# Patient Record
Sex: MALE | Race: WHITE | NOT HISPANIC OR LATINO | Employment: OTHER | URBAN - METROPOLITAN AREA
[De-identification: names, ages, dates, MRNs, and addresses within clinical notes are randomized per-mention and may not be internally consistent; named-entity substitution may affect disease eponyms.]

---

## 2017-01-04 ENCOUNTER — TELEPHONE (OUTPATIENT)
Dept: INTERNAL MEDICINE | Facility: CLINIC | Age: 72
End: 2017-01-04

## 2017-01-04 NOTE — TELEPHONE ENCOUNTER
Attempted to call patient back to inform [employer] form was signed by Dr ZURITA and faxed to [employer].  No answer, LVM.

## 2017-01-04 NOTE — TELEPHONE ENCOUNTER
----- Message from Gina Donovan sent at 1/4/2017 11:13 AM CST -----  Contact: wife-Ms Pringle  Mailed in form that needs to be completed and sent to [facility]. Please call back at

## 2017-01-04 NOTE — TELEPHONE ENCOUNTER
----- Message from Myra Shine sent at 1/4/2017  9:20 AM CST -----  Contact: self/[phone]  Pt called in regards to getting a npp appointment with the dr. I was unable to schedule.      Please advise

## 2017-01-06 ENCOUNTER — TELEPHONE (OUTPATIENT)
Dept: CARDIOLOGY | Facility: CLINIC | Age: 72
End: 2017-01-06

## 2017-01-06 NOTE — TELEPHONE ENCOUNTER
----- Message from Lidya Ruiz LPN sent at 1/6/2017 10:56 AM CST -----  Contact: Dee Pringle (Spouse)      ----- Message -----     From: Melinda Egan     Sent: 1/6/2017  10:47 AM       To: Lisseth Murray Staff    Message for Rosa, regarding status of paper work  Call back on#  [phone]  thanks

## 2017-01-06 NOTE — TELEPHONE ENCOUNTER
Spoke with wife. Paperwork was faxed to [facility] yesterday.  Informed her to call back for re-faxing if [facility] states they did not receive it.

## 2017-01-26 ENCOUNTER — OFFICE VISIT (OUTPATIENT)
Dept: INTERNAL MEDICINE | Facility: CLINIC | Age: 72
End: 2017-01-26
Payer: MEDICARE

## 2017-01-26 VITALS
RESPIRATION RATE: 14 BRPM | WEIGHT: 236.56 LBS | BODY MASS INDEX: 30.36 KG/M2 | HEART RATE: 52 BPM | HEIGHT: 74 IN | TEMPERATURE: 99 F | DIASTOLIC BLOOD PRESSURE: 76 MMHG | SYSTOLIC BLOOD PRESSURE: 184 MMHG

## 2017-01-26 DIAGNOSIS — Z12.11 COLON CANCER SCREENING: ICD-10-CM

## 2017-01-26 DIAGNOSIS — Z12.5 PROSTATE CANCER SCREENING: ICD-10-CM

## 2017-01-26 DIAGNOSIS — I73.9 PAD (PERIPHERAL ARTERY DISEASE): ICD-10-CM

## 2017-01-26 DIAGNOSIS — E66.9 DIABETES MELLITUS TYPE 2 IN OBESE: ICD-10-CM

## 2017-01-26 DIAGNOSIS — I73.9 PVD (PERIPHERAL VASCULAR DISEASE): ICD-10-CM

## 2017-01-26 DIAGNOSIS — E78.2 MIXED HYPERCHOLESTEROLEMIA AND HYPERTRIGLYCERIDEMIA: ICD-10-CM

## 2017-01-26 DIAGNOSIS — F33.0 MILD EPISODE OF RECURRENT MAJOR DEPRESSIVE DISORDER: ICD-10-CM

## 2017-01-26 DIAGNOSIS — F13.20 SEDATIVE DEPENDENCE: ICD-10-CM

## 2017-01-26 DIAGNOSIS — Z87.891 EX-SMOKER: ICD-10-CM

## 2017-01-26 DIAGNOSIS — E11.69 DIABETES MELLITUS TYPE 2 IN OBESE: ICD-10-CM

## 2017-01-26 DIAGNOSIS — I25.10 CORONARY ARTERY DISEASE DUE TO LIPID RICH PLAQUE: ICD-10-CM

## 2017-01-26 DIAGNOSIS — E11.9 DIABETIC EYE EXAM: ICD-10-CM

## 2017-01-26 DIAGNOSIS — Z01.00 DIABETIC EYE EXAM: ICD-10-CM

## 2017-01-26 DIAGNOSIS — I25.10 CORONARY ARTERY DISEASE INVOLVING NATIVE CORONARY ARTERY OF NATIVE HEART WITHOUT ANGINA PECTORIS: ICD-10-CM

## 2017-01-26 DIAGNOSIS — Z23 NEED FOR PROPHYLACTIC VACCINATION AND INOCULATION AGAINST INFLUENZA: ICD-10-CM

## 2017-01-26 DIAGNOSIS — F51.01 PRIMARY INSOMNIA: ICD-10-CM

## 2017-01-26 DIAGNOSIS — G47.00 INSOMNIA, UNSPECIFIED TYPE: ICD-10-CM

## 2017-01-26 DIAGNOSIS — I25.83 CORONARY ARTERY DISEASE DUE TO LIPID RICH PLAQUE: ICD-10-CM

## 2017-01-26 DIAGNOSIS — Z00.00 WELL ADULT EXAM: Primary | ICD-10-CM

## 2017-01-26 DIAGNOSIS — Z23 NEED FOR VACCINATION WITH 13-POLYVALENT PNEUMOCOCCAL CONJUGATE VACCINE: ICD-10-CM

## 2017-01-26 DIAGNOSIS — Z13.6 SCREENING FOR AAA (ABDOMINAL AORTIC ANEURYSM): ICD-10-CM

## 2017-01-26 DIAGNOSIS — R09.89 BILATERAL CAROTID BRUITS: ICD-10-CM

## 2017-01-26 DIAGNOSIS — I10 ESSENTIAL HYPERTENSION: ICD-10-CM

## 2017-01-26 PROCEDURE — 3078F DIAST BP <80 MM HG: CPT | Mod: S$GLB,,, | Performed by: FAMILY MEDICINE

## 2017-01-26 PROCEDURE — 99387 INIT PM E/M NEW PAT 65+ YRS: CPT | Mod: S$GLB,,, | Performed by: FAMILY MEDICINE

## 2017-01-26 PROCEDURE — 99999 PR PBB SHADOW E&M-EST. PATIENT-LVL IV: CPT | Mod: PBBFAC,,, | Performed by: FAMILY MEDICINE

## 2017-01-26 PROCEDURE — G0008 ADMIN INFLUENZA VIRUS VAC: HCPCS | Mod: 59,S$GLB,, | Performed by: FAMILY MEDICINE

## 2017-01-26 PROCEDURE — 90670 PCV13 VACCINE IM: CPT | Mod: S$GLB,,, | Performed by: FAMILY MEDICINE

## 2017-01-26 PROCEDURE — 3077F SYST BP >= 140 MM HG: CPT | Mod: S$GLB,,, | Performed by: FAMILY MEDICINE

## 2017-01-26 PROCEDURE — 90662 IIV NO PRSV INCREASED AG IM: CPT | Mod: S$GLB,,, | Performed by: FAMILY MEDICINE

## 2017-01-26 PROCEDURE — G0009 ADMIN PNEUMOCOCCAL VACCINE: HCPCS | Mod: 59,S$GLB,, | Performed by: FAMILY MEDICINE

## 2017-01-26 RX ORDER — CLOPIDOGREL BISULFATE 75 MG/1
75 TABLET ORAL DAILY
Qty: 90 TABLET | Refills: 3 | Status: SHIPPED | OUTPATIENT
Start: 2017-01-26 | End: 2018-02-01 | Stop reason: SDUPTHER

## 2017-01-26 RX ORDER — NIFEDIPINE 60 MG/1
60 TABLET, EXTENDED RELEASE ORAL DAILY
Qty: 30 TABLET | Refills: 11 | Status: SHIPPED | OUTPATIENT
Start: 2017-01-26 | End: 2017-05-17 | Stop reason: SDUPTHER

## 2017-01-26 RX ORDER — FLUOXETINE HYDROCHLORIDE 20 MG/1
20 CAPSULE ORAL DAILY
Qty: 90 CAPSULE | Refills: 3 | Status: SHIPPED | OUTPATIENT
Start: 2017-01-26 | End: 2017-02-07 | Stop reason: SDUPTHER

## 2017-01-26 RX ORDER — TEMAZEPAM 30 MG/1
30 CAPSULE ORAL NIGHTLY PRN
Qty: 30 CAPSULE | Refills: 0 | Status: SHIPPED | OUTPATIENT
Start: 2017-01-26 | End: 2017-05-17 | Stop reason: SDUPTHER

## 2017-01-26 RX ORDER — SIMVASTATIN 10 MG/1
10 TABLET, FILM COATED ORAL NIGHTLY
Qty: 90 TABLET | Refills: 3 | Status: SHIPPED | OUTPATIENT
Start: 2017-01-26 | End: 2017-02-07 | Stop reason: ALTCHOICE

## 2017-01-26 RX ORDER — METOPROLOL TARTRATE 100 MG/1
50 TABLET ORAL 2 TIMES DAILY
Qty: 90 TABLET | Refills: 1 | Status: SHIPPED | OUTPATIENT
Start: 2017-01-26 | End: 2017-07-31 | Stop reason: SDUPTHER

## 2017-01-26 RX ORDER — METFORMIN HYDROCHLORIDE 1000 MG/1
1000 TABLET ORAL 2 TIMES DAILY
Qty: 180 TABLET | Refills: 3 | Status: SHIPPED | OUTPATIENT
Start: 2017-01-26 | End: 2018-02-01 | Stop reason: SDUPTHER

## 2017-01-26 RX ORDER — LOSARTAN POTASSIUM 100 MG/1
100 TABLET ORAL DAILY
Qty: 90 TABLET | Refills: 3 | Status: SHIPPED | OUTPATIENT
Start: 2017-01-26 | End: 2017-10-30 | Stop reason: SDUPTHER

## 2017-01-26 NOTE — MR AVS SNAPSHOT
[facility]   [facility]  [place] [address]  Phone: [phone]  Fax: [phone]                  Cecil Pringle   2017 1:00 PM   Office Visit    Description:  Male : 1945   Provider:  Layo Jett MD   Department:  [facility]           Reason for Visit     Annual Exam           Diagnoses this Visit        Comments    Well adult exam    -  Primary     Coronary artery disease involving native coronary artery of native heart without angina pectoris         PVD (peripheral vascular disease)         PAD (peripheral artery disease)         Uncontrolled type 2 diabetes mellitus with diabetic peripheral angiopathy without gangrene, without long-term current use of insulin         Diabetes mellitus type 2 in obese         Type 2 diabetes, uncontrolled, with neuropathy         Mixed hypercholesterolemia and hypertriglyceridemia         Essential hypertension         Depression, unspecified depression type         Insomnia, unspecified type         Ex-smoker         Screening for AAA (abdominal aortic aneurysm)         Diabetic eye exam         Need for prophylactic vaccination and inoculation against influenza         Need for vaccination with 13-polyvalent pneumococcal conjugate vaccine         Colon cancer screening         Prostate cancer screening         Coronary artery disease due to lipid rich plaque         Primary insomnia                To Do List           To Schedule:     Please call the Endoscopy Department at [phone] to schedule your appointment.          Goals (5 Years of Data)     None      Follow-Up and Disposition     Return in about 1 month (around 2017), or if symptoms worsen or fail to improve, for HTN reevaluation.       These Medications        Disp Refills Start End    nifedipine 30 MG ORAL TR24 (PROCARDIA-XL) 60 MG (OSM) 24 hr tablet 30 tablet 11 2017    Take 1 tablet (60 mg total)  by mouth once daily. - Oral    Pharmacy: [address] Ph #: [phone]       temazepam (RESTORIL) 30 mg capsule 30 capsule 0 1/26/2017     Take 1 capsule (30 mg total) by mouth nightly as needed for Insomnia. - Oral    Pharmacy: [address] Ph #: [phone]       simvastatin (ZOCOR) 10 MG tablet 90 tablet 3 1/26/2017     Take 1 tablet (10 mg total) by mouth every evening. - Oral    Pharmacy: [address] Ph #: [phone]       metoprolol tartrate (LOPRESSOR) 100 MG tablet 90 tablet 1 1/26/2017     Take 0.5 tablets (50 mg total) by mouth 2 (two) times daily. - Oral    Pharmacy: [address] Ph #: [phone]       metformin (GLUCOPHAGE) 1000 MG tablet 180 tablet 3 1/26/2017     Take 1 tablet (1,000 mg total) by mouth 2 (two) times daily. - Oral    Pharmacy: [address] Ph #: [phone]       losartan (COZAAR) 100 MG tablet 90 tablet 3 1/26/2017 1/26/2018    Take 1 tablet (100 mg total) by mouth once daily. - Oral    Pharmacy: [address] Ph #: [phone]       fluoxetine (PROZAC) 20 MG capsule 90 capsule 3 1/26/2017 1/26/2018    Take 1 capsule (20 mg total) by mouth once daily. - Oral    Pharmacy: [address] Ph #: [phone]       clopidogrel (PLAVIX) 75 mg tablet 90 tablet 3 1/26/2017     Take 1 tablet (75 mg total) by mouth once daily. - Oral    Pharmacy: [address] Ph #: [phone]         [facility]     [facility] Care Line - 24/7 Assistance  Registered nurses in the [facility] provide clinical advisement, health education, appointment  booking, and other advisory services.  Call for this free service at [phone].             Medications           Message regarding Medications     Verify the changes and/or additions to your medication regime listed below are the same as discussed with your clinician today.  If any of these changes or additions are incorrect, please notify your healthcare provider.        CHANGE how you are taking these medications     Start Taking Instead of    nifedipine 30 MG ORAL TR24 (PROCARDIA-XL) 60 MG (OSM) 24 hr tablet nifedipine 30 MG ORAL TR24 (PROCARDIA-XL) 30 MG (OSM) 24 hr tablet    Dosage:  Take 1 tablet (60 mg total) by mouth once daily. Dosage:  Take 1 tablet (30 mg total) by mouth once daily.    Reason for Change:  Reorder     temazepam (RESTORIL) 30 mg capsule temazepam (RESTORIL) 30 mg capsule    Dosage:  Take 1 capsule (30 mg total) by mouth nightly as needed for Insomnia. Dosage:  Take 1 capsule (30 mg total) by mouth nightly as needed.    Reason for Change:  Reorder            Verify that the below list of medications is an accurate representation of the medications you are currently taking.  If none reported, the list may be blank. If incorrect, please contact your healthcare provider. Carry this list with you in case of emergency.           Current Medications     aspirin 325 MG tablet Take 325 mg by mouth once daily.    clopidogrel (PLAVIX) 75 mg tablet Take 1 tablet (75 mg total) by mouth once daily.    co-enzyme Q-10 30 mg capsule Take 30 mg by mouth 3 (three) times daily.    fish oil-omega-3 fatty acids 300-1,000 mg capsule Take 1 g by mouth 3 (three) times daily.     fluoxetine (PROZAC) 20 MG capsule Take 1 capsule (20 mg total) by mouth once daily.    losartan (COZAAR) 100 MG tablet Take 1 tablet (100 mg total) by mouth once daily.    metformin (GLUCOPHAGE) 1000 MG tablet Take 1 tablet (1,000 mg total) by mouth 2 (two) times daily.    metoprolol tartrate (LOPRESSOR) 100 MG tablet Take 0.5 tablets  "(50 mg total) by mouth 2 (two) times daily.    multivit-iron-min-folic acid (MULTIVITAMIN-IRON-MINERALS-FOLIC ACID) 3,500-18-0.4 unit-mg-mg Chew Take by mouth.      nifedipine 30 MG ORAL TR24 (PROCARDIA-XL) 60 MG (OSM) 24 hr tablet Take 1 tablet (60 mg total) by mouth once daily.    simvastatin (ZOCOR) 10 MG tablet Take 1 tablet (10 mg total) by mouth every evening.    temazepam (RESTORIL) 30 mg capsule Take 1 capsule (30 mg total) by mouth nightly as needed for Insomnia.           Clinical Reference Information           Vital Signs - Last Recorded  Most recent update: 1/26/2017 12:55 PM by Geetha Lovett    BP Pulse Temp Resp Ht Wt    (!) 184/76 (BP Location: Right arm, Patient Position: Sitting, BP Method: Manual) (!) 52 98.7 °F (37.1 °C) (Oral) 14 6' 2" (1.88 m) 107.3 kg (236 lb 8.9 oz)    BMI                30.37 kg/m2          Blood Pressure          Most Recent Value    BP  (!)  184/76      Allergies as of 1/26/2017     No Known Allergies      Immunizations Administered on Date of Encounter - 1/26/2017     Name Date Dose VIS Date Route    Pneumococcal Conjugate - 13 Valent  Incomplete 0.5 mL 11/5/2015 Intramuscular      Orders Placed During Today's Visit      Normal Orders This Visit    Ambulatory Referral to Cardiology     Ambulatory Referral to Ophthalmology     Ambulatory Referral to Podiatry     Ambulatory Referral to Psychiatry     Case request GI: COLONOSCOPY     Pneumococcal Conjugate Vaccine (13 Valent) (IM)     Future Labs/Procedures Expected by Expires    CBC auto differential  1/26/2017 1/26/2018    Comprehensive metabolic panel  1/26/2017 1/26/2018    Hemoglobin A1c  1/26/2017 3/27/2018    Lipid panel  1/26/2017 1/26/2018    Microalbumin/creatinine urine ratio  1/26/2017 3/27/2018    PSA, Screening  1/26/2017 1/26/2018    T4, free  1/26/2017 1/26/2018    TSH  1/26/2017 1/26/2018    Urinalysis  1/26/2017 1/26/2018    Cardiology Lab Abdominal Aorta Evaluation  As directed 1/26/2018      "

## 2017-01-26 NOTE — PROGRESS NOTES
Subjective:       Patient ID: Cecil Pringle is a 71 y.o. male.    Chief Complaint: Annual Exam (new patient)    HPI 71-year-old white male former patient of Dr. Montanez presents to clinic today to establish care.  He has been treated predominantly for coronary artery disease status post CABG in 1990, 5 stent placement in 2013, and repeat CABG in 2014.  He has been stable on aspirin and Plavix.  He is being treated for hyperlipidemia with simvastatin 10 mg daily.  Blood pressure is uncontrolled at this time despite treatment with losartan, metoprolol, and nifedipine.  He has also been noted to have peripheral arterial disease per lower extremity arterial Dopplers.  He is an ex-smoker but has never had AAA screening ultrasound.  He is also been treated for type 2 diabetes with last hemoglobin A1c of 7.3 on metformin 1000 mg twice a day.  He continues to complain of bilateral lower extremity numbness.  He reports significant anxiety and depression despite being on Prozac.  He reports increased stress with marital difficulties.  He continues to use temazepam nightly secondary to frequent insomnia.  He reports a past surgical history significant for CABG in 1990 and repeat CABG in 2014.  5 coronary stents were placed in 2013.  He had also had a right hand surgery secondary to Dupuytren's contracture and left foot surgery.  He has a very strong family history of heart disease in multiple family members.  His mother passed away from asbestosis.  He is not up-to-date with eye exam or any vaccinations.  Review of Systems   Constitutional: Negative for appetite change, chills, fatigue and fever.   HENT: Negative for congestion, ear pain, hearing loss, postnasal drip, rhinorrhea, sinus pressure, sore throat and tinnitus.    Eyes: Positive for visual disturbance. Negative for redness and itching.   Respiratory: Negative for cough, chest tightness and shortness of breath.    Cardiovascular: Negative for chest pain and  palpitations.   Gastrointestinal: Negative for abdominal pain, constipation, diarrhea, nausea and vomiting.   Genitourinary: Positive for frequency (3 times per night). Negative for decreased urine volume, difficulty urinating, dysuria, hematuria and urgency.   Musculoskeletal: Negative for back pain, myalgias, neck pain and neck stiffness.   Skin: Negative for rash.   Neurological: Positive for numbness (feet). Negative for dizziness, light-headedness and headaches.   Psychiatric/Behavioral: Positive for confusion, decreased concentration, dysphoric mood and sleep disturbance. The patient is nervous/anxious.        Objective:      Physical Exam   Constitutional: He is oriented to person, place, and time. He appears well-developed and well-nourished. No distress.   HENT:   Head: Normocephalic and atraumatic.   Right Ear: External ear normal.   Left Ear: External ear normal.   Nose: Nose normal.   Mouth/Throat: Oropharynx is clear and moist. No oropharyngeal exudate.   Eyes: Conjunctivae and EOM are normal. Pupils are equal, round, and reactive to light. Right eye exhibits no discharge. Left eye exhibits no discharge. No scleral icterus.   Neck: Normal range of motion. Neck supple. No JVD present. No tracheal deviation present. No thyromegaly present.   Cardiovascular: Normal rate, regular rhythm, normal heart sounds and intact distal pulses.  Exam reveals no gallop and no friction rub.    No murmur heard.  Pulses:       Carotid pulses are on the right side with bruit, and on the left side with bruit.       Dorsalis pedis pulses are 1+ on the right side, and 1+ on the left side.        Posterior tibial pulses are 1+ on the right side, and 1+ on the left side.   Pulmonary/Chest: Effort normal and breath sounds normal. No stridor. No respiratory distress. He has no wheezes. He has no rales.   Abdominal: Soft. Bowel sounds are normal. He exhibits no distension and no mass. There is no tenderness. There is no rebound and  no guarding.   Genitourinary: Rectum normal and prostate normal.   Musculoskeletal: Normal range of motion. He exhibits no edema or tenderness.        Right foot: There is normal range of motion and no deformity.        Left foot: There is normal range of motion and no deformity.   Feet:   Right Foot:   Protective Sensation: 10 sites tested. 5 sites sensed.   Skin Integrity: Negative for ulcer, blister, skin breakdown, erythema, warmth, callus or dry skin.   Left Foot:   Protective Sensation: 10 sites tested. 5 sites sensed.   Skin Integrity: Negative for ulcer, blister, skin breakdown, erythema, warmth, callus or dry skin.   Lymphadenopathy:     He has no cervical adenopathy.   Neurological: He is alert and oriented to person, place, and time.   Skin: Skin is warm and dry. No rash noted. He is not diaphoretic. No erythema. No pallor.   Psychiatric: He has a normal mood and affect. His behavior is normal. Judgment and thought content normal.   Nursing note and vitals reviewed.      Assessment:       1. Well adult exam    2. Coronary artery disease involving native coronary artery of native heart without angina pectoris    3. PVD (peripheral vascular disease)    4. PAD (peripheral artery disease)    5. Uncontrolled type 2 diabetes mellitus with diabetic peripheral angiopathy without gangrene, without long-term current use of insulin    6. Diabetes mellitus type 2 in obese    7. Type 2 diabetes, uncontrolled, with neuropathy    8. Mixed hypercholesterolemia and hypertriglyceridemia    9. Essential hypertension    10. Insomnia, unspecified type    11. Ex-smoker    12. Screening for AAA (abdominal aortic aneurysm)    13. Diabetic eye exam    14. Coronary artery disease due to lipid rich plaque    15. Primary insomnia    16. Sedative dependence    17. Bilateral carotid bruits    18. Mild episode of recurrent major depressive disorder    19. Need for prophylactic vaccination and inoculation against influenza    20. Need  for vaccination with 13-polyvalent pneumococcal conjugate vaccine    21. Colon cancer screening    22. Prostate cancer screening        Plan:       1.  CBC, CMP, UA, TSH, free T4, fasting lipids, hemoglobin A1c, urine microalbumin to creatinine ratio, and PSA.  2.  Continue metoprolol and losartan as prescribed and increase dose of nifedipine to 60 mg daily secondary to continued hypertension.  3.  Refer to cardiology to establish care with new cardiologist.  4.  Continue aspirin and Plavix as prescribed.  5.  AAA screening ultrasound.  6.  Continue metformin 1000 mg twice a day.  7.  Refer to podiatry for diabetic foot exam secondary to neuropathy.  8.  Continue Prozac 20 mg daily.  Refer to psychiatry for further evaluation of depression.  9.  Continue temazepam as needed and refer to psychiatry for further evaluation of chronic insomnia and sedative dependence.  10.  Continue simvastatin 10 mg daily.  Hyperlipidemia stable.  11.  Flu vaccine given.  12.  Prevnar 13 vaccine given.  13.  Refer to ophthalmology for diabetic eye exam.  14.  Bilateral carotid Dopplers secondary to carotid bruit.  15.  Screening colonoscopy.  16.  Return to clinic as needed or in 1 month for hypertension reevaluation.

## 2017-01-30 ENCOUNTER — LAB VISIT (OUTPATIENT)
Dept: LAB | Facility: HOSPITAL | Age: 72
End: 2017-01-30
Attending: FAMILY MEDICINE
Payer: MEDICARE

## 2017-01-30 DIAGNOSIS — Z01.00 DIABETIC EYE EXAM: ICD-10-CM

## 2017-01-30 DIAGNOSIS — E78.2 MIXED HYPERCHOLESTEROLEMIA AND HYPERTRIGLYCERIDEMIA: ICD-10-CM

## 2017-01-30 DIAGNOSIS — E11.69 DIABETES MELLITUS TYPE 2 IN OBESE: ICD-10-CM

## 2017-01-30 DIAGNOSIS — Z13.6 SCREENING FOR AAA (ABDOMINAL AORTIC ANEURYSM): ICD-10-CM

## 2017-01-30 DIAGNOSIS — Z12.5 PROSTATE CANCER SCREENING: ICD-10-CM

## 2017-01-30 DIAGNOSIS — Z12.11 COLON CANCER SCREENING: ICD-10-CM

## 2017-01-30 DIAGNOSIS — I73.9 PAD (PERIPHERAL ARTERY DISEASE): ICD-10-CM

## 2017-01-30 DIAGNOSIS — E11.9 DIABETIC EYE EXAM: ICD-10-CM

## 2017-01-30 DIAGNOSIS — Z23 NEED FOR VACCINATION WITH 13-POLYVALENT PNEUMOCOCCAL CONJUGATE VACCINE: ICD-10-CM

## 2017-01-30 DIAGNOSIS — E66.9 DIABETES MELLITUS TYPE 2 IN OBESE: ICD-10-CM

## 2017-01-30 DIAGNOSIS — I73.9 PVD (PERIPHERAL VASCULAR DISEASE): ICD-10-CM

## 2017-01-30 DIAGNOSIS — Z23 NEED FOR PROPHYLACTIC VACCINATION AND INOCULATION AGAINST INFLUENZA: ICD-10-CM

## 2017-01-30 DIAGNOSIS — I25.10 CORONARY ARTERY DISEASE INVOLVING NATIVE CORONARY ARTERY OF NATIVE HEART WITHOUT ANGINA PECTORIS: ICD-10-CM

## 2017-01-30 DIAGNOSIS — I10 ESSENTIAL HYPERTENSION: ICD-10-CM

## 2017-01-30 DIAGNOSIS — G47.00 INSOMNIA, UNSPECIFIED TYPE: ICD-10-CM

## 2017-01-30 DIAGNOSIS — Z00.00 WELL ADULT EXAM: ICD-10-CM

## 2017-01-30 DIAGNOSIS — Z87.891 EX-SMOKER: ICD-10-CM

## 2017-01-30 LAB
ALBUMIN SERPL BCP-MCNC: 4 G/DL
ALP SERPL-CCNC: 65 U/L
ALT SERPL W/O P-5'-P-CCNC: 30 U/L
ANION GAP SERPL CALC-SCNC: 9 MMOL/L
AST SERPL-CCNC: 24 U/L
BASOPHILS # BLD AUTO: 0.03 K/UL
BASOPHILS NFR BLD: 0.3 %
BILIRUB SERPL-MCNC: 0.4 MG/DL
BUN SERPL-MCNC: 14 MG/DL
CALCIUM SERPL-MCNC: 9 MG/DL
CHLORIDE SERPL-SCNC: 104 MMOL/L
CHOLEST/HDLC SERPL: 4.5 {RATIO}
CO2 SERPL-SCNC: 25 MMOL/L
COMPLEXED PSA SERPL-MCNC: 0.78 NG/ML
CREAT SERPL-MCNC: 1 MG/DL
DIFFERENTIAL METHOD: ABNORMAL
EOSINOPHIL # BLD AUTO: 0.2 K/UL
EOSINOPHIL NFR BLD: 2.2 %
ERYTHROCYTE [DISTWIDTH] IN BLOOD BY AUTOMATED COUNT: 14.7 %
EST. GFR  (AFRICAN AMERICAN): >60 ML/MIN/1.73 M^2
EST. GFR  (NON AFRICAN AMERICAN): >60 ML/MIN/1.73 M^2
GLUCOSE SERPL-MCNC: 176 MG/DL
HCT VFR BLD AUTO: 44.6 %
HDL/CHOLESTEROL RATIO: 22.4 %
HDLC SERPL-MCNC: 147 MG/DL
HDLC SERPL-MCNC: 33 MG/DL
HGB BLD-MCNC: 15.1 G/DL
LDLC SERPL CALC-MCNC: ABNORMAL MG/DL
LYMPHOCYTES # BLD AUTO: 3 K/UL
LYMPHOCYTES NFR BLD: 31.2 %
MCH RBC QN AUTO: 29.5 PG
MCHC RBC AUTO-ENTMCNC: 33.9 %
MCV RBC AUTO: 87 FL
MONOCYTES # BLD AUTO: 0.6 K/UL
MONOCYTES NFR BLD: 6.6 %
NEUTROPHILS # BLD AUTO: 5.8 K/UL
NEUTROPHILS NFR BLD: 59.1 %
NONHDLC SERPL-MCNC: 114 MG/DL
PLATELET # BLD AUTO: 213 K/UL
PMV BLD AUTO: 11.8 FL
POTASSIUM SERPL-SCNC: 4.5 MMOL/L
PROT SERPL-MCNC: 7.6 G/DL
RBC # BLD AUTO: 5.12 M/UL
SODIUM SERPL-SCNC: 138 MMOL/L
T4 FREE SERPL-MCNC: 1.01 NG/DL
TRIGL SERPL-MCNC: 410 MG/DL
TSH SERPL DL<=0.005 MIU/L-ACNC: 1.01 UIU/ML
WBC # BLD AUTO: 9.73 K/UL

## 2017-01-30 PROCEDURE — 80053 COMPREHEN METABOLIC PANEL: CPT

## 2017-01-30 PROCEDURE — 84153 ASSAY OF PSA TOTAL: CPT

## 2017-01-30 PROCEDURE — 36415 COLL VENOUS BLD VENIPUNCTURE: CPT | Mod: PO

## 2017-01-30 PROCEDURE — 85025 COMPLETE CBC W/AUTO DIFF WBC: CPT

## 2017-01-30 PROCEDURE — 84439 ASSAY OF FREE THYROXINE: CPT

## 2017-01-30 PROCEDURE — 83036 HEMOGLOBIN GLYCOSYLATED A1C: CPT

## 2017-01-30 PROCEDURE — 84443 ASSAY THYROID STIM HORMONE: CPT

## 2017-01-30 PROCEDURE — 80061 LIPID PANEL: CPT

## 2017-01-31 ENCOUNTER — PATIENT MESSAGE (OUTPATIENT)
Dept: INTERNAL MEDICINE | Facility: CLINIC | Age: 72
End: 2017-01-31

## 2017-01-31 DIAGNOSIS — E66.9 DIABETES MELLITUS TYPE 2 IN OBESE: ICD-10-CM

## 2017-01-31 DIAGNOSIS — E11.69 DIABETES MELLITUS TYPE 2 IN OBESE: ICD-10-CM

## 2017-01-31 LAB
ESTIMATED AVG GLUCOSE: 186 MG/DL
HBA1C MFR BLD HPLC: 8.1 %

## 2017-02-03 ENCOUNTER — OFFICE VISIT (OUTPATIENT)
Dept: OPTOMETRY | Facility: CLINIC | Age: 72
End: 2017-02-03
Payer: MEDICARE

## 2017-02-03 DIAGNOSIS — E11.9 TYPE 2 DIABETES MELLITUS WITHOUT RETINOPATHY: Primary | ICD-10-CM

## 2017-02-03 DIAGNOSIS — Z13.5 GLAUCOMA SCREENING: ICD-10-CM

## 2017-02-03 DIAGNOSIS — H25.13 NUCLEAR SCLEROSIS, BILATERAL: ICD-10-CM

## 2017-02-03 DIAGNOSIS — H52.203 ASTIGMATISM WITH PRESBYOPIA, BILATERAL: ICD-10-CM

## 2017-02-03 DIAGNOSIS — H52.4 ASTIGMATISM WITH PRESBYOPIA, BILATERAL: ICD-10-CM

## 2017-02-03 PROCEDURE — 92015 DETERMINE REFRACTIVE STATE: CPT | Mod: S$GLB,,, | Performed by: OPTOMETRIST

## 2017-02-03 PROCEDURE — 92004 COMPRE OPH EXAM NEW PT 1/>: CPT | Mod: S$GLB,,, | Performed by: OPTOMETRIST

## 2017-02-03 PROCEDURE — 99999 PR PBB SHADOW E&M-EST. PATIENT-LVL II: CPT | Mod: PBBFAC,,, | Performed by: OPTOMETRIST

## 2017-02-03 NOTE — LETTER
February 3, 2017      Layo Jett MD  [address]  [place] [address]           [place] [facility]  [address] [facility]  [place] [address]  Phone: [phone]  Fax: [phone]          Patient: Cecil Pringle   MR Number: [id number]   YOB: 1945   Date of Visit: 2/3/2017       Dear Dr. Layo Jett:    Thank you for referring Cecil Pringle to me for evaluation. Attached you will find relevant portions of my assessment and plan of care.    If you have questions, please do not hesitate to call me. I look forward to following Cecil Pringle along with you.    Sincerely,    Jonatan Buckley, OD    Enclosure  CC:  No Recipients    If you would like to receive this communication electronically, please contact [email or web address] or [phone] to request more information on [employer] access.    For providers and/or their staff who would like to refer a patient to [facility], please contact us through our one-stop-shop provider referral line, [facility], at [phone].    If you feel you have received this communication in error or would no longer like to receive these types of communications, please e-mail [email or web address]

## 2017-02-03 NOTE — PROGRESS NOTES
HPI     NILESH: 2 years ago   Pt states near va has decreased. Denies f/f  Pt states BS does fluctuates.    No gtts     LBS= 138 (2/2/2017)  Hemoglobin A1C       Date                     Value               Ref Range             Status                01/30/2017               8.1 (H)             4.5 - 6.2 %           Final                  11/04/2015               7.3 (H)             4.5 - 6.2 %           Final                 11/08/2013               6.6 (H)             4.5 - 6.2 %           Final            ----------         Last edited by Nery Graham on 2/3/2017  8:00 AM.     ROS     Positive for: Endocrine, Cardiovascular    Negative for: Constitutional, Gastrointestinal, Neurological, Skin,   Genitourinary, Musculoskeletal, HENT, Eyes, Respiratory, Psychiatric,   Allergic/Imm, Heme/Lymph    Last edited by Jonatan Buckley, OD on 2/3/2017  8:20 AM. (History)        Assessment /Plan     For exam results, see Encounter Report.    Type 2 diabetes mellitus without retinopathy    Nuclear sclerosis, bilateral    Glaucoma screening    Astigmatism with presbyopia, bilateral      1. Cat OU--wrote new spex Rx  2. DM- WITHOUT RETINOPATHY.  Advised yearly DFE    PLAN:    rtc 1 yr

## 2017-02-04 ENCOUNTER — TELEPHONE (OUTPATIENT)
Dept: GASTROENTEROLOGY | Facility: CLINIC | Age: 72
End: 2017-02-04

## 2017-02-04 NOTE — TELEPHONE ENCOUNTER
home 2/1, 2/4 to call office to schedule an appointment prior to scheduling colonoscopy due to the Plavix pt is taking.

## 2017-02-07 ENCOUNTER — TELEPHONE (OUTPATIENT)
Dept: CARDIOLOGY | Facility: CLINIC | Age: 72
End: 2017-02-07

## 2017-02-07 ENCOUNTER — OFFICE VISIT (OUTPATIENT)
Dept: CARDIOLOGY | Facility: CLINIC | Age: 72
End: 2017-02-07
Payer: MEDICARE

## 2017-02-07 VITALS
WEIGHT: 232.94 LBS | BODY MASS INDEX: 28.96 KG/M2 | HEART RATE: 52 BPM | DIASTOLIC BLOOD PRESSURE: 66 MMHG | HEIGHT: 75 IN | SYSTOLIC BLOOD PRESSURE: 152 MMHG

## 2017-02-07 DIAGNOSIS — I25.10 CORONARY ARTERY DISEASE INVOLVING NATIVE CORONARY ARTERY OF NATIVE HEART WITHOUT ANGINA PECTORIS: Primary | ICD-10-CM

## 2017-02-07 DIAGNOSIS — I10 ESSENTIAL HYPERTENSION: ICD-10-CM

## 2017-02-07 DIAGNOSIS — E78.2 MIXED HYPERLIPIDEMIA: ICD-10-CM

## 2017-02-07 DIAGNOSIS — Z95.1 S/P CABG X 5: ICD-10-CM

## 2017-02-07 DIAGNOSIS — E11.8 TYPE 2 DIABETES MELLITUS WITH COMPLICATION, WITHOUT LONG-TERM CURRENT USE OF INSULIN: ICD-10-CM

## 2017-02-07 DIAGNOSIS — I35.0 NONRHEUMATIC AORTIC VALVE STENOSIS: ICD-10-CM

## 2017-02-07 PROCEDURE — 3077F SYST BP >= 140 MM HG: CPT | Mod: S$GLB,,, | Performed by: INTERNAL MEDICINE

## 2017-02-07 PROCEDURE — 3078F DIAST BP <80 MM HG: CPT | Mod: S$GLB,,, | Performed by: INTERNAL MEDICINE

## 2017-02-07 PROCEDURE — 99499 UNLISTED E&M SERVICE: CPT | Mod: S$GLB,,, | Performed by: INTERNAL MEDICINE

## 2017-02-07 PROCEDURE — 1157F ADVNC CARE PLAN IN RCRD: CPT | Mod: S$GLB,,, | Performed by: INTERNAL MEDICINE

## 2017-02-07 PROCEDURE — 99999 PR PBB SHADOW E&M-EST. PATIENT-LVL III: CPT | Mod: PBBFAC,,, | Performed by: INTERNAL MEDICINE

## 2017-02-07 PROCEDURE — 1160F RVW MEDS BY RX/DR IN RCRD: CPT | Mod: S$GLB,,, | Performed by: INTERNAL MEDICINE

## 2017-02-07 PROCEDURE — 1159F MED LIST DOCD IN RCRD: CPT | Mod: S$GLB,,, | Performed by: INTERNAL MEDICINE

## 2017-02-07 PROCEDURE — 1126F AMNT PAIN NOTED NONE PRSNT: CPT | Mod: S$GLB,,, | Performed by: INTERNAL MEDICINE

## 2017-02-07 PROCEDURE — 99215 OFFICE O/P EST HI 40 MIN: CPT | Mod: S$GLB,,, | Performed by: INTERNAL MEDICINE

## 2017-02-07 PROCEDURE — 93000 ELECTROCARDIOGRAM COMPLETE: CPT | Mod: S$GLB,,, | Performed by: INTERNAL MEDICINE

## 2017-02-07 RX ORDER — FLUOXETINE HYDROCHLORIDE 20 MG/1
20 CAPSULE ORAL DAILY
Qty: 90 CAPSULE | Refills: 3 | Status: SHIPPED | OUTPATIENT
Start: 2017-02-07 | End: 2018-03-15 | Stop reason: SDUPTHER

## 2017-02-07 RX ORDER — ATORVASTATIN CALCIUM 40 MG/1
40 TABLET, FILM COATED ORAL DAILY
Qty: 90 TABLET | Refills: 3 | Status: SHIPPED | OUTPATIENT
Start: 2017-02-07 | End: 2018-02-01 | Stop reason: SDUPTHER

## 2017-02-07 NOTE — MR AVS SNAPSHOT
[facility]   [facility]  [place] [address]  Phone: [phone]                  Cecil Pringle   2017 8:00 AM   Office Visit    Description:  Male : 1945   Provider:  Nory May MD   Department:  [facility]           Reason for Visit     Coronary Artery Disease           Diagnoses this Visit        Comments    Coronary artery disease involving native coronary artery of native heart without angina pectoris    -  Primary     S/P CABG x 5                To Do List           Future Appointments        Provider Department Dept Phone    2017 8[facility] [phone]    [facility] [phone]    2017 10:30 AM Sierra Mcgrath DPM [facility] [phone]    2017 7:40 AM Layo Jett MD [facility] [phone]    2017 8:00 AM LAB, [facility] - Laboratory [phone]      Goals (5 Years of Data)     None       These Medications        Disp Refills Start End    atorvastatin (LIPITOR) 40 MG tablet 90 tablet 3 2017     Take 1 tablet (40 mg total) by mouth once daily. - Oral    Pharmacy: [facility] NEISHA GANN  [address] Ph #: [phone]         [facility] On Call     [facility] On Call Nurse Care Line -  Assistance  Registered nurses in the [facility] provide clinical advisement, health education, appointment booking, and other advisory services.  Call for this free service at [phone].             Medications           Message regarding Medications     Verify the changes and/or additions to your medication regime listed below are the same as discussed with your clinician today.  If any of these changes or additions are incorrect, please notify your healthcare provider.        START taking these NEW medications        Refills     "atorvastatin (LIPITOR) 40 MG tablet 3    Sig: Take 1 tablet (40 mg total) by mouth once daily.    Class: Normal    Route: Oral      STOP taking these medications     simvastatin (ZOCOR) 10 MG tablet Take 1 tablet (10 mg total) by mouth every evening.           Verify that the below list of medications is an accurate representation of the medications you are currently taking.  If none reported, the list may be blank. If incorrect, please contact your healthcare provider. Carry this list with you in case of emergency.           Current Medications     aspirin 325 MG tablet Take 325 mg by mouth once daily.    clopidogrel (PLAVIX) 75 mg tablet Take 1 tablet (75 mg total) by mouth once daily.    co-enzyme Q-10 30 mg capsule Take 30 mg by mouth once daily.     fish oil-omega-3 fatty acids 300-1,000 mg capsule Take 2 g by mouth 3 (three) times daily.     fluoxetine (PROZAC) 20 MG capsule Take 1 capsule (20 mg total) by mouth once daily.    losartan (COZAAR) 100 MG tablet Take 1 tablet (100 mg total) by mouth once daily.    metformin (GLUCOPHAGE) 1000 MG tablet Take 1 tablet (1,000 mg total) by mouth 2 (two) times daily.    metoprolol tartrate (LOPRESSOR) 100 MG tablet Take 0.5 tablets (50 mg total) by mouth 2 (two) times daily.    multivit-iron-min-folic acid (MULTIVITAMIN-IRON-MINERALS-FOLIC ACID) 3,500-18-0.4 unit-mg-mg Chew Take by mouth.      nifedipine 30 MG ORAL TR24 (PROCARDIA-XL) 60 MG (OSM) 24 hr tablet Take 1 tablet (60 mg total) by mouth once daily.    SAW PALMETTO ORAL Take 1 capsule by mouth 2 (two) times daily.    SITagliptan (JANUVIA) 100 MG Tab Take 1 tablet (100 mg total) by mouth once daily.    temazepam (RESTORIL) 30 mg capsule Take 1 capsule (30 mg total) by mouth nightly as needed for Insomnia.    atorvastatin (LIPITOR) 40 MG tablet Take 1 tablet (40 mg total) by mouth once daily.           Clinical Reference Information           Your Vitals Were     BP Pulse Height Weight BMI    152/66 52 6' 3" " (1.905 m) 105.7 kg (232 lb 14.7 oz) 29.11 kg/m2      Blood Pressure          Most Recent Value    BP  (!)  152/66      Allergies as of 2/7/2017     No Known Allergies      Immunizations Administered on Date of Encounter - 2/7/2017     None      Orders Placed During Today's Visit      Normal Orders This Visit    IN OFFICE EKG 12-LEAD (to [place])     Future Labs/Procedures Expected by Expires    IN OFFICE EKG 12-LEAD (to [place])  As directed 2/7/2018      Instructions    LDL - bad type - improves with diet and medications: typically statins; most other medications can lower LDL but have not been proven to prevent heart attacks.             May not improve significantly with exercise alone.             Ideally less than 70     HDL - good type - improves with exercise             Ideally greater than 50    TGs (triglycerides) - also bad - can change very quickly and considerably with food - improves with diet and exercise            In some cases a low carbohydrate diet will lower TGs better than a low fat diet.            Ideal range       Sugar, fat and cholesterol in food:     Current dietary guidelines recommend drastically reducing our intake of sugar. There is less emphasis on fat. And cholesterol in our food is no longer a significant concern. However please do not confuse this with the role of cholesterol in our blood and arteries. It is still cholesterol that clogs up our arteries whether it comes from our food or is manufactured by our bodies.       Most foods that are high in cholesterol are also high in saturated fat. But there is way more saturated fat than cholesterol in these foods. On the other hand there are a handful of foods that are high in cholesterol but do not contain much saturated fat: eggs, shrimp, crab legs and crawfish; these are OK to eat.       Saturated fat is the bad fat - you should limit your intake of this. Deep fried foods, meats and other animal fats are high in saturated fat.  Cookies, donuts and most dessert and cakes are usually high in both saturated fat and sugar.       Unsaturated fat is the good fat. It contains the same number of calories as saturated fat but does not get deposited in our arteries. The Mediterranean style diet encourages the intake of unsaturated fat - olive oil, avocado and unsalted nuts.      You should eat a few servings of vegetables (and fruit as long as you are not diabetic) everyday. Substitute some plant proteins in place of meat: soy, beans, lentils, quinoa and oatmeal.     Do not use stick butter or stick margarine. Butter that comes in a tub is soft butter. It consists of 1/2 butter and 1/2 canola or another type of vegetable oil. It is fine to use that.       Trans fats should also be avoided. Most foods that are labelled as containing 0 gms of trans fat can still contain several hundred milligrams of trans fat: creamer, margarine, refrigerator dough, deep fried foods, ready made frosting, potato, corn and torilla chips, cakes, cookies, pie crusts and crackers containing shortening made with hydrogenated vegetable oil.           Language Assistance Services     ATTENTION: Language assistance services are available, free of charge. Please call [phone].      ATENCIÓN: Si kandicela brad, tiene a malave disposición servicios gratuitos de asistencia lingüística. Llame al [phone].     CHÚ Ý: N?u b?n nói Ti?ng Vi?t, có các d?ch v? h? tr? ngôn ng? mi?n phí dành cho b?n. G?i s? [phone].         [facility] complies with applicable Federal civil rights laws and does not discriminate on the basis of race, color, national origin, age, disability, or sex.

## 2017-02-07 NOTE — TELEPHONE ENCOUNTER
Nelida with [facility] pharmacy notified to cancel prescription for Simvastatin 10mg listed on pt's profile.

## 2017-02-07 NOTE — LETTER
February 13, 2017      Layo Jett MD  [address]  [place] [address]           [place] [facility]  [address]  [place] [address]  Phone: [phone]          Patient: Cecil Pringle   MR Number: [id number]   YOB: 1945   Date of Visit: 2/7/2017       Dear Dr. Layo Jett:    Thank you for referring Cecil Pringle to me for evaluation. Attached you will find relevant portions of my assessment and plan of care.    If you have questions, please do not hesitate to call me. I look forward to following Cecil Pringle along with you.    Sincerely,    Nory May MD    Enclosure  CC:  No Recipients    If you would like to receive this communication electronically, please contact [email or web address] or [phone] to request more information on [facility] access.    For providers and/or their staff who would like to refer a patient to [facility], please contact us through our one-stop-shop provider referral line, [facility], at [phone].    If you feel you have received this communication in error or would no longer like to receive these types of communications, please e-mail [email or web address]

## 2017-02-07 NOTE — ASSESSMENT & PLAN NOTE
Triglycerides elevated.   Cholesterol education.  Nutritional counseling.  Switch simvastatin to atorvastatin 40 mg. If triglycerides remain elevated despite good glycemic control, will add a fibrate.  Will discuss w Dr. Beckett if treatment w liraglutide is an option, since he had improvement in his glycemic control and lipids which on it.

## 2017-02-07 NOTE — PROGRESS NOTES
Subjective:     Patient ID:  Cecil Pringle is a 71 y.o. male who presents for evaluation of Coronary Artery Disease      Problem List:  DM w asymptomatic PAD  CAD  CABG in 1990s and 2014  HTN  Mixed hyperlipidemia  Aortic stenosis      HPI:     Cecil Pringle is here to establish care with a cardiologist on the [place]. He has been following with  Dr. Montanez since the 1990s. He had 5 vessel CABG in the 1990s. In 2011 he had 5 stents and in 2014 he underwent a second  CABG x 4 at [facility].  He does not report angina or shortness of breath with exertion. He has not required S/L NTG.  He does not recall having angina prior to any of the coronary interventions.   Stress echo 2/15: normal LV systolic function, mild aortic stenosis, valve area 1.7 cm2. No ischemia but his peak heart rate was in the 90s, 63% of max predicted.  He has a mixed hyperlipidemia. He is currently treated with low dose simvastatin. He does not recall taking a fibrate for the elevated triglycerides. He recalls that he entered a study for diabetes using injectible liraglutide and during the treatment phase his blood sugars and lipids normalized.  He recalls having a febrile illness as a child followed by a heart murmur. He has aortic stenosis but does not have any other valve involvement.   On a beta-blocker, ACE inhibitor and low dose dihydropyridine calcium-channel blocker for hypertension. BP is mildly elevated today. He does not take a diuretic. Renal function is normal.      Review of Systems   Constitution: Negative for weakness, malaise/fatigue, weight gain and weight loss.   Eyes: Positive for visual disturbance.   Cardiovascular: Negative for chest pain, claudication, dyspnea on exertion, irregular heartbeat, leg swelling, orthopnea, palpitations, paroxysmal nocturnal dyspnea and syncope.   Respiratory: Negative for cough, sputum production and wheezing.    Musculoskeletal: Negative for falls, joint pain,  muscle cramps, muscle weakness and myalgias.   Gastrointestinal: Negative for abdominal pain, heartburn and melena.   Genitourinary: Positive for nocturia. Negative for frequency and hematuria.   Neurological: Negative for dizziness, light-headedness, loss of balance and paresthesias.   Psychiatric/Behavioral: Negative for depression.         Current Outpatient Prescriptions   Medication Sig    aspirin 325 MG tablet Take 325 mg by mouth once daily.    clopidogrel (PLAVIX) 75 mg tablet Take 1 tablet (75 mg total) by mouth once daily.    co-enzyme Q-10 30 mg capsule Take 30 mg by mouth once daily.     fish oil-omega-3 fatty acids 300-1,000 mg capsule Take 2 g by mouth 3 (three) times daily.     fluoxetine (PROZAC) 20 MG capsule Take 1 capsule (20 mg total) by mouth once daily.    losartan (COZAAR) 100 MG tablet Take 1 tablet (100 mg total) by mouth once daily.    metformin (GLUCOPHAGE) 1000 MG tablet Take 1 tablet (1,000 mg total) by mouth 2 (two) times daily.    metoprolol tartrate (LOPRESSOR) 100 MG tablet Take 0.5 tablets (50 mg total) by mouth 2 (two) times daily.    multivit-iron-min-folic acid (MULTIVITAMIN-IRON-MINERALS-FOLIC ACID) 3,500-18-0.4 unit-mg-mg Chew Take by mouth.      nifedipine 30 MG ORAL TR24 (PROCARDIA-XL) 60 MG (OSM) 24 hr tablet Take 1 tablet (60 mg total) by mouth once daily.    SAW PALMETTO ORAL Take 1 capsule by mouth 2 (two) times daily.    simvastatin (ZOCOR) 10 MG tablet Take 1 tablet (10 mg total) by mouth every evening.    SITagliptan (JANUVIA) 100 MG Tab Take 1 tablet (100 mg total) by mouth once daily.    temazepam (RESTORIL) 30 mg capsule Take 1 capsule (30 mg total) by mouth nightly as needed for Insomnia.         Past Medical History   Diagnosis Date    Anxiety     Coronary artery disease     Depression     Diabetes mellitus, type 2     Hyperlipidemia     Hypertension     Insomnia     PAD (peripheral artery disease)          Social History   Substance Use  "Topics    Smoking status: Former Smoker     Packs/day: 2.50     Years: 30.00     Types: Cigarettes    Smokeless tobacco: Never Used    Alcohol use No         Family History   Problem Relation Age of Onset    Heart disease Father       at the age of 56    Heart disease Brother     Heart disease Paternal Aunt     Heart disease Paternal Uncle     Cancer Mother      mesothelioma,  at the age of 78    Heart disease Sister     Heart disease Maternal Aunt     Glaucoma Neg Hx     Macular degeneration Neg Hx     Retinal detachment Neg Hx     Strabismus Neg Hx     Amblyopia Neg Hx     Blindness Neg Hx     Cataracts Neg Hx          Objective:     Physical Exam   Constitutional: He is oriented to person, place, and time. He appears well-developed and well-nourished.   BP (!) 152/66  Pulse (!) 52  Ht 6' 3" (1.905 m)  Wt 105.7 kg (232 lb 14.7 oz)  BMI 29.11 kg/m2     HENT:   Head: Normocephalic and atraumatic.   Neck: No JVD present. Carotid bruit is present (louder on the left, radiating from the precordium).   Cardiovascular: Normal rate, regular rhythm, S1 normal and S2 normal.  Exam reveals no gallop.    Murmur heard.   Harsh midsystolic murmur is present with a grade of 3/6  at the upper right sternal border radiating to the neck  Pulses:       Radial pulses are 2+ on the right side, and 2+ on the left side.        Posterior tibial pulses are 1+ on the right side, and 1+ on the left side.   Pulmonary/Chest: Effort normal. He has no wheezes. He has no rales. Chest wall is not dull to percussion.   Abdominal: Soft. There is no splenomegaly or hepatomegaly. There is no tenderness.   Musculoskeletal:        Right lower leg: He exhibits no edema.        Left lower leg: He exhibits no edema.   Neurological: He is alert and oriented to person, place, and time. Gait normal.   Skin: Skin is warm and dry. No bruising noted. No cyanosis. Nails show no clubbing.   Psychiatric: He has a normal mood and affect. " His speech is normal and behavior is normal. Judgment and thought content normal. Cognition and memory are normal.         Lab Results   Component Value Date    CHOL 147 01/30/2017    CHOL 152 11/04/2015    CHOL 163 11/08/2013     Lab Results   Component Value Date    HDL 33 (L) 01/30/2017    HDL 32 (L) 11/04/2015    HDL 33 (L) 11/08/2013     Lab Results   Component Value Date    LDLCALC Invalid, Trig>400.0 01/30/2017    LDLCALC 45.6 (L) 11/04/2015    LDLCALC 74.4 11/08/2013     Lab Results   Component Value Date    TRIG 410 (H) 01/30/2017    TRIG 372 (H) 11/04/2015    TRIG 278 (H) 11/08/2013     Lab Results   Component Value Date    CHOLHDL 22.4 01/30/2017    CHOLHDL 21.1 11/04/2015    CHOLHDL 20.2 11/08/2013     Lab Results   Component Value Date    ALT 30 01/30/2017     Lab Results   Component Value Date    ALT 30 01/30/2017    AST 24 01/30/2017    ALKPHOS 65 01/30/2017    BILITOT 0.4 01/30/2017      Lab Results   Component Value Date    CREATININE 1.0 01/30/2017    BUN 14 01/30/2017     01/30/2017    K 4.5 01/30/2017     01/30/2017    CO2 25 01/30/2017     Stress echo 2/15:    1 - Moderate left atrial enlargement.     2 - Concentric hypertrophy.     3 - Normal left ventricular systolic function (EF 60-65%).     4 - Left ventricular diastolic dysfunction.     5 - Normal right ventricular systolic function .     6 - Trivial aortic stenosis, KOBY = 1.7 cm2, mean gradient = 11.0 mmHg.     7 - Mild tricuspid regurgitation.     8 - No evidence of stress induced myocardial ischemia. Exercised for 6.4 min, peak heart rate <100 bm, 63% of the age predicted maximum heart rate.      Assessment:     1. Coronary artery disease involving native coronary artery of native heart without angina pectoris    2. S/P CABG x 5    3. Mixed hyperlipidemia    4. Essential hypertension    5. Nonrheumatic aortic valve stenosis    6. Type 2 diabetes mellitus with complication, without long-term current use of insulin           Plan:       Coronary artery disease involving native coronary artery of native heart without angina pectoris  Stable.  Continue same meds.    Mixed hyperlipidemia  Triglycerides elevated.   Cholesterol education.  Nutritional counseling.  Switch simvastatin to atorvastatin 40 mg. If triglycerides remain elevated despite good glycemic control, will add a fibrate.  Will discuss w Dr. Beckett if treatment w liraglutide is an option, since he had improvement in his glycemic control and lipids which on it.      S/P CABG x 5  Get old records.    Nonrheumatic aortic valve stenosis  Febrile illness during childhood followed by development of a murmur. He has aortic stenosis but does not have involvement of any other valve. I doubt if the aortic stenosis is rheumatic.     Essential hypertension  BP elevated today.   Will monitor. If it continues to remain elevated consider adding a thiazide diuretic.    RTC 6 months.

## 2017-02-07 NOTE — Clinical Note
Layo, Thank you for referring Cecil Pringle  to the [facility] clinic at [place]. He recalls that he entered a study for diabetes using injectible liraglutide Victoza and during the treatment phase his blood sugars and lipids normalized. Is treatment w Victoza an option? If so, I will defer adding a fibrate for his triglycerides. Nory

## 2017-02-07 NOTE — PATIENT INSTRUCTIONS
LDL - bad type - improves with diet and medications: typically statins; most other medications can lower LDL but have not been proven to prevent heart attacks.             May not improve significantly with exercise alone.             Ideally less than 70     HDL - good type - improves with exercise             Ideally greater than 50    TGs (triglycerides) - also bad - can change very quickly and considerably with food - improves with diet and exercise            In some cases a low carbohydrate diet will lower TGs better than a low fat diet.            Ideal range       Sugar, fat and cholesterol in food:     Current dietary guidelines recommend drastically reducing our intake of sugar. There is less emphasis on fat. And cholesterol in our food is no longer a significant concern. However please do not confuse this with the role of cholesterol in our blood and arteries. It is still cholesterol that clogs up our arteries whether it comes from our food or is manufactured by our bodies.       Most foods that are high in cholesterol are also high in saturated fat. But there is way more saturated fat than cholesterol in these foods. On the other hand there are a handful of foods that are high in cholesterol but do not contain much saturated fat: eggs, shrimp, crab legs and crawfish; these are OK to eat.       Saturated fat is the bad fat - you should limit your intake of this. Deep fried foods, meats and other animal fats are high in saturated fat. Cookies, donuts and most dessert and cakes are usually high in both saturated fat and sugar.       Unsaturated fat is the good fat. It contains the same number of calories as saturated fat but does not get deposited in our arteries. The Mediterranean style diet encourages the intake of unsaturated fat - olive oil, avocado and unsalted nuts.      You should eat a few servings of vegetables (and fruit as long as you are not diabetic) everyday. Substitute some plant  proteins in place of meat: soy, beans, lentils, quinoa and oatmeal.     Do not use stick butter or stick margarine. Butter that comes in a tub is soft butter. It consists of 1/2 butter and 1/2 canola or another type of vegetable oil. It is fine to use that.       Trans fats should also be avoided. Most foods that are labelled as containing 0 gms of trans fat can still contain several hundred milligrams of trans fat: creamer, margarine, refrigerator dough, deep fried foods, ready made frosting, potato, corn and torilla chips, cakes, cookies, pie crusts and crackers containing shortening made with hydrogenated vegetable oil.

## 2017-02-08 ENCOUNTER — TELEPHONE (OUTPATIENT)
Dept: INTERNAL MEDICINE | Facility: CLINIC | Age: 72
End: 2017-02-08

## 2017-02-08 PROBLEM — I35.0 NONRHEUMATIC AORTIC VALVE STENOSIS: Status: ACTIVE | Noted: 2017-02-08

## 2017-02-08 NOTE — ASSESSMENT & PLAN NOTE
Febrile illness during childhood followed by development of a murmur. He has aortic stenosis but does not have involvement of any other valve. I doubt if the aortic stenosis is rheumatic.

## 2017-02-09 ENCOUNTER — CLINICAL SUPPORT (OUTPATIENT)
Dept: CARDIOLOGY | Facility: CLINIC | Age: 72
End: 2017-02-09
Payer: MEDICARE

## 2017-02-09 ENCOUNTER — OFFICE VISIT (OUTPATIENT)
Dept: PODIATRY | Facility: CLINIC | Age: 72
End: 2017-02-09
Payer: MEDICARE

## 2017-02-09 ENCOUNTER — PATIENT MESSAGE (OUTPATIENT)
Dept: INTERNAL MEDICINE | Facility: CLINIC | Age: 72
End: 2017-02-09

## 2017-02-09 VITALS
HEIGHT: 75 IN | DIASTOLIC BLOOD PRESSURE: 79 MMHG | SYSTOLIC BLOOD PRESSURE: 201 MMHG | HEART RATE: 47 BPM | WEIGHT: 232 LBS | BODY MASS INDEX: 28.85 KG/M2

## 2017-02-09 DIAGNOSIS — L84 PRE-ULCERATIVE CORN OR CALLOUS: ICD-10-CM

## 2017-02-09 DIAGNOSIS — R09.89 BILATERAL CAROTID BRUITS: ICD-10-CM

## 2017-02-09 DIAGNOSIS — I65.21 STENOSIS OF RIGHT CAROTID ARTERY: ICD-10-CM

## 2017-02-09 DIAGNOSIS — I77.1 ILIAC ARTERY STENOSIS, LEFT: ICD-10-CM

## 2017-02-09 DIAGNOSIS — Z13.6 SCREENING FOR AAA (ABDOMINAL AORTIC ANEURYSM): ICD-10-CM

## 2017-02-09 DIAGNOSIS — B35.1 DERMATOPHYTOSIS OF NAIL: ICD-10-CM

## 2017-02-09 DIAGNOSIS — E11.49 TYPE II DIABETES MELLITUS WITH NEUROLOGICAL MANIFESTATIONS: Primary | ICD-10-CM

## 2017-02-09 DIAGNOSIS — Z87.891 EX-SMOKER: ICD-10-CM

## 2017-02-09 LAB
AORTIC ATHEROMA: YES
INTERNAL CAROTID STENOSIS: NORMAL
VASCULAR ABDOMINAL AORTIC ANEURYSM (AAA): 2.97

## 2017-02-09 PROCEDURE — 3077F SYST BP >= 140 MM HG: CPT | Mod: S$GLB,,, | Performed by: PODIATRIST

## 2017-02-09 PROCEDURE — 3078F DIAST BP <80 MM HG: CPT | Mod: S$GLB,,, | Performed by: PODIATRIST

## 2017-02-09 PROCEDURE — 1159F MED LIST DOCD IN RCRD: CPT | Mod: S$GLB,,, | Performed by: PODIATRIST

## 2017-02-09 PROCEDURE — 1160F RVW MEDS BY RX/DR IN RCRD: CPT | Mod: S$GLB,,, | Performed by: PODIATRIST

## 2017-02-09 PROCEDURE — 99499 UNLISTED E&M SERVICE: CPT | Mod: S$GLB,,, | Performed by: PODIATRIST

## 2017-02-09 PROCEDURE — 99204 OFFICE O/P NEW MOD 45 MIN: CPT | Mod: 25,S$GLB,, | Performed by: PODIATRIST

## 2017-02-09 PROCEDURE — 93978 VASCULAR STUDY: CPT | Mod: S$GLB,,, | Performed by: INTERNAL MEDICINE

## 2017-02-09 PROCEDURE — 99999 PR PBB SHADOW E&M-EST. PATIENT-LVL III: CPT | Mod: PBBFAC,,, | Performed by: PODIATRIST

## 2017-02-09 PROCEDURE — 3045F PR MOST RECENT HEMOGLOBIN A1C LEVEL 7.0-9.0%: CPT | Mod: S$GLB,,, | Performed by: PODIATRIST

## 2017-02-09 PROCEDURE — 1157F ADVNC CARE PLAN IN RCRD: CPT | Mod: S$GLB,,, | Performed by: PODIATRIST

## 2017-02-09 PROCEDURE — 11721 DEBRIDE NAIL 6 OR MORE: CPT | Mod: 59,Q9,S$GLB, | Performed by: PODIATRIST

## 2017-02-09 PROCEDURE — 11056 PARNG/CUTG B9 HYPRKR LES 2-4: CPT | Mod: Q9,S$GLB,, | Performed by: PODIATRIST

## 2017-02-09 PROCEDURE — 1126F AMNT PAIN NOTED NONE PRSNT: CPT | Mod: S$GLB,,, | Performed by: PODIATRIST

## 2017-02-09 PROCEDURE — 93880 EXTRACRANIAL BILAT STUDY: CPT | Mod: S$GLB,,, | Performed by: INTERNAL MEDICINE

## 2017-02-09 PROCEDURE — 4010F ACE/ARB THERAPY RXD/TAKEN: CPT | Mod: S$GLB,,, | Performed by: PODIATRIST

## 2017-02-09 NOTE — PROCEDURES
Routine Foot Care  Date/Time: 2/9/2017 12:28 PM  Performed by: ELOISE JACKSON  Authorized by: ELOISE JACKSON     Consent Done?:  Yes (Verbal)  Hyperkeratotic Skin Lesions?: Yes    Number of trimmed lesions:  2  Location(s):  Left 1st Toe and Right 1st Toe    Nail Care Type:  Debride  Location(s): All  (Left 1st Toe, Left 3rd Toe, Left 2nd Toe, Left 4th Toe, Left 5th Toe, Right 1st Toe, Right 2nd Toe, Right 3rd Toe, Right 4th Toe and Right 5th Toe)  Patient tolerance:  Patient tolerated the procedure well with no immediate complications

## 2017-02-09 NOTE — PROGRESS NOTES
CC:     Foot exam       HPI:   The patient is a 71 y.o.  male  who presents for a diabetic foot exam.     Patient reports occasional presence of abnormal sensation to the feet .    History of diabetic foot ulcers: none   History of foot surgery: none.     Shoes worn today:  Athletic type  Patient complains of bilateral hallux calluses.       Primary care doctor is: Layo Jett MD  Patient last saw primary care doctor on:    1/26/17  Hemoglobin A1C   Date Value Ref Range Status   01/30/2017 8.1 (H) 4.5 - 6.2 % Final   11/04/2015 7.3 (H) 4.5 - 6.2 % Final   11/08/2013 6.6 (H) 4.5 - 6.2 % Final           Past Medical History   Diagnosis Date    Anxiety     Coronary artery disease     Depression     Diabetes mellitus, type 2     Hyperlipidemia     Hypertension     Insomnia     PAD (peripheral artery disease)          Current Outpatient Prescriptions on File Prior to Visit   Medication Sig Dispense Refill    aspirin 325 MG tablet Take 325 mg by mouth once daily.      atorvastatin (LIPITOR) 40 MG tablet Take 1 tablet (40 mg total) by mouth once daily. 90 tablet 3    clopidogrel (PLAVIX) 75 mg tablet Take 1 tablet (75 mg total) by mouth once daily. 90 tablet 3    co-enzyme Q-10 30 mg capsule Take 30 mg by mouth once daily.       fish oil-omega-3 fatty acids 300-1,000 mg capsule Take 2 g by mouth 3 (three) times daily.       fluoxetine (PROZAC) 20 MG capsule Take 1 capsule (20 mg total) by mouth once daily. 90 capsule 3    liraglutide 0.6 mg/0.1 mL, 18 mg/3 mL, subq PNIJ 0.6 mg/0.1 mL (18 mg/3 mL) PnIj Start with 0.6 mg SC daily for 1 week, then increase to 1.2 mg SC daily for 1 week.  If tolerable, increased to 1.8 mg SC daily. 3 mL 11    losartan (COZAAR) 100 MG tablet Take 1 tablet (100 mg total) by mouth once daily. 90 tablet 3    metformin (GLUCOPHAGE) 1000 MG tablet Take 1 tablet (1,000 mg total) by mouth 2 (two) times daily. 180 tablet 3    metoprolol tartrate (LOPRESSOR) 100 MG tablet Take  "0.5 tablets (50 mg total) by mouth 2 (two) times daily. 90 tablet 1    multivit-iron-min-folic acid (MULTIVITAMIN-IRON-MINERALS-FOLIC ACID) 3,500-18-0.4 unit-mg-mg Chew Take by mouth.        nifedipine 30 MG ORAL TR24 (PROCARDIA-XL) 60 MG (OSM) 24 hr tablet Take 1 tablet (60 mg total) by mouth once daily. 30 tablet 11    SAW PALMETTO ORAL Take 1 capsule by mouth 2 (two) times daily.      SITagliptan (JANUVIA) 100 MG Tab Take 1 tablet (100 mg total) by mouth once daily. 30 tablet 11    temazepam (RESTORIL) 30 mg capsule Take 1 capsule (30 mg total) by mouth nightly as needed for Insomnia. 30 capsule 0     No current facility-administered medications on file prior to visit.          Review of patient's allergies indicates:  No Known Allergies          ROS:  General ROS: negative  Respiratory ROS: no cough, shortness of breath, or wheezing  Cardiovascular ROS: no chest pain or dyspnea on exertion  Musculoskeletal ROS: negative  Neurological ROS:   positive for - numbness/tingling  Dermatological ROS: negative      LAST HbA1c:   Hemoglobin A1C   Date Value Ref Range Status   01/30/2017 8.1 (H) 4.5 - 6.2 % Final     Comment:     According to ADA guidelines, hemoglobin A1C <7.0% represents  optimal control in non-pregnant diabetic patients.  Different  metrics may apply to specific populations.   Standards of Medical Care in Diabetes - 2016.  For the purpose of screening for the presence of diabetes:  <5.7%     Consistent with the absence of diabetes  5.7-6.4%  Consistent with increasing risk for diabetes   (prediabetes)  >or=6.5%  Consistent with diabetes  Currently no consensus exists for use of hemoglobin A1C  for diagnosis of diabetes for children.     11/04/2015 7.3 (H) 4.5 - 6.2 % Final   11/08/2013 6.6 (H) 4.5 - 6.2 % Final           EXAM:   Vitals:    02/09/17 1023   BP: (!) 201/79   Pulse: (!) 47   Weight: 105.2 kg (232 lb)   Height: 6' 3" (1.905 m)       General: alert, no distress, cooperative    Vascular: "   Dorsalis pedis:   1+ bilateral.   Posterior Tibial:   1+ bilateral.   3 seconds capillary refill time   Temperature of toes are cool to touch.   reduced hair growth on the feet.    Edema on feet:   Trace and non-pitting   Varicosities:  spider veins on the bilateral    Dermatological:    Skin: thin,  dry and no suspicious lesions  Nails: toenails 1-5 L and 1-5 R  are onychomycosis of the toenails and dystrophic nails, elongated and thick by 1-2mm with subungual debris.    Callus:  Yes - bilateral medial hallux.   Open Wounds: None  Ecchymoses is not observed.      Erythema:  none .     Interdigital spaces: debris present      Neurological:    normal light touch sensation  [place] diminished      Musculoskeletal:     Muscle strength: 5/5, adequate ROM, adequate strength     Right foot:  bunion   Left foot: bunion             ASSESSMENT/PLAN:      I counseled the patient on his conditions, their implications and medical management.       Type II diabetes mellitus with neurological manifestations  -     Foot Care    Pre-ulcerative corn or callous  -     Foot Care    Dermatophytosis of nail  -     Foot Care        Shoe inspection. Diabetic Foot Education. Patient reminded of the importance of good nutrition and blood sugar control to help prevent podiatric complications of diabetes. Patient instructed on proper foot hygiene. We discussed wearing proper shoe gear, daily foot inspections, never walking without protective shoe gear, never putting sharp instruments to feet.    Return in about 3 months (around 5/9/2017) for diabetic foot exam, or sooner if concerned.

## 2017-02-09 NOTE — PATIENT INSTRUCTIONS
Your A1c:    Hemoglobin A1C   Date Value Ref Range Status   01/30/2017 8.1 (H) 4.5 - 6.2 % Final     Comment:     According to ADA guidelines, hemoglobin A1C <7.0% represents  optimal control in non-pregnant diabetic patients.  Different  metrics may apply to specific populations.   Standards of Medical Care in Diabetes - 2016.  For the purpose of screening for the presence of diabetes:  <5.7%     Consistent with the absence of diabetes  5.7-6.4%  Consistent with increasing risk for diabetes   (prediabetes)  >or=6.5%  Consistent with diabetes  Currently no consensus exists for use of hemoglobin A1C  for diagnosis of diabetes for children.     11/04/2015 7.3 (H) 4.5 - 6.2 % Final   11/08/2013 6.6 (H) 4.5 - 6.2 % Final       How to Check Your Feet    Below are tips to help you look for foot problems. Try to check your feet at the same time each day, such as when you get out of bed in the morning.    · Check the top of each foot. The tops of toes, back of the heel, and outer edge of the foot can get a lot of rubbing from poor-fitting shoes.    · Check the bottom of each foot. Daily wear and tear often leads to problems at pressure spots.    · Check the toes and nails. Fungal infections often occur between toes. Toenail problems can also be a sign of fungal infections or lead to breaks in the skin.    · Check your shoes, too. Loose objects inside a shoe can injure the foot. Use your hand to feel inside your shoes for things like nina, loose stitching, or rough areas that could irritate your skin.        Diabetic Foot Care    Diabetes can lead to a number of different foot complications. Fortunately, most of these complications can be prevented with a little extra foot care. If diabetes is not well controlled, the high blood sugar can cause damage to blood vessels and result in poor circulation to the foot. When the skin does not get enough blood flow, it becomes prone to pressure sores and ulcers, which heal  slowly.  High blood sugar can also damage nerves, interfering with the ability to feel pain and pressure. When you cant feel your foot normally, it is easy to injure your skin, bones and joints without knowing it. For these reasons diabetes increases the risk of fungal infections, bunions and ulcers. Deep ulcers can lead to bone infection. Gangrene is the most serious foot complication of diabetes. It usually occurs on the tips of the toes as blacked areas of skin. The black area is dead tissue. In severe cases, gangrene spreads to involve the entire toe, other toes and the entire foot. Foot or toe amputation may be required. Good foot care and blood sugar control can prevent this.    Home Care  1. Wear comfortable, proper fitting shoes.  2. Wash your feet daily with warm water and mild soap.  3. After drying, apply a moisturizing cream or lotion.  4. Check your feet daily for skin breaks, blisters, swelling, or redness. Look between your toes also.  5. Wear cotton socks and change them every day.  6. Trim toe nails carefully and do not cut your cuticles.  7. Strive to keep your blood sugar under control with a combination of medicines, diet and activity.  8. If you smoke and have diabetes, it is very important that you stop. Smoking reduces blood flow to your foot.  9. Avoid activities that increase your risk of foot injury:  · Do not walk barefoot.  · Do not use heating pads or hot water bottles on your feet.  · Do not put your foot in a hot tub without first checking the temperature with your hand.  10) Schedule yearly foot exams.    Follow Up  with your doctor or as advised by our staff. Report any cut, puncture, scrape, other injury, blister, ingrown toenail or ulcer on your foot.    Get Prompt Medical Attention  if any of the following occur:  -- Open ulcer with pus draining from the wound  -- Increasing foot or leg pain  -- New areas of redness or swelling or tender areas of the foot    © 4947-7972 The  [employer]. [address] [place] [address], [place] [address]. All rights reserved. This information is not intended as a substitute for professional medical care. Always follow your healthcare professional's instructions.

## 2017-02-09 NOTE — LETTER
February 9, 2017      Layo Jett MD  [address] [facility] [place] [address]           [place] [facility]  [address] [facility] [address]  Phone: [phone]          Patient: Cecil Pringle   MR Number: [id number]   YOB: 1945   Date of Visit: 2/9/2017       Dear Dr. Layo Jett:    Thank you for referring Cecil Pringle to me for evaluation. Attached you will find relevant portions of my assessment and plan of care.    If you have questions, please do not hesitate to call me. I look forward to following Cecil Pringle along with you.    Sincerely,    Sierra Mcgrath, GERBER    Enclosure  CC:  No Recipients    If you would like to receive this communication electronically, please contact [email or web address] or [phone] to request more information on [employer] Link access.    For providers and/or their staff who would like to refer a patient to [facility], please contact us through our one-stop-shop provider referral line, [facility], at [phone].    If you feel you have received this communication in error or would no longer like to receive these types of communications, please e-mail [email or web address]

## 2017-02-14 ENCOUNTER — INITIAL CONSULT (OUTPATIENT)
Dept: CARDIOLOGY | Facility: CLINIC | Age: 72
End: 2017-02-14
Payer: MEDICARE

## 2017-02-14 VITALS
HEART RATE: 52 BPM | DIASTOLIC BLOOD PRESSURE: 76 MMHG | SYSTOLIC BLOOD PRESSURE: 142 MMHG | WEIGHT: 235.44 LBS | BODY MASS INDEX: 29.27 KG/M2 | HEIGHT: 75 IN

## 2017-02-14 DIAGNOSIS — I65.21 STENOSIS OF RIGHT CAROTID ARTERY: ICD-10-CM

## 2017-02-14 DIAGNOSIS — I77.1 ILIAC ARTERY STENOSIS, LEFT: ICD-10-CM

## 2017-02-14 DIAGNOSIS — I73.9 PVD (PERIPHERAL VASCULAR DISEASE): Primary | ICD-10-CM

## 2017-02-14 PROCEDURE — 1160F RVW MEDS BY RX/DR IN RCRD: CPT | Mod: S$GLB,,, | Performed by: INTERNAL MEDICINE

## 2017-02-14 PROCEDURE — 99999 PR PBB SHADOW E&M-EST. PATIENT-LVL III: CPT | Mod: PBBFAC,,, | Performed by: INTERNAL MEDICINE

## 2017-02-14 PROCEDURE — 3077F SYST BP >= 140 MM HG: CPT | Mod: S$GLB,,, | Performed by: INTERNAL MEDICINE

## 2017-02-14 PROCEDURE — 99213 OFFICE O/P EST LOW 20 MIN: CPT | Mod: S$GLB,,, | Performed by: INTERNAL MEDICINE

## 2017-02-14 PROCEDURE — 3078F DIAST BP <80 MM HG: CPT | Mod: S$GLB,,, | Performed by: INTERNAL MEDICINE

## 2017-02-14 PROCEDURE — 1126F AMNT PAIN NOTED NONE PRSNT: CPT | Mod: S$GLB,,, | Performed by: INTERNAL MEDICINE

## 2017-02-14 PROCEDURE — 99499 UNLISTED E&M SERVICE: CPT | Mod: S$GLB,,, | Performed by: INTERNAL MEDICINE

## 2017-02-14 PROCEDURE — 1157F ADVNC CARE PLAN IN RCRD: CPT | Mod: S$GLB,,, | Performed by: INTERNAL MEDICINE

## 2017-02-14 PROCEDURE — 1159F MED LIST DOCD IN RCRD: CPT | Mod: S$GLB,,, | Performed by: INTERNAL MEDICINE

## 2017-02-14 NOTE — LETTER
February 14, 2017      Layo Jett MD  [address] [facility] [place] [address]           [place] [facility]  [address] [facility] [place] [address]  Phone: [phone]          Patient: Cecil Pringle   MR Number: [id number]   YOB: 1945   Date of Visit: 2/14/2017       Dear Dr. Layo Jett:    Thank you for referring Cecil Pringle to me for evaluation. Attached you will find relevant portions of my assessment and plan of care.    If you have questions, please do not hesitate to call me. I look forward to following Cecil Pringle along with you.    Sincerely,    Sayluis angel Blankenship MD    Enclosure  CC:  No Recipients    If you would like to receive this communication electronically, please contact [email or web address] or [phone] to request more information on [employer] Link access.    For providers and/or their staff who would like to refer a patient to [facility], please contact us through our one-stop-shop provider referral line, [facility], at [phone].    If you feel you have received this communication in error or would no longer like to receive these types of communications, please e-mail [email or web address]

## 2017-02-14 NOTE — MR AVS SNAPSHOT
[place] [facility]   [address]  [place] [address]  Phone: [phone]                  Cecil Pringle   2017 8:00 AM   Initial consult    Description:  Male : 1945   Provider:  Demarcus Blankenship MD   Department:  [place] [facility]           Reason for Visit     Consult           Diagnoses this Visit        Comments    PVD (peripheral vascular disease)    -  Primary     Stenosis of right carotid artery         Iliac artery stenosis, left                To Do List           Future Appointments        Provider Department Dept Phone    2017 7:40 AM Layo Jett MD [place] [facility] Internal Medicine [phone]    2017 8:00 AM LAB, [place] [facility] [phone]      Goals (5 Years of Data)     None      Follow-Up and Disposition     Call patient with results Return in about 1 year (around 2018).      [facility] On Call     [facility] On Call Nurse Care Line -  Assistance  Registered nurses in the [facility] On Call Center provide clinical advisement, health education, appointment booking, and other advisory services.  Call for this free service at [phone].             Medications           Message regarding Medications     Verify the changes and/or additions to your medication regime listed below are the same as discussed with your clinician today.  If any of these changes or additions are incorrect, please notify your healthcare provider.             Verify that the below list of medications is an accurate representation of the medications you are currently taking.  If none reported, the list may be blank. If incorrect, please contact your healthcare provider. Carry this list with you in case of emergency.           Current Medications     aspirin 325 MG tablet Take 325 mg by mouth once daily.    atorvastatin (LIPITOR) 40 MG tablet Take 1 tablet (40 mg total) by mouth once daily.    clopidogrel (PLAVIX) 75 mg tablet  "Take 1 tablet (75 mg total) by mouth once daily.    co-enzyme Q-10 30 mg capsule Take 30 mg by mouth once daily.     fish oil-omega-3 fatty acids 300-1,000 mg capsule Take 2 g by mouth 3 (three) times daily.     fluoxetine (PROZAC) 20 MG capsule Take 1 capsule (20 mg total) by mouth once daily.    liraglutide 0.6 mg/0.1 mL, 18 mg/3 mL, subq PNIJ 0.6 mg/0.1 mL (18 mg/3 mL) PnIj Start with 0.6 mg SC daily for 1 week, then increase to 1.2 mg SC daily for 1 week.  If tolerable, increased to 1.8 mg SC daily.    losartan (COZAAR) 100 MG tablet Take 1 tablet (100 mg total) by mouth once daily.    metformin (GLUCOPHAGE) 1000 MG tablet Take 1 tablet (1,000 mg total) by mouth 2 (two) times daily.    metoprolol tartrate (LOPRESSOR) 100 MG tablet Take 0.5 tablets (50 mg total) by mouth 2 (two) times daily.    multivit-iron-min-folic acid (MULTIVITAMIN-IRON-MINERALS-FOLIC ACID) 3,500-18-0.4 unit-mg-mg Chew Take by mouth.      nifedipine 30 MG ORAL TR24 (PROCARDIA-XL) 60 MG (OSM) 24 hr tablet Take 1 tablet (60 mg total) by mouth once daily.    SAW PALMETTO ORAL Take 1 capsule by mouth 2 (two) times daily.    SITagliptan (JANUVIA) 100 MG Tab Take 1 tablet (100 mg total) by mouth once daily.    temazepam (RESTORIL) 30 mg capsule Take 1 capsule (30 mg total) by mouth nightly as needed for Insomnia.           Clinical Reference Information           Your Vitals Were     BP Pulse Height Weight BMI    142/76 (BP Location: Right arm, Patient Position: Sitting, BP Method: Manual) 52 6' 3" (1.905 m) 106.8 kg (235 lb 7.2 oz) 29.43 kg/m2      Blood Pressure          Most Recent Value    BP  (!)  142/76      Allergies as of 2/14/2017     No Known Allergies      Immunizations Administered on Date of Encounter - 2/14/2017     None      Orders Placed During Today's Visit     Future Labs/Procedures Expected by Expires    Cardiology Lab BEHZAD Resting, Lower Extremities  As directed 2/14/2018      Language Assistance Services     ATTENTION: Language " assistance services are available, free of charge. Please call [phone].      ATENCIÓN: Si habla brad, tiene a malave disposición servicios gratuitos de asistencia lingüística. Llame al [phone].     CHÚ Ý: N?u b?n nói Ti?ng Vi?t, có các d?ch v? h? tr? ngôn ng? mi?n phí dành cho b?n. G?i s? [phone].         [facility] complies with applicable Federal civil rights laws and does not discriminate on the basis of race, color, national origin, age, disability, or sex.

## 2017-02-14 NOTE — PROGRESS NOTES
Subjective:    Patient ID:  Cecil Pringle is a 71 y.o. Y.o.male who presents for evaluation of  PAD.      HPI: 71 year old male with PMH of CABG  and , Quit smoking , mixed hyperlipidemia. Presents to day for evaluation of PAD. He had AAA recently showed ectatic abdominal aorta and the carotid doppler showed minimal disease. Mr. Ambrosia reports no signs of stroke or mini stroke  No chest pain or SOB. He can walk more than 2 miles. His arterial doppler in  shows evidence of PAD. He is asymptomatic. No chest pain or SOB.    Past Medical History   Diagnosis Date    Anxiety     Coronary artery disease     Depression     Diabetes mellitus, type 2     Hyperlipidemia     Hypertension     Insomnia     PAD (peripheral artery disease)        indicated that his mother is . He indicated that his father is . He indicated that both of his sisters are alive. He indicated that both of his brothers are alive. He indicated that his daughter is alive. He indicated that his son is alive. He indicated that his maternal aunt is . He indicated that the status of his paternal aunt is unknown. He indicated that the status of his paternal uncle is unknown. He indicated that the status of his neg hx is unknown.     Social History     Social History    Marital status:      Spouse name: N/A    Number of children: N/A    Years of education: N/A     Occupational History    Retired       Social History Main Topics    Smoking status: Former Smoker     Packs/day: 2.50     Years: 30.00     Types: Cigarettes    Smokeless tobacco: Never Used    Alcohol use No    Drug use: No    Sexual activity: Not on file     Other Topics Concern    Not on file     Social History Narrative       Current Outpatient Prescriptions   Medication Sig    aspirin 325 MG tablet Take 325 mg by mouth once daily.    atorvastatin (LIPITOR) 40 MG tablet Take 1 tablet (40 mg total) by mouth once daily.     clopidogrel (PLAVIX) 75 mg tablet Take 1 tablet (75 mg total) by mouth once daily.    co-enzyme Q-10 30 mg capsule Take 30 mg by mouth once daily.     fish oil-omega-3 fatty acids 300-1,000 mg capsule Take 2 g by mouth 3 (three) times daily.     fluoxetine (PROZAC) 20 MG capsule Take 1 capsule (20 mg total) by mouth once daily.    liraglutide 0.6 mg/0.1 mL, 18 mg/3 mL, subq PNIJ 0.6 mg/0.1 mL (18 mg/3 mL) PnIj Start with 0.6 mg SC daily for 1 week, then increase to 1.2 mg SC daily for 1 week.  If tolerable, increased to 1.8 mg SC daily.    losartan (COZAAR) 100 MG tablet Take 1 tablet (100 mg total) by mouth once daily.    metformin (GLUCOPHAGE) 1000 MG tablet Take 1 tablet (1,000 mg total) by mouth 2 (two) times daily.    metoprolol tartrate (LOPRESSOR) 100 MG tablet Take 0.5 tablets (50 mg total) by mouth 2 (two) times daily.    multivit-iron-min-folic acid (MULTIVITAMIN-IRON-MINERALS-FOLIC ACID) 3,500-18-0.4 unit-mg-mg Chew Take by mouth.      nifedipine 30 MG ORAL TR24 (PROCARDIA-XL) 60 MG (OSM) 24 hr tablet Take 1 tablet (60 mg total) by mouth once daily.    SAW PALMETTO ORAL Take 1 capsule by mouth 2 (two) times daily.    SITagliptan (JANUVIA) 100 MG Tab Take 1 tablet (100 mg total) by mouth once daily.    temazepam (RESTORIL) 30 mg capsule Take 1 capsule (30 mg total) by mouth nightly as needed for Insomnia.     No current facility-administered medications for this visit.        CMP  Sodium   Date Value Ref Range Status   01/30/2017 138 136 - 145 mmol/L Final     Potassium   Date Value Ref Range Status   01/30/2017 4.5 3.5 - 5.1 mmol/L Final     Chloride   Date Value Ref Range Status   01/30/2017 104 95 - 110 mmol/L Final     CO2   Date Value Ref Range Status   01/30/2017 25 23 - 29 mmol/L Final     Glucose   Date Value Ref Range Status   01/30/2017 176 (H) 70 - 110 mg/dL Final     BUN, Bld   Date Value Ref Range Status   01/30/2017 14 8 - 23 mg/dL Final     Creatinine   Date Value Ref Range  Status   01/30/2017 1.0 0.5 - 1.4 mg/dL Final     Calcium   Date Value Ref Range Status   01/30/2017 9.0 8.7 - 10.5 mg/dL Final     Total Protein   Date Value Ref Range Status   01/30/2017 7.6 6.0 - 8.4 g/dL Final     Albumin   Date Value Ref Range Status   01/30/2017 4.0 3.5 - 5.2 g/dL Final     Total Bilirubin   Date Value Ref Range Status   01/30/2017 0.4 0.1 - 1.0 mg/dL Final     Comment:     For infants and newborns, interpretation of results should be based  on gestational age, weight and in agreement with clinical  observations.  Premature Infant recommended reference ranges:  Up to 24 hours.............<8.0 mg/dL  Up to 48 hours............<12.0 mg/dL  3-5 days..................<15.0 mg/dL  6-29 days.................<15.0 mg/dL       Alkaline Phosphatase   Date Value Ref Range Status   01/30/2017 65 55 - 135 U/L Final     AST   Date Value Ref Range Status   01/30/2017 24 10 - 40 U/L Final     ALT   Date Value Ref Range Status   01/30/2017 30 10 - 44 U/L Final     Anion Gap   Date Value Ref Range Status   01/30/2017 9 8 - 16 mmol/L Final     eGFR if    Date Value Ref Range Status   01/30/2017 >60.0 >60 mL/min/1.73 m^2 Final     eGFR if non    Date Value Ref Range Status   01/30/2017 >60.0 >60 mL/min/1.73 m^2 Final     Comment:     Calculation used to obtain the estimated glomerular filtration  rate (eGFR) is the CKD-EPI equation. Since race is unknown   in our information system, the eGFR values for   -American and Non--American patients are given   for each creatinine result.         Lab Results   Component Value Date    WBC 9.73 01/30/2017    HGB 15.1 01/30/2017    HCT 44.6 01/30/2017    MCV 87 01/30/2017     01/30/2017       Review of Systems   Constitution: Negative for decreased appetite, fever and weight gain.   HENT: Negative.    Cardiovascular: Negative for chest pain, claudication, cyanosis and leg swelling.   Respiratory: Negative for cough,  "shortness of breath and wheezing.    Skin: Negative for color change, dry skin, itching, rash and suspicious lesions.   Musculoskeletal: Negative for arthritis, back pain, joint swelling and muscle weakness.   Gastrointestinal: Negative.    Genitourinary: Negative.    Neurological: Negative.  Negative for loss of balance, numbness and paresthesias.        Objective:     Visit Vitals    BP (!) 142/76 (BP Location: Right arm, Patient Position: Sitting, BP Method: Manual)    Pulse (!) 52    Ht 6' 3" (1.905 m)    Wt 106.8 kg (235 lb 7.2 oz)    BMI 29.43 kg/m2     Physical Exam   Constitutional: He is oriented to person, place, and time. He appears well-developed and well-nourished. No distress.   HENT:   Head: Normocephalic and atraumatic.   Eyes: Conjunctivae and EOM are normal. Pupils are equal, round, and reactive to light.   Neck: Normal range of motion. Neck supple. No JVD present.   Cardiovascular: Normal rate, regular rhythm and normal heart sounds.  Exam reveals no gallop and no friction rub.    No murmur heard.  Pulmonary/Chest: Effort normal and breath sounds normal. No respiratory distress. He has no wheezes.   Musculoskeletal: Normal range of motion. He exhibits no edema or tenderness.   Neurological: He is alert and oriented to person, place, and time.   Skin: Skin is warm. No rash noted. He is not diaphoretic. No erythema. No pallor.         I have reviewed the tests results from 2/2017  CONCLUSIONS   There is 20 - 39% right Internal Carotid stenosis.  There is 0 - 19% left Internal Carotid stenosis.  2/2017.  CONCLUSIONS   The Abdominal Aorta is ectatic at the inferior mesenteric level with a maximum diameter of 2.97 cm.  There is an accelerated PSV at the left common iliac level of 191 cm/s  indicating a possible stenosis.  Assessment:       1. PVD (peripheral vascular disease)  Cardiology Lab BEHZAD Resting, Lower Extremities   2. Stenosis of right carotid artery     3. Iliac artery stenosis, left   "        Plan:       Cecil was seen today for consult.    1. He was switched to Lipitor recently, he will be on fibrates as per cardiology note.  2. Needs extra work to get his DM under control.  3. walking exercise.  4. Rest BEHZAD.    Demarcus Blankenship

## 2017-02-15 ENCOUNTER — TELEPHONE (OUTPATIENT)
Dept: CARDIOLOGY | Facility: CLINIC | Age: 72
End: 2017-02-15

## 2017-02-15 ENCOUNTER — CLINICAL SUPPORT (OUTPATIENT)
Dept: CARDIOLOGY | Facility: CLINIC | Age: 72
End: 2017-02-15
Payer: MEDICARE

## 2017-02-15 DIAGNOSIS — I73.9 PVD (PERIPHERAL VASCULAR DISEASE): ICD-10-CM

## 2017-02-15 DIAGNOSIS — E78.2 MIXED HYPERLIPIDEMIA: Primary | ICD-10-CM

## 2017-02-15 LAB — VASCULAR ANKLE BRACHIAL INDEX (ABI) RIGHT: 1.05 (ref 0.9–1.2)

## 2017-02-15 PROCEDURE — 93922 UPR/L XTREMITY ART 2 LEVELS: CPT | Mod: S$GLB,,, | Performed by: INTERNAL MEDICINE

## 2017-02-15 NOTE — TELEPHONE ENCOUNTER
----- Message from Demarcus Blankenship MD sent at 2/15/2017  2:20 PM CST -----  Stable BEHZAD. Continue same management.

## 2017-02-15 NOTE — TELEPHONE ENCOUNTER
Spoke with patient in regards to BEHZAD results.    Informed patient BEHZAD was stable.  Plan : Continue same management.    Patient verbalized understanding.

## 2017-02-19 NOTE — ASSESSMENT & PLAN NOTE
BP elevated today.   Will monitor. If it continues to remain elevated consider adding a thiazide diuretic.

## 2017-02-20 ENCOUNTER — TELEPHONE (OUTPATIENT)
Dept: CARDIOLOGY | Facility: CLINIC | Age: 72
End: 2017-02-20

## 2017-02-20 NOTE — TELEPHONE ENCOUNTER
----- Message from Nory May MD sent at 2/19/2017  2:21 PM CST -----  BP was elevated in clinic the day that I saw him. I recall he said that he had not taken his meds that morning. Pl let him know that I placed an order for the Hypertension digital medicine program.

## 2017-02-27 ENCOUNTER — OFFICE VISIT (OUTPATIENT)
Dept: INTERNAL MEDICINE | Facility: CLINIC | Age: 72
End: 2017-02-27
Payer: MEDICARE

## 2017-02-27 VITALS
BODY MASS INDEX: 28.86 KG/M2 | DIASTOLIC BLOOD PRESSURE: 74 MMHG | WEIGHT: 232.13 LBS | SYSTOLIC BLOOD PRESSURE: 132 MMHG | TEMPERATURE: 98 F | HEIGHT: 75 IN | RESPIRATION RATE: 16 BRPM | HEART RATE: 70 BPM

## 2017-02-27 DIAGNOSIS — I35.0 NONRHEUMATIC AORTIC VALVE STENOSIS: ICD-10-CM

## 2017-02-27 DIAGNOSIS — Z95.1 S/P CABG X 5: ICD-10-CM

## 2017-02-27 DIAGNOSIS — I65.21 STENOSIS OF RIGHT CAROTID ARTERY: ICD-10-CM

## 2017-02-27 DIAGNOSIS — I73.9 PVD (PERIPHERAL VASCULAR DISEASE): ICD-10-CM

## 2017-02-27 DIAGNOSIS — I77.1 ILIAC ARTERY STENOSIS, LEFT: ICD-10-CM

## 2017-02-27 DIAGNOSIS — I10 ESSENTIAL HYPERTENSION: Primary | ICD-10-CM

## 2017-02-27 DIAGNOSIS — E78.2 MIXED HYPERLIPIDEMIA: ICD-10-CM

## 2017-02-27 DIAGNOSIS — I25.10 CORONARY ARTERY DISEASE INVOLVING NATIVE CORONARY ARTERY OF NATIVE HEART WITHOUT ANGINA PECTORIS: ICD-10-CM

## 2017-02-27 DIAGNOSIS — I73.9 PAD (PERIPHERAL ARTERY DISEASE): ICD-10-CM

## 2017-02-27 PROBLEM — E11.69 DIABETES MELLITUS TYPE 2 IN OBESE: Status: RESOLVED | Noted: 2017-01-26 | Resolved: 2017-02-27

## 2017-02-27 PROBLEM — E66.9 DIABETES MELLITUS TYPE 2 IN OBESE: Status: RESOLVED | Noted: 2017-01-26 | Resolved: 2017-02-27

## 2017-02-27 PROCEDURE — 1160F RVW MEDS BY RX/DR IN RCRD: CPT | Mod: S$GLB,,, | Performed by: FAMILY MEDICINE

## 2017-02-27 PROCEDURE — 1126F AMNT PAIN NOTED NONE PRSNT: CPT | Mod: S$GLB,,, | Performed by: FAMILY MEDICINE

## 2017-02-27 PROCEDURE — 99999 PR PBB SHADOW E&M-EST. PATIENT-LVL IV: CPT | Mod: PBBFAC,,, | Performed by: FAMILY MEDICINE

## 2017-02-27 PROCEDURE — 1157F ADVNC CARE PLAN IN RCRD: CPT | Mod: S$GLB,,, | Performed by: FAMILY MEDICINE

## 2017-02-27 PROCEDURE — 3045F PR MOST RECENT HEMOGLOBIN A1C LEVEL 7.0-9.0%: CPT | Mod: S$GLB,,, | Performed by: FAMILY MEDICINE

## 2017-02-27 PROCEDURE — 3078F DIAST BP <80 MM HG: CPT | Mod: S$GLB,,, | Performed by: FAMILY MEDICINE

## 2017-02-27 PROCEDURE — 99499 UNLISTED E&M SERVICE: CPT | Mod: S$GLB,,, | Performed by: FAMILY MEDICINE

## 2017-02-27 PROCEDURE — 1159F MED LIST DOCD IN RCRD: CPT | Mod: S$GLB,,, | Performed by: FAMILY MEDICINE

## 2017-02-27 PROCEDURE — 99214 OFFICE O/P EST MOD 30 MIN: CPT | Mod: S$GLB,,, | Performed by: FAMILY MEDICINE

## 2017-02-27 PROCEDURE — 3075F SYST BP GE 130 - 139MM HG: CPT | Mod: S$GLB,,, | Performed by: FAMILY MEDICINE

## 2017-02-27 PROCEDURE — 4010F ACE/ARB THERAPY RXD/TAKEN: CPT | Mod: S$GLB,,, | Performed by: FAMILY MEDICINE

## 2017-02-27 NOTE — MR AVS SNAPSHOT
[place] [facility]   [facility]  [place] [address]  Phone: [phone]  Fax: [phone]                  Cecil Pringle   2017 7:40 AM   Office Visit    Description:  Male : 1945   Provider:  Layo Jett MD   Department:  [place] [facility]           Reason for Visit     Follow-up                To Do List           Future Appointments        Provider Department Dept Phone    2017 8:00 AM LAB, [facility] [phone]      Goals (5 Years of Data)     None      Follow-Up and Disposition     Return in about 3 months (around 2017), or if symptoms worsen or fail to improve.      [facility] On Call     [facility] On Call Nurse Care Line -  Assistance  Registered nurses in the [facility] On Call Center provide clinical advisement, health education, appointment booking, and other advisory services.  Call for this free service at [phone].             Medications           Message regarding Medications     Verify the changes and/or additions to your medication regime listed below are the same as discussed with your clinician today.  If any of these changes or additions are incorrect, please notify your healthcare provider.             Verify that the below list of medications is an accurate representation of the medications you are currently taking.  If none reported, the list may be blank. If incorrect, please contact your healthcare provider. Carry this list with you in case of emergency.           Current Medications     aspirin 325 MG tablet Take 325 mg by mouth once daily.    atorvastatin (LIPITOR) 40 MG tablet Take 1 tablet (40 mg total) by mouth once daily.    clopidogrel (PLAVIX) 75 mg tablet Take 1 tablet (75 mg total) by mouth once daily.    co-enzyme Q-10 30 mg capsule Take 30 mg by mouth once daily.     fish oil-omega-3 fatty acids 300-1,000 mg capsule Take 2 g by mouth 3 (three) times daily.     fluoxetine  (PROZAC) 20 MG capsule Take 1 capsule (20 mg total) by mouth once daily.    liraglutide 0.6 mg/0.1 mL, 18 mg/3 mL, subq PNIJ 0.6 mg/0.1 mL (18 mg/3 mL) PnIj Start with 0.6 mg SC daily for 1 week, then increase to 1.2 mg SC daily for 1 week.  If tolerable, increased to 1.8 mg SC daily.    losartan (COZAAR) 100 MG tablet Take 1 tablet (100 mg total) by mouth once daily.    metformin (GLUCOPHAGE) 1000 MG tablet Take 1 tablet (1,000 mg total) by mouth 2 (two) times daily.    metoprolol tartrate (LOPRESSOR) 100 MG tablet Take 0.5 tablets (50 mg total) by mouth 2 (two) times daily.    multivit-iron-min-folic acid (MULTIVITAMIN-IRON-MINERALS-FOLIC ACID) 3,500-18-0.4 unit-mg-mg Chew Take by mouth.      nifedipine 30 MG ORAL TR24 (PROCARDIA-XL) 60 MG (OSM) 24 hr tablet Take 1 tablet (60 mg total) by mouth once daily.    SAW PALMETTO ORAL Take 1 capsule by mouth 2 (two) times daily.    SITagliptan (JANUVIA) 100 MG Tab Take 1 tablet (100 mg total) by mouth once daily.    temazepam (RESTORIL) 30 mg capsule Take 1 capsule (30 mg total) by mouth nightly as needed for Insomnia.           Clinical Reference Information           Your Vitals Were     BP                   132/74 (BP Location: Left arm, Patient Position: Sitting, BP Method: Manual)           Blood Pressure          Most Recent Value    BP  132/74      Allergies as of 2/27/2017     No Known Allergies      Immunizations Administered on Date of Encounter - 2/27/2017     None      Language Assistance Services     ATTENTION: Language assistance services are available, free of charge. Please call [phone].      ATENCIÓN: Si habla brad, tiene a malave disposición servicios gratuitos de asistencia lingüística. Llame al [phone].     CHÚ Ý: N?u b?n nói Ti?ng Vi?t, có các d?ch v? h? tr? ngôn ng? mi?n phí dành cho b?n. G?i s? [phone].         [facility] complies with applicable Federal civil rights laws and does not discriminate on the basis  of race, color, national origin, age, disability, or sex.

## 2017-02-27 NOTE — PROGRESS NOTES
Subjective:       Patient ID: Cecil Pringle is a 71 y.o. male.    Chief Complaint: Follow-up    HPI 71-year-old white male with coronary artery disease status post CABG, 5 stent placement, repeat CABG, hypertension, hyperlipidemia, uncontrolled type 2 diabetes, and peripheral artery and peripheral vascular disease presents to clinic today for follow-up.  He was originally seen on January 26, 2017 to establish care at which time his blood pressure was elevated at 184/76.  He was also noted to have an A1c that was elevated.  The patient's medications were adjusted specifically increasing nifedipine to 60 mg daily.  He was also started on both Januvia and Victoza for further control.  He returns today and has followed up with both cardiology and vascular medicine for further treatment.  At this time he reports improvement of his blood pressure and his blood glucose levels noting a range from 110-150.  Review of Systems   Constitutional: Negative for appetite change, chills, fatigue and fever.   HENT: Negative for congestion, ear pain, hearing loss, postnasal drip, rhinorrhea, sinus pressure, sore throat and tinnitus.    Eyes: Negative for redness, itching and visual disturbance.   Respiratory: Negative for cough, chest tightness and shortness of breath.    Cardiovascular: Negative for chest pain and palpitations.   Gastrointestinal: Negative for abdominal pain, constipation, diarrhea, nausea and vomiting.   Genitourinary: Negative for decreased urine volume, difficulty urinating, dysuria, frequency, hematuria and urgency.   Musculoskeletal: Negative for back pain, myalgias, neck pain and neck stiffness.   Skin: Negative for rash.   Neurological: Negative for dizziness, light-headedness and headaches.   Psychiatric/Behavioral: Negative.        Objective:      Physical Exam   Constitutional: He is oriented to person, place, and time. He appears well-developed and well-nourished. No distress.   HENT:   Head:  Normocephalic and atraumatic.   Right Ear: External ear normal.   Left Ear: External ear normal.   Nose: Nose normal.   Mouth/Throat: Oropharynx is clear and moist. No oropharyngeal exudate.   Eyes: Conjunctivae and EOM are normal. Pupils are equal, round, and reactive to light. Right eye exhibits no discharge. Left eye exhibits no discharge. No scleral icterus.   Neck: Normal range of motion. Neck supple. No JVD present. No tracheal deviation present. No thyromegaly present.   Cardiovascular: Normal rate, regular rhythm, normal heart sounds and intact distal pulses.  Exam reveals no gallop and no friction rub.    No murmur heard.  Pulmonary/Chest: Effort normal and breath sounds normal. No stridor. No respiratory distress. He has no wheezes. He has no rales.   Abdominal: Soft. Bowel sounds are normal. He exhibits no distension and no mass. There is no tenderness. There is no rebound and no guarding.   Musculoskeletal: Normal range of motion. He exhibits no edema or tenderness.   Lymphadenopathy:     He has no cervical adenopathy.   Neurological: He is alert and oriented to person, place, and time.   Skin: Skin is warm and dry. No rash noted. He is not diaphoretic. No erythema. No pallor.   Psychiatric: He has a normal mood and affect. His behavior is normal. Judgment and thought content normal.   Nursing note and vitals reviewed.      Assessment:       1. Essential hypertension    2. Uncontrolled type 2 diabetes mellitus with diabetic peripheral angiopathy without gangrene, without long-term current use of insulin    3. Coronary artery disease involving native coronary artery of native heart without angina pectoris    4. Mixed hyperlipidemia    5. Type 2 diabetes, uncontrolled, with neuropathy    6. PAD (peripheral artery disease)    7. PVD (peripheral vascular disease)    8. Nonrheumatic aortic valve stenosis    9. Stenosis of right carotid artery    10. Iliac artery stenosis, left    11. S/P CABG x 5        Plan:        1.  Continue losartan 100 mg daily, metoprolol 100 mg daily, and nifedipine 60 mg daily.  Hypertension is well controlled.  2.  Continue metformin 1000 mg twice a day, Januvia 100 mg daily, and Victoza as prescribed.  3.  Continue aspirin, Plavix, and Lipitor as prescribed.  Continue follow-up with cardiology and vascular medicine as scheduled.  4.  Return to clinic as needed or in 3 months for general exam.

## 2017-05-05 ENCOUNTER — LAB VISIT (OUTPATIENT)
Dept: LAB | Facility: HOSPITAL | Age: 72
End: 2017-05-05
Attending: FAMILY MEDICINE
Payer: MEDICARE

## 2017-05-05 DIAGNOSIS — E11.69 DIABETES MELLITUS TYPE 2 IN OBESE: ICD-10-CM

## 2017-05-05 DIAGNOSIS — E66.9 DIABETES MELLITUS TYPE 2 IN OBESE: ICD-10-CM

## 2017-05-05 LAB
ALBUMIN SERPL BCP-MCNC: 4.1 G/DL
ALP SERPL-CCNC: 65 U/L
ALT SERPL W/O P-5'-P-CCNC: 21 U/L
ANION GAP SERPL CALC-SCNC: 9 MMOL/L
AST SERPL-CCNC: 16 U/L
BILIRUB SERPL-MCNC: 0.4 MG/DL
BUN SERPL-MCNC: 17 MG/DL
CALCIUM SERPL-MCNC: 9.5 MG/DL
CHLORIDE SERPL-SCNC: 104 MMOL/L
CHOLEST/HDLC SERPL: 4.1 {RATIO}
CO2 SERPL-SCNC: 25 MMOL/L
CREAT SERPL-MCNC: 1 MG/DL
EST. GFR  (AFRICAN AMERICAN): >60 ML/MIN/1.73 M^2
EST. GFR  (NON AFRICAN AMERICAN): >60 ML/MIN/1.73 M^2
GLUCOSE SERPL-MCNC: 162 MG/DL
HDL/CHOLESTEROL RATIO: 24.6 %
HDLC SERPL-MCNC: 134 MG/DL
HDLC SERPL-MCNC: 33 MG/DL
LDLC SERPL CALC-MCNC: 28.8 MG/DL
NONHDLC SERPL-MCNC: 101 MG/DL
POTASSIUM SERPL-SCNC: 4.6 MMOL/L
PROT SERPL-MCNC: 7.6 G/DL
SODIUM SERPL-SCNC: 138 MMOL/L
TRIGL SERPL-MCNC: 361 MG/DL

## 2017-05-05 PROCEDURE — 36415 COLL VENOUS BLD VENIPUNCTURE: CPT | Mod: PO

## 2017-05-05 PROCEDURE — 80061 LIPID PANEL: CPT

## 2017-05-05 PROCEDURE — 80053 COMPREHEN METABOLIC PANEL: CPT

## 2017-05-05 PROCEDURE — 83036 HEMOGLOBIN GLYCOSYLATED A1C: CPT

## 2017-05-06 LAB
ESTIMATED AVG GLUCOSE: 151 MG/DL
HBA1C MFR BLD HPLC: 6.9 %

## 2017-05-17 ENCOUNTER — OFFICE VISIT (OUTPATIENT)
Dept: INTERNAL MEDICINE | Facility: CLINIC | Age: 72
End: 2017-05-17
Payer: MEDICARE

## 2017-05-17 ENCOUNTER — PATIENT MESSAGE (OUTPATIENT)
Dept: INTERNAL MEDICINE | Facility: CLINIC | Age: 72
End: 2017-05-17

## 2017-05-17 VITALS
DIASTOLIC BLOOD PRESSURE: 70 MMHG | WEIGHT: 230.81 LBS | RESPIRATION RATE: 16 BRPM | HEART RATE: 57 BPM | SYSTOLIC BLOOD PRESSURE: 128 MMHG | BODY MASS INDEX: 28.7 KG/M2 | HEIGHT: 75 IN | TEMPERATURE: 99 F

## 2017-05-17 DIAGNOSIS — I73.9 PAD (PERIPHERAL ARTERY DISEASE): ICD-10-CM

## 2017-05-17 DIAGNOSIS — I35.0 NONRHEUMATIC AORTIC VALVE STENOSIS: ICD-10-CM

## 2017-05-17 DIAGNOSIS — E11.69 DIABETES MELLITUS TYPE 2 IN OBESE: ICD-10-CM

## 2017-05-17 DIAGNOSIS — F33.0 MILD EPISODE OF RECURRENT MAJOR DEPRESSIVE DISORDER: ICD-10-CM

## 2017-05-17 DIAGNOSIS — I10 ESSENTIAL HYPERTENSION: ICD-10-CM

## 2017-05-17 DIAGNOSIS — I73.9 PVD (PERIPHERAL VASCULAR DISEASE): ICD-10-CM

## 2017-05-17 DIAGNOSIS — I65.21 STENOSIS OF RIGHT CAROTID ARTERY: ICD-10-CM

## 2017-05-17 DIAGNOSIS — Z95.1 S/P CABG X 5: ICD-10-CM

## 2017-05-17 DIAGNOSIS — I25.10 CORONARY ARTERY DISEASE INVOLVING NATIVE CORONARY ARTERY OF NATIVE HEART WITHOUT ANGINA PECTORIS: Primary | ICD-10-CM

## 2017-05-17 DIAGNOSIS — G47.00 INSOMNIA, UNSPECIFIED TYPE: ICD-10-CM

## 2017-05-17 DIAGNOSIS — I77.1 ILIAC ARTERY STENOSIS, LEFT: ICD-10-CM

## 2017-05-17 DIAGNOSIS — E78.2 MIXED HYPERLIPIDEMIA: ICD-10-CM

## 2017-05-17 DIAGNOSIS — E66.9 DIABETES MELLITUS TYPE 2 IN OBESE: ICD-10-CM

## 2017-05-17 PROCEDURE — 4010F ACE/ARB THERAPY RXD/TAKEN: CPT | Mod: S$GLB,,, | Performed by: FAMILY MEDICINE

## 2017-05-17 PROCEDURE — 3078F DIAST BP <80 MM HG: CPT | Mod: S$GLB,,, | Performed by: FAMILY MEDICINE

## 2017-05-17 PROCEDURE — 3044F HG A1C LEVEL LT 7.0%: CPT | Mod: S$GLB,,, | Performed by: FAMILY MEDICINE

## 2017-05-17 PROCEDURE — 99999 PR PBB SHADOW E&M-EST. PATIENT-LVL III: CPT | Mod: PBBFAC,,, | Performed by: FAMILY MEDICINE

## 2017-05-17 PROCEDURE — 3074F SYST BP LT 130 MM HG: CPT | Mod: S$GLB,,, | Performed by: FAMILY MEDICINE

## 2017-05-17 PROCEDURE — 1126F AMNT PAIN NOTED NONE PRSNT: CPT | Mod: S$GLB,,, | Performed by: FAMILY MEDICINE

## 2017-05-17 PROCEDURE — 1160F RVW MEDS BY RX/DR IN RCRD: CPT | Mod: S$GLB,,, | Performed by: FAMILY MEDICINE

## 2017-05-17 PROCEDURE — 1159F MED LIST DOCD IN RCRD: CPT | Mod: S$GLB,,, | Performed by: FAMILY MEDICINE

## 2017-05-17 PROCEDURE — 99499 UNLISTED E&M SERVICE: CPT | Mod: S$GLB,,, | Performed by: FAMILY MEDICINE

## 2017-05-17 PROCEDURE — 99214 OFFICE O/P EST MOD 30 MIN: CPT | Mod: S$GLB,,, | Performed by: FAMILY MEDICINE

## 2017-05-17 RX ORDER — NIFEDIPINE 60 MG/1
60 TABLET, EXTENDED RELEASE ORAL DAILY
Qty: 90 TABLET | Refills: 3 | Status: SHIPPED | OUTPATIENT
Start: 2017-05-17 | End: 2018-06-06 | Stop reason: SDUPTHER

## 2017-05-17 RX ORDER — TEMAZEPAM 30 MG/1
30 CAPSULE ORAL NIGHTLY PRN
Qty: 30 CAPSULE | Refills: 0 | Status: SHIPPED | OUTPATIENT
Start: 2017-05-17 | End: 2017-10-30 | Stop reason: SDUPTHER

## 2017-05-17 NOTE — PROGRESS NOTES
Subjective:       Patient ID: Cecil Pringle is a 71 y.o. male.    Chief Complaint: Follow-up    HPI 71-year-old white male with coronary artery disease status post CABG, 5 stent placement, repeat CABG, hypertension, hyperlipidemia, type 2 diabetes, peripheral artery disease, and peripheral vascular disease presents to clinic today for follow-up of his general medical conditions.  Hypertension continues to be well-controlled on nifedipine 60 mg daily, metoprolol 50 mg twice a day, and losartan 100 mg daily.  He continues to be treated for type 2 diabetes and recently has been started on Januvia 100 mg daily and Victoza.  He was artery taking metformin 1000 mg twice a day.  At this time he reports improvement of his glucose levels.  He reports a range of .  He reports continued Accu-Cheks 3 times per day.  Current A1c has improved to 6.9.  He continues to have stable depression on Prozac.  Review of Systems   Constitutional: Negative for appetite change, chills, fatigue and fever.   HENT: Negative for congestion, ear pain, hearing loss, postnasal drip, rhinorrhea, sinus pressure, sore throat and tinnitus.    Eyes: Negative for redness, itching and visual disturbance.   Respiratory: Negative for cough, chest tightness and shortness of breath.    Cardiovascular: Negative for chest pain and palpitations.   Gastrointestinal: Negative for abdominal pain, constipation, diarrhea, nausea and vomiting.   Genitourinary: Negative for decreased urine volume, difficulty urinating, dysuria, frequency, hematuria and urgency.   Musculoskeletal: Negative for back pain, myalgias, neck pain and neck stiffness.   Skin: Negative for rash.   Neurological: Negative for dizziness, light-headedness and headaches.   Psychiatric/Behavioral: Negative.        Objective:      Physical Exam   Constitutional: He is oriented to person, place, and time. He appears well-developed and well-nourished. No distress.   HENT:   Head: Normocephalic  and atraumatic.   Right Ear: External ear normal.   Left Ear: External ear normal.   Nose: Nose normal.   Mouth/Throat: Oropharynx is clear and moist. No oropharyngeal exudate.   Eyes: Conjunctivae and EOM are normal. Pupils are equal, round, and reactive to light. Right eye exhibits no discharge. Left eye exhibits no discharge. No scleral icterus.   Neck: Normal range of motion. Neck supple. No JVD present. No tracheal deviation present. No thyromegaly present.   Cardiovascular: Normal rate, regular rhythm, normal heart sounds and intact distal pulses.  Exam reveals no gallop and no friction rub.    No murmur heard.  Pulmonary/Chest: Effort normal and breath sounds normal. No stridor. No respiratory distress. He has no wheezes. He has no rales.   Abdominal: Soft. Bowel sounds are normal. He exhibits no distension and no mass. There is no tenderness. There is no rebound and no guarding.   Musculoskeletal: Normal range of motion. He exhibits no edema or tenderness.   Lymphadenopathy:     He has no cervical adenopathy.   Neurological: He is alert and oriented to person, place, and time.   Skin: Skin is warm and dry. No rash noted. He is not diaphoretic. No erythema. No pallor.   Psychiatric: He has a normal mood and affect. His behavior is normal. Judgment and thought content normal.   Nursing note and vitals reviewed.      Assessment:       1. Coronary artery disease involving native coronary artery of native heart without angina pectoris    2. Uncontrolled type 2 diabetes mellitus with diabetic peripheral angiopathy without gangrene, without long-term current use of insulin    3. Type 2 diabetes, uncontrolled, with neuropathy    4. Diabetes mellitus type 2 in obese    5. S/P CABG x 5    6. Essential hypertension    7. Iliac artery stenosis, left    8. Mixed hyperlipidemia    9. Nonrheumatic aortic valve stenosis    10. PAD (peripheral artery disease)    11. PVD (peripheral vascular disease)    12. Stenosis of right  carotid artery    13. Mild episode of recurrent major depressive disorder    14. Insomnia, unspecified type        Plan:       1.  Continue aspirin and Plavix as prescribed.  2.  Continue metoprolol 50 mg twice a day, losartan 100 mg daily, and nifedipine 60 mg daily.  Hypertension is well controlled.  3.  Continue metformin 1000 mg twice a day, Januvia 100 mg daily, and increase Victoza to 1.6 mg daily.  4.  Iliac stenosis is stable on aspirin and Plavix.  5.  Continue Lipitor 40 mg daily.  Hyper lipidemia is well-controlled.  5.  Continue follow-up with cardiology as scheduled.  6.  Continue Prozac as prescribed.  7.  Continue temazepam as needed.  8.  Return to clinic as needed or in 8 months for annual exam.

## 2017-05-17 NOTE — MR AVS SNAPSHOT
[facility]   [facility]  [place] [address]  Phone: [phone]  Fax: [phone]                  Cecil Pringle   2017 8:40 AM   Office Visit    Description:  Male : 1945   Provider:  Layo Jett MD   Department:  [facility]           Reason for Visit     Follow-up           Diagnoses this Visit        Comments    Uncontrolled type 2 diabetes mellitus with diabetic peripheral angiopathy without gangrene, without long-term current use of insulin         Type 2 diabetes, uncontrolled, with neuropathy         Diabetes mellitus type 2 in obese                To Do List           Future Appointments        Provider Department Dept Phone    2017 8:40 AM Layo Jett MD [facility] [phone]      Goals (5 Years of Data)     None      Follow-Up and Disposition     Return in about 8 months (around 2018), or if symptoms worsen or fail to improve, for Annual exam.       These Medications        Disp Refills Start End    nifedipine (PROCARDIA-XL) 60 MG (OSM) 24 hr tablet 90 tablet 3 2017    Take 1 tablet (60 mg total) by mouth once daily. - Oral    Pharmacy: [facility] [address] [place] [address] Ph #: [phone]       SITagliptan (JANUVIA) 100 MG Tab 90 tablet 3 2017    Take 1 tablet (100 mg total) by mouth once daily. - Oral    Pharmacy: [facility] [address] [place] [address] Ph #: [phone]         [facility]     [facility] -  Assistance  Unless otherwise directed by your provider, please contact [facility], our nurse care line that is available for  assistance.     Registered nurses in the [facility] provide: appointment scheduling, clinical advisement, health education, and other advisory services.  Call: [phone] (toll free)                Medications           Message regarding Medications     Verify the changes and/or additions to your medication regime listed below are the same as discussed with your clinician today.  If any of these changes or additions are incorrect, please notify your healthcare provider.        CHANGE how you are taking these medications     Start Taking Instead of    nifedipine (PROCARDIA-XL) 60 MG (OSM) 24 hr tablet nifedipine 30 MG ORAL TR24 (PROCARDIA-XL) 60 MG (OSM) 24 hr tablet    Dosage:  Take 1 tablet (60 mg total) by mouth once daily. Dosage:  Take 1 tablet (60 mg total) by mouth once daily.    Reason for Change:  Reorder            Verify that the below list of medications is an accurate representation of the medications you are currently taking.  If none reported, the list may be blank. If incorrect, please contact your healthcare provider. Carry this list with you in case of emergency.           Current Medications     aspirin 325 MG tablet Take 325 mg by mouth once daily.    atorvastatin (LIPITOR) 40 MG tablet Take 1 tablet (40 mg total) by mouth once daily.    clopidogrel (PLAVIX) 75 mg tablet Take 1 tablet (75 mg total) by mouth once daily.    co-enzyme Q-10 30 mg capsule Take 30 mg by mouth once daily.     fish oil-omega-3 fatty acids 300-1,000 mg capsule Take 2 g by mouth 3 (three) times daily.     fluoxetine (PROZAC) 20 MG capsule Take 1 capsule (20 mg total) by mouth once daily.    liraglutide 0.6 mg/0.1 mL, 18 mg/3 mL, subq PNIJ 0.6 mg/0.1 mL (18 mg/3 mL) PnIj Start with 0.6 mg SC daily for 1 week, then increase to 1.2 mg SC daily for 1 week.  If tolerable, increased to 1.8 mg SC daily.    losartan (COZAAR) 100 MG tablet Take 1 tablet (100 mg total) by mouth once daily.    metformin (GLUCOPHAGE) 1000 MG tablet Take 1 tablet (1,000 mg total) by mouth 2 (two) times daily.    metoprolol tartrate (LOPRESSOR) 100 MG tablet Take 0.5 tablets (50 mg total) by mouth 2 (two) times daily.    multivit-iron-min-folic  "acid (MULTIVITAMIN-IRON-MINERALS-FOLIC ACID) 3,500-18-0.4 unit-mg-mg Chew Take by mouth.      nifedipine (PROCARDIA-XL) 60 MG (OSM) 24 hr tablet Take 1 tablet (60 mg total) by mouth once daily.    SAW PALMETTO ORAL Take 1 capsule by mouth 2 (two) times daily.    SITagliptan (JANUVIA) 100 MG Tab Take 1 tablet (100 mg total) by mouth once daily.    temazepam (RESTORIL) 30 mg capsule Take 1 capsule (30 mg total) by mouth nightly as needed for Insomnia.           Clinical Reference Information           Your Vitals Were     BP Pulse Temp Resp Height Weight    128/70 (BP Location: Left arm, Patient Position: Sitting, BP Method: Manual) 57 98.5 °F (36.9 °C) (Oral) 16 6' 3" (1.905 m) 104.7 kg (230 lb 13.2 oz)    BMI                28.85 kg/m2          Blood Pressure          Most Recent Value    BP  128/70      Allergies as of 5/17/2017     No Known Allergies      Immunizations Administered on Date of Encounter - 5/17/2017     None      Language Assistance Services     ATTENTION: Language assistance services are available, free of charge. Please call [phone].      ATENCIÓN: Si ann-marie salinas, tiene a malave disposición servicios gratuitos de asistencia lingüística. Llame al [phone].     [facility] Ý: N?u b?n nói Ti?ng Vi?t, có các d?ch v? h? tr? ngôn ng? mi?n phí dành cho b?n. G?i s? [phone].         [facility] complies with applicable Federal civil rights laws and does not discriminate on the basis of race, color, national origin, age, disability, or sex.        "

## 2017-05-19 ENCOUNTER — PATIENT MESSAGE (OUTPATIENT)
Dept: INTERNAL MEDICINE | Facility: CLINIC | Age: 72
End: 2017-05-19

## 2017-05-19 RX ORDER — LANCETS
EACH MISCELLANEOUS
Qty: 100 EACH | Refills: 3 | Status: SHIPPED | OUTPATIENT
Start: 2017-05-19 | End: 2018-03-15

## 2017-05-19 RX ORDER — DEXTROSE 4 G
TABLET,CHEWABLE ORAL
Qty: 1 EACH | Refills: 0 | Status: SHIPPED | OUTPATIENT
Start: 2017-05-19 | End: 2018-03-15

## 2017-06-05 NOTE — PROGRESS NOTES
Januvia rx failed to trasmit on 5/17/17. Dr approved. Called rx into [facility] [address] for #90 w/ 3 Refills.

## 2017-07-31 RX ORDER — METOPROLOL TARTRATE 100 MG/1
TABLET ORAL
Qty: 90 TABLET | Refills: 3 | Status: SHIPPED | OUTPATIENT
Start: 2017-07-31 | End: 2017-07-31 | Stop reason: SDUPTHER

## 2017-07-31 RX ORDER — METOPROLOL TARTRATE 100 MG/1
50 TABLET ORAL 2 TIMES DAILY
Qty: 90 TABLET | Refills: 3 | Status: SHIPPED | OUTPATIENT
Start: 2017-07-31 | End: 2018-02-01 | Stop reason: SDUPTHER

## 2017-09-14 ENCOUNTER — LAB VISIT (OUTPATIENT)
Dept: LAB | Facility: HOSPITAL | Age: 72
End: 2017-09-14
Attending: INTERNAL MEDICINE
Payer: MEDICARE

## 2017-09-14 DIAGNOSIS — E78.2 MIXED HYPERLIPIDEMIA: ICD-10-CM

## 2017-09-14 LAB
ALT SERPL W/O P-5'-P-CCNC: 26 U/L
ANION GAP SERPL CALC-SCNC: 12 MMOL/L
AST SERPL-CCNC: 20 U/L
BUN SERPL-MCNC: 19 MG/DL
CALCIUM SERPL-MCNC: 9.4 MG/DL
CHLORIDE SERPL-SCNC: 107 MMOL/L
CHOLEST SERPL-MCNC: 125 MG/DL
CHOLEST/HDLC SERPL: 3.9 {RATIO}
CO2 SERPL-SCNC: 21 MMOL/L
CREAT SERPL-MCNC: 1 MG/DL
EST. GFR  (AFRICAN AMERICAN): >60 ML/MIN/1.73 M^2
EST. GFR  (NON AFRICAN AMERICAN): >60 ML/MIN/1.73 M^2
GLUCOSE SERPL-MCNC: 156 MG/DL
HDLC SERPL-MCNC: 32 MG/DL
HDLC SERPL: 25.6 %
LDLC SERPL CALC-MCNC: 25 MG/DL
NONHDLC SERPL-MCNC: 93 MG/DL
POTASSIUM SERPL-SCNC: 4.4 MMOL/L
SODIUM SERPL-SCNC: 140 MMOL/L
TRIGL SERPL-MCNC: 340 MG/DL

## 2017-09-14 PROCEDURE — 84450 TRANSFERASE (AST) (SGOT): CPT

## 2017-09-14 PROCEDURE — 80048 BASIC METABOLIC PNL TOTAL CA: CPT

## 2017-09-14 PROCEDURE — 84460 ALANINE AMINO (ALT) (SGPT): CPT

## 2017-09-14 PROCEDURE — 80061 LIPID PANEL: CPT

## 2017-09-14 PROCEDURE — 36415 COLL VENOUS BLD VENIPUNCTURE: CPT | Mod: PO

## 2017-09-21 ENCOUNTER — OFFICE VISIT (OUTPATIENT)
Dept: CARDIOLOGY | Facility: CLINIC | Age: 72
End: 2017-09-21
Payer: MEDICARE

## 2017-09-21 ENCOUNTER — TELEPHONE (OUTPATIENT)
Dept: CARDIOLOGY | Facility: CLINIC | Age: 72
End: 2017-09-21

## 2017-09-21 ENCOUNTER — TELEPHONE (OUTPATIENT)
Dept: INTERNAL MEDICINE | Facility: CLINIC | Age: 72
End: 2017-09-21

## 2017-09-21 VITALS
WEIGHT: 230.5 LBS | SYSTOLIC BLOOD PRESSURE: 144 MMHG | DIASTOLIC BLOOD PRESSURE: 66 MMHG | BODY MASS INDEX: 28.66 KG/M2 | HEART RATE: 52 BPM | HEIGHT: 75 IN

## 2017-09-21 DIAGNOSIS — E11.59 TYPE 2 DIABETES MELLITUS WITH OTHER CIRCULATORY COMPLICATION, WITHOUT LONG-TERM CURRENT USE OF INSULIN: ICD-10-CM

## 2017-09-21 DIAGNOSIS — E78.2 MIXED HYPERLIPIDEMIA: Primary | ICD-10-CM

## 2017-09-21 DIAGNOSIS — I25.10 CORONARY ARTERY DISEASE INVOLVING NATIVE CORONARY ARTERY OF NATIVE HEART WITHOUT ANGINA PECTORIS: ICD-10-CM

## 2017-09-21 DIAGNOSIS — I73.9 PAD (PERIPHERAL ARTERY DISEASE): ICD-10-CM

## 2017-09-21 DIAGNOSIS — I10 ESSENTIAL HYPERTENSION: ICD-10-CM

## 2017-09-21 DIAGNOSIS — R26.81 GAIT INSTABILITY: Primary | ICD-10-CM

## 2017-09-21 DIAGNOSIS — I35.0 NONRHEUMATIC AORTIC VALVE STENOSIS: ICD-10-CM

## 2017-09-21 PROCEDURE — 1126F AMNT PAIN NOTED NONE PRSNT: CPT | Mod: S$GLB,,, | Performed by: INTERNAL MEDICINE

## 2017-09-21 PROCEDURE — 3077F SYST BP >= 140 MM HG: CPT | Mod: S$GLB,,, | Performed by: INTERNAL MEDICINE

## 2017-09-21 PROCEDURE — 99999 PR PBB SHADOW E&M-EST. PATIENT-LVL III: CPT | Mod: PBBFAC,,, | Performed by: INTERNAL MEDICINE

## 2017-09-21 PROCEDURE — 3078F DIAST BP <80 MM HG: CPT | Mod: S$GLB,,, | Performed by: INTERNAL MEDICINE

## 2017-09-21 PROCEDURE — 99499 UNLISTED E&M SERVICE: CPT | Mod: S$GLB,,, | Performed by: INTERNAL MEDICINE

## 2017-09-21 PROCEDURE — 1159F MED LIST DOCD IN RCRD: CPT | Mod: S$GLB,,, | Performed by: INTERNAL MEDICINE

## 2017-09-21 PROCEDURE — 99214 OFFICE O/P EST MOD 30 MIN: CPT | Mod: S$GLB,,, | Performed by: INTERNAL MEDICINE

## 2017-09-21 PROCEDURE — 3008F BODY MASS INDEX DOCD: CPT | Mod: S$GLB,,, | Performed by: INTERNAL MEDICINE

## 2017-09-21 RX ORDER — FENOFIBRATE 160 MG/1
160 TABLET ORAL DAILY
Qty: 90 TABLET | Refills: 3 | Status: SHIPPED | OUTPATIENT
Start: 2017-09-21 | End: 2018-09-20 | Stop reason: SDUPTHER

## 2017-09-21 NOTE — Clinical Note
Gerhard, He complains of gait imbalance. Pl evaluate or consider sending him to a neurologist. Homey[place]

## 2017-09-21 NOTE — PROGRESS NOTES
Subjective:   Patient ID:  Cecil Pringle is a 71 y.o. male who presents for follow-up of Coronary Artery Disease      Problem List:  DM ~1995  asymptomatic PAD  CAD  CABG in 1990s and 2014  HTN  Mixed hyperlipidemia  Aortic stenosis    HPI:   Cecil Pringle does not report angina or shortness of breath with exertion. He has not required S/L NTG.    He had 5 vessel CABG in the 1990s. In 2011 he had 5 stents and in 2014 he underwent a second  CABG x 4 at [facility].  He does not recall having angina prior to any of the coronary interventions.   Stress echo 2/15: normal LV systolic function, mild aortic stenosis, valve area 1.7 cm2. No ischemia but his peak heart rate was in the 90s, 63% of max predicted.  He has a mixed hyperlipidemia treated w atorvastatin and fish oil. He has never been on a fibrate.  Triglycerides have been 340-410 mg/dl. HbA1c has been 6.9-8.1%. He uses an OTC fish oil 2 gm tid but the omega 3 content is much lower.   On metoprolol and losartan for hypertension.  He has PAD. U/S in 2015 revealed significant disease in the (R) CFA and SFA and (L) SFA. He does not report claudication. He walks 2 miles in 45 min 3-4 days a week.  ABIs performed in 2/17 revealed 1.05 on the right and 1.5 (noncompressible) on the left.  Carotid ultrasound reveals a 20-39% stenosis on the right side.  He reports that his balance is not what it used to be. A few months ago he stumbled over an uneven side walk.      Review of Systems   Constitution: Negative for weakness, malaise/fatigue, weight gain and weight loss.   Cardiovascular: Negative for chest pain, claudication, dyspnea on exertion, irregular heartbeat, leg swelling, orthopnea, palpitations, paroxysmal nocturnal dyspnea and syncope.   Respiratory: Negative for cough, sputum production and wheezing.    Musculoskeletal: Positive for muscle cramps. Negative for falls, joint pain, muscle weakness and myalgias.   Gastrointestinal: Negative for  abdominal pain, heartburn and melena.   Genitourinary: Positive for nocturia. Negative for frequency and hematuria.   Neurological: Positive for loss of balance. Negative for dizziness, light-headedness and paresthesias.   Psychiatric/Behavioral: Positive for depression. The patient is nervous/anxious.        Current Outpatient Prescriptions   Medication Sig    aspirin 325 MG tablet Take 325 mg by mouth once daily.    atorvastatin (LIPITOR) 40 MG tablet Take 1 tablet (40 mg total) by mouth once daily.    blood sugar diagnostic (RELION CONFIRM-MICRO) Strp 1 strip by Misc.(Non-Drug; Combo Route) route 3 (three) times daily. relion confirm plus test strips    blood sugar diagnostic (TRUETEST TEST STRIPS) Strp accu chek test strips test 3 times daily    blood-glucose meter [facility] Needs accu-chek meter to check blood sugar 3 times daily    clopidogrel (PLAVIX) 75 mg tablet Take 1 tablet (75 mg total) by mouth once daily.    co-enzyme Q-10 30 mg capsule Take 30 mg by mouth once daily.     fish oil-omega-3 fatty acids 300-1,000 mg capsule Take 2 g by mouth 3 (three) times daily.     fluoxetine (PROZAC) 20 MG capsule Take 1 capsule (20 mg total) by mouth once daily.    lancets Misc accu-chek lancets blood sugar 3 times daily    losartan (COZAAR) 100 MG tablet Take 1 tablet (100 mg total) by mouth once daily.    metformin (GLUCOPHAGE) 1000 MG tablet Take 1 tablet (1,000 mg total) by mouth 2 (two) times daily.    metoprolol tartrate (LOPRESSOR) 100 MG tablet Take 0.5 tablets (50 mg total) by mouth 2 (two) times daily.    multivit-iron-min-folic acid (MULTIVITAMIN-IRON-MINERALS-FOLIC ACID) 3,500-18-0.4 unit-mg-mg Chew Take by mouth.      nifedipine (PROCARDIA-XL) 60 MG (OSM) 24 hr tablet Take 1 tablet (60 mg total) by mouth once daily.    SAW PALMETTO ORAL Take 1 capsule by mouth 2 (two) times daily.    SITagliptan (JANUVIA) 100 MG Tab Take 1 tablet (100 mg total) by mouth once daily.    temazepam (RESTORIL) 30  "mg capsule Take 1 capsule (30 mg total) by mouth nightly as needed for Insomnia.         Social History   Substance Use Topics    Smoking status: Former Smoker     Packs/day: 2.50     Years: 30.00     Types: Cigarettes    Smokeless tobacco: Never Used    Alcohol use No         Objective:     Physical Exam   Constitutional: He is oriented to person, place, and time. He appears well-developed and well-nourished.   BP (!) 144/66   Pulse (!) 52   Ht 6' 3" (1.905 m)   Wt 104.5 kg (230 lb 7.9 oz)   BMI 28.81 kg/m²      HENT:   Head: Normocephalic and atraumatic.   Neck: No JVD present. Carotid bruit is present (radiating from the precordium).   Cardiovascular: Normal rate, regular rhythm, S1 normal and S2 normal.  Exam reveals no gallop.    Murmur heard.   Harsh midsystolic murmur is present with a grade of 2/6  at the upper right sternal border radiating to the neck  Pulses:       Radial pulses are 2+ on the right side, and 2+ on the left side.        Posterior tibial pulses are 0 on the right side, and 2+ on the left side.   Pulmonary/Chest: Effort normal. He has no wheezes. He has no rales. Chest wall is not dull to percussion.   Abdominal: Soft. There is no splenomegaly or hepatomegaly. There is no tenderness.   Musculoskeletal:        Right lower leg: He exhibits no edema.        Left lower leg: He exhibits no edema.   Neurological: He is alert and oriented to person, place, and time. Gait normal.   Skin: Skin is warm and dry. No bruising noted. No cyanosis. Nails show no clubbing.   Psychiatric: He has a normal mood and affect. His speech is normal and behavior is normal. Judgment and thought content normal. Cognition and memory are normal.           Lab Results   Component Value Date    CHOL 125 09/14/2017    HDL 32 (L) 09/14/2017    LDLCALC 25.0 (L) 09/14/2017    TRIG 340 (H) 09/14/2017    CHOLHDL 25.6 09/14/2017     Lab Results   Component Value Date     (H) 09/14/2017    CREATININE 1.0 09/14/2017 "    BUN 19 09/14/2017     09/14/2017    K 4.4 09/14/2017     09/14/2017    CO2 21 (L) 09/14/2017     Lab Results   Component Value Date    ALT 26 09/14/2017    AST 20 09/14/2017    ALKPHOS 65 05/05/2017    BILITOT 0.4 05/05/2017         Assessment and Plan:     1. Mixed hyperlipidemia    2. Coronary artery disease involving native coronary artery of native heart without angina pectoris    3. Essential hypertension    4. Nonrheumatic aortic valve stenosis    5. Type 2 diabetes mellitus with other circulatory complication, without long-term current use of insulin    6. PVD (peripheral vascular disease)        Mixed hyperlipidemia  Add fenofibrate.  Nutritional counseling.  Discussed risk of rhabdomyolysis and advised temporarily discontinuing the fibrate in situations where the risk increases.     Coronary artery disease involving native coronary artery of native heart without angina pectoris  Stable.  Continue same meds.     Essential hypertension  Stable.  Continue same meds.     Nonrheumatic aortic valve stenosis  Mild. Will repeat echo on RTC in 2018.    PAD (peripheral artery disease)  Asymptomatic.  Will repeat u/s on RTC in 2018.     RTC 4/18

## 2017-09-21 NOTE — TELEPHONE ENCOUNTER
The patient has been seen by cardiology today and complained of gait instability.  I have referred the patient to neurology for further evaluation.  Please schedule an inform the patient.  Thank you.

## 2017-09-21 NOTE — TELEPHONE ENCOUNTER
Spoke w/ pt re: cardiology appt & unsteady gait. He stated that he was not having that much trouble. I explained to him that the Dr has referred him to neurology to further evaluate him.    Pt verbalized understanding.    I sent referral coordinator information re: referral for neurology

## 2017-09-21 NOTE — PATIENT INSTRUCTIONS
Triglycerides are made worse by the following  Foods containing fat and sugars: ice cream, desserts, cakes, chocolate  Beer, fruit juices  Starches: rice, potatoes, bread, pasta  Lack of exercise  Uncontrolled diabetes      LDL - bad type - improves with diet and medications: typically statins; most other                   medications that lower LDL but have not been proven to prevent heart attacks.             May not improve significantly with exercise alone. If the triglycerides are high, the LDL  will not be accurate.             Ideally less than 70    HDL - good type - improves with exercise             Ideally greater than 50    TGs (triglycerides) - also bad - can change very quickly and considerably with food -           improve with diet and exercise            In some cases a low carbohydrate diet will lower TGs better than a low fat diet.            Ideal range     Sugar, fat and cholesterol in food:     A sensible diet that limits the intake of sugars, saturated (bad) fats and trans fats while increasing the intake of unsaturated (good)  fats and plant proteins is the basis of the current dietary recommendations.      We now recommend drastically reducing the intake of sugar. There is less emphasis on excluding fat.   Cholesterol in our food is no longer a significant concern, because it is generally present in small amounts. However please do not confuse this with the role of cholesterol in our blood and arteries. Make no mistake, it is cholesterol that clogs up our arteries whether it comes from our food or is manufactured by our bodies.       Most foods that are high in cholesterol are also high in saturated fat. But there is way more saturated fat than cholesterol in these foods. On the other hand there are a handful of foods that are high in cholesterol but do not contain much saturated fat: eggs, shrimp, crab legs and crawfish are OK to eat.       Saturated fat is the bad fat - you should  limit your intake of this. Deep fried foods, meats and other animal fats are high in saturated fat. Cookies, donuts and most dessert and cakes are usually high in both saturated fat and sugar.       Unsaturated fat is the good fat. It contains the same number of calories as saturated fat but does not get deposited in our arteries. The Mediterranean style diet encourages the intake of unsaturated fat - olive oil, avocado and unsalted nuts.      You should eat a few servings of vegetables (and fruit as long as you are not diabetic) everyday. Substitute some plant proteins in place of meat: soy, beans, lentils, quinoa and oatmeal.      Do not use stick butter or stick margarine. Butter that comes in a tub is soft butter. It consists of 1/2 butter and 1/2 canola or another type of vegetable oil. It is fine to use that.       Trans fats should definitely be avoided. Most foods that are labelled as containing 0 gms of trans fat can still contain several hundred milligrams of trans fat: creamer, margarine, refrigerator dough, deep fried foods, ready made frosting, potato, corn and torilla chips, cakes, cookies, pie crusts and crackers containing shortening made with hydrogenated vegetable oil.            In rare cases the cholesterol meds can cause a muscle reaction known as rhabdomyolysis. If you have muscle soreness all over the body, you feel like you have the flu but there is no fever, it may be a reaction from your cholesterol meds. Call us in that situation.    If having surgery or hospitalized for any reason, or you are sick or dehydrated fenofibrate should be stopped for a few days.  Keep well hydrated at alll times. Particularly in the summer.    If your doctor is prescribing a new medication even if for a short time, always ask if it would interfere with the cholesterol meds that you are taking.

## 2017-09-21 NOTE — ASSESSMENT & PLAN NOTE
Add fenofibrate.  Nutritional counseling.  Discussed risk of rhabdomyolysis and advised temporarily discontinuing the fibrate in situations where the risk increases.

## 2017-09-21 NOTE — TELEPHONE ENCOUNTER
Fili with [facility] pharmacy notified to cancel prescription for Liraglutide from pt's profile Pt states he no longer takes this medication.

## 2017-10-30 RX ORDER — TEMAZEPAM 30 MG/1
30 CAPSULE ORAL NIGHTLY PRN
Qty: 30 CAPSULE | Refills: 0 | Status: SHIPPED | OUTPATIENT
Start: 2017-10-30 | End: 2018-02-01 | Stop reason: SDUPTHER

## 2017-10-30 RX ORDER — LOSARTAN POTASSIUM 100 MG/1
100 TABLET ORAL DAILY
Qty: 90 TABLET | Refills: 3 | Status: SHIPPED | OUTPATIENT
Start: 2017-10-30 | End: 2018-02-01 | Stop reason: SDUPTHER

## 2018-01-05 DIAGNOSIS — I10 ESSENTIAL HYPERTENSION: Primary | ICD-10-CM

## 2018-02-01 DIAGNOSIS — E66.9 DIABETES MELLITUS TYPE 2 IN OBESE: ICD-10-CM

## 2018-02-01 DIAGNOSIS — E11.69 DIABETES MELLITUS TYPE 2 IN OBESE: ICD-10-CM

## 2018-02-01 RX ORDER — CLOPIDOGREL BISULFATE 75 MG/1
75 TABLET ORAL DAILY
Qty: 90 TABLET | Refills: 3 | Status: SHIPPED | OUTPATIENT
Start: 2018-02-01 | End: 2018-03-08 | Stop reason: SDUPTHER

## 2018-02-01 RX ORDER — LOSARTAN POTASSIUM 100 MG/1
100 TABLET ORAL DAILY
Qty: 90 TABLET | Refills: 3 | Status: SHIPPED | OUTPATIENT
Start: 2018-02-01 | End: 2019-01-30 | Stop reason: SDUPTHER

## 2018-02-01 RX ORDER — METFORMIN HYDROCHLORIDE 1000 MG/1
1000 TABLET ORAL 2 TIMES DAILY
Qty: 180 TABLET | Refills: 3 | Status: SHIPPED | OUTPATIENT
Start: 2018-02-01 | End: 2019-01-30 | Stop reason: SDUPTHER

## 2018-02-01 RX ORDER — ATORVASTATIN CALCIUM 40 MG/1
TABLET, FILM COATED ORAL
Qty: 90 TABLET | Refills: 3 | Status: SHIPPED | OUTPATIENT
Start: 2018-02-01 | End: 2019-01-30 | Stop reason: SDUPTHER

## 2018-02-01 RX ORDER — METFORMIN HYDROCHLORIDE 1000 MG/1
TABLET ORAL
Qty: 180 TABLET | Refills: 3 | Status: SHIPPED | OUTPATIENT
Start: 2018-02-01 | End: 2018-02-01 | Stop reason: SDUPTHER

## 2018-02-01 RX ORDER — METOPROLOL TARTRATE 100 MG/1
50 TABLET ORAL 2 TIMES DAILY
Qty: 90 TABLET | Refills: 3 | Status: SHIPPED | OUTPATIENT
Start: 2018-02-01 | End: 2018-08-01 | Stop reason: SDUPTHER

## 2018-02-01 RX ORDER — TEMAZEPAM 30 MG/1
30 CAPSULE ORAL NIGHTLY PRN
Qty: 30 CAPSULE | Refills: 0 | Status: SHIPPED | OUTPATIENT
Start: 2018-02-01 | End: 2018-05-04 | Stop reason: SDUPTHER

## 2018-02-01 RX ORDER — CLOPIDOGREL BISULFATE 75 MG/1
TABLET ORAL
Qty: 90 TABLET | Refills: 3 | Status: SHIPPED | OUTPATIENT
Start: 2018-02-01 | End: 2019-01-30 | Stop reason: SDUPTHER

## 2018-02-01 NOTE — TELEPHONE ENCOUNTER
Faxed rx for Restoril 30 mg #30 no additional refills to [facility] @ [phone] . Phone was busy, so faxed

## 2018-02-08 ENCOUNTER — PES CALL (OUTPATIENT)
Dept: ADMINISTRATIVE | Facility: CLINIC | Age: 73
End: 2018-02-08

## 2018-02-21 NOTE — TELEPHONE ENCOUNTER
Spoke with Jose @ [facility]. Stated that they never received fax. I called rx in for restoril 30 mg # 30 no additional refills to [facility] @ [phone]

## 2018-02-22 ENCOUNTER — TELEPHONE (OUTPATIENT)
Dept: INTERNAL MEDICINE | Facility: CLINIC | Age: 73
End: 2018-02-22

## 2018-02-22 DIAGNOSIS — F33.0 MILD EPISODE OF RECURRENT MAJOR DEPRESSIVE DISORDER: ICD-10-CM

## 2018-02-22 DIAGNOSIS — I65.21 STENOSIS OF RIGHT CAROTID ARTERY: ICD-10-CM

## 2018-02-22 DIAGNOSIS — Z00.00 WELL ADULT EXAM: Primary | ICD-10-CM

## 2018-02-22 DIAGNOSIS — I73.9 PAD (PERIPHERAL ARTERY DISEASE): ICD-10-CM

## 2018-02-22 DIAGNOSIS — Z95.1 S/P CABG X 5: ICD-10-CM

## 2018-02-22 DIAGNOSIS — E78.2 MIXED HYPERLIPIDEMIA: ICD-10-CM

## 2018-02-22 DIAGNOSIS — F13.20 SEDATIVE DEPENDENCE: ICD-10-CM

## 2018-02-22 DIAGNOSIS — G47.00 INSOMNIA, UNSPECIFIED TYPE: ICD-10-CM

## 2018-02-22 DIAGNOSIS — I25.10 CORONARY ARTERY DISEASE INVOLVING NATIVE CORONARY ARTERY OF NATIVE HEART WITHOUT ANGINA PECTORIS: ICD-10-CM

## 2018-02-22 DIAGNOSIS — Z87.891 EX-SMOKER: ICD-10-CM

## 2018-02-22 DIAGNOSIS — Z12.5 PROSTATE CANCER SCREENING: ICD-10-CM

## 2018-02-22 DIAGNOSIS — I35.0 NONRHEUMATIC AORTIC VALVE STENOSIS: ICD-10-CM

## 2018-02-22 DIAGNOSIS — I10 ESSENTIAL HYPERTENSION: ICD-10-CM

## 2018-02-22 NOTE — TELEPHONE ENCOUNTER
PLease order labs to be linked to physical appt for pt has an appt for another dr. On 3-1-18    Thank you

## 2018-02-22 NOTE — TELEPHONE ENCOUNTER
Labs have been ordered but please reschedule the patient's appointment as he is due for a physical exam.  The patient has scheduled a MyChart follow-up.  Thank you.

## 2018-02-22 NOTE — TELEPHONE ENCOUNTER
Spoke with pt re: appt change. Pt agreed . Changed appt from 20 minutes  to 40 min physical appt.    Linked labs to scheduled appt

## 2018-03-01 ENCOUNTER — LAB VISIT (OUTPATIENT)
Dept: LAB | Facility: HOSPITAL | Age: 73
End: 2018-03-01
Attending: INTERNAL MEDICINE
Payer: MEDICARE

## 2018-03-01 ENCOUNTER — CLINICAL SUPPORT (OUTPATIENT)
Dept: CARDIOLOGY | Facility: CLINIC | Age: 73
End: 2018-03-01
Attending: INTERNAL MEDICINE
Payer: MEDICARE

## 2018-03-01 DIAGNOSIS — I73.9 PAD (PERIPHERAL ARTERY DISEASE): ICD-10-CM

## 2018-03-01 DIAGNOSIS — I65.21 STENOSIS OF RIGHT CAROTID ARTERY: ICD-10-CM

## 2018-03-01 DIAGNOSIS — I35.0 NONRHEUMATIC AORTIC VALVE STENOSIS: ICD-10-CM

## 2018-03-01 DIAGNOSIS — I25.10 CORONARY ARTERY DISEASE INVOLVING NATIVE CORONARY ARTERY OF NATIVE HEART WITHOUT ANGINA PECTORIS: ICD-10-CM

## 2018-03-01 DIAGNOSIS — Z87.891 EX-SMOKER: ICD-10-CM

## 2018-03-01 DIAGNOSIS — F13.20 SEDATIVE DEPENDENCE: ICD-10-CM

## 2018-03-01 DIAGNOSIS — E78.2 MIXED HYPERLIPIDEMIA: ICD-10-CM

## 2018-03-01 DIAGNOSIS — Z95.1 S/P CABG X 5: ICD-10-CM

## 2018-03-01 DIAGNOSIS — G47.00 INSOMNIA, UNSPECIFIED TYPE: ICD-10-CM

## 2018-03-01 DIAGNOSIS — F33.0 MILD EPISODE OF RECURRENT MAJOR DEPRESSIVE DISORDER: ICD-10-CM

## 2018-03-01 DIAGNOSIS — Z12.5 PROSTATE CANCER SCREENING: ICD-10-CM

## 2018-03-01 DIAGNOSIS — I10 ESSENTIAL HYPERTENSION: ICD-10-CM

## 2018-03-01 DIAGNOSIS — Z00.00 WELL ADULT EXAM: ICD-10-CM

## 2018-03-01 LAB
ALBUMIN SERPL BCP-MCNC: 4.1 G/DL
ALP SERPL-CCNC: 52 U/L
ALT SERPL W/O P-5'-P-CCNC: 23 U/L
ALT SERPL W/O P-5'-P-CCNC: 23 U/L
ANION GAP SERPL CALC-SCNC: 11 MMOL/L
ANION GAP SERPL CALC-SCNC: 11 MMOL/L
AORTIC VALVE STENOSIS: ABNORMAL
AST SERPL-CCNC: 18 U/L
AST SERPL-CCNC: 18 U/L
BASOPHILS # BLD AUTO: 0.08 K/UL
BASOPHILS NFR BLD: 0.9 %
BILIRUB SERPL-MCNC: 0.4 MG/DL
BUN SERPL-MCNC: 19 MG/DL
BUN SERPL-MCNC: 19 MG/DL
CALCIUM SERPL-MCNC: 10.1 MG/DL
CALCIUM SERPL-MCNC: 10.1 MG/DL
CHLORIDE SERPL-SCNC: 106 MMOL/L
CHLORIDE SERPL-SCNC: 106 MMOL/L
CHOLEST SERPL-MCNC: 136 MG/DL
CHOLEST/HDLC SERPL: 4.4 {RATIO}
CO2 SERPL-SCNC: 23 MMOL/L
CO2 SERPL-SCNC: 23 MMOL/L
COMPLEXED PSA SERPL-MCNC: 0.49 NG/ML
CREAT SERPL-MCNC: 1.3 MG/DL
CREAT SERPL-MCNC: 1.3 MG/DL
DIASTOLIC DYSFUNCTION: YES
DIFFERENTIAL METHOD: ABNORMAL
EOSINOPHIL # BLD AUTO: 0.2 K/UL
EOSINOPHIL NFR BLD: 2.2 %
ERYTHROCYTE [DISTWIDTH] IN BLOOD BY AUTOMATED COUNT: 14.5 %
EST. GFR  (AFRICAN AMERICAN): >60 ML/MIN/1.73 M^2
EST. GFR  (AFRICAN AMERICAN): >60 ML/MIN/1.73 M^2
EST. GFR  (NON AFRICAN AMERICAN): 55.3 ML/MIN/1.73 M^2
EST. GFR  (NON AFRICAN AMERICAN): 55.3 ML/MIN/1.73 M^2
ESTIMATED AVG GLUCOSE: 157 MG/DL
ESTIMATED PA SYSTOLIC PRESSURE: 31.36
GLUCOSE SERPL-MCNC: 157 MG/DL
GLUCOSE SERPL-MCNC: 157 MG/DL
HBA1C MFR BLD HPLC: 7.1 %
HCT VFR BLD AUTO: 40.6 %
HDLC SERPL-MCNC: 31 MG/DL
HDLC SERPL: 22.8 %
HGB BLD-MCNC: 13.9 G/DL
IMM GRANULOCYTES # BLD AUTO: 0.08 K/UL
IMM GRANULOCYTES NFR BLD AUTO: 0.9 %
LDLC SERPL CALC-MCNC: 51.4 MG/DL
LYMPHOCYTES # BLD AUTO: 2.5 K/UL
LYMPHOCYTES NFR BLD: 26.8 %
MCH RBC QN AUTO: 29.5 PG
MCHC RBC AUTO-ENTMCNC: 34.2 G/DL
MCV RBC AUTO: 86 FL
MONOCYTES # BLD AUTO: 0.6 K/UL
MONOCYTES NFR BLD: 6 %
NEUTROPHILS # BLD AUTO: 6 K/UL
NEUTROPHILS NFR BLD: 63.2 %
NONHDLC SERPL-MCNC: 105 MG/DL
NRBC BLD-RTO: 0 /100 WBC
PLATELET # BLD AUTO: 273 K/UL
PMV BLD AUTO: 11.5 FL
POTASSIUM SERPL-SCNC: 5.2 MMOL/L
POTASSIUM SERPL-SCNC: 5.2 MMOL/L
PROT SERPL-MCNC: 7.5 G/DL
RBC # BLD AUTO: 4.71 M/UL
RETIRED EF AND QEF - SEE NOTES: 60 (ref 55–65)
SODIUM SERPL-SCNC: 140 MMOL/L
SODIUM SERPL-SCNC: 140 MMOL/L
T4 FREE SERPL-MCNC: 1.04 NG/DL
TRICUSPID VALVE REGURGITATION: ABNORMAL
TRIGL SERPL-MCNC: 268 MG/DL
TSH SERPL DL<=0.005 MIU/L-ACNC: 0.84 UIU/ML
WBC # BLD AUTO: 9.4 K/UL

## 2018-03-01 PROCEDURE — 84153 ASSAY OF PSA TOTAL: CPT

## 2018-03-01 PROCEDURE — 93925 LOWER EXTREMITY STUDY: CPT | Mod: S$GLB,,, | Performed by: INTERNAL MEDICINE

## 2018-03-01 PROCEDURE — 80061 LIPID PANEL: CPT

## 2018-03-01 PROCEDURE — 85025 COMPLETE CBC W/AUTO DIFF WBC: CPT

## 2018-03-01 PROCEDURE — 80053 COMPREHEN METABOLIC PANEL: CPT

## 2018-03-01 PROCEDURE — 93306 TTE W/DOPPLER COMPLETE: CPT | Mod: S$GLB,,, | Performed by: INTERNAL MEDICINE

## 2018-03-01 PROCEDURE — 36415 COLL VENOUS BLD VENIPUNCTURE: CPT | Mod: PO

## 2018-03-01 PROCEDURE — 84439 ASSAY OF FREE THYROXINE: CPT

## 2018-03-01 PROCEDURE — 83036 HEMOGLOBIN GLYCOSYLATED A1C: CPT

## 2018-03-01 PROCEDURE — 84443 ASSAY THYROID STIM HORMONE: CPT

## 2018-03-08 ENCOUNTER — PATIENT MESSAGE (OUTPATIENT)
Dept: CARDIOLOGY | Facility: CLINIC | Age: 73
End: 2018-03-08

## 2018-03-08 ENCOUNTER — OFFICE VISIT (OUTPATIENT)
Dept: CARDIOLOGY | Facility: CLINIC | Age: 73
End: 2018-03-08
Payer: MEDICARE

## 2018-03-08 VITALS
SYSTOLIC BLOOD PRESSURE: 146 MMHG | DIASTOLIC BLOOD PRESSURE: 64 MMHG | HEART RATE: 52 BPM | BODY MASS INDEX: 28.85 KG/M2 | WEIGHT: 232.06 LBS | HEIGHT: 75 IN

## 2018-03-08 DIAGNOSIS — Z95.1 S/P CABG X 5: ICD-10-CM

## 2018-03-08 DIAGNOSIS — R00.1 SINUS BRADYCARDIA: ICD-10-CM

## 2018-03-08 DIAGNOSIS — E11.8 TYPE 2 DIABETES MELLITUS WITH COMPLICATION, WITHOUT LONG-TERM CURRENT USE OF INSULIN: ICD-10-CM

## 2018-03-08 DIAGNOSIS — I10 ESSENTIAL HYPERTENSION: ICD-10-CM

## 2018-03-08 DIAGNOSIS — I73.9 PAD (PERIPHERAL ARTERY DISEASE): ICD-10-CM

## 2018-03-08 DIAGNOSIS — I25.10 CORONARY ARTERY DISEASE INVOLVING NATIVE CORONARY ARTERY OF NATIVE HEART WITHOUT ANGINA PECTORIS: Primary | ICD-10-CM

## 2018-03-08 DIAGNOSIS — E78.2 MIXED HYPERLIPIDEMIA: ICD-10-CM

## 2018-03-08 DIAGNOSIS — I35.0 NONRHEUMATIC AORTIC VALVE STENOSIS: ICD-10-CM

## 2018-03-08 PROCEDURE — 99214 OFFICE O/P EST MOD 30 MIN: CPT | Mod: S$GLB,,, | Performed by: INTERNAL MEDICINE

## 2018-03-08 PROCEDURE — 3077F SYST BP >= 140 MM HG: CPT | Mod: S$GLB,,, | Performed by: INTERNAL MEDICINE

## 2018-03-08 PROCEDURE — 93000 ELECTROCARDIOGRAM COMPLETE: CPT | Mod: S$GLB,,, | Performed by: INTERNAL MEDICINE

## 2018-03-08 PROCEDURE — 99499 UNLISTED E&M SERVICE: CPT | Mod: S$GLB,,, | Performed by: INTERNAL MEDICINE

## 2018-03-08 PROCEDURE — 3078F DIAST BP <80 MM HG: CPT | Mod: S$GLB,,, | Performed by: INTERNAL MEDICINE

## 2018-03-08 PROCEDURE — 99999 PR PBB SHADOW E&M-EST. PATIENT-LVL III: CPT | Mod: PBBFAC,,, | Performed by: INTERNAL MEDICINE

## 2018-03-08 NOTE — PATIENT INSTRUCTIONS
PET stress test for the heart.  Extremely detailed and accurate but also very sensitive no caffeine for 24 hours    ===============    Triglycerides are made worse by the following  Foods containing fat and sugars: ice cream, desserts, cakes, chocolate  Beer, fruit juices  Starches: rice, potatoes, bread, pasta  Lack of exercise  Uncontrolled diabetes    Although your triglycerides are better since the addition of fenofibrate, please stop fenofibrate for a couple of days if you are ill, dehydrated to being admitted to hospital because of the risk of a rare side effect known a s rhabdomyolysis.    ========================    Avoid NSAIDs: Ibuprofen, Advil, Motrin, Aleeve, Naproxen, Meloxicam, Mobic, Diclofenac and Voltaren.  Aleeve (naproxen) is safer from the cardiac stand point but should be avoided if kidney function is not normal.  Also do not take NSAIDs at the same time as aspirin - it may make aspirin less effective.  You can take Tylenol for pain. It is not a NSAID. Limit Tylenol to 4 tabs (500 mg each) in a 24 hr period.    =================

## 2018-03-08 NOTE — PROGRESS NOTES
Subjective:   Patient ID:  Cecil Pringle is a 72 y.o. male who presents for follow-up of Coronary Artery Disease      Problem List:    CAD  CABG in 1990s and 2014  HTN  Mixed hyperlipidemia  DM ~1995  asymptomatic PAD  Aortic stenosis - mild  Heart murmur since childhood    HPI:   Cecil Pringle feels generally well. He does not report angina or shortness of breath with exertion. He has not required S/L NTG.  He had 5 vessel CABG in the 1990s. In 2011 he had 5 stents and in 2014 he underwent a second CABG x 4 at [facility].  He does not recall having angina prior to any of the coronary interventions.   Stress echo 2/15: normal LV systolic function, mild aortic stenosis, valve area 1.7 cm2. No ischemia but his peak heart rate was in the 90s, 63% of max predicted.    He has PAD. Lower extremity u/s 3/18: moderate SFA disease bilaterally. He has not been walking for the past 3-4 months because of pain in the left knee. He does not report claudication.  Mild plaque was present in the right carotid artery, noted on u/s in 2/17.   He has a mixed hyperlipidemia treated with atorvastatin. Fenofibrate added in 9/17. Triglycerides have improved from 340-401 to 268 mg/dl but non-HDL cholesterol has not changed and remains around 100 mg/dl.  Echo 3/18:  LA mildly enlarged, LV at the  upper limits of normal measuring 5.6 cm, aortic valve area  1.8 cm², peak velocity 2.6 m/s and mean gradient 16 mm Hg.   Hypertension is treated with nifedipine, metoprolol and losartan.  Systolic blood pressure was in the 140s today.  States at home SBP is ~134 mm Hg. He has a sinus bradycardia from the beta-blocker.  Creatinine is a bit from 1.0 to 1.3 mg/dl.     Review of Systems   Constitution: Negative for weakness, malaise/fatigue, weight gain and weight loss.   HENT: Negative for hearing loss and nosebleeds.    Eyes: Negative for visual disturbance.   Cardiovascular: Negative for chest pain, claudication, dyspnea on  exertion, irregular heartbeat, leg swelling, orthopnea, palpitations, paroxysmal nocturnal dyspnea and syncope.   Respiratory: Negative for cough, hemoptysis, sputum production and wheezing.    Hematologic/Lymphatic: Does not bruise/bleed easily.   Musculoskeletal: Negative for arthritis, back pain, falls, joint pain, muscle cramps, muscle weakness and myalgias.   Gastrointestinal: Negative for abdominal pain, heartburn and melena.   Genitourinary: Positive for nocturia. Negative for frequency and hematuria.   Neurological: Negative for dizziness, headaches, light-headedness, loss of balance, numbness and paresthesias.   Psychiatric/Behavioral: Positive for depression. The patient is nervous/anxious.        Current Outpatient Prescriptions   Medication Sig    aspirin 325 MG tablet Take 325 mg by mouth once daily.    atorvastatin (LIPITOR) 40 MG tablet TAKE ONE TABLET BY MOUTH ONCE DAILY    blood sugar diagnostic (RELION CONFIRM-MICRO) Strp 1 strip by Misc.(Non-Drug; Combo Route) route 3 (three) times daily. relion confirm plus test strips    blood sugar diagnostic (TRUETEST TEST STRIPS) Strp accu chek test strips test 3 times daily    blood-glucose meter [facility] Needs accu-chek meter to check blood sugar 3 times daily    clopidogrel (PLAVIX) 75 mg tablet TAKE ONE TABLET BY MOUTH ONCE DAILY    co-enzyme Q-10 30 mg capsule Take 30 mg by mouth once daily.     fenofibrate 160 MG Tab Take 1 tablet (160 mg total) by mouth once daily.    fish oil-omega-3 fatty acids 300-1,000 mg capsule Take 2 g by mouth 3 (three) times daily.     fluoxetine (PROZAC) 20 MG capsule Take 1 capsule (20 mg total) by mouth once daily.    lancets Misc accu-chek lancets blood sugar 3 times daily    losartan (COZAAR) 100 MG tablet Take 1 tablet (100 mg total) by mouth once daily.    metFORMIN (GLUCOPHAGE) 1000 MG tablet Take 1 tablet (1,000 mg total) by mouth 2 (two) times daily.    metoprolol tartrate (LOPRESSOR) 100 MG tablet Take 0.5  "tablets (50 mg total) by mouth 2 (two) times daily.    multivit-iron-min-folic acid (MULTIVITAMIN-IRON-MINERALS-FOLIC ACID) 3,500-18-0.4 unit-mg-mg Chew Take 1 tablet by mouth once daily.     nifedipine (PROCARDIA-XL) 60 MG (OSM) 24 hr tablet Take 1 tablet (60 mg total) by mouth once daily.    SAW PALMETTO ORAL Take 1 capsule by mouth 2 (two) times daily.    SITagliptin (JANUVIA) 100 MG Tab Take 1 tablet (100 mg total) by mouth once daily.    temazepam (RESTORIL) 30 mg capsule Take 1 capsule (30 mg total) by mouth nightly as needed for Insomnia.         Social History   Substance Use Topics    Smoking status: Former Smoker     Packs/day: 2.50     Years: 30.00     Types: Cigarettes    Smokeless tobacco: Never Used    Alcohol use No         Objective:     Physical Exam   Constitutional: He is oriented to person, place, and time. He appears well-developed and well-nourished.   BP (!) 146/64   Pulse (!) 52   Ht 6' 3" (1.905 m)   Wt 105.3 kg (232 lb 0.6 oz)   BMI 29.00 kg/m²      HENT:   Head: Normocephalic and atraumatic.   Neck: No JVD present. Carotid bruit is present (Radiating from the precordium).   Cardiovascular: Normal rate, regular rhythm, S1 normal and S2 normal.  Exam reveals no gallop.    Murmur heard.   Harsh midsystolic murmur is present  at the lower left sternal border, apex radiating to the neck  Pulses:       Radial pulses are 2+ on the right side, and 2+ on the left side.        Posterior tibial pulses are 1+ on the right side, and 1+ on the left side.   Pulmonary/Chest: Effort normal. He has no wheezes. He has no rales. Chest wall is not dull to percussion.   Abdominal: Soft. There is no splenomegaly or hepatomegaly. There is no tenderness.   Musculoskeletal:        Right lower leg: He exhibits no edema.        Left lower leg: He exhibits no edema.   Neurological: He is alert and oriented to person, place, and time. Gait normal.   Skin: Skin is warm and dry. No bruising noted. No " cyanosis. Nails show no clubbing.   Psychiatric: He has a normal mood and affect. His speech is normal and behavior is normal. Judgment and thought content normal. Cognition and memory are normal.           Component      Latest Ref Rng & Units 3/1/2018 9/14/2017 5/5/2017           7:42 AM     Sodium      136 - 145 mmol/L 140 140 138   Potassium      3.5 - 5.1 mmol/L 5.2 (H) 4.4 4.6   Chloride      95 - 110 mmol/L 106 107 104   CO2      23 - 29 mmol/L 23 21 (L) 25   Glucose      70 - 110 mg/dL 157 (H) 156 (H) 162 (H)   BUN, Bld      8 - 23 mg/dL 19 19 17   Creatinine      0.5 - 1.4 mg/dL 1.3 1.0 1.0   Calcium      8.7 - 10.5 mg/dL 10.1 9.4 9.5   Total Protein      6.0 - 8.4 g/dL   7.6   Albumin      3.5 - 5.2 g/dL   4.1   Total Bilirubin      0.1 - 1.0 mg/dL   0.4   Alkaline Phosphatase      55 - 135 U/L   65   AST      10 - 40 U/L 18 20 16   ALT      10 - 44 U/L 23 26 21   Anion Gap      8 - 16 mmol/L 11 12 9   eGFR if African American      >60 mL/min/1.73 m:2 >60.0 >60.0 >60.0   eGFR if non African American      >60 mL/min/1.73 m:2 55.3 (A) >60.0 >60.0       Component      Latest Ref Rng & Units 3/1/2018 9/14/2017 5/5/2017   Cholesterol      120 - 199 mg/dL 136 125 134   Triglycerides      30 - 150 mg/dL 268 (H) 340 (H) 361 (H)   HDL      40 - 75 mg/dL 31 (L) 32 (L) 33 (L)   LDL Cholesterol      63.0 - 159.0 mg/dL 51.4 (L) 25.0 (L) 28.8 (L)   HDL/Chol Ratio      20.0 - 50.0 % 22.8 25.6 24.6   Total Cholesterol/HDL Ratio      2.0 - 5.0 4.4 3.9 4.1   Non-HDL Cholesterol      mg/dL 105 93 101        ECG today sinus bradycardia at 44 bpm with ST and T wave abnormality unchanged.      Assessment and Plan:     Coronary artery disease involving native coronary artery of native heart without angina pectoris and s/p CABG 2 separate occasions.  Comments:  Patient has not had a recent stress test.  He does not have any symptoms at the present time but he did not have any angina or shortness of breath prior to his  interventions  Orders:  -     Cardiac PET Scan Stress; Future  -     EKG 12-lead; Future; Expected date: 03/08/2018    PAD (peripheral artery disease)  Comments:  Bilateral SFA disease. Asymptomatic.  Recommend bicycling because he is unable to walk long distances.    Mixed hyperlipidemia  Comments:  Triglycerides mproved since the addition of fenofibrate, but non-HDL cholesterol has not improved.  Continue lipoma and fenofibrate.    Type 2 diabetes mellitus with complication, without long-term current use of insulin  Comments:  Stop Victoza.  Will defer to Dr. Beckett    Nonrheumatic aortic valve stenosis  Comments:  Mild. He has had a heart murmur since childhood. The aortic stenosis is not the cause of the murmur present since childhood.    Essential hypertension   Controlled at home but mildly elevated in clinic today.  He will send us his home blood pressure readings.    Sinus bradycardia  Comments:  Asymptomatic.      Follow-up in about 8 months (around 10/22/2018).

## 2018-03-09 NOTE — TELEPHONE ENCOUNTER
BPs at home a bit better than in our clinic; that is pretty typical. Suggest he work on lowering his BP a bit so that the SBP is <130 at least 50% of the time.  Exercise 4-5 times a week and low salt. Review BPs again in about 2 months. Suggest he check 1-2 times a week and keep a record until then.

## 2018-03-15 ENCOUNTER — OFFICE VISIT (OUTPATIENT)
Dept: INTERNAL MEDICINE | Facility: CLINIC | Age: 73
End: 2018-03-15
Payer: MEDICARE

## 2018-03-15 VITALS
HEART RATE: 57 BPM | WEIGHT: 232.13 LBS | SYSTOLIC BLOOD PRESSURE: 138 MMHG | TEMPERATURE: 98 F | DIASTOLIC BLOOD PRESSURE: 58 MMHG | BODY MASS INDEX: 29.02 KG/M2 | RESPIRATION RATE: 15 BRPM

## 2018-03-15 DIAGNOSIS — Z00.00 WELL ADULT EXAM: Primary | ICD-10-CM

## 2018-03-15 DIAGNOSIS — I35.0 NONRHEUMATIC AORTIC VALVE STENOSIS: ICD-10-CM

## 2018-03-15 DIAGNOSIS — I25.10 CORONARY ARTERY DISEASE INVOLVING NATIVE CORONARY ARTERY OF NATIVE HEART WITHOUT ANGINA PECTORIS: ICD-10-CM

## 2018-03-15 DIAGNOSIS — Z23 NEED FOR 23-POLYVALENT PNEUMOCOCCAL POLYSACCHARIDE VACCINE: ICD-10-CM

## 2018-03-15 DIAGNOSIS — I10 ESSENTIAL HYPERTENSION: ICD-10-CM

## 2018-03-15 DIAGNOSIS — Z12.11 COLON CANCER SCREENING: ICD-10-CM

## 2018-03-15 DIAGNOSIS — E11.69 ONYCHOMYCOSIS OF MULTIPLE TOENAILS WITH TYPE 2 DIABETES MELLITUS: ICD-10-CM

## 2018-03-15 DIAGNOSIS — G47.09 OTHER INSOMNIA: ICD-10-CM

## 2018-03-15 DIAGNOSIS — E78.2 MIXED HYPERLIPIDEMIA: ICD-10-CM

## 2018-03-15 DIAGNOSIS — I73.9 PAD (PERIPHERAL ARTERY DISEASE): ICD-10-CM

## 2018-03-15 DIAGNOSIS — Z95.1 S/P CABG X 5: ICD-10-CM

## 2018-03-15 DIAGNOSIS — E11.9 DIABETIC EYE EXAM: ICD-10-CM

## 2018-03-15 DIAGNOSIS — J30.89 CHRONIC NONSEASONAL ALLERGIC RHINITIS DUE TO POLLEN: ICD-10-CM

## 2018-03-15 DIAGNOSIS — Z01.00 DIABETIC EYE EXAM: ICD-10-CM

## 2018-03-15 DIAGNOSIS — B35.1 ONYCHOMYCOSIS OF MULTIPLE TOENAILS WITH TYPE 2 DIABETES MELLITUS: ICD-10-CM

## 2018-03-15 DIAGNOSIS — F33.0 MILD EPISODE OF RECURRENT MAJOR DEPRESSIVE DISORDER: ICD-10-CM

## 2018-03-15 DIAGNOSIS — I65.21 STENOSIS OF RIGHT CAROTID ARTERY: ICD-10-CM

## 2018-03-15 PROBLEM — F13.20 SEDATIVE DEPENDENCE: Status: RESOLVED | Noted: 2017-01-26 | Resolved: 2018-03-15

## 2018-03-15 PROBLEM — Z87.891 EX-SMOKER: Status: RESOLVED | Noted: 2017-01-26 | Resolved: 2018-03-15

## 2018-03-15 PROCEDURE — G0009 ADMIN PNEUMOCOCCAL VACCINE: HCPCS | Mod: S$GLB,,, | Performed by: FAMILY MEDICINE

## 2018-03-15 PROCEDURE — 90732 PPSV23 VACC 2 YRS+ SUBQ/IM: CPT | Mod: S$GLB,,, | Performed by: FAMILY MEDICINE

## 2018-03-15 PROCEDURE — 99499 UNLISTED E&M SERVICE: CPT | Mod: S$GLB,,, | Performed by: FAMILY MEDICINE

## 2018-03-15 PROCEDURE — 99999 PR PBB SHADOW E&M-EST. PATIENT-LVL IV: CPT | Mod: PBBFAC,,, | Performed by: FAMILY MEDICINE

## 2018-03-15 PROCEDURE — 99397 PER PM REEVAL EST PAT 65+ YR: CPT | Mod: S$GLB,,, | Performed by: FAMILY MEDICINE

## 2018-03-15 RX ORDER — HYDROXYZINE PAMOATE 25 MG/1
25 CAPSULE ORAL NIGHTLY PRN
Qty: 30 CAPSULE | Refills: 11 | Status: SHIPPED | OUTPATIENT
Start: 2018-03-15 | End: 2019-03-19 | Stop reason: SDUPTHER

## 2018-03-15 RX ORDER — LANCETS
EACH MISCELLANEOUS
Qty: 100 EACH | Refills: 3 | Status: SHIPPED | OUTPATIENT
Start: 2018-03-15

## 2018-03-15 RX ORDER — FLUOXETINE HYDROCHLORIDE 20 MG/1
20 CAPSULE ORAL DAILY
Qty: 90 CAPSULE | Refills: 3 | Status: SHIPPED | OUTPATIENT
Start: 2018-03-15 | End: 2019-03-19 | Stop reason: SDUPTHER

## 2018-03-15 RX ORDER — INSULIN PUMP SYRINGE, 3 ML
EACH MISCELLANEOUS
Qty: 1 EACH | Refills: 0 | Status: SHIPPED | OUTPATIENT
Start: 2018-03-15 | End: 2023-11-14

## 2018-03-15 NOTE — PROGRESS NOTES
Subjective:       Patient ID: Cecil Pringle is a 72 y.o. male.    Chief Complaint: Annual Exam    HPI 72-year-old white male presents to clinic today for annual physical exam.  He continues to be followed by cardiology secondary to coronary artery disease status post CABG in , 5 stent placement in , and repeat CABG in .  He continues to remain stable on both aspirin and Plavix.  Hypertension is well controlled on losartan 100 mg daily, nifedipine 60 mg daily, and metoprolol 50 mg daily.  Hyperlipidemia remains well controlled on Lipitor 40 mg daily and fenofibrate 160 mg daily.  Type 2 diabetes is adequately controlled on metformin 1000 mg twice a day and Januvia 100 mg daily.  He has had a slight increase in his A1c to 7.1.  He continues to check his blood glucose levels once daily.  He continues to complain of anxiety with frequent insomnia.  He continues to take fluoxetine daily but reports only occasional use of temazepam when his insomnia become severe.  He also reports frequent awakening at night to urinate.  Secondarily, he complains of frequent nasal congestion while sleeping.  He reports a past surgical history significant for CABG in  and repeat CABG in , 5 coronary stents were placed in , right hand surgery secondary to Dupuytren contracture, and left foot surgery.  He has a strong family history of heart disease.  His mother  from asbestosis.  He is up-to-date with all vaccinations except the pneumococcal vaccine which has been discussed and will be given today.  Finally, he complains of occasional shoulder pain and knee pain for which he obtains relief with ice and stretching.  Review of Systems   Constitutional: Positive for activity change. Negative for appetite change, chills, fatigue, fever and unexpected weight change.   HENT: Positive for congestion and hearing loss. Negative for ear pain, postnasal drip, rhinorrhea, sinus pressure, sore throat, tinnitus and  trouble swallowing.    Eyes: Positive for visual disturbance. Negative for discharge, redness and itching.   Respiratory: Negative for cough, chest tightness, shortness of breath and wheezing.    Cardiovascular: Negative for chest pain and palpitations.   Gastrointestinal: Positive for vomiting. Negative for abdominal pain, blood in stool, constipation, diarrhea and nausea.   Endocrine: Negative for polydipsia and polyuria.   Genitourinary: Positive for frequency and urgency. Negative for decreased urine volume, difficulty urinating, dysuria and hematuria.   Musculoskeletal: Positive for arthralgias (left knee and right shoulder) and gait problem. Negative for back pain, joint swelling, myalgias, neck pain and neck stiffness.   Skin: Negative for rash.   Neurological: Negative for dizziness, weakness, light-headedness and headaches.   Psychiatric/Behavioral: Positive for dysphoric mood and sleep disturbance. Negative for confusion.       Objective:      Physical Exam   Constitutional: He is oriented to person, place, and time. He appears well-developed and well-nourished. No distress.   HENT:   Head: Normocephalic and atraumatic.   Right Ear: External ear normal.   Left Ear: External ear normal.   Nose: Nose normal.   Mouth/Throat: Oropharynx is clear and moist. No oropharyngeal exudate.   Eyes: Conjunctivae and EOM are normal. Pupils are equal, round, and reactive to light. Right eye exhibits no discharge. Left eye exhibits no discharge. No scleral icterus.   Neck: Normal range of motion. Neck supple. No JVD present. No tracheal deviation present. No thyromegaly present.   Cardiovascular: Normal rate, regular rhythm, normal heart sounds and intact distal pulses.  Exam reveals no gallop and no friction rub.    No murmur heard.  Pulses:       Dorsalis pedis pulses are 2+ on the right side.        Posterior tibial pulses are 2+ on the right side.   Pulmonary/Chest: Effort normal and breath sounds normal. No stridor.  No respiratory distress. He has no wheezes. He has no rales.   Abdominal: Soft. Bowel sounds are normal. He exhibits no distension and no mass. There is no tenderness. There is no rebound and no guarding.   Musculoskeletal: Normal range of motion. He exhibits no edema.        Left knee: Tenderness found. Medial joint line tenderness noted.        Cervical back: He exhibits tenderness, pain and spasm.        Right foot: There is deformity (onychomycosis). There is normal range of motion.        Left foot: There is deformity (Onychomycosis). There is normal range of motion.   Feet:   Right Foot:   Protective Sensation: 10 sites tested. 10 sites sensed.   Skin Integrity: Negative for ulcer, blister, skin breakdown, erythema, warmth, callus or dry skin.   Left Foot:   Protective Sensation: 10 sites tested. 10 sites sensed.   Skin Integrity: Negative for ulcer, blister, skin breakdown, erythema, warmth, callus or dry skin.   Lymphadenopathy:     He has no cervical adenopathy.   Neurological: He is alert and oriented to person, place, and time.   Skin: Skin is warm and dry. No rash noted. He is not diaphoretic. No erythema. No pallor.   Psychiatric: He has a normal mood and affect. His behavior is normal. Judgment and thought content normal.   Nursing note and vitals reviewed.      Assessment:       1. Well adult exam    2. Uncontrolled type 2 diabetes mellitus with diabetic peripheral angiopathy without gangrene, without long-term current use of insulin    3. Type 2 diabetes, uncontrolled, with neuropathy    4. Coronary artery disease involving native coronary artery of native heart without angina pectoris    5. Essential hypertension    6. Mild episode of recurrent major depressive disorder    7. Mixed hyperlipidemia    8. Nonrheumatic aortic valve stenosis    9. PAD (peripheral artery disease)    10. S/P CABG x 5    11. Stenosis of right carotid artery    12. Other insomnia    13. Chronic nonseasonal allergic rhinitis  due to pollen    14. Onychomycosis of multiple toenails with type 2 diabetes mellitus    15. Diabetic eye exam    16. Need for 23-polyvalent pneumococcal polysaccharide vaccine        Plan:       1.  Labs have been reviewed and are overall within normal limits.  2.  Continue metformin 1000 mg twice a day and Januvia 100 mg daily.  Diabetes is well-controlled with A1c of 7.1.  Continue once daily glucose testing.  A prescription for new glucose meter and testing supplies has been given.  3.  Refer to ophthalmology for diabetic eye exam.  4.  Refer to podiatry for further evaluation and treatment of onychomycosis with diabetes.  5.  Continue aspirin, Plavix, losartan, metoprolol, and nifedipine as prescribed.  Coronary artery disease is stable.  Continue follow-up with cardiology as scheduled.  6.  Continue Lipitor 40 mg daily and fenofibrate 160 mg daily.  Hyperlipidemia is well controlled.  7.  Start Atarax 25 mg at bedtime secondary to chronic ALLERGIC rhinitis.  8.  Pneumovax given.  9.  Colon cancer screening with FIT test.  10.  Return to clinic as needed or in 6 months for general diabetic follow-up.

## 2018-03-19 ENCOUNTER — TELEPHONE (OUTPATIENT)
Dept: INTERNAL MEDICINE | Facility: CLINIC | Age: 73
End: 2018-03-19

## 2018-03-20 ENCOUNTER — LAB VISIT (OUTPATIENT)
Dept: LAB | Facility: HOSPITAL | Age: 73
End: 2018-03-20
Attending: FAMILY MEDICINE
Payer: MEDICARE

## 2018-03-20 ENCOUNTER — TELEPHONE (OUTPATIENT)
Dept: CARDIOLOGY | Facility: CLINIC | Age: 73
End: 2018-03-20

## 2018-03-20 DIAGNOSIS — Z12.11 COLON CANCER SCREENING: ICD-10-CM

## 2018-03-20 PROCEDURE — 82274 ASSAY TEST FOR BLOOD FECAL: CPT

## 2018-03-21 ENCOUNTER — CLINICAL SUPPORT (OUTPATIENT)
Dept: CARDIOLOGY | Facility: CLINIC | Age: 73
End: 2018-03-21
Payer: MEDICARE

## 2018-03-21 ENCOUNTER — PATIENT MESSAGE (OUTPATIENT)
Dept: INTERNAL MEDICINE | Facility: CLINIC | Age: 73
End: 2018-03-21

## 2018-03-21 DIAGNOSIS — I25.10 CORONARY ARTERY DISEASE INVOLVING NATIVE CORONARY ARTERY OF NATIVE HEART WITHOUT ANGINA PECTORIS: ICD-10-CM

## 2018-03-21 DIAGNOSIS — Z12.11 COLON CANCER SCREENING: Primary | ICD-10-CM

## 2018-03-21 LAB
DIASTOLIC DYSFUNCTION: NO
HEMOCCULT STL QL IA: POSITIVE

## 2018-03-21 PROCEDURE — 93015 CV STRESS TEST SUPVJ I&R: CPT | Mod: S$GLB,,, | Performed by: INTERNAL MEDICINE

## 2018-03-21 PROCEDURE — A9555 RB82 RUBIDIUM: HCPCS | Mod: S$GLB,,, | Performed by: INTERNAL MEDICINE

## 2018-03-21 PROCEDURE — 78492 MYOCRD IMG PET MLT RST&STRS: CPT | Mod: S$GLB,,, | Performed by: INTERNAL MEDICINE

## 2018-03-21 NOTE — TELEPHONE ENCOUNTER
Spoke with pt re: FIT test  Results.  Results came back positive. possibility for cancer but also for polyps. Referral has been put in. Expect a call within 2 weeks.    Pt verbalized understanding

## 2018-03-27 ENCOUNTER — OFFICE VISIT (OUTPATIENT)
Dept: PODIATRY | Facility: CLINIC | Age: 73
End: 2018-03-27
Payer: MEDICARE

## 2018-03-27 VITALS
HEART RATE: 57 BPM | HEIGHT: 75 IN | BODY MASS INDEX: 28.85 KG/M2 | DIASTOLIC BLOOD PRESSURE: 60 MMHG | WEIGHT: 232 LBS | SYSTOLIC BLOOD PRESSURE: 138 MMHG

## 2018-03-27 DIAGNOSIS — L84 PRE-ULCERATIVE CORN OR CALLOUS: ICD-10-CM

## 2018-03-27 DIAGNOSIS — B35.1 DERMATOPHYTOSIS OF NAIL: ICD-10-CM

## 2018-03-27 DIAGNOSIS — E11.49 TYPE II DIABETES MELLITUS WITH NEUROLOGICAL MANIFESTATIONS: Primary | ICD-10-CM

## 2018-03-27 PROCEDURE — 11721 DEBRIDE NAIL 6 OR MORE: CPT | Mod: 59,Q9,S$GLB, | Performed by: PODIATRIST

## 2018-03-27 PROCEDURE — 99999 PR PBB SHADOW E&M-EST. PATIENT-LVL III: CPT | Mod: PBBFAC,,, | Performed by: PODIATRIST

## 2018-03-27 PROCEDURE — 99499 UNLISTED E&M SERVICE: CPT | Mod: S$GLB,,, | Performed by: PODIATRIST

## 2018-03-27 PROCEDURE — 11056 PARNG/CUTG B9 HYPRKR LES 2-4: CPT | Mod: Q9,S$GLB,, | Performed by: PODIATRIST

## 2018-03-27 NOTE — PATIENT INSTRUCTIONS
How to Check Your Feet    Below are tips to help you look for foot problems. Try to check your feet at the same time each day, such as when you get out of bed in the morning.    · Check the top of each foot. The tops of toes, back of the heel, and outer edge of the foot can get a lot of rubbing from poor-fitting shoes.    · Check the bottom of each foot. Daily wear and tear often leads to problems at pressure spots.    · Check the toes and nails. Fungal infections often occur between toes. Toenail problems can also be a sign of fungal infections or lead to breaks in the skin.    · Check your shoes, too. Loose objects inside a shoe can injure the foot. Use your hand to feel inside your shoes for things like nina, loose stitching, or rough areas that could irritate your skin.        Diabetic Foot Care    Diabetes can lead to a number of different foot complications. Fortunately, most of these complications can be prevented with a little extra foot care. If diabetes is not well controlled, the high blood sugar can cause damage to blood vessels and result in poor circulation to the foot. When the skin does not get enough blood flow, it becomes prone to pressure sores and ulcers, which heal slowly.  High blood sugar can also damage nerves, interfering with the ability to feel pain and pressure. When you cant feel your foot normally, it is easy to injure your skin, bones and joints without knowing it. For these reasons diabetes increases the risk of fungal infections, bunions and ulcers. Deep ulcers can lead to bone infection. Gangrene is the most serious foot complication of diabetes. It usually occurs on the tips of the toes as blacked areas of skin. The black area is dead tissue. In severe cases, gangrene spreads to involve the entire toe, other toes and the entire foot. Foot or toe amputation may be required. Good foot care and blood sugar control can prevent this.    Home Care  1. Wear comfortable, proper fitting  shoes.  2. Wash your feet daily with warm water and mild soap.  3. After drying, apply a moisturizing cream or lotion.  4. Check your feet daily for skin breaks, blisters, swelling, or redness. Look between your toes also.  5. Wear cotton socks and change them every day.  6. Trim toe nails carefully and do not cut your cuticles.  7. Strive to keep your blood sugar under control with a combination of medicines, diet and activity.  8. If you smoke and have diabetes, it is very important that you stop. Smoking reduces blood flow to your foot.  9. Avoid activities that increase your risk of foot injury:  · Do not walk barefoot.  · Do not use heating pads or hot water bottles on your feet.  · Do not put your foot in a hot tub without first checking the temperature with your hand.  10) Schedule yearly foot exams.    Follow Up  with your doctor or as advised by our staff. Report any cut, puncture, scrape, other injury, blister, ingrown toenail or ulcer on your foot.    Get Prompt Medical Attention  if any of the following occur:  -- Open ulcer with pus draining from the wound  -- Increasing foot or leg pain  -- New areas of redness or swelling or tender areas of the foot    © 1074-3795 [employer]. [address] [place] [address], [place] [address]. All rights reserved. This information is not intended as a substitute for professional medical care. Always follow your healthcare professional's instructions.

## 2018-03-27 NOTE — LETTER
March 27, 2018      Layo Jett MD  [address] [facility] [address]           [facility]  [address] [facility] [address]  Phone: [phone]          Patient: Cecil Pringle   MR Number: [id number]   YOB: 1945   Date of Visit: 3/27/2018       Dear Dr. Layo Jett:    Thank you for referring Cecil Pringle to me for evaluation. Attached you will find relevant portions of my assessment and plan of care.    If you have questions, please do not hesitate to call me. I look forward to following Cecil Pringle along with you.    Sincerely,    Sierra Mcgrath, GERBER    Enclosure  CC:  No Recipients    If you would like to receive this communication electronically, please contact [email or web address] or [phone] to request more information on [facility] access.    For providers and/or their staff who would like to refer a patient to [facility], please contact us through our one-stop-shop provider referral line, [facility], at [phone].    If you feel you have received this communication in error or would no longer like to receive these types of communications, please e-mail [email or web address]

## 2018-03-27 NOTE — PROGRESS NOTES
Chief Complaint   Patient presents with    Diabetic Foot Exam     dr racquel garcia 3/15/18    Nail Problem             HPI:   The patient is a 72 y.o.  male  who presents for a diabetic foot exam.     Patient reports occasional presence of abnormal sensation to the feet .    History of diabetic foot ulcers: none   History of foot surgery: none.     Shoes worn today:  Orthopedic-type shoes.    Patient complains of bilateral hallux calluses.       Primary care doctor is: Racquel Garcia MD  Chief Complaint   Patient presents with    Diabetic Foot Exam     dr racquel garcia 3/15/18    Nail Problem             Past Medical History:   Diagnosis Date    Anxiety     Coronary artery disease     Depression     Diabetes mellitus, type 2     Hyperlipidemia     Hypertension     Insomnia     PAD (peripheral artery disease)          Current Outpatient Prescriptions on File Prior to Visit   Medication Sig Dispense Refill    aspirin 325 MG tablet Take 325 mg by mouth once daily.      atorvastatin (LIPITOR) 40 MG tablet TAKE ONE TABLET BY MOUTH ONCE DAILY 90 tablet 3    blood sugar diagnostic Strp To check BG 1 times daily, to use with insurance preferred meter 100 each 3    blood-glucose meter kit To check BG 1 times daily, to use with insurance preferred meter 1 each 0    clopidogrel (PLAVIX) 75 mg tablet TAKE ONE TABLET BY MOUTH ONCE DAILY 90 tablet 3    co-enzyme Q-10 30 mg capsule Take 30 mg by mouth once daily.       fenofibrate 160 MG Tab Take 1 tablet (160 mg total) by mouth once daily. 90 tablet 3    fish oil-omega-3 fatty acids 300-1,000 mg capsule Take 2 g by mouth 3 (three) times daily.       FLUoxetine (PROZAC) 20 MG capsule Take 1 capsule (20 mg total) by mouth once daily. 90 capsule 3    hydrOXYzine pamoate (VISTARIL) 25 MG Cap Take 1 capsule (25 mg total) by mouth nightly as needed (allergies). 30 capsule 11    lancets Misc To check BG 1 times daily, to use with insurance preferred meter 100  each 3    losartan (COZAAR) 100 MG tablet Take 1 tablet (100 mg total) by mouth once daily. 90 tablet 3    metFORMIN (GLUCOPHAGE) 1000 MG tablet Take 1 tablet (1,000 mg total) by mouth 2 (two) times daily. 180 tablet 3    metoprolol tartrate (LOPRESSOR) 100 MG tablet Take 0.5 tablets (50 mg total) by mouth 2 (two) times daily. 90 tablet 3    multivit-iron-min-folic acid (MULTIVITAMIN-IRON-MINERALS-FOLIC ACID) 3,500-18-0.4 unit-mg-mg Chew Take 1 tablet by mouth once daily.       nifedipine (PROCARDIA-XL) 60 MG (OSM) 24 hr tablet Take 1 tablet (60 mg total) by mouth once daily. 90 tablet 3    SAW PALMETTO ORAL Take 1 capsule by mouth 2 (two) times daily.      SITagliptin (JANUVIA) 100 MG Tab Take 1 tablet (100 mg total) by mouth once daily. 90 tablet 3    temazepam (RESTORIL) 30 mg capsule Take 1 capsule (30 mg total) by mouth nightly as needed for Insomnia. 30 capsule 0     No current facility-administered medications on file prior to visit.          Review of patient's allergies indicates:  No Known Allergies        Social History     Social History    Marital status:      Spouse name: N/A    Number of children: N/A    Years of education: N/A     Occupational History    Retired       Social History Main Topics    Smoking status: Former Smoker     Packs/day: 2.50     Years: 30.00     Types: Cigarettes    Smokeless tobacco: Never Used    Alcohol use No    Drug use: No    Sexual activity: Not on file     Other Topics Concern    Not on file     Social History Narrative    No narrative on file           ROS:  General ROS: negative  Respiratory ROS: no cough, shortness of breath, or wheezing  Cardiovascular ROS: no chest pain or dyspnea on exertion  Musculoskeletal ROS: negative  Neurological ROS:   positive for - numbness/tingling  Dermatological ROS: negative      LAST HbA1c:   Hemoglobin A1C   Date Value Ref Range Status   03/01/2018 7.1 (H) 4.0 - 5.6 % Final     Comment:     According to ADA  guidelines, hemoglobin A1c <7.0% represents  optimal control in non-pregnant diabetic patients. Different  metrics may apply to specific patient populations.   Standards of Medical Care in Diabetes-2016.  For the purpose of screening for the presence of diabetes:  <5.7%     Consistent with the absence of diabetes  5.7-6.4%  Consistent with increasing risk for diabetes   (prediabetes)  >or=6.5%  Consistent with diabetes  Currently, no consensus exists for use of hemoglobin A1c  for diagnosis of diabetes for children.  This Hemoglobin A1c assay has significant interference with fetal   hemoglobin   (HbF). The results are invalid for patients with abnormal amounts of   HbF,   including those with known Hereditary Persistence   of Fetal Hemoglobin. Heterozygous hemoglobin variants (HbAS, HbAC,   HbAD, HbAE, HbA2) do not significantly interfere with this assay;   however, presence of multiple variants in a sample may impact the %   interference.     05/05/2017 6.9 (H) 4.5 - 6.2 % Final     Comment:     According to ADA guidelines, hemoglobin A1C <7.0% represents  optimal control in non-pregnant diabetic patients.  Different  metrics may apply to specific populations.   Standards of Medical Care in Diabetes - 2016.  For the purpose of screening for the presence of diabetes:  <5.7%     Consistent with the absence of diabetes  5.7-6.4%  Consistent with increasing risk for diabetes   (prediabetes)  >or=6.5%  Consistent with diabetes  Currently no consensus exists for use of hemoglobin A1C  for diagnosis of diabetes for children.     01/30/2017 8.1 (H) 4.5 - 6.2 % Final     Comment:     According to ADA guidelines, hemoglobin A1C <7.0% represents  optimal control in non-pregnant diabetic patients.  Different  metrics may apply to specific populations.   Standards of Medical Care in Diabetes - 2016.  For the purpose of screening for the presence of diabetes:  <5.7%     Consistent with the absence of diabetes  5.7-6.4%   "Consistent with increasing risk for diabetes   (prediabetes)  >or=6.5%  Consistent with diabetes  Currently no consensus exists for use of hemoglobin A1C  for diagnosis of diabetes for children.             EXAM:   Vitals:    03/27/18 0805   BP: 138/60   Pulse: (!) 57   Weight: 105.2 kg (232 lb)   Height: 6' 3" (1.905 m)       General: alert, no distress, cooperative    Vascular:   Dorsalis pedis:   1+ bilateral.   Posterior Tibial:   1+ bilateral.   3 seconds capillary refill time   Temperature of toes are cool to touch.   reduced hair growth on the feet.    Edema on feet:   Trace and non-pitting   Varicosities:  spider veins on the bilateral      Dermatological:    Skin: thin,  dry and no suspicious lesions  Nails: toenails 1-5 L and 1-5 R  are onychomycosis of the toenails and dystrophic nails, elongated and thick by 1-2mm with subungual debris.  Some of the toenails have dried blood underneath, not fungus.  Toenails are too long.   Callus:  Yes - bilateral medial hallux.   Open Wounds: None  Ecchymoses is not observed.      Erythema:  none .     Interdigital spaces: debris present      Neurological:    normal light touch sensation  Seaboard diminished      Musculoskeletal:     Muscle strength: 5/5, adequate ROM, adequate strength     Right foot:  bunion   Left foot: bunion             ASSESSMENT/PLAN:      I counseled the patient on his conditions, their implications and medical management.       Type II diabetes mellitus with neurological manifestations    Pre-ulcerative corn or callous    Dermatophytosis of nail    Other orders  -     Foot Care        Shoe inspection. Diabetic Foot Education. Patient reminded of the importance of good nutrition and blood sugar control to help prevent podiatric complications of diabetes. Patient instructed on proper foot hygiene. We discussed wearing proper shoe gear, daily foot inspections, never walking without protective shoe gear, never putting sharp instruments to " feet.    Routine Foot Care  Date/Time: 3/27/2018 8:39 AM  Performed by: ELOISE JACKSON  Authorized by: ELOISE JACKSON     Consent Done?:  Yes (Verbal)  Hyperkeratotic Skin Lesions?: Yes    Number of trimmed lesions:  2  Location(s):  Left 1st Toe and Right 1st Toe    Nail Care Type:  Debride  Location(s): All  (Left 1st Toe, Left 3rd Toe, Left 2nd Toe, Left 4th Toe, Left 5th Toe, Right 1st Toe, Right 2nd Toe, Right 3rd Toe, Right 4th Toe and Right 5th Toe)  Patient tolerance:  Patient tolerated the procedure well with no immediate complications     With patient's permission, the toenails mentioned above were aggressively reduced and debrided using a nail nipper, removing all offending nail and debris. Utilizing a #15 scalpel, I trimmed the corns and calluses at the above mentioned location.      The patient will continue to monitor the areas daily, inspect the feet, wear protective shoe gear when ambulatory, and moisturizer to maintain skin integrity.          Follow-up in about 3 months (around 6/27/2018) for foot care, or sooner if concerned.

## 2018-04-05 ENCOUNTER — TELEPHONE (OUTPATIENT)
Dept: CARDIOLOGY | Facility: CLINIC | Age: 73
End: 2018-04-05

## 2018-04-05 ENCOUNTER — OFFICE VISIT (OUTPATIENT)
Dept: OPTOMETRY | Facility: CLINIC | Age: 73
End: 2018-04-05
Payer: MEDICARE

## 2018-04-05 DIAGNOSIS — Z13.5 GLAUCOMA SCREENING: ICD-10-CM

## 2018-04-05 DIAGNOSIS — H25.13 NUCLEAR SCLEROSIS, BILATERAL: ICD-10-CM

## 2018-04-05 DIAGNOSIS — H52.4 ASTIGMATISM WITH PRESBYOPIA, BILATERAL: ICD-10-CM

## 2018-04-05 DIAGNOSIS — H52.203 ASTIGMATISM WITH PRESBYOPIA, BILATERAL: ICD-10-CM

## 2018-04-05 DIAGNOSIS — E11.9 TYPE 2 DIABETES MELLITUS WITHOUT RETINOPATHY: Primary | ICD-10-CM

## 2018-04-05 PROCEDURE — 99999 PR PBB SHADOW E&M-EST. PATIENT-LVL II: CPT | Mod: PBBFAC,,, | Performed by: OPTOMETRIST

## 2018-04-05 PROCEDURE — 92014 COMPRE OPH EXAM EST PT 1/>: CPT | Mod: S$GLB,,, | Performed by: OPTOMETRIST

## 2018-04-05 PROCEDURE — 92015 DETERMINE REFRACTIVE STATE: CPT | Mod: S$GLB,,, | Performed by: OPTOMETRIST

## 2018-04-05 PROCEDURE — 99499 UNLISTED E&M SERVICE: CPT | Mod: S$GLB,,, | Performed by: OPTOMETRIST

## 2018-04-05 NOTE — PROGRESS NOTES
HPI     Here for annual exam  BS stable  Occasional light flashes  No itching burning or tearing  No headaches or diplopia  Hemoglobin A1C       Date                     Value               Ref Range             Status                03/01/2018               7.1 (H)             4.0 - 5.6 %           Final              Comment:    According to ADA guidelines, hemoglobin A1c <7.0% represents  optimal   control in non-pregnant diabetic patients. Different  metrics may apply to   specific patient populations.   Standards of Medical Care in   Diabetes-2016.  For the purpose of screening for the presence of   diabetes:  <5.7%     Consistent with the absence of diabetes  5.7-6.4%    Consistent with increasing risk for diabetes   (prediabetes)  >or=6.5%    Consistent with diabetes  Currently, no consensus exists for use of   hemoglobin A1c  for diagnosis of diabetes for children.  This Hemoglobin   A1c assay has significant interference with fetal   hemoglobin   (HbF).   The results are invalid for patients with abnormal amounts of   HbF,     including those with known Hereditary Persistence   of Fetal Hemoglobin.   Heterozygous hemoglobin variants (HbAS, HbAC,   HbAD, HbAE, HbA2) do not   significantly interfere with this assay;   however, presence of multiple   variants in a sample may impact the %   interference.         05/05/2017               6.9 (H)             4.5 - 6.2 %           Final              Comment:    According to ADA guidelines, hemoglobin A1C <7.0% represents  optimal   control in non-pregnant diabetic patients.  Different  metrics may apply   to specific populations.   Standards of Medical Care in Diabetes -   2016.  For the purpose of screening for the presence of diabetes:  <5.7%       Consistent with the absence of diabetes  5.7-6.4%  Consistent with   increasing risk for diabetes   (prediabetes)  >or=6.5%  Consistent with   diabetes  Currently no consensus exists for use of hemoglobin A1C  for    diagnosis of diabetes for children.         01/30/2017               8.1 (H)             4.5 - 6.2 %           Final              Comment:    According to ADA guidelines, hemoglobin A1C <7.0% represents  optimal   control in non-pregnant diabetic patients.  Different  metrics may apply   to specific populations.   Standards of Medical Care in Diabetes -   2016.  For the purpose of screening for the presence of diabetes:  <5.7%       Consistent with the absence of diabetes  5.7-6.4%  Consistent with   increasing risk for diabetes   (prediabetes)  >or=6.5%  Consistent with   diabetes  Currently no consensus exists for use of hemoglobin A1C  for   diagnosis of diabetes for children.    ----------      Last edited by Jonatan Buckley, OD on 4/5/2018  7:54 AM. (History)        ROS     Positive for: Endocrine (DM)    Negative for: Constitutional, Gastrointestinal, Neurological, Skin,   Genitourinary, Musculoskeletal, HENT, Cardiovascular, Eyes, Respiratory,   Psychiatric, Allergic/Imm, Heme/Lymph    Last edited by Jonatan Buckley, OD on 4/5/2018  7:54 AM. (History)        Assessment /Plan     For exam results, see Encounter Report.    Type 2 diabetes mellitus without retinopathy    Nuclear sclerosis, bilateral    Glaucoma screening    Astigmatism with presbyopia, bilateral      1. Cat OU--wrote new spex Rx  2. DM- WITHOUT RETINOPATHY.  Advised yearly DFE    PLAN:    rtc 1 yr

## 2018-04-05 NOTE — TELEPHONE ENCOUNTER
----- Message from Nory May MD sent at 4/5/2018  4:01 PM CDT -----  Please let patient know that his stress test was normal.

## 2018-04-05 NOTE — LETTER
April 5, 2018      Layo Jett MD  [address]  [place] [address]           [place] [facility]  [address] [facility]  [place] [address]  Phone: [phone]  Fax: [phone]          Patient: Cecil Pringle   MR Number: [id number]   YOB: 1945   Date of Visit: 4/5/2018       Dear Dr. Layo Jett:    Thank you for referring Cecil Pringle to me for evaluation. Attached you will find relevant portions of my assessment and plan of care.    If you have questions, please do not hesitate to call me. I look forward to following Cecil Pringle along with you.    Sincerely,    Jonatan Buckley, OD    Enclosure  CC:  No Recipients    If you would like to receive this communication electronically, please contact [email or web address] or [phone] to request more information on [facility] Link access.    For providers and/or their staff who would like to refer a patient to [facility], please contact us through our one-stop-shop provider referral line, [facility], at [phone].    If you feel you have received this communication in error or would no longer like to receive these types of communications, please e-mail [email or web address]

## 2018-04-12 ENCOUNTER — TELEPHONE (OUTPATIENT)
Dept: INTERNAL MEDICINE | Facility: CLINIC | Age: 73
End: 2018-04-12

## 2018-05-04 DIAGNOSIS — E11.9 DIABETES MELLITUS WITHOUT COMPLICATION: ICD-10-CM

## 2018-05-04 RX ORDER — TEMAZEPAM 30 MG/1
30 CAPSULE ORAL NIGHTLY PRN
Qty: 30 CAPSULE | Refills: 0 | Status: SHIPPED | OUTPATIENT
Start: 2018-05-04 | End: 2018-08-01 | Stop reason: SDUPTHER

## 2018-06-07 RX ORDER — NIFEDIPINE 60 MG/1
TABLET, EXTENDED RELEASE ORAL
Qty: 90 TABLET | Refills: 3 | Status: SHIPPED | OUTPATIENT
Start: 2018-06-07 | End: 2019-06-14 | Stop reason: SDUPTHER

## 2018-06-07 RX ORDER — NIFEDIPINE 60 MG/1
60 TABLET, EXTENDED RELEASE ORAL DAILY
Qty: 90 TABLET | Refills: 3 | Status: CANCELLED | OUTPATIENT
Start: 2018-06-07 | End: 2019-06-07

## 2018-06-26 ENCOUNTER — OFFICE VISIT (OUTPATIENT)
Dept: PODIATRY | Facility: CLINIC | Age: 73
End: 2018-06-26
Payer: MEDICARE

## 2018-06-26 VITALS
BODY MASS INDEX: 28.85 KG/M2 | SYSTOLIC BLOOD PRESSURE: 130 MMHG | HEART RATE: 57 BPM | WEIGHT: 232 LBS | HEIGHT: 75 IN | DIASTOLIC BLOOD PRESSURE: 60 MMHG

## 2018-06-26 DIAGNOSIS — B35.1 DERMATOPHYTOSIS OF NAIL: ICD-10-CM

## 2018-06-26 DIAGNOSIS — E11.51 TYPE II DIABETES MELLITUS WITH PERIPHERAL CIRCULATORY DISORDER: Primary | ICD-10-CM

## 2018-06-26 DIAGNOSIS — L84 PRE-ULCERATIVE CORN OR CALLOUS: ICD-10-CM

## 2018-06-26 PROCEDURE — 3045F PR MOST RECENT HEMOGLOBIN A1C LEVEL 7.0-9.0%: CPT | Mod: CPTII,S$GLB,, | Performed by: PODIATRIST

## 2018-06-26 PROCEDURE — 3078F DIAST BP <80 MM HG: CPT | Mod: CPTII,S$GLB,, | Performed by: PODIATRIST

## 2018-06-26 PROCEDURE — 99214 OFFICE O/P EST MOD 30 MIN: CPT | Mod: 25,S$GLB,, | Performed by: PODIATRIST

## 2018-06-26 PROCEDURE — 11721 DEBRIDE NAIL 6 OR MORE: CPT | Mod: 59,Q9,S$GLB, | Performed by: PODIATRIST

## 2018-06-26 PROCEDURE — 99999 PR PBB SHADOW E&M-EST. PATIENT-LVL III: CPT | Mod: PBBFAC,,, | Performed by: PODIATRIST

## 2018-06-26 PROCEDURE — 11056 PARNG/CUTG B9 HYPRKR LES 2-4: CPT | Mod: Q9,S$GLB,, | Performed by: PODIATRIST

## 2018-06-26 PROCEDURE — 3075F SYST BP GE 130 - 139MM HG: CPT | Mod: CPTII,S$GLB,, | Performed by: PODIATRIST

## 2018-06-26 NOTE — PATIENT INSTRUCTIONS
Your A1c:    Hemoglobin A1C   Date Value Ref Range Status   03/01/2018 7.1 (H) 4.0 - 5.6 % Final     Comment:     According to ADA guidelines, hemoglobin A1c <7.0% represents  optimal control in non-pregnant diabetic patients. Different  metrics may apply to specific patient populations.   Standards of Medical Care in Diabetes-2016.  For the purpose of screening for the presence of diabetes:  <5.7%     Consistent with the absence of diabetes  5.7-6.4%  Consistent with increasing risk for diabetes   (prediabetes)  >or=6.5%  Consistent with diabetes  Currently, no consensus exists for use of hemoglobin A1c  for diagnosis of diabetes for children.  This Hemoglobin A1c assay has significant interference with fetal   hemoglobin   (HbF). The results are invalid for patients with abnormal amounts of   HbF,   including those with known Hereditary Persistence   of Fetal Hemoglobin. Heterozygous hemoglobin variants (HbAS, HbAC,   HbAD, HbAE, HbA2) do not significantly interfere with this assay;   however, presence of multiple variants in a sample may impact the %   interference.     05/05/2017 6.9 (H) 4.5 - 6.2 % Final     Comment:     According to ADA guidelines, hemoglobin A1C <7.0% represents  optimal control in non-pregnant diabetic patients.  Different  metrics may apply to specific populations.   Standards of Medical Care in Diabetes - 2016.  For the purpose of screening for the presence of diabetes:  <5.7%     Consistent with the absence of diabetes  5.7-6.4%  Consistent with increasing risk for diabetes   (prediabetes)  >or=6.5%  Consistent with diabetes  Currently no consensus exists for use of hemoglobin A1C  for diagnosis of diabetes for children.     01/30/2017 8.1 (H) 4.5 - 6.2 % Final     Comment:     According to ADA guidelines, hemoglobin A1C <7.0% represents  optimal control in non-pregnant diabetic patients.  Different  metrics may apply to specific populations.   Standards of Medical Care in Diabetes -  2016.  For the purpose of screening for the presence of diabetes:  <5.7%     Consistent with the absence of diabetes  5.7-6.4%  Consistent with increasing risk for diabetes   (prediabetes)  >or=6.5%  Consistent with diabetes  Currently no consensus exists for use of hemoglobin A1C  for diagnosis of diabetes for children.         How to Check Your Feet    Below are tips to help you look for foot problems. Try to check your feet at the same time each day, such as when you get out of bed in the morning.    · Check the top of each foot. The tops of toes, back of the heel, and outer edge of the foot can get a lot of rubbing from poor-fitting shoes.    · Check the bottom of each foot. Daily wear and tear often leads to problems at pressure spots.    · Check the toes and nails. Fungal infections often occur between toes. Toenail problems can also be a sign of fungal infections or lead to breaks in the skin.    · Check your shoes, too. Loose objects inside a shoe can injure the foot. Use your hand to feel inside your shoes for things like nina, loose stitching, or rough areas that could irritate your skin.        Diabetic Foot Care    Diabetes can lead to a number of different foot complications. Fortunately, most of these complications can be prevented with a little extra foot care. If diabetes is not well controlled, the high blood sugar can cause damage to blood vessels and result in poor circulation to the foot. When the skin does not get enough blood flow, it becomes prone to pressure sores and ulcers, which heal slowly.  High blood sugar can also damage nerves, interfering with the ability to feel pain and pressure. When you cant feel your foot normally, it is easy to injure your skin, bones and joints without knowing it. For these reasons diabetes increases the risk of fungal infections, bunions and ulcers. Deep ulcers can lead to bone infection. Gangrene is the most serious foot complication of diabetes. It  usually occurs on the tips of the toes as blacked areas of skin. The black area is dead tissue. In severe cases, gangrene spreads to involve the entire toe, other toes and the entire foot. Foot or toe amputation may be required. Good foot care and blood sugar control can prevent this.    Home Care  1. Wear comfortable, proper fitting shoes.  2. Wash your feet daily with warm water and mild soap.  3. After drying, apply a moisturizing cream or lotion.  4. Check your feet daily for skin breaks, blisters, swelling, or redness. Look between your toes also.  5. Wear cotton socks and change them every day.  6. Trim toe nails carefully and do not cut your cuticles.  7. Strive to keep your blood sugar under control with a combination of medicines, diet and activity.  8. If you smoke and have diabetes, it is very important that you stop. Smoking reduces blood flow to your foot.  9. Avoid activities that increase your risk of foot injury:  · Do not walk barefoot.  · Do not use heating pads or hot water bottles on your feet.  · Do not put your foot in a hot tub without first checking the temperature with your hand.  10) Schedule yearly foot exams.    Follow Up  with your doctor or as advised by our staff. Report any cut, puncture, scrape, other injury, blister, ingrown toenail or ulcer on your foot.    Get Prompt Medical Attention  if any of the following occur:  -- Open ulcer with pus draining from the wound  -- Increasing foot or leg pain  -- New areas of redness or swelling or tender areas of the foot    © 4347-4986 [employer]. [address] [place] [address], [place] [address]. All rights reserved. This information is not intended as a substitute for professional medical care. Always follow your healthcare professional's instructions.

## 2018-06-26 NOTE — PROGRESS NOTES
Chief Complaint   Patient presents with    Diabetes Mellitus     dr racquel garcia 3/5/18    Nail Care             HPI:   The patient is a 72 y.o.  male  who presents for a diabetic foot exam.     Patient reports occasional presence of abnormal sensation to the feet .    History of diabetic foot ulcers: none   History of foot surgery: none.     Veins taken from bilateral lower extremities for bypass.   Shoes worn today:  Orthopedic-type shoes, had them for at least two years.    Patient complains of bilateral 1st metatarsal head calluses, had them all his life.  He uses Eucerin on his calluses about once to twice a week when he remembers.             Primary care doctor is: Racquel Garcia MD  Chief Complaint   Patient presents with    Diabetes Mellitus     dr racquel garcia 3/5/18    Nail Care             Past Medical History:   Diagnosis Date    Anxiety     Arthritis     Coronary artery disease     Depression     Diabetes mellitus, type 2     Hyperlipidemia     Hypertension     Insomnia     PAD (peripheral artery disease)          Current Outpatient Prescriptions on File Prior to Visit   Medication Sig Dispense Refill    aspirin 325 MG tablet Take 325 mg by mouth once daily.      atorvastatin (LIPITOR) 40 MG tablet TAKE ONE TABLET BY MOUTH ONCE DAILY 90 tablet 3    blood sugar diagnostic Strp To check BG 1 times daily, to use with insurance preferred meter 100 each 3    blood-glucose meter kit To check BG 1 times daily, to use with insurance preferred meter 1 each 0    clopidogrel (PLAVIX) 75 mg tablet TAKE ONE TABLET BY MOUTH ONCE DAILY 90 tablet 3    co-enzyme Q-10 30 mg capsule Take 30 mg by mouth once daily.       fenofibrate 160 MG Tab Take 1 tablet (160 mg total) by mouth once daily. 90 tablet 3    fish oil-omega-3 fatty acids 300-1,000 mg capsule Take 2 g by mouth 3 (three) times daily.       FLUoxetine (PROZAC) 20 MG capsule Take 1 capsule (20 mg total) by mouth once daily. 90  capsule 3    hydrOXYzine pamoate (VISTARIL) 25 MG Cap Take 1 capsule (25 mg total) by mouth nightly as needed (allergies). 30 capsule 11    lancets Misc To check BG 1 times daily, to use with insurance preferred meter 100 each 3    losartan (COZAAR) 100 MG tablet Take 1 tablet (100 mg total) by mouth once daily. 90 tablet 3    metFORMIN (GLUCOPHAGE) 1000 MG tablet Take 1 tablet (1,000 mg total) by mouth 2 (two) times daily. 180 tablet 3    metoprolol tartrate (LOPRESSOR) 100 MG tablet Take 0.5 tablets (50 mg total) by mouth 2 (two) times daily. 90 tablet 3    multivit-iron-min-folic acid (MULTIVITAMIN-IRON-MINERALS-FOLIC ACID) 3,500-18-0.4 unit-mg-mg Chew Take 1 tablet by mouth once daily.       NIFEdipine (PROCARDIA-XL) 60 MG (OSM) 24 hr tablet TAKE ONE TABLET BY MOUTH ONCE DAILY 90 tablet 3    SAW PALMETTO ORAL Take 1 capsule by mouth 2 (two) times daily.      SITagliptin (JANUVIA) 100 MG Tab Take 1 tablet (100 mg total) by mouth once daily. 90 tablet 3    temazepam (RESTORIL) 30 mg capsule Take 1 capsule (30 mg total) by mouth nightly as needed for Insomnia. 30 capsule 0     No current facility-administered medications on file prior to visit.          Review of patient's allergies indicates:  No Known Allergies        Social History     Social History    Marital status:      Spouse name: N/A    Number of children: N/A    Years of education: N/A     Occupational History    Retired       Social History Main Topics    Smoking status: Former Smoker     Packs/day: 2.50     Years: 30.00     Types: Cigarettes    Smokeless tobacco: Never Used    Alcohol use No    Drug use: No    Sexual activity: Not on file     Other Topics Concern    Not on file     Social History Narrative    No narrative on file           ROS:  General ROS: negative  Respiratory ROS: no cough, shortness of breath, or wheezing  Cardiovascular ROS: no chest pain or dyspnea on exertion  Musculoskeletal ROS:  negative  Neurological ROS:   positive for - numbness/tingling  Dermatological ROS: negative          LAST HbA1c:   Hemoglobin A1C   Date Value Ref Range Status   03/01/2018 7.1 (H) 4.0 - 5.6 % Final     Comment:     According to ADA guidelines, hemoglobin A1c <7.0% represents  optimal control in non-pregnant diabetic patients. Different  metrics may apply to specific patient populations.   Standards of Medical Care in Diabetes-2016.  For the purpose of screening for the presence of diabetes:  <5.7%     Consistent with the absence of diabetes  5.7-6.4%  Consistent with increasing risk for diabetes   (prediabetes)  >or=6.5%  Consistent with diabetes  Currently, no consensus exists for use of hemoglobin A1c  for diagnosis of diabetes for children.  This Hemoglobin A1c assay has significant interference with fetal   hemoglobin   (HbF). The results are invalid for patients with abnormal amounts of   HbF,   including those with known Hereditary Persistence   of Fetal Hemoglobin. Heterozygous hemoglobin variants (HbAS, HbAC,   HbAD, HbAE, HbA2) do not significantly interfere with this assay;   however, presence of multiple variants in a sample may impact the %   interference.     05/05/2017 6.9 (H) 4.5 - 6.2 % Final     Comment:     According to ADA guidelines, hemoglobin A1C <7.0% represents  optimal control in non-pregnant diabetic patients.  Different  metrics may apply to specific populations.   Standards of Medical Care in Diabetes - 2016.  For the purpose of screening for the presence of diabetes:  <5.7%     Consistent with the absence of diabetes  5.7-6.4%  Consistent with increasing risk for diabetes   (prediabetes)  >or=6.5%  Consistent with diabetes  Currently no consensus exists for use of hemoglobin A1C  for diagnosis of diabetes for children.     01/30/2017 8.1 (H) 4.5 - 6.2 % Final     Comment:     According to ADA guidelines, hemoglobin A1C <7.0% represents  optimal control in non-pregnant diabetic  "patients.  Different  metrics may apply to specific populations.   Standards of Medical Care in Diabetes - 2016.  For the purpose of screening for the presence of diabetes:  <5.7%     Consistent with the absence of diabetes  5.7-6.4%  Consistent with increasing risk for diabetes   (prediabetes)  >or=6.5%  Consistent with diabetes  Currently no consensus exists for use of hemoglobin A1C  for diagnosis of diabetes for children.             EXAM:   Vitals:    06/26/18 0817   BP: 130/60   Pulse: (!) 57   Weight: 105.2 kg (232 lb)   Height: 6' 3" (1.905 m)       General: alert, no distress, cooperative    Vascular:   Dorsalis pedis:   1+ bilateral.   Posterior Tibial:   1+ bilateral.   3 seconds capillary refill time   Temperature of toes are cool to touch.   reduced hair growth on the feet.    Edema on feet:   Trace and non-pitting   Varicosities:  spider veins on the bilateral        Dermatological:    Skin: thin,  dry and no suspicious lesions  Nails: toenails 1-5 L and 1-5 R  are onychomycosis of the toenails and dystrophic nails, elongated and thick by 1-2mm with subungual debris.  Some of the toenails have dried blood underneath, not fungus.  Toenails are too long.   Callus:  Yes - bilateral 1st metatarsal heads  Open Wounds: None  Ecchymoses is observed at the base of toes 2 and 3 left foot, no tenderness to palpation, no acute trauma noted to the area.      Erythema:  none .     Interdigital spaces: debris present      Neurological:    normal light touch sensation  [place] diminished bilateral   No Tinels  No Mulders      Musculoskeletal:     Muscle strength: 5/5, adequate ROM, adequate strength     Right foot:  Bunion, medial arch collapse, hammertoes  Left foot: bunion, medial arch collapse, hammertoes             ASSESSMENT/PLAN:      I counseled the patient on his conditions, their implications and medical management.     Problem List Items Addressed This Visit       Visit Diagnoses     Type II diabetes " mellitus with peripheral circulatory disorder    -  Primary    Relevant Orders    · DIABETIC SHOES FOR HOME USE  · Shoe inspection. Diabetic Foot Education. Patient reminded of the importance of good nutrition and blood sugar control to help prevent podiatric complications of diabetes. Patient instructed on proper foot hygiene. We discussed wearing proper shoe gear, daily foot inspections, never walking without protective shoe gear, never putting sharp instruments to feet.      Dermatophytosis of nail      ·         See Report below      Pre-ulcerative corn or callous        Relevant Orders    · DIABETIC SHOES FOR HOME USE                Routine Foot Care  Date/Time: 6/26/2018 8:51 AM  Performed by: ELOISE JACKSON  Authorized by: ELOISE JACKSON     Consent Done?:  Yes (Verbal)  Hyperkeratotic Skin Lesions?: Yes    Number of trimmed lesions:  2  Location(s):  Left 1st Metatarsal Head and Right 1st Metatarsal Head    Nail Care Type:  Debride  Location(s): All  (Left 1st Toe, Left 3rd Toe, Left 2nd Toe, Left 4th Toe, Left 5th Toe, Right 1st Toe, Right 2nd Toe, Right 3rd Toe, Right 4th Toe and Right 5th Toe)  Patient tolerance:  Patient tolerated the procedure well with no immediate complications     With patient's permission, the toenails mentioned above were aggressively reduced and debrided using a nail nipper, removing all offending nail and debris. Utilizing a #15 scalpel, I trimmed the corns and calluses at the above mentioned location.      The patient will continue to monitor the areas daily, inspect the feet, wear protective shoe gear when ambulatory, and moisturizer to maintain skin integrity.          Follow-up in about 4 months (around 10/26/2018) for foot care, or sooner if concerned.

## 2018-08-01 RX ORDER — TEMAZEPAM 30 MG/1
30 CAPSULE ORAL NIGHTLY PRN
Qty: 30 CAPSULE | Refills: 0 | Status: SHIPPED | OUTPATIENT
Start: 2018-08-01 | End: 2019-01-30 | Stop reason: SDUPTHER

## 2018-08-01 RX ORDER — METOPROLOL TARTRATE 100 MG/1
50 TABLET ORAL 2 TIMES DAILY
Qty: 90 TABLET | Refills: 3 | Status: SHIPPED | OUTPATIENT
Start: 2018-08-01 | End: 2019-01-30 | Stop reason: SDUPTHER

## 2018-08-22 ENCOUNTER — CLINICAL SUPPORT (OUTPATIENT)
Dept: OTOLARYNGOLOGY | Facility: CLINIC | Age: 73
End: 2018-08-22
Payer: MEDICARE

## 2018-08-22 ENCOUNTER — OFFICE VISIT (OUTPATIENT)
Dept: OTOLARYNGOLOGY | Facility: CLINIC | Age: 73
End: 2018-08-22
Payer: MEDICARE

## 2018-08-22 VITALS
TEMPERATURE: 97 F | WEIGHT: 232.5 LBS | BODY MASS INDEX: 29.06 KG/M2 | HEART RATE: 49 BPM | DIASTOLIC BLOOD PRESSURE: 68 MMHG | SYSTOLIC BLOOD PRESSURE: 180 MMHG

## 2018-08-22 DIAGNOSIS — H93.13 TINNITUS AURIUM, BILATERAL: ICD-10-CM

## 2018-08-22 DIAGNOSIS — H90.3 ASYMMETRIC SNHL (SENSORINEURAL HEARING LOSS): Primary | ICD-10-CM

## 2018-08-22 PROCEDURE — 3078F DIAST BP <80 MM HG: CPT | Mod: CPTII,S$GLB,, | Performed by: OTOLARYNGOLOGY

## 2018-08-22 PROCEDURE — 99204 OFFICE O/P NEW MOD 45 MIN: CPT | Mod: S$GLB,,, | Performed by: OTOLARYNGOLOGY

## 2018-08-22 PROCEDURE — 92557 COMPREHENSIVE HEARING TEST: CPT | Mod: S$GLB,,, | Performed by: AUDIOLOGIST-HEARING AID FITTER

## 2018-08-22 PROCEDURE — 99999 PR PBB SHADOW E&M-EST. PATIENT-LVL IV: CPT | Mod: PBBFAC,,, | Performed by: OTOLARYNGOLOGY

## 2018-08-22 PROCEDURE — 92567 TYMPANOMETRY: CPT | Mod: S$GLB,,, | Performed by: AUDIOLOGIST-HEARING AID FITTER

## 2018-08-22 PROCEDURE — 99999 PR PBB SHADOW E&M-EST. PATIENT-LVL I: CPT | Mod: PBBFAC,,, | Performed by: AUDIOLOGIST-HEARING AID FITTER

## 2018-08-22 PROCEDURE — 3077F SYST BP >= 140 MM HG: CPT | Mod: CPTII,S$GLB,, | Performed by: OTOLARYNGOLOGY

## 2018-08-22 NOTE — PROGRESS NOTES
Chief Complaint   Patient presents with    Ear Fullness    Hearing Loss     started about a year ago    Other     severe congestion   .     HPI:  Cecil Pringle is a very pleasant 72 y.o. male here to see me today for the first time for evaluation of hearing loss.  He reports hearing loss that has been gradually progressing over the last 1 years.  He has not noted any difference in hearing between the ears, with both ears being the worse hearing ear.  He has noted any tinnitus in the left ear.  He has not had any recent issues with ear pain or ear drainage.  He denies a family history of hearing loss, and has not had any previous otologic surgery.  He admits to any history of significant loud noise exposure.He denies issues with dizziness. He admits to difficulty with his balance.     Past Medical History:   Diagnosis Date    Anxiety     Arthritis     Coronary artery disease     Depression     Diabetes mellitus, type 2     Hyperlipidemia     Hypertension     Insomnia     PAD (peripheral artery disease)      Social History     Socioeconomic History    Marital status:      Spouse name: Not on file    Number of children: Not on file    Years of education: Not on file    Highest education level: Not on file   Social Needs    Financial resource strain: Not on file    Food insecurity - worry: Not on file    Food insecurity - inability: Not on file    Transportation needs - medical: Not on file    Transportation needs - non-medical: Not on file   Occupational History    Occupation: Retired    Tobacco Use    Smoking status: Former Smoker     Packs/day: 2.50     Years: 30.00     Pack years: 75.00     Types: Cigarettes    Smokeless tobacco: Never Used   Substance and Sexual Activity    Alcohol use: No    Drug use: No    Sexual activity: Not on file   Other Topics Concern    Not on file   Social History Narrative    Not on file     Past Surgical History:   Procedure Laterality Date     CORONARY ARTERY BYPASS GRAFT      CORONARY ARTERY BYPASS GRAFT      coronary stents  2013    x5    left foot surgery  1980    Right hand surgery       Family History   Problem Relation Age of Onset    Heart disease Father          at the age of 56    Heart disease Brother     Heart disease Paternal Aunt     Heart disease Paternal Uncle     Cancer Mother         mesothelioma,  at the age of 78    Heart disease Sister     Heart disease Maternal Aunt     Glaucoma Neg Hx     Macular degeneration Neg Hx     Retinal detachment Neg Hx     Strabismus Neg Hx     Amblyopia Neg Hx     Blindness Neg Hx     Cataracts Neg Hx          Review of Systems  General: negative for chills, fever or weight loss  Psychological: negative for mood changes or depression  Ophthalmic: negative for blurry vision, photophobia or eye pain  ENT: see HPI  Respiratory: no cough, shortness of breath, or wheezing  Cardiovascular: no chest pain or dyspnea on exertion  Gastrointestinal: no abdominal pain, change in bowel habits, or black/ bloody stools  Musculoskeletal: negative for gait disturbance or muscular weakness  Neurological: no syncope or seizures; no ataxia  Dermatological: negative for puritis,  rash and jaundice  Hematologic/lymphatic: no easy bruising, no new lumps or bumps      Physical Exam:    Vitals:    18 0904   BP: (!) 180/68   Pulse: (!) 49   Temp: 97.2 °F (36.2 °C)       Constitutional: Well appearing / communicating without difficutly.  NAD.  Eyes: EOM I Bilaterally  Head/Face: Normocephalic.  Negative paranasal sinus pressure/tenderness.  Salivary glands WNL.  House Brackmann I Bilaterally.    Right Ear: Auricle normal appearance. External Auditory Canal within normal limits no lesions or masses,TM w/o masses/lesions/perforations. TM mobility noted.   Left Ear: Auricle normal appearance. External Auditory Canal within normal limits no lesions or masses,TM w/o masses/lesions/perforations.  TM mobility noted.  Rinne Air conduction >bone conduction bilaterally, Handy midline.   Nose: No gross nasal septal deviation. Inferior Turbinates 3+ bilaterally. No septal perforation. No masses/lesions. External nasal skin appears normal without masses/lesions.  Oral Cavity: Gingiva/lips within normal limits.  Dentition/gingiva healthy appearing. Mucus membranes moist. Floor of mouth soft, no masses palpated. Oral Tongue mobile. Hard Palate appears normal.    Oropharynx: Base of tongue appears normal. No masses/lesions noted. Tonsillar fossa/pharyngeal wall without lesions. Posterior oropharynx WNL.  Soft palate without masses. Midline uvula.   Neck/Lymphatic: No LAD I-VI bilaterally.  No thyromegaly.  No masses noted on exam.    Mirror laryngoscopy/nasopharyngoscopy: Active gag reflex.  Unable to perform.    Neuro/Psychiatric: AOx3.  Normal mood and affect.   Cardiovascular: Normal carotid pulses bilaterally, no increasing jugular venous distention noted at cervical region bilaterally.    Respiratory: Normal respiratory effort, no stridor, no retractions noted.      Audiogram reviewed personally by myself and in detail with the patient today.         Assessment:    ICD-10-CM ICD-9-CM    1. Asymmetric SNHL (sensorineural hearing loss) H90.5 389.16 MRI IAC/Temporal Bones W W/O Contrast      COMPREHENSIVE AUDIOGRAM   2. Tinnitus aurium, bilateral H93.13 388.31      The primary encounter diagnosis was Asymmetric SNHL (sensorineural hearing loss). A diagnosis of Tinnitus aurium, bilateral was also pertinent to this visit.      Plan:  Orders Placed This Encounter   Procedures    MRI IAC/Temporal Bones W W/O Contrast    COMPREHENSIVE AUDIOGRAM      We reviewed the recent audiogram, and the fact that there is a distinct asymmetry of at least 10 dB across 3 frequencies.  Considering the degree of asymmetry, I would recommend and MRI of the IAC with contrast to evaluate for any retrocochlear lesions.  If that is normal,  the patient will continue to follow with annual audiograms and consider amplification.       We also discussed that tinnitus is most often caused by a hearing loss, and that as the hair cells are damaged, either genetic or as a result of loud noise exposure, they then cause tinnitus.  Some patients find that restricting the salt or caffeine in their diet helps.  Tinnitus tends to be louder in times of stress and fatigue, and may decrease with time.  Sound machines may also be an effective masking technique if needed at night.    This visit was 45 minutes in duration, with over 50% of the time spent in direct face-to-face counseling and coordination of care regarding the issues outlined above.      Thank you kindly for allowing me to participate in the patient's care.       Soila Roberson MD

## 2018-08-22 NOTE — PROGRESS NOTES
Johnson Young, [facility]  [facility] [address]  [address]  [place] [address]      Patient: Cecil Pringle   MRN: [id number]  : 1945  HEL[place]: 2018       AUDIOLOGICAL EVALUATION    CASE HISTORY:    Cecil Pringle, 72 y.o., was seen on the above date for an audiological evaluation. Patient reported hearing loss and tinnitus in both ears with his left ear being worse. He stated that these symptoms began about one year ago. He also reported feeling off-balance for one year. No further history significant to hearing loss was reported.    TEST RESULTS:    Otoscopy was unremarkable for both ears. Imittance testing revealed normal middle ear compliance (Type A) in both ears. Speech reception thresholds were obtained at 20 dB HL and 75 dB HL (masked), in the right and left ears, respectively, which was consistent with pure tone results. A word recognition score of 68% was obtained in the right ear using a presentation level of 60 dBHL. A masked left ear word recognition score of 24% correct was obtained using a presentation level of 90 dBHL.     IMPRESSION:   Audiological testing indicated that Cecil Pringle has a mild sloping to severe sensorineural hearing loss from 1500 to 8K Hz in his right ear and a moderate sloping to profound sensorineural hearing loss in his left ear.    RECOMMENDATIONS:   It is recommended that he follow up medically with a physician to assess retrocochlear functioning due to unexplained asymmetrical hearing loss, tinnitus, and/or vertigo.    If you should have any questions or concerns regarding the above information, please do not hesitate to contact me at [phone].      _______________________________  Nettie Young, CCC-A  Clinical Audiologist    enclosure: audiogram

## 2018-08-28 ENCOUNTER — TELEPHONE (OUTPATIENT)
Dept: OTOLARYNGOLOGY | Facility: CLINIC | Age: 73
End: 2018-08-28

## 2018-08-28 DIAGNOSIS — H90.3 ASYMMETRIC SNHL (SENSORINEURAL HEARING LOSS): Primary | ICD-10-CM

## 2018-08-29 ENCOUNTER — HOSPITAL ENCOUNTER (OUTPATIENT)
Dept: RADIOLOGY | Facility: HOSPITAL | Age: 73
Discharge: HOME | End: 2018-08-29
Attending: OTOLARYNGOLOGY
Payer: MEDICARE

## 2018-08-29 ENCOUNTER — TELEPHONE (OUTPATIENT)
Dept: OTOLARYNGOLOGY | Facility: CLINIC | Age: 73
End: 2018-08-29

## 2018-08-29 DIAGNOSIS — H90.3 ASYMMETRIC SNHL (SENSORINEURAL HEARING LOSS): ICD-10-CM

## 2018-08-29 PROCEDURE — 25500020 PHARM REV CODE 255: Performed by: OTOLARYNGOLOGY

## 2018-08-29 PROCEDURE — A9585 GADOBUTROL INJECTION: HCPCS | Performed by: OTOLARYNGOLOGY

## 2018-08-29 PROCEDURE — 70553 MRI BRAIN STEM W/O & W/DYE: CPT | Mod: 26,,, | Performed by: RADIOLOGY

## 2018-08-29 PROCEDURE — 70553 MRI BRAIN STEM W/O & W/DYE: CPT | Mod: TC

## 2018-08-29 RX ORDER — GADOBUTROL 604.72 MG/ML
10 INJECTION INTRAVENOUS
Status: COMPLETED | OUTPATIENT
Start: 2018-08-29 | End: 2018-08-29

## 2018-08-29 RX ADMIN — GADOBUTROL 10 ML: 604.72 INJECTION INTRAVENOUS at 09:08

## 2018-08-29 NOTE — TELEPHONE ENCOUNTER
----- Message from Soila Roberson MD sent at 8/29/2018 11:06 AM CDT -----  Please call patient and notify of normal results of MRI IAC.

## 2018-09-14 DIAGNOSIS — E11.9 TYPE 2 DIABETES MELLITUS WITHOUT COMPLICATION: ICD-10-CM

## 2018-09-22 RX ORDER — FENOFIBRATE 160 MG/1
TABLET ORAL
Qty: 90 TABLET | Refills: 3 | Status: SHIPPED | OUTPATIENT
Start: 2018-09-22 | End: 2019-06-14 | Stop reason: SDUPTHER

## 2018-09-25 ENCOUNTER — PATIENT MESSAGE (OUTPATIENT)
Dept: CARDIOLOGY | Facility: CLINIC | Age: 73
End: 2018-09-25

## 2018-10-03 ENCOUNTER — PATIENT MESSAGE (OUTPATIENT)
Dept: INTERNAL MEDICINE | Facility: CLINIC | Age: 73
End: 2018-10-03

## 2018-10-03 DIAGNOSIS — Z12.11 COLON CANCER SCREENING: Primary | ICD-10-CM

## 2018-10-08 ENCOUNTER — TELEPHONE (OUTPATIENT)
Dept: ENDOSCOPY | Facility: HOSPITAL | Age: 73
End: 2018-10-08

## 2018-10-08 NOTE — TELEPHONE ENCOUNTER
Pt needs to be scheduled for a colonoscopy.   Pt is currently taking Plavix.  Per Endoscopy protocol it should be held x 5 days prior.  Ok for pt to hold?  Please advise.  Thanks

## 2018-10-09 ENCOUNTER — TELEPHONE (OUTPATIENT)
Dept: ENDOSCOPY | Facility: HOSPITAL | Age: 73
End: 2018-10-09

## 2018-10-09 NOTE — TELEPHONE ENCOUNTER
October 9, 2018              12:59 PM   Layo Jett MD routed this conversation to Me    Layo Jett MD   to Noyr May MD           12:59 PM    Thank you.   Nory May MD   to Layo Jett MD         12:57 PM   Note      He can hold Plavix for 5 days prior to colonoscopy.  He should resume Plavix in the evening after the colonoscopy.  He should not stop aspirin.      October 8, 2018              2:30 PM   Layo Jett MD routed this conversation to Nory May MD               2:14 PM   You routed this conversation to Layo Jett MD    Me          2:13 PM   Note      Pt needs to be scheduled for a colonoscopy.   Pt is currently taking Plavix.  Per Endoscopy protocol it should be held x 5 days prior.  Ok for pt to hold?  Please advise.  Thanks

## 2018-10-09 NOTE — TELEPHONE ENCOUNTER
He can hold Plavix for 5 days prior to colonoscopy.  He should resume Plavix in the evening after the colonoscopy.  He should not stop aspirin.

## 2018-10-12 ENCOUNTER — TELEPHONE (OUTPATIENT)
Dept: GASTROENTEROLOGY | Facility: CLINIC | Age: 73
End: 2018-10-12

## 2018-10-15 ENCOUNTER — TELEPHONE (OUTPATIENT)
Dept: GASTROENTEROLOGY | Facility: CLINIC | Age: 73
End: 2018-10-15

## 2018-11-07 ENCOUNTER — PATIENT MESSAGE (OUTPATIENT)
Dept: CARDIOLOGY | Facility: CLINIC | Age: 73
End: 2018-11-07

## 2018-11-07 ENCOUNTER — PATIENT MESSAGE (OUTPATIENT)
Dept: INTERNAL MEDICINE | Facility: CLINIC | Age: 73
End: 2018-11-07

## 2018-11-09 ENCOUNTER — TELEPHONE (OUTPATIENT)
Dept: ENDOSCOPY | Facility: HOSPITAL | Age: 73
End: 2018-11-09

## 2018-11-09 NOTE — TELEPHONE ENCOUNTER
Left message instructing patient to call dept @ [phone] between 8am-4pm.    Arrival time  @0800

## 2018-11-12 ENCOUNTER — TELEPHONE (OUTPATIENT)
Dept: GASTROENTEROLOGY | Facility: CLINIC | Age: 73
End: 2018-11-12

## 2018-11-12 NOTE — TELEPHONE ENCOUNTER
----- Message from Soco Valdez sent at 11/12/2018  8:59 AM CST -----  Contact: [phone]  Pt called stating he has a colonoscopy tomorrow and his prep haven't been sent to the pharmacy . Please sen it to [facility] [place] . Please advise

## 2018-12-05 ENCOUNTER — TELEPHONE (OUTPATIENT)
Dept: INTERNAL MEDICINE | Facility: CLINIC | Age: 73
End: 2018-12-05

## 2018-12-05 ENCOUNTER — TELEPHONE (OUTPATIENT)
Dept: ENDOSCOPY | Facility: HOSPITAL | Age: 73
End: 2018-12-05

## 2018-12-05 ENCOUNTER — TELEPHONE (OUTPATIENT)
Dept: GASTROENTEROLOGY | Facility: CLINIC | Age: 73
End: 2018-12-05

## 2018-12-05 NOTE — TELEPHONE ENCOUNTER
Called  # back & LMOR to [facility].  Dr. Jett stated that he already spoke with someone re; needs to be off plavix for 5 days prior to colonoscopoy

## 2018-12-05 NOTE — TELEPHONE ENCOUNTER
cleared  to hold Plavix for 5 days prior to procedure.  stated he needs to start Plavix 24 hours after procedure.

## 2018-12-05 NOTE — TELEPHONE ENCOUNTER
----- Message from Sushila Potter sent at 12/5/2018  3:28 PM CST -----  Contact: Ronda// [phone]  Needs to speak with you urgently about patient's  Script clopidogrel (PLAVIX) 75 mg tablet.

## 2018-12-10 ENCOUNTER — TELEPHONE (OUTPATIENT)
Dept: ENDOSCOPY | Facility: HOSPITAL | Age: 73
End: 2018-12-10

## 2018-12-11 ENCOUNTER — TELEPHONE (OUTPATIENT)
Dept: ENDOSCOPY | Facility: HOSPITAL | Age: 73
End: 2018-12-11

## 2018-12-11 NOTE — TELEPHONE ENCOUNTER
Spoke with patient about arrival time @730.     Prep instructions reviewed: the day before the procedure, follow a clear liquid diet all day, then start the first 1/2 of prep at 5pm and take 2nd 1/2 of prep @ 230 .  Pt must be completely NPO when prep completed @ 430 .    Medications: Do not take Insulin or oral diabetic medications the day of the procedure.  Take as prescribed: heart, seizure and blood pressure medication in the morning with a sip of water (less than an ounce).  Take any breathing medications and bring inhalers to hospital with you Leave all valuables and jewelry at home.     Wear comfortable clothes to procedure to change into hospital gown You cannot drive for 24 hours after your procedure because you will receive sedation for your procedure to make you comfortable.  A ride must be provided at discharge.

## 2018-12-12 ENCOUNTER — HOSPITAL ENCOUNTER (OUTPATIENT)
Facility: HOSPITAL | Age: 73
Discharge: HOME | End: 2018-12-12
Attending: INTERNAL MEDICINE | Admitting: INTERNAL MEDICINE
Payer: MEDICARE

## 2018-12-12 ENCOUNTER — ANESTHESIA (OUTPATIENT)
Dept: ENDOSCOPY | Facility: HOSPITAL | Age: 73
End: 2018-12-12
Payer: MEDICARE

## 2018-12-12 ENCOUNTER — ANESTHESIA EVENT (OUTPATIENT)
Dept: ENDOSCOPY | Facility: HOSPITAL | Age: 73
End: 2018-12-12
Payer: MEDICARE

## 2018-12-12 ENCOUNTER — TELEPHONE (OUTPATIENT)
Dept: ENDOSCOPY | Facility: HOSPITAL | Age: 73
End: 2018-12-12

## 2018-12-12 VITALS
BODY MASS INDEX: 27.35 KG/M2 | RESPIRATION RATE: 18 BRPM | HEIGHT: 75 IN | OXYGEN SATURATION: 99 % | SYSTOLIC BLOOD PRESSURE: 165 MMHG | WEIGHT: 220 LBS | HEART RATE: 48 BPM | DIASTOLIC BLOOD PRESSURE: 78 MMHG | TEMPERATURE: 98 F

## 2018-12-12 DIAGNOSIS — Z12.11 COLON CANCER SCREENING: Primary | ICD-10-CM

## 2018-12-12 PROCEDURE — 27201012 HC FORCEPS, HOT/COLD, DISP: Performed by: INTERNAL MEDICINE

## 2018-12-12 PROCEDURE — 88305 TISSUE EXAM BY PATHOLOGIST: CPT | Mod: 26,,, | Performed by: PATHOLOGY

## 2018-12-12 PROCEDURE — 45385 COLONOSCOPY W/LESION REMOVAL: CPT | Mod: PT,,, | Performed by: INTERNAL MEDICINE

## 2018-12-12 PROCEDURE — 63600175 PHARM REV CODE 636 W HCPCS: Mod: HCNC | Performed by: NURSE ANESTHETIST, CERTIFIED REGISTERED

## 2018-12-12 PROCEDURE — 45385 COLONOSCOPY W/LESION REMOVAL: CPT | Performed by: INTERNAL MEDICINE

## 2018-12-12 PROCEDURE — 88305 TISSUE EXAM BY PATHOLOGIST: CPT | Performed by: PATHOLOGY

## 2018-12-12 PROCEDURE — 45380 COLONOSCOPY AND BIOPSY: CPT | Mod: 59,,, | Performed by: INTERNAL MEDICINE

## 2018-12-12 PROCEDURE — 45380 COLONOSCOPY AND BIOPSY: CPT | Performed by: INTERNAL MEDICINE

## 2018-12-12 PROCEDURE — 37000009 HC ANESTHESIA EA ADD 15 MINS: Performed by: INTERNAL MEDICINE

## 2018-12-12 PROCEDURE — 37000008 HC ANESTHESIA 1ST 15 MINUTES: Performed by: INTERNAL MEDICINE

## 2018-12-12 PROCEDURE — 27201089 HC SNARE, DISP (ANY): Performed by: INTERNAL MEDICINE

## 2018-12-12 PROCEDURE — 25000003 PHARM REV CODE 250: Mod: HCNC | Performed by: INTERNAL MEDICINE

## 2018-12-12 PROCEDURE — 27200997: Performed by: INTERNAL MEDICINE

## 2018-12-12 RX ORDER — PROPOFOL 10 MG/ML
VIAL (ML) INTRAVENOUS CONTINUOUS PRN
Status: DISCONTINUED | OUTPATIENT
Start: 2018-12-12 | End: 2018-12-12

## 2018-12-12 RX ORDER — SODIUM CHLORIDE 9 MG/ML
INJECTION, SOLUTION INTRAVENOUS CONTINUOUS
Status: DISCONTINUED | OUTPATIENT
Start: 2018-12-12 | End: 2018-12-12 | Stop reason: HOSPADM

## 2018-12-12 RX ORDER — SODIUM CHLORIDE 0.9 % (FLUSH) 0.9 %
3 SYRINGE (ML) INJECTION
Status: DISCONTINUED | OUTPATIENT
Start: 2018-12-12 | End: 2018-12-12 | Stop reason: HOSPADM

## 2018-12-12 RX ORDER — PROPOFOL 10 MG/ML
VIAL (ML) INTRAVENOUS
Status: DISCONTINUED | OUTPATIENT
Start: 2018-12-12 | End: 2018-12-12

## 2018-12-12 RX ORDER — LIDOCAINE HCL/PF 100 MG/5ML
SYRINGE (ML) INTRAVENOUS
Status: DISCONTINUED | OUTPATIENT
Start: 2018-12-12 | End: 2018-12-12

## 2018-12-12 RX ADMIN — PROPOFOL 80 MG: 10 INJECTION, EMULSION INTRAVENOUS at 08:12

## 2018-12-12 RX ADMIN — PROPOFOL 150 MCG/KG/MIN: 10 INJECTION, EMULSION INTRAVENOUS at 08:12

## 2018-12-12 RX ADMIN — LIDOCAINE HYDROCHLORIDE 100 MG: 20 INJECTION, SOLUTION INTRAVENOUS at 08:12

## 2018-12-12 RX ADMIN — PROPOFOL 20 MG: 10 INJECTION, EMULSION INTRAVENOUS at 08:12

## 2018-12-12 RX ADMIN — SODIUM CHLORIDE: 0.9 INJECTION, SOLUTION INTRAVENOUS at 08:12

## 2018-12-12 NOTE — TRANSFER OF CARE
"Anesthesia Transfer of Care Note    Patient: Cecil Pringle    Procedure(s) Performed: Procedure(s) (LRB):  COLONOSCOPY (N/A)    Patient location: GI    Anesthesia Type: MAC    Transport from OR: Transported from OR on room air with adequate spontaneous ventilation    Post pain: adequate analgesia    Post assessment: no apparent anesthetic complications    Post vital signs: stable    Level of consciousness: sedated    Nausea/Vomiting: no nausea/vomiting    Complications: none    Transfer of care protocol was followed      Last vitals:   Visit Vitals  BP (!) 191/79   Pulse (!) 50   Temp 36.6 °C (97.9 °F)   Resp 20   Ht 6' 3" (1.905 m)   Wt 99.8 kg (220 lb)   SpO2 97%   BMI 27.50 kg/m²     "

## 2018-12-12 NOTE — PLAN OF CARE
PIV discontinued with catheter intact. PIV insertion site clean, dry, and intact with no signs/symptoms of redness or swelling. Pressure dressing applied with gauze and coban. Instructed patient and his wife to keep dressing on for at least 20-30 minutes. Both verbalized understanding.

## 2018-12-12 NOTE — TELEPHONE ENCOUNTER
Dr Pollock did an colonoscopy on Mr Pringle today. Dr Pollock wants to know if it is ok for patient to continue holding the Plavix for 5 more days. Please advise

## 2018-12-12 NOTE — H&P
History & Physical - Short Stay  Gastroenterology      SUBJECTIVE:     Procedure: Colonoscopy    Chief Complaint/Indication for Procedure: Screening    History of Present Illness:  Patient is a 73 y.o. male presents for colon cancer screening. Stated colonoscopy > 10 years ago was negative for polyps. No family history of colon cancer.    PTA Medications   Medication Sig    aspirin 325 MG tablet Take 325 mg by mouth once daily.    atorvastatin (LIPITOR) 40 MG tablet TAKE ONE TABLET BY MOUTH ONCE DAILY    blood sugar diagnostic Strp To check BG 1 times daily, to use with insurance preferred meter    blood-glucose meter kit To check BG 1 times daily, to use with insurance preferred meter    clopidogrel (PLAVIX) 75 mg tablet TAKE ONE TABLET BY MOUTH ONCE DAILY    co-enzyme Q-10 30 mg capsule Take 30 mg by mouth once daily.     fenofibrate 160 MG Tab TAKE ONE TABLET BY MOUTH ONCE DAILY    fish oil-omega-3 fatty acids 300-1,000 mg capsule Take 2 g by mouth 3 (three) times daily.     FLUoxetine (PROZAC) 20 MG capsule Take 1 capsule (20 mg total) by mouth once daily.    hydrOXYzine pamoate (VISTARIL) 25 MG Cap Take 1 capsule (25 mg total) by mouth nightly as needed (allergies).    lancets Misc To check BG 1 times daily, to use with insurance preferred meter    losartan (COZAAR) 100 MG tablet Take 1 tablet (100 mg total) by mouth once daily.    metFORMIN (GLUCOPHAGE) 1000 MG tablet Take 1 tablet (1,000 mg total) by mouth 2 (two) times daily.    metoprolol tartrate (LOPRESSOR) 100 MG tablet Take 0.5 tablets (50 mg total) by mouth 2 (two) times daily.    multivit-iron-min-folic acid (MULTIVITAMIN-IRON-MINERALS-FOLIC ACID) 3,500-18-0.4 unit-mg-mg Chew Take 1 tablet by mouth once daily.     NIFEdipine (PROCARDIA-XL) 60 MG (OSM) 24 hr tablet TAKE ONE TABLET BY MOUTH ONCE DAILY    polyethylene glycol (GOLYTELY,NULYTELY) 236-22.74-6.74 -5.86 gram suspension TAKE AS DIRECTED    SAW PALMETTO ORAL Take 1 capsule by  mouth 2 (two) times daily.    SITagliptin (JANUVIA) 100 MG Tab Take 1 tablet (100 mg total) by mouth once daily.    temazepam (RESTORIL) 30 mg capsule Take 1 capsule (30 mg total) by mouth nightly as needed for Insomnia.       Review of patient's allergies indicates:  No Known Allergies     Past Medical History:   Diagnosis Date    Anxiety     Arthritis     Coronary artery disease     Depression     Diabetes mellitus, type 2     Hyperlipidemia     Hypertension     Insomnia     PAD (peripheral artery disease)      Past Surgical History:   Procedure Laterality Date    CORONARY ARTERY BYPASS GRAFT      CORONARY ARTERY BYPASS GRAFT      coronary stents  2013    x5    left foot surgery      Right hand surgery       Family History   Problem Relation Age of Onset    Heart disease Father          at the age of 56    Heart disease Brother     Heart disease Paternal Aunt     Heart disease Paternal Uncle     Cancer Mother         mesothelioma,  at the age of 78    Heart disease Sister     Heart disease Maternal Aunt     Glaucoma Neg Hx     Macular degeneration Neg Hx     Retinal detachment Neg Hx     Strabismus Neg Hx     Amblyopia Neg Hx     Blindness Neg Hx     Cataracts Neg Hx      Social History     Tobacco Use    Smoking status: Former Smoker     Packs/day: 2.50     Years: 30.00     Pack years: 75.00     Types: Cigarettes    Smokeless tobacco: Never Used   Substance Use Topics    Alcohol use: No    Drug use: No       Review of Systems:  Constitutional: no fever or chills  Respiratory: no cough or shortness of breath  Cardiovascular: no chest pain or palpitations  Gastrointestinal: no nausea or vomiting, no abdominal pain or change in bowel habits    OBJECTIVE:     Vital Signs (Most Recent)       Physical Exam:  General: well developed, well nourished  Lungs:  clear to auscultation bilaterally and normal respiratory effort  Heart: regular rate, S1, S2  normal  Abdomen: soft, non-tender non-distented; bowel sounds normal; no masses,  no organomegaly    Laboratory  CBC: No results for input(s): WBC, RBC, HGB, HCT, PLT, MCV, MCH, MCHC in the last 168 hours.  CMP: No results for input(s): GLU, CALCIUM, ALBUMIN, PROT, NA, K, CO2, CL, BUN, CREATININE, ALKPHOS, ALT, AST, BILITOT in the last 168 hours.  Coagulation: No results for input(s): LABPROT, INR, APTT in the last 168 hours.      Diagnostic Results:      ASSESSMENT/PLAN:     Colon cancer screening    Plan: Colonoscopy    Anesthesia Plan: MAC    ASA Grade: ASA 3 - Patient with moderate systemic disease with functional limitations     The impression and plan was discussed in detail with the patient and family. All questions have been answered and the patient voices understanding of our plan at this point. The risk of the procedure was discussed in detail which includes but not limited to bleeding, infection, perforation in some cases requiring surgery with its spectrum of complications.

## 2018-12-12 NOTE — [employer] PATIENT INSTRUCTIONS
Discharge Summary/Instructions after an Endoscopic Procedure  Patient Name: Cecil Pringle  Patient MRN: [id number]  Patient YOB: 1945 Wednesday, December 12, 2018  Jabari Pollock MD  RESTRICTIONS:  During your procedure today, you received medications for sedation.  These   medications may affect your judgment, balance and coordination.  Therefore,   for 24 hours, you have the following restrictions:   - DO NOT drive a car, operate machinery, make legal/financial decisions,   sign important papers or drink alcohol.    ACTIVITY:  Today: no heavy lifting, straining or running due to procedural   sedation/anesthesia.  The following day: return to full activity including work.  DIET:  Eat and drink normally unless instructed otherwise.     TREATMENT FOR COMMON SIDE EFFECTS:  - Mild abdominal pain, nausea, belching, bloating or excessive gas:  rest,   eat lightly and use a heating pad.  - Sore Throat: treat with throat lozenges and/or gargle with warm salt   water.  - Because air was used during the procedure, expelling large amounts of air   from your rectum or belching is normal.  - If a bowel prep was taken, you may not have a bowel movement for 1-3 days.    This is normal.  SYMPTOMS TO WATCH FOR AND REPORT TO YOUR PHYSICIAN:  1. Abdominal pain or bloating, other than gas cramps.  2. Chest pain.  3. Back pain.  4. Signs of infection such as: chills or fever occurring within 24 hours   after the procedure.  5. Rectal bleeding, which would show as bright red, maroon, or black stools.   (A tablespoon of blood from the rectum is not serious, especially if   hemorrhoids are present.)  6. Vomiting.  7. Weakness or dizziness.  GO DIRECTLY TO THE NEAREST EMERGENCY ROOM IF YOU HAVE ANY OF THE FOLLOWING:      Difficulty breathing              Chills and/or fever over 101 F   Persistent vomiting and/or vomiting blood   Severe abdominal pain   Severe chest pain   Black, tarry stools   Bleeding- more than one  tablespoon   Any other symptom or condition that you feel may need urgent attention  Your doctor recommends these additional instructions:  If any biopsies were taken, your doctors clinic will contact you in 1 to 2   weeks with any results.  - Discharge patient to home (ambulatory).   - Await pathology results.   - Repeat colonoscopy in 6 months for surveillance after piecemeal   polypectomy.  For questions, problems or results please call your physician - Jabari Pollock MD at Work:  [phone].  EMERGENCY PHONE NUMBER: [phone],  LAB RESULTS: [phone]  IF A COMPLICATION OR EMERGENCY SITUATION ARISES AND YOU ARE UNABLE TO REACH   YOUR PHYSICIAN - GO DIRECTLY TO THE EMERGENCY ROOM.  Jabari Pollock MD  12/12/2018 9:41:08 AM  This report has been verified and signed electronically.  [employer]

## 2018-12-12 NOTE — PLAN OF CARE
Recovery complete. Dr. Pollock spoke to patient and his wife at bedside about outcome of the procedure. Patient recovered to baseline. Discharge instructions reviewed with patient and his wife. VSS and no complaints of pain or discomfort at this time. Patient ready for discharge.

## 2018-12-12 NOTE — DISCHARGE SUMMARY
Discharge Summary/Instructions after an Endoscopic Procedure    Patient Name: Cecil Pringle  Patient MRN: [id number]  Patient YOB: 1945 Wednesday, December 12, 2018  Jabari Pollock MD    RESTRICTIONS:  During your procedure today, you received medications for sedation.  These medications may affect your judgment, balance and coordination.  Therefore, for 24 hours, you have the following restrictions:     - DO NOT drive a car, operate machinery, make legal/financial decisions, sign important papers or drink alcohol.      ACTIVITY:  Today: no heavy lifting, straining or running due to procedural sedation/anesthesia.  The following day: return to full activity including work.    DIET:  Eat and drink normally unless instructed otherwise.     TREATMENT FOR COMMON SIDE EFFECTS:  - Mild abdominal pain, nausea, belching, bloating or excessive gas:  rest, eat lightly and use a heating pad.  - Sore Throat: treat with throat lozenges and/or gargle with warm salt water.  - Because air was used during the procedure, expelling large amounts of air from your rectum or belching is normal.  - If a bowel prep was taken, you may not have a bowel movement for 1-3 days.  This is normal.      SYMPTOMS TO WATCH FOR AND REPORT TO YOUR PHYSICIAN:  1. Abdominal pain or bloating, other than gas cramps.  2. Chest pain.  3. Back pain.  4. Signs of infection such as: chills or fever occurring within 24 hours after the procedure.  5. Rectal bleeding, which would show as bright red, maroon, or black stools. (A tablespoon of blood from the rectum is not serious, especially if hemorrhoids are present.)  6. Vomiting.  7. Weakness or dizziness.      GO DIRECTLY TO THE NEAREST EMERGENCY ROOM IF YOU HAVE ANY OF THE FOLLOWING:     Difficulty breathing              Chills and/or fever over 101 F   Persistent vomiting and/or vomiting blood   Severe abdominal pain   Severe chest pain   Black, tarry stools   Bleeding- more than one  tablespoon   Any other symptom or condition that you feel may need urgent attention    Your doctor recommends these additional instructions:  If any biopsies were taken, your doctors clinic will contact you in 1 to 2 weeks with any results.    - Discharge patient to home (ambulatory).   - Await pathology results.   - Repeat colonoscopy in 6 months for surveillance after piecemeal polypectomy.    For questions, problems or results please call your physician - Jabari Pollock MD at Work:  [phone].    EMERGENCY PHONE NUMBER: [phone],  LAB RESULTS: [phone]    IF A COMPLICATION OR EMERGENCY SITUATION ARISES AND YOU ARE UNABLE TO REACH YOUR PHYSICIAN - GO DIRECTLY TO THE EMERGENCY ROOM.

## 2018-12-12 NOTE — TELEPHONE ENCOUNTER
Message   Received: Today   Message Contents   MD Lucero Murray MA   Caller: Unspecified (Today, 10:37 AM)             Please let the patient know that he need to start his Plavix tomorrow.   Thanks   Jabari Pollock MD      Patient informed.

## 2018-12-12 NOTE — TELEPHONE ENCOUNTER
He has had 2 separate CABG operations. His DAPT score (that provide guidance about continuing dual anti-platelet therapy) is probably not reliable because he had stents prior to his second CABG. I recommend restarting Plavix as soon as it is safe from GI's stand point.

## 2018-12-12 NOTE — ANESTHESIA PREPROCEDURE EVALUATION
12/12/2018  Cecil Pringle is a 73 y.o., male for colonoscopy    Review of patient's allergies indicates:  No Known Allergies    Past Medical History:   Diagnosis Date    Anxiety     Arthritis     Coronary artery disease     Depression     Diabetes mellitus, type 2     Hyperlipidemia     Hypertension     Insomnia     PAD (peripheral artery disease)      Past Surgical History:   Procedure Laterality Date    CORONARY ARTERY BYPASS GRAFT  1990    CORONARY ARTERY BYPASS GRAFT  2014    coronary stents  2013    x5    left foot surgery  1980    Right hand surgery  1980     Patient Active Problem List   Diagnosis    Coronary artery disease involving native coronary artery of native heart without angina pectoris    Essential hypertension    Uncontrolled type 2 diabetes mellitus with diabetic peripheral angiopathy without gangrene, without long-term current use of insulin    Mixed hyperlipidemia    Other insomnia    Mild episode of recurrent major depressive disorder    PAD (peripheral artery disease)    Type 2 diabetes, uncontrolled, with neuropathy    S/P CABG x 5    Nonrheumatic aortic valve stenosis    Stenosis of right carotid artery     Wt Readings from Last 3 Encounters:   08/22/18 105.4 kg (232 lb 7.6 oz)   06/26/18 105.2 kg (232 lb)   03/27/18 105.2 kg (232 lb)     Temp Readings from Last 3 Encounters:   08/22/18 36.2 °C (97.2 °F) (Oral)   03/15/18 36.6 °C (97.9 °F) (Oral)   05/17/17 36.9 °C (98.5 °F) (Oral)     BP Readings from Last 3 Encounters:   08/22/18 (!) 180/68   06/26/18 130/60   03/27/18 138/60     Pulse Readings from Last 3 Encounters:   08/22/18 (!) 49   06/26/18 (!) 57   03/27/18 (!) 57       Anesthesia Evaluation    I have reviewed the Patient Summary Reports.        Review of Systems      Physical Exam  General:  Well nourished    Airway/Jaw/Neck:  Airway Findings:  Mouth Opening: Normal Tongue: Normal  General Airway Assessment: Adult  Mallampati: II  Improves to II with phonation.  TM Distance: Normal, at least 6 cm       Chest/Lungs:  Chest/Lungs Findings: Clear to auscultation, Normal Respiratory Rate     Heart/Vascular:  Heart Findings: Rate: Normal  Rhythm: Regular Rhythm  Sounds: Normal        Mental Status:  Mental Status Findings:  Cooperative, Alert and Oriented       Lab Results   Component Value Date    WBC 9.40 03/01/2018    HGB 13.9 (L) 03/01/2018    HCT 40.6 03/01/2018    MCV 86 03/01/2018     03/01/2018       Chemistry        Component Value Date/Time     03/01/2018 0742     03/01/2018 0742    K 5.2 (H) 03/01/2018 0742    K 5.2 (H) 03/01/2018 0742     03/01/2018 0742     03/01/2018 0742    CO2 23 03/01/2018 0742    CO2 23 03/01/2018 0742    BUN 19 03/01/2018 0742    BUN 19 03/01/2018 0742    CREATININE 1.2 08/29/2018 0723     (H) 03/01/2018 0742     (H) 03/01/2018 0742        Component Value Date/Time    CALCIUM 10.1 03/01/2018 0742    CALCIUM 10.1 03/01/2018 0742    ALKPHOS 52 (L) 03/01/2018 0742    AST 18 03/01/2018 0742    AST 18 03/01/2018 0742    ALT 23 03/01/2018 0742    ALT 23 03/01/2018 0742    BILITOT 0.4 03/01/2018 0742    ESTGFRAFRICA >60 08/29/2018 0723    EGFRNONAA >60 08/29/2018 0723            Anesthesia Plan  Type of Anesthesia, risks & benefits discussed:  Anesthesia Type:  MAC  Patient's Preference:   Intra-op Monitoring Plan:   Intra-op Monitoring Plan Comments:   Post Op Pain Control Plan:   Post Op Pain Control Plan Comments:   Induction:   IV  Beta Blocker:         Informed Consent: Patient understands risks and agrees with Anesthesia plan.  Questions answered. Anesthesia consent signed with patient.  ASA Score: 3     Day of Surgery Review of History & Physical: I have interviewed and examined the patient. I have reviewed the patient's H&P dated:  There are no significant changes.  H&P update  referred to the provider.  H&P completed by Anesthesiologist.       Ready For Surgery From Anesthesia Perspective.

## 2018-12-12 NOTE — ANESTHESIA POSTPROCEDURE EVALUATION
"Anesthesia Post Evaluation    Patient: Cecil Pringle    Procedure(s) Performed: Procedure(s) (LRB):  COLONOSCOPY (N/A)    Final Anesthesia Type: MAC  Patient location during evaluation: GI PACU  Patient participation: Yes- Able to Participate  Level of consciousness: awake and alert and oriented  Post-procedure vital signs: reviewed and stable  Pain management: adequate  Airway patency: patent  PONV status at discharge: No PONV  Anesthetic complications: no      Cardiovascular status: stable, hemodynamically stable and blood pressure returned to baseline  Respiratory status: unassisted, room air and spontaneous ventilation  Hydration status: euvolemic  Follow-up not needed.        Visit Vitals  BP (!) 191/79   Pulse (!) 50   Temp 36.6 °C (97.9 °F)   Resp 20   Ht 6' 3" (1.905 m)   Wt 99.8 kg (220 lb)   SpO2 97%   BMI 27.50 kg/m²       Pain/Randy Score: No Data Recorded      "

## 2018-12-17 ENCOUNTER — TELEPHONE (OUTPATIENT)
Dept: GASTROENTEROLOGY | Facility: CLINIC | Age: 73
End: 2018-12-17

## 2018-12-17 NOTE — TELEPHONE ENCOUNTER
Post procedure instructions. Repeat colonoscopy in 6 months for surveillance after piecemeal polypectomy. Please place on recall.

## 2018-12-20 ENCOUNTER — TELEPHONE (OUTPATIENT)
Dept: ENDOSCOPY | Facility: HOSPITAL | Age: 73
End: 2018-12-20

## 2018-12-20 NOTE — TELEPHONE ENCOUNTER
----- Message from Jabari Pollock MD sent at 12/20/2018 12:13 PM CST -----  Please let the patient know the colon polyps were adenoma. No cancer or dysplasia. Colonoscopy in 6 months.

## 2019-01-30 ENCOUNTER — PATIENT MESSAGE (OUTPATIENT)
Dept: INTERNAL MEDICINE | Facility: CLINIC | Age: 74
End: 2019-01-30

## 2019-01-30 DIAGNOSIS — E66.9 DIABETES MELLITUS TYPE 2 IN OBESE: ICD-10-CM

## 2019-01-30 DIAGNOSIS — E11.69 DIABETES MELLITUS TYPE 2 IN OBESE: ICD-10-CM

## 2019-01-30 RX ORDER — METOPROLOL TARTRATE 100 MG/1
50 TABLET ORAL 2 TIMES DAILY
Qty: 90 TABLET | Refills: 0 | Status: SHIPPED | OUTPATIENT
Start: 2019-01-30 | End: 2019-04-25 | Stop reason: SDUPTHER

## 2019-01-30 RX ORDER — TEMAZEPAM 30 MG/1
30 CAPSULE ORAL NIGHTLY PRN
Qty: 30 CAPSULE | Refills: 0 | Status: SHIPPED | OUTPATIENT
Start: 2019-01-30 | End: 2019-04-25 | Stop reason: SDUPTHER

## 2019-01-30 RX ORDER — METFORMIN HYDROCHLORIDE 1000 MG/1
1000 TABLET ORAL 2 TIMES DAILY
Qty: 180 TABLET | Refills: 0 | Status: SHIPPED | OUTPATIENT
Start: 2019-01-30 | End: 2019-04-25 | Stop reason: SDUPTHER

## 2019-01-30 RX ORDER — CLOPIDOGREL BISULFATE 75 MG/1
75 TABLET ORAL DAILY
Qty: 90 TABLET | Refills: 0 | Status: SHIPPED | OUTPATIENT
Start: 2019-01-30 | End: 2019-04-25 | Stop reason: SDUPTHER

## 2019-01-30 RX ORDER — LOSARTAN POTASSIUM 100 MG/1
100 TABLET ORAL DAILY
Qty: 90 TABLET | Refills: 0 | Status: SHIPPED | OUTPATIENT
Start: 2019-01-30 | End: 2019-04-25 | Stop reason: SDUPTHER

## 2019-01-31 RX ORDER — ATORVASTATIN CALCIUM 40 MG/1
40 TABLET, FILM COATED ORAL DAILY
Qty: 90 TABLET | Refills: 3 | Status: SHIPPED | OUTPATIENT
Start: 2019-01-31 | End: 2020-04-22

## 2019-02-18 DIAGNOSIS — E66.9 DIABETES MELLITUS TYPE 2 IN OBESE: ICD-10-CM

## 2019-02-18 DIAGNOSIS — E11.69 DIABETES MELLITUS TYPE 2 IN OBESE: ICD-10-CM

## 2019-02-20 ENCOUNTER — TELEPHONE (OUTPATIENT)
Dept: INTERNAL MEDICINE | Facility: CLINIC | Age: 74
End: 2019-02-20

## 2019-02-20 DIAGNOSIS — E66.9 DIABETES MELLITUS TYPE 2 IN OBESE: ICD-10-CM

## 2019-02-20 DIAGNOSIS — I25.10 CORONARY ARTERY DISEASE INVOLVING NATIVE CORONARY ARTERY OF NATIVE HEART WITHOUT ANGINA PECTORIS: ICD-10-CM

## 2019-02-20 DIAGNOSIS — E11.69 DIABETES MELLITUS TYPE 2 IN OBESE: ICD-10-CM

## 2019-02-20 DIAGNOSIS — F33.0 MILD EPISODE OF RECURRENT MAJOR DEPRESSIVE DISORDER: ICD-10-CM

## 2019-02-20 DIAGNOSIS — E78.2 MIXED HYPERLIPIDEMIA: ICD-10-CM

## 2019-02-20 DIAGNOSIS — I10 ESSENTIAL HYPERTENSION: ICD-10-CM

## 2019-02-20 DIAGNOSIS — I35.0 NONRHEUMATIC AORTIC VALVE STENOSIS: ICD-10-CM

## 2019-02-20 DIAGNOSIS — I65.21 STENOSIS OF RIGHT CAROTID ARTERY: ICD-10-CM

## 2019-02-20 DIAGNOSIS — Z12.5 PROSTATE CANCER SCREENING: ICD-10-CM

## 2019-02-20 DIAGNOSIS — Z95.1 S/P CABG X 5: ICD-10-CM

## 2019-02-20 DIAGNOSIS — I73.9 PAD (PERIPHERAL ARTERY DISEASE): ICD-10-CM

## 2019-02-20 DIAGNOSIS — G47.09 OTHER INSOMNIA: ICD-10-CM

## 2019-02-20 DIAGNOSIS — Z00.00 WELL ADULT EXAM: Primary | ICD-10-CM

## 2019-02-20 PROBLEM — Z12.11 COLON CANCER SCREENING: Status: RESOLVED | Noted: 2018-12-12 | Resolved: 2019-02-20

## 2019-02-20 RX ORDER — SITAGLIPTIN 100 MG/1
TABLET, FILM COATED ORAL
Qty: 90 TABLET | Refills: 0 | Status: SHIPPED | OUTPATIENT
Start: 2019-02-20 | End: 2019-07-23 | Stop reason: SDUPTHER

## 2019-03-19 ENCOUNTER — PATIENT MESSAGE (OUTPATIENT)
Dept: ADMINISTRATIVE | Facility: OTHER | Age: 74
End: 2019-03-19

## 2019-03-19 DIAGNOSIS — J30.89 CHRONIC NONSEASONAL ALLERGIC RHINITIS DUE TO POLLEN: ICD-10-CM

## 2019-03-19 RX ORDER — FLUOXETINE HYDROCHLORIDE 20 MG/1
20 CAPSULE ORAL DAILY
Qty: 90 CAPSULE | Refills: 3 | Status: SHIPPED | OUTPATIENT
Start: 2019-03-19 | End: 2019-04-25

## 2019-03-19 RX ORDER — HYDROXYZINE PAMOATE 25 MG/1
25 CAPSULE ORAL NIGHTLY PRN
Qty: 90 CAPSULE | Refills: 3 | Status: SHIPPED | OUTPATIENT
Start: 2019-03-19 | End: 2020-03-16

## 2019-03-29 DIAGNOSIS — B35.1 ONYCHOMYCOSIS OF MULTIPLE TOENAILS WITH TYPE 2 DIABETES MELLITUS: ICD-10-CM

## 2019-03-29 DIAGNOSIS — E11.69 ONYCHOMYCOSIS OF MULTIPLE TOENAILS WITH TYPE 2 DIABETES MELLITUS: ICD-10-CM

## 2019-03-31 RX ORDER — CALCIUM CITRATE/VITAMIN D3 200MG-6.25
TABLET ORAL
Qty: 100 STRIP | Refills: 3 | Status: SHIPPED | OUTPATIENT
Start: 2019-03-31 | End: 2019-07-31 | Stop reason: SDUPTHER

## 2019-04-02 ENCOUNTER — OFFICE VISIT (OUTPATIENT)
Dept: OPTOMETRY | Facility: CLINIC | Age: 74
End: 2019-04-02
Payer: COMMERCIAL

## 2019-04-02 DIAGNOSIS — E11.9 TYPE 2 DIABETES MELLITUS WITHOUT RETINOPATHY: Primary | ICD-10-CM

## 2019-04-02 DIAGNOSIS — H25.13 NUCLEAR SCLEROSIS, BILATERAL: ICD-10-CM

## 2019-04-02 DIAGNOSIS — Z13.5 GLAUCOMA SCREENING: ICD-10-CM

## 2019-04-02 DIAGNOSIS — H52.203 ASTIGMATISM WITH PRESBYOPIA, BILATERAL: ICD-10-CM

## 2019-04-02 DIAGNOSIS — H52.4 ASTIGMATISM WITH PRESBYOPIA, BILATERAL: ICD-10-CM

## 2019-04-02 PROCEDURE — 92014 COMPRE OPH EXAM EST PT 1/>: CPT | Mod: S$GLB,,, | Performed by: OPTOMETRIST

## 2019-04-02 PROCEDURE — 92015 PR REFRACTION: ICD-10-PCS | Mod: S$GLB,,, | Performed by: OPTOMETRIST

## 2019-04-02 PROCEDURE — 99999 PR PBB SHADOW E&M-EST. PATIENT-LVL II: ICD-10-PCS | Mod: PBBFAC,,, | Performed by: OPTOMETRIST

## 2019-04-02 PROCEDURE — 99999 PR PBB SHADOW E&M-EST. PATIENT-LVL II: CPT | Mod: PBBFAC,,, | Performed by: OPTOMETRIST

## 2019-04-02 PROCEDURE — 92015 DETERMINE REFRACTIVE STATE: CPT | Mod: S$GLB,,, | Performed by: OPTOMETRIST

## 2019-04-02 PROCEDURE — 92014 PR EYE EXAM, EST PATIENT,COMPREHESV: ICD-10-PCS | Mod: S$GLB,,, | Performed by: OPTOMETRIST

## 2019-04-02 NOTE — PROGRESS NOTES
HPI     DLS   04/05/2018  Pt catarina visual changes or problems since last visit.  DM+  HBP+  CHOL+  ARTHRITIS-  HA-  DIPLOPIA-  FLASHES-  FLOATERS-  Hemoglobin A1C       Date                     Value               Ref Range             Status                03/01/2018               7.1 (H)             4.0 - 5.6 %           Final              Comment:    According to ADA guidelines, hemoglobin A1c <7.0% represents  optimal   control in non-pregnant diabetic patients. Different  metrics may apply to   specific patient populations.   Standards of Medical Care in   Diabetes-2016.  For the purpose of screening for the presence of   diabetes:  <5.7%     Consistent with the absence of diabetes  5.7-6.4%    Consistent with increasing risk for diabetes   (prediabetes)  >or=6.5%    Consistent with diabetes  Currently, no consensus exists for use of   hemoglobin A1c  for diagnosis of diabetes for children.  This Hemoglobin   A1c assay has significant interference with fetal   hemoglobin   (HbF).   The results are invalid for patients with abnormal amounts of   HbF,     including those with known Hereditary Persistence   of Fetal Hemoglobin.   Heterozygous hemoglobin variants (HbAS, HbAC,   HbAD, HbAE, HbA2) do not   significantly interfere with this assay;   however, presence of multiple   variants in a sample may impact the %   interference.         05/05/2017               6.9 (H)             4.5 - 6.2 %           Final              Comment:    According to ADA guidelines, hemoglobin A1C <7.0% represents  optimal   control in non-pregnant diabetic patients.  Different  metrics may apply   to specific populations.   Standards of Medical Care in Diabetes -   2016.  For the purpose of screening for the presence of diabetes:  <5.7%       Consistent with the absence of diabetes  5.7-6.4%  Consistent with   increasing risk for diabetes   (prediabetes)  >or=6.5%  Consistent with   diabetes  Currently no consensus exists for use of  hemoglobin A1C  for   diagnosis of diabetes for children.         01/30/2017               8.1 (H)             4.5 - 6.2 %           Final              Comment:    According to ADA guidelines, hemoglobin A1C <7.0% represents  optimal   control in non-pregnant diabetic patients.  Different  metrics may apply   to specific populations.   Standards of Medical Care in Diabetes -   2016.  For the purpose of screening for the presence of diabetes:  <5.7%       Consistent with the absence of diabetes  5.7-6.4%  Consistent with   increasing risk for diabetes   (prediabetes)  >or=6.5%  Consistent with   diabetes  Currently no consensus exists for use of hemoglobin A1C  for   diagnosis of diabetes for children.    ----------      Last edited by Jonatan Buckley, OD on 4/2/2019  9:53 AM. (History)        ROS     Positive for: Endocrine (DM)    Negative for: Constitutional, Gastrointestinal, Neurological, Skin,   Genitourinary, Musculoskeletal, HENT, Cardiovascular, Eyes, Respiratory,   Psychiatric, Allergic/Imm, Heme/Lymph    Last edited by Jonatan Buckley, OD on 4/2/2019  9:53 AM. (History)        Assessment /Plan     For exam results, see Encounter Report.    Type 2 diabetes mellitus without retinopathy    Nuclear sclerosis, bilateral    Glaucoma screening    Astigmatism with presbyopia, bilateral      1. Cat OU--stable  2. DM- WITHOUT RETINOPATHY.  Advised yearly DFE    PLAN:    rtc 1 yr

## 2019-04-18 ENCOUNTER — TELEPHONE (OUTPATIENT)
Dept: ENDOSCOPY | Facility: HOSPITAL | Age: 74
End: 2019-04-18

## 2019-04-18 ENCOUNTER — LAB VISIT (OUTPATIENT)
Dept: LAB | Facility: HOSPITAL | Age: 74
End: 2019-04-18
Attending: FAMILY MEDICINE
Payer: MEDICARE

## 2019-04-18 DIAGNOSIS — I73.9 PAD (PERIPHERAL ARTERY DISEASE): ICD-10-CM

## 2019-04-18 DIAGNOSIS — F33.0 MILD EPISODE OF RECURRENT MAJOR DEPRESSIVE DISORDER: ICD-10-CM

## 2019-04-18 DIAGNOSIS — Z95.1 S/P CABG X 5: ICD-10-CM

## 2019-04-18 DIAGNOSIS — I10 ESSENTIAL HYPERTENSION: ICD-10-CM

## 2019-04-18 DIAGNOSIS — K63.5 POLYP OF COLON, UNSPECIFIED PART OF COLON, UNSPECIFIED TYPE: Primary | ICD-10-CM

## 2019-04-18 DIAGNOSIS — I35.0 NONRHEUMATIC AORTIC VALVE STENOSIS: ICD-10-CM

## 2019-04-18 DIAGNOSIS — Z00.00 WELL ADULT EXAM: ICD-10-CM

## 2019-04-18 DIAGNOSIS — E78.2 MIXED HYPERLIPIDEMIA: ICD-10-CM

## 2019-04-18 DIAGNOSIS — G47.09 OTHER INSOMNIA: ICD-10-CM

## 2019-04-18 DIAGNOSIS — Z12.5 PROSTATE CANCER SCREENING: ICD-10-CM

## 2019-04-18 DIAGNOSIS — I65.21 STENOSIS OF RIGHT CAROTID ARTERY: ICD-10-CM

## 2019-04-18 DIAGNOSIS — H90.3 ASYMMETRIC SNHL (SENSORINEURAL HEARING LOSS): ICD-10-CM

## 2019-04-18 DIAGNOSIS — I25.10 CORONARY ARTERY DISEASE INVOLVING NATIVE CORONARY ARTERY OF NATIVE HEART WITHOUT ANGINA PECTORIS: ICD-10-CM

## 2019-04-18 LAB
ALBUMIN SERPL BCP-MCNC: 4.1 G/DL (ref 3.5–5.2)
ALP SERPL-CCNC: 52 U/L (ref 55–135)
ALT SERPL W/O P-5'-P-CCNC: 26 U/L (ref 10–44)
ANION GAP SERPL CALC-SCNC: 9 MMOL/L (ref 8–16)
AST SERPL-CCNC: 19 U/L (ref 10–40)
BASOPHILS # BLD AUTO: 0.05 K/UL (ref 0–0.2)
BASOPHILS NFR BLD: 0.6 % (ref 0–1.9)
BILIRUB SERPL-MCNC: 0.5 MG/DL (ref 0.1–1)
BUN SERPL-MCNC: 21 MG/DL (ref 8–23)
CALCIUM SERPL-MCNC: 10.1 MG/DL (ref 8.7–10.5)
CHLORIDE SERPL-SCNC: 102 MMOL/L (ref 95–110)
CHOLEST SERPL-MCNC: 148 MG/DL (ref 120–199)
CHOLEST/HDLC SERPL: 4.6 {RATIO} (ref 2–5)
CO2 SERPL-SCNC: 24 MMOL/L (ref 23–29)
COMPLEXED PSA SERPL-MCNC: 0.48 NG/ML (ref 0–4)
CREAT SERPL-MCNC: 1.2 MG/DL (ref 0.5–1.4)
CREAT SERPL-MCNC: 1.2 MG/DL (ref 0.5–1.4)
DIFFERENTIAL METHOD: ABNORMAL
EOSINOPHIL # BLD AUTO: 0.2 K/UL (ref 0–0.5)
EOSINOPHIL NFR BLD: 2.1 % (ref 0–8)
ERYTHROCYTE [DISTWIDTH] IN BLOOD BY AUTOMATED COUNT: 14.6 % (ref 11.5–14.5)
EST. GFR  (AFRICAN AMERICAN): >60 ML/MIN/1.73 M^2
EST. GFR  (AFRICAN AMERICAN): >60 ML/MIN/1.73 M^2
EST. GFR  (NON AFRICAN AMERICAN): >60 ML/MIN/1.73 M^2
EST. GFR  (NON AFRICAN AMERICAN): >60 ML/MIN/1.73 M^2
ESTIMATED AVG GLUCOSE: 186 MG/DL (ref 68–131)
GLUCOSE SERPL-MCNC: 191 MG/DL (ref 70–110)
HBA1C MFR BLD HPLC: 8.1 % (ref 4–5.6)
HCT VFR BLD AUTO: 42.7 % (ref 40–54)
HDLC SERPL-MCNC: 32 MG/DL (ref 40–75)
HDLC SERPL: 21.6 % (ref 20–50)
HGB BLD-MCNC: 13.7 G/DL (ref 14–18)
IMM GRANULOCYTES # BLD AUTO: 0.06 K/UL (ref 0–0.04)
IMM GRANULOCYTES NFR BLD AUTO: 0.7 % (ref 0–0.5)
LDLC SERPL CALC-MCNC: 53.2 MG/DL (ref 63–159)
LYMPHOCYTES # BLD AUTO: 2.7 K/UL (ref 1–4.8)
LYMPHOCYTES NFR BLD: 31.3 % (ref 18–48)
MCH RBC QN AUTO: 28.2 PG (ref 27–31)
MCHC RBC AUTO-ENTMCNC: 32.1 G/DL (ref 32–36)
MCV RBC AUTO: 88 FL (ref 82–98)
MONOCYTES # BLD AUTO: 0.6 K/UL (ref 0.3–1)
MONOCYTES NFR BLD: 6.8 % (ref 4–15)
NEUTROPHILS # BLD AUTO: 5.1 K/UL (ref 1.8–7.7)
NEUTROPHILS NFR BLD: 58.5 % (ref 38–73)
NONHDLC SERPL-MCNC: 116 MG/DL
NRBC BLD-RTO: 0 /100 WBC
PLATELET # BLD AUTO: 258 K/UL (ref 150–350)
PMV BLD AUTO: 11.5 FL (ref 9.2–12.9)
POTASSIUM SERPL-SCNC: 4.4 MMOL/L (ref 3.5–5.1)
PROT SERPL-MCNC: 7.3 G/DL (ref 6–8.4)
RBC # BLD AUTO: 4.85 M/UL (ref 4.6–6.2)
SODIUM SERPL-SCNC: 135 MMOL/L (ref 136–145)
T4 FREE SERPL-MCNC: 0.98 NG/DL (ref 0.71–1.51)
TRIGL SERPL-MCNC: 314 MG/DL (ref 30–150)
TSH SERPL DL<=0.005 MIU/L-ACNC: 1.07 UIU/ML (ref 0.4–4)
WBC # BLD AUTO: 8.67 K/UL (ref 3.9–12.7)

## 2019-04-18 PROCEDURE — 83036 HEMOGLOBIN GLYCOSYLATED A1C: CPT | Mod: HCNC

## 2019-04-18 PROCEDURE — 80061 LIPID PANEL: CPT | Mod: HCNC

## 2019-04-18 PROCEDURE — 80053 COMPREHEN METABOLIC PANEL: CPT | Mod: HCNC

## 2019-04-18 PROCEDURE — 84153 ASSAY OF PSA TOTAL: CPT | Mod: HCNC

## 2019-04-18 PROCEDURE — 36415 COLL VENOUS BLD VENIPUNCTURE: CPT | Mod: HCNC,PO

## 2019-04-18 PROCEDURE — 84443 ASSAY THYROID STIM HORMONE: CPT | Mod: HCNC

## 2019-04-18 PROCEDURE — 84439 ASSAY OF FREE THYROXINE: CPT | Mod: HCNC

## 2019-04-18 PROCEDURE — 85025 COMPLETE CBC W/AUTO DIFF WBC: CPT | Mod: HCNC

## 2019-04-21 ENCOUNTER — PATIENT MESSAGE (OUTPATIENT)
Dept: INTERNAL MEDICINE | Facility: CLINIC | Age: 74
End: 2019-04-21

## 2019-04-21 RX ORDER — DAPAGLIFLOZIN 5 MG/1
5 TABLET, FILM COATED ORAL DAILY
Qty: 30 TABLET | Refills: 11 | Status: SHIPPED | OUTPATIENT
Start: 2019-04-21 | End: 2019-07-28

## 2019-04-25 ENCOUNTER — OFFICE VISIT (OUTPATIENT)
Dept: INTERNAL MEDICINE | Facility: CLINIC | Age: 74
End: 2019-04-25
Payer: MEDICARE

## 2019-04-25 ENCOUNTER — HOSPITAL ENCOUNTER (OUTPATIENT)
Dept: RADIOLOGY | Facility: HOSPITAL | Age: 74
Discharge: HOME | End: 2019-04-25
Attending: FAMILY MEDICINE
Payer: MEDICARE

## 2019-04-25 VITALS
HEART RATE: 56 BPM | RESPIRATION RATE: 24 BRPM | BODY MASS INDEX: 28.7 KG/M2 | TEMPERATURE: 99 F | WEIGHT: 230.81 LBS | SYSTOLIC BLOOD PRESSURE: 154 MMHG | DIASTOLIC BLOOD PRESSURE: 64 MMHG | HEIGHT: 75 IN

## 2019-04-25 DIAGNOSIS — B35.1 ONYCHOMYCOSIS OF MULTIPLE TOENAILS WITH TYPE 2 DIABETES MELLITUS AND PERIPHERAL ANGIOPATHY: ICD-10-CM

## 2019-04-25 DIAGNOSIS — M25.562 ACUTE PAIN OF LEFT KNEE: ICD-10-CM

## 2019-04-25 DIAGNOSIS — Z95.1 S/P CABG X 5: ICD-10-CM

## 2019-04-25 DIAGNOSIS — I35.0 NONRHEUMATIC AORTIC VALVE STENOSIS: ICD-10-CM

## 2019-04-25 DIAGNOSIS — E11.69 ONYCHOMYCOSIS OF MULTIPLE TOENAILS WITH TYPE 2 DIABETES MELLITUS AND PERIPHERAL NEUROPATHY: ICD-10-CM

## 2019-04-25 DIAGNOSIS — I10 ESSENTIAL HYPERTENSION: ICD-10-CM

## 2019-04-25 DIAGNOSIS — E11.42 ONYCHOMYCOSIS OF MULTIPLE TOENAILS WITH TYPE 2 DIABETES MELLITUS AND PERIPHERAL NEUROPATHY: ICD-10-CM

## 2019-04-25 DIAGNOSIS — E78.2 MIXED HYPERLIPIDEMIA: ICD-10-CM

## 2019-04-25 DIAGNOSIS — G47.09 OTHER INSOMNIA: ICD-10-CM

## 2019-04-25 DIAGNOSIS — I25.10 CORONARY ARTERY DISEASE INVOLVING NATIVE CORONARY ARTERY OF NATIVE HEART WITHOUT ANGINA PECTORIS: ICD-10-CM

## 2019-04-25 DIAGNOSIS — Z00.00 WELL ADULT EXAM: Primary | ICD-10-CM

## 2019-04-25 DIAGNOSIS — B35.1 ONYCHOMYCOSIS OF MULTIPLE TOENAILS WITH TYPE 2 DIABETES MELLITUS AND PERIPHERAL NEUROPATHY: ICD-10-CM

## 2019-04-25 DIAGNOSIS — E11.51 ONYCHOMYCOSIS OF MULTIPLE TOENAILS WITH TYPE 2 DIABETES MELLITUS AND PERIPHERAL ANGIOPATHY: ICD-10-CM

## 2019-04-25 DIAGNOSIS — F33.0 MILD EPISODE OF RECURRENT MAJOR DEPRESSIVE DISORDER: ICD-10-CM

## 2019-04-25 DIAGNOSIS — I65.21 STENOSIS OF RIGHT CAROTID ARTERY: ICD-10-CM

## 2019-04-25 DIAGNOSIS — I73.9 PAD (PERIPHERAL ARTERY DISEASE): ICD-10-CM

## 2019-04-25 DIAGNOSIS — E11.69 ONYCHOMYCOSIS OF MULTIPLE TOENAILS WITH TYPE 2 DIABETES MELLITUS AND PERIPHERAL ANGIOPATHY: ICD-10-CM

## 2019-04-25 PROCEDURE — 99999 PR PBB SHADOW E&M-EST. PATIENT-LVL V: CPT | Mod: PBBFAC,HCNC,, | Performed by: FAMILY MEDICINE

## 2019-04-25 PROCEDURE — 99499 UNLISTED E&M SERVICE: CPT | Mod: HCNC,S$GLB,, | Performed by: FAMILY MEDICINE

## 2019-04-25 PROCEDURE — 3078F PR MOST RECENT DIASTOLIC BLOOD PRESSURE < 80 MM HG: ICD-10-PCS | Mod: CPTII,S$GLB,, | Performed by: FAMILY MEDICINE

## 2019-04-25 PROCEDURE — 73562 X-RAY EXAM OF KNEE 3: CPT | Mod: 26,HCNC,LT, | Performed by: RADIOLOGY

## 2019-04-25 PROCEDURE — 99999 PR PBB SHADOW E&M-EST. PATIENT-LVL V: ICD-10-PCS | Mod: PBBFAC,HCNC,, | Performed by: FAMILY MEDICINE

## 2019-04-25 PROCEDURE — 99397 PR PREVENTIVE VISIT,EST,65 & OVER: ICD-10-PCS | Mod: S$GLB,,, | Performed by: FAMILY MEDICINE

## 2019-04-25 PROCEDURE — 3078F DIAST BP <80 MM HG: CPT | Mod: CPTII,S$GLB,, | Performed by: FAMILY MEDICINE

## 2019-04-25 PROCEDURE — 99397 PER PM REEVAL EST PAT 65+ YR: CPT | Mod: S$GLB,,, | Performed by: FAMILY MEDICINE

## 2019-04-25 PROCEDURE — 3045F PR MOST RECENT HEMOGLOBIN A1C LEVEL 7.0-9.0%: ICD-10-PCS | Mod: CPTII,S$GLB,, | Performed by: FAMILY MEDICINE

## 2019-04-25 PROCEDURE — 73562 X-RAY EXAM OF KNEE 3: CPT | Mod: TC,HCNC,PO,LT

## 2019-04-25 PROCEDURE — 99499 RISK ADDL DX/OHS AUDIT: ICD-10-PCS | Mod: HCNC,S$GLB,, | Performed by: FAMILY MEDICINE

## 2019-04-25 PROCEDURE — 73562 XR KNEE 3 VIEW LEFT: ICD-10-PCS | Mod: 26,HCNC,LT, | Performed by: RADIOLOGY

## 2019-04-25 PROCEDURE — 3077F PR MOST RECENT SYSTOLIC BLOOD PRESSURE >= 140 MM HG: ICD-10-PCS | Mod: CPTII,S$GLB,, | Performed by: FAMILY MEDICINE

## 2019-04-25 PROCEDURE — 3045F PR MOST RECENT HEMOGLOBIN A1C LEVEL 7.0-9.0%: CPT | Mod: CPTII,S$GLB,, | Performed by: FAMILY MEDICINE

## 2019-04-25 PROCEDURE — 3077F SYST BP >= 140 MM HG: CPT | Mod: CPTII,S$GLB,, | Performed by: FAMILY MEDICINE

## 2019-04-25 RX ORDER — CLOPIDOGREL BISULFATE 75 MG/1
75 TABLET ORAL DAILY
Qty: 90 TABLET | Refills: 3 | Status: SHIPPED | OUTPATIENT
Start: 2019-04-25 | End: 2020-04-15

## 2019-04-25 RX ORDER — FLUOXETINE HYDROCHLORIDE 40 MG/1
40 CAPSULE ORAL DAILY
Qty: 90 CAPSULE | Refills: 3 | Status: SHIPPED | OUTPATIENT
Start: 2019-04-25 | End: 2020-04-24

## 2019-04-25 RX ORDER — LOSARTAN POTASSIUM 100 MG/1
100 TABLET ORAL DAILY
Qty: 90 TABLET | Refills: 3 | Status: SHIPPED | OUTPATIENT
Start: 2019-04-25 | End: 2020-04-15

## 2019-04-25 RX ORDER — ASPIRIN 81 MG/1
81 TABLET ORAL DAILY
COMMUNITY

## 2019-04-25 RX ORDER — METFORMIN HYDROCHLORIDE 1000 MG/1
1000 TABLET ORAL 2 TIMES DAILY
Qty: 180 TABLET | Refills: 3 | Status: SHIPPED | OUTPATIENT
Start: 2019-04-25 | End: 2020-04-15

## 2019-04-25 RX ORDER — METOPROLOL TARTRATE 100 MG/1
50 TABLET ORAL 2 TIMES DAILY
Qty: 90 TABLET | Refills: 3 | Status: SHIPPED | OUTPATIENT
Start: 2019-04-25 | End: 2020-04-15

## 2019-04-25 RX ORDER — TEMAZEPAM 30 MG/1
30 CAPSULE ORAL NIGHTLY PRN
Qty: 30 CAPSULE | Refills: 0 | Status: SHIPPED | OUTPATIENT
Start: 2019-04-25 | End: 2019-06-14 | Stop reason: SDUPTHER

## 2019-04-25 NOTE — PROGRESS NOTES
Subjective:       Patient ID: Cecil Pringle is a 73 y.o. male.    Chief Complaint: Annual Exam    HPI 73-year-old white male presents to clinic today for annual physical exam.  He continues to be followed by Cardiology secondary to coronary artery disease status post CABG in , 5 stent placement in , and repeat CABG in .  He is also followed for peripheral artery disease. He remained stable on aspirin and Plavix.  Hypertension remains well controlled on losartan 100 mg daily, nifedipine 60 mg daily, and metoprolol 50 mg daily.  Hyperlipidemia remains well controlled on Lipitor 40 mg daily and fenofibrate 160 mg daily.  He continues to be treated for type 2 diabetes with control worsening over the past year.  A1c has increased to 8.1.  The patient reports decreased exercise secondary to increased left knee pain. I have recommended left knee x-ray and orthopedic referral for further evaluation of his knee pain.  I have also recommended that he start Farxiga 5 mg daily in addition to his other diabetic medications.  Risks and benefits have been discussed.  Finally, he continues to complain of frequent depression and anxiety despite taking Prozac.  He reports marital difficulties but states that after 40 years of marriage he puts up with it.  I have recommended increasing Prozac to 40 mg daily and have also given him a referral to Psychiatry.  Reports a past surgical history significant for CABG in  and repeat CABG in , 5 coronary stents placed in , right hand surgery, and left foot surgery.  He has a strong family history of heart disease. His mother  of asbestosis.  He is up-to-date with all vaccinations.  Review of Systems   Constitutional: Negative for appetite change, chills, fatigue and fever.   HENT: Positive for ear pain (left ear) and tinnitus (left ear). Negative for congestion, hearing loss, postnasal drip, rhinorrhea, sinus pressure and sore throat.    Eyes: Negative for  redness, itching and visual disturbance.   Respiratory: Negative for cough, chest tightness and shortness of breath.    Cardiovascular: Negative for chest pain and palpitations.   Gastrointestinal: Positive for abdominal pain (reflux in AM). Negative for constipation, diarrhea, nausea and vomiting.   Genitourinary: Positive for frequency and urgency. Negative for decreased urine volume, difficulty urinating, dysuria and hematuria.   Musculoskeletal: Positive for arthralgias (left knee pain). Negative for back pain, myalgias, neck pain and neck stiffness.   Skin: Negative for rash.   Neurological: Negative for dizziness, light-headedness and headaches.   Psychiatric/Behavioral: Negative.        Objective:      Physical Exam   Constitutional: He is oriented to person, place, and time. He appears well-developed and well-nourished. No distress.   HENT:   Head: Normocephalic and atraumatic.   Right Ear: External ear normal.   Left Ear: External ear normal.   Nose: Nose normal.   Mouth/Throat: Oropharynx is clear and moist. No oropharyngeal exudate.   Eyes: Pupils are equal, round, and reactive to light. Conjunctivae and EOM are normal. Right eye exhibits no discharge. Left eye exhibits no discharge. No scleral icterus.   Neck: Normal range of motion. Neck supple. No JVD present. No tracheal deviation present. No thyromegaly present.   Cardiovascular: Normal rate, regular rhythm, normal heart sounds and intact distal pulses. Exam reveals no gallop and no friction rub.   No murmur heard.  Pulses:       Dorsalis pedis pulses are 1+ on the right side, and 1+ on the left side.        Posterior tibial pulses are 1+ on the right side, and 1+ on the left side.   Pulmonary/Chest: Effort normal and breath sounds normal. No stridor. No respiratory distress. He has no wheezes. He has no rales.   Abdominal: Soft. Bowel sounds are normal. He exhibits no distension and no mass. There is no tenderness. There is no rebound and no  guarding.   Musculoskeletal: Normal range of motion. He exhibits no edema or tenderness.        Right foot: There is deformity. There is normal range of motion.        Left foot: There is deformity. There is normal range of motion.   Feet:   Right Foot:   Protective Sensation: 10 sites tested. 1 site sensed.  Skin Integrity: Positive for callus. Negative for ulcer, blister, skin breakdown, erythema, warmth or dry skin.   Left Foot:   Protective Sensation: 10 sites tested. 1 site sensed.  Skin Integrity: Positive for callus. Negative for ulcer, blister, skin breakdown, erythema, warmth or dry skin.   Lymphadenopathy:     He has no cervical adenopathy.   Neurological: He is alert and oriented to person, place, and time.   Skin: Skin is warm and dry. No rash noted. He is not diaphoretic. No erythema. No pallor.   Psychiatric: He has a normal mood and affect. His behavior is normal. Judgment and thought content normal.   Nursing note and vitals reviewed.      Assessment:       1. Well adult exam    2. Uncontrolled type 2 diabetes mellitus with diabetic peripheral angiopathy without gangrene, without long-term current use of insulin    3. Onychomycosis of multiple toenails with type 2 diabetes mellitus and peripheral neuropathy    4. Onychomycosis of multiple toenails with type 2 diabetes mellitus and peripheral angiopathy    5. Type 2 diabetes, uncontrolled, with neuropathy    6. PAD (peripheral artery disease)    7. Coronary artery disease involving native coronary artery of native heart without angina pectoris    8. Nonrheumatic aortic valve stenosis    9. S/P CABG x 5    10. Stenosis of right carotid artery    11. Essential hypertension    12. Other insomnia    13. Mixed hyperlipidemia    14. Mild episode of recurrent major depressive disorder    15. Acute pain of left knee        Plan:       1.  Labs have been reviewed and are overall within normal limits except for an elevated A1c.  2.  Continue metformin 1000 mg  b.i.d. and Januvia 100 mg daily.  Start Farxiga 5 mg daily and repeat CMP and A1c in 3 months.  3.  Refer to Podiatry for further evaluation and treatment of onychomycosis of the bilateral feet with associated diabetes, peripheral artery disease, and peripheral neuropathy.  4.  Continue aspirin, Plavix, losartan, metoprolol, and nifedipine as prescribed and continue follow-up with Cardiology as scheduled.  Hypertension, coronary artery disease, and carotid artery disease remained stable.  5.  Continue use of temazepam as needed.  6.  Continue Lipitor and fenofibrate as prescribed.  Hyperlipidemia is well controlled.  7.  Increase Prozac to 40 mg daily and refer to psychiatry for further evaluation and treatment.  8.  Left knee x-ray and refer to Orthopedics for further evaluation and treatment.  9.  Return to clinic as needed or in 3 months for re-evaluation.

## 2019-04-26 ENCOUNTER — TELEPHONE (OUTPATIENT)
Dept: GASTROENTEROLOGY | Facility: CLINIC | Age: 74
End: 2019-04-26

## 2019-04-29 ENCOUNTER — PATIENT MESSAGE (OUTPATIENT)
Dept: INTERNAL MEDICINE | Facility: CLINIC | Age: 74
End: 2019-04-29

## 2019-04-29 ENCOUNTER — TELEPHONE (OUTPATIENT)
Dept: GASTROENTEROLOGY | Facility: CLINIC | Age: 74
End: 2019-04-29

## 2019-04-29 NOTE — TELEPHONE ENCOUNTER
It is okay for the patient to hold Plavix for 5 days prior to colonoscopy.  He should restart the Plavix as soon as possible post colonoscopy.  Thank you.

## 2019-04-29 NOTE — TELEPHONE ENCOUNTER
Informed patient's wife Dr. Jett approved holding Plavix 5 days before procedure.  Take Plavix until 6/8.  Resume after procedure.

## 2019-04-29 NOTE — TELEPHONE ENCOUNTER
----- Message from Tammie Alvarenga sent at 4/29/2019  8:42 AM CDT -----  Contact: Self/ [phone]  Patient called in returning your call. Please call and advise.

## 2019-04-29 NOTE — LETTER
[place] - Gastroenterology  [address]  [place] [address]  Phone: [phone]        Colonoscopy Bowel Prep Instructions                                                                                                                                                                             Golytely, Nulytely or Colytely   [address]   Barb Stanley   [phone]       Your procedure is scheduled for 06/14/2019 at 700am at  [facility] [address]. Check in at the admit desk, first floor of the hospital. (Building on the left)   To ensure your test is accurate and complete, you MUST follow these instructions. If you have questions, please call our office at [phone]. We are dedicated to providing you with quality care.     The Day Before Your Test :     (Clear Liquid Diet)   The day before your exam you will be on a liquid ONLY diet.   Avoid all Items with Red, Orange, Purple and/or Blue Food Coloring.    NO DAIRY PRODUCTS     You Can have:     Coffee with sugar (NO CREAMER) Chicken Bouillon or Broth (no Noodles, no Veggies)   Tea Beef Bouillon or Broth (no Noodles, no Veggies)   Water Green and/or Yellow Popsicles   Soda Green and/or Yellow Jell-O   Apple Juice, White Grape Juice Lemonade         1. Mix Golytely, Colyte or NuLytely with 1 Gallon of water. REFRIGERATE.        If pack is unflavored, you may add either yellow or green powder drink mix.       2. Beginning at 5:00pm drink one 8oz glass of mixture every 10 minutes until you finish HALF the gallon of mixture. REFRIDGERATE REMAINING MIXTURE.       Drink a liquid from the above list along with the prep to ensure adequate hydration and effective prep. If you become nauseated, stop drinking the mixture for 30 minutes and then start again.          If you have any questions regarding your time please call [phone].   Plan on being here 3-4 hours.                      The day of your  "Colonoscopy:       Five hours before you arrive drink the second half of the prep.   Use the rest of the first portion and follow the same instructions. (Second half of Gallon)   Nothing to eat or drink after completing prep.   Brushing your teeth with "just a sip" of water is allowed.       Your stool should be clear to yellow liquid     You Must Have Someone with You to Drive You Home Since You Will Be Receiving IV Sedation     If you take any heart, blood pressure, or seizure medication routinely- you should take this medication early the morning of your procedure. Hold all other medications until after the procedure. If you use inhalers bring them along with you.     Do Not Take Blood Thinners Such As:   Coumadin, Aggrenox, Plavix, Pradaxa, Reapro, Pletal, Xarelto, Ticagrelor, Brilinta, Eliquis and any other medications not listed, Asprin or Asprin containing medications at least 7 days before your exam.      PLEASE CONFIRM THIS WITH YOUR PRESCRIBING PHYSICIAN.     Rowan Pena Arthritis Pain Formula Arthropan   Ascriptin Aspergum Sabina Buff   James's Pills Ecotrin Empirin Excedrin   Tan Allison Webber     IF YOU ARE DIABETIC: NO INSULIN OR ORAL MEDICATIONS THE MORNING OF THE PROCEDURE. TAKE ONLY HALF A DOSE OF YOUR INSULIN   "

## 2019-04-30 ENCOUNTER — PATIENT MESSAGE (OUTPATIENT)
Dept: INTERNAL MEDICINE | Facility: CLINIC | Age: 74
End: 2019-04-30

## 2019-05-01 ENCOUNTER — OFFICE VISIT (OUTPATIENT)
Dept: PODIATRY | Facility: CLINIC | Age: 74
End: 2019-05-01
Payer: MEDICARE

## 2019-05-01 VITALS
SYSTOLIC BLOOD PRESSURE: 165 MMHG | DIASTOLIC BLOOD PRESSURE: 65 MMHG | HEIGHT: 75 IN | BODY MASS INDEX: 28.6 KG/M2 | HEART RATE: 46 BPM | WEIGHT: 230 LBS

## 2019-05-01 DIAGNOSIS — B35.1 ONYCHOMYCOSIS DUE TO DERMATOPHYTE: ICD-10-CM

## 2019-05-01 DIAGNOSIS — B35.1 ONYCHOMYCOSIS OF MULTIPLE TOENAILS WITH TYPE 2 DIABETES MELLITUS AND PERIPHERAL ANGIOPATHY: Primary | ICD-10-CM

## 2019-05-01 DIAGNOSIS — I73.9 PAD (PERIPHERAL ARTERY DISEASE): ICD-10-CM

## 2019-05-01 DIAGNOSIS — E11.69 ONYCHOMYCOSIS OF MULTIPLE TOENAILS WITH TYPE 2 DIABETES MELLITUS AND PERIPHERAL ANGIOPATHY: Primary | ICD-10-CM

## 2019-05-01 DIAGNOSIS — E11.51 ONYCHOMYCOSIS OF MULTIPLE TOENAILS WITH TYPE 2 DIABETES MELLITUS AND PERIPHERAL ANGIOPATHY: Primary | ICD-10-CM

## 2019-05-01 DIAGNOSIS — L84 CORN OR CALLUS: ICD-10-CM

## 2019-05-01 PROCEDURE — 99213 OFFICE O/P EST LOW 20 MIN: CPT | Mod: 25,HCNC,S$GLB, | Performed by: PODIATRIST

## 2019-05-01 PROCEDURE — 11056 PR TRIM BENIGN HYPERKERATOTIC SKIN LESION,2-4: ICD-10-PCS | Mod: Q9,HCNC,S$GLB, | Performed by: PODIATRIST

## 2019-05-01 PROCEDURE — 99213 PR OFFICE/OUTPT VISIT, EST, LEVL III, 20-29 MIN: ICD-10-PCS | Mod: 25,HCNC,S$GLB, | Performed by: PODIATRIST

## 2019-05-01 PROCEDURE — 11721 PR DEBRIDEMENT OF NAILS, 6 OR MORE: ICD-10-PCS | Mod: 59,Q9,HCNC,S$GLB | Performed by: PODIATRIST

## 2019-05-01 PROCEDURE — 3077F SYST BP >= 140 MM HG: CPT | Mod: HCNC,CPTII,S$GLB, | Performed by: PODIATRIST

## 2019-05-01 PROCEDURE — 3078F PR MOST RECENT DIASTOLIC BLOOD PRESSURE < 80 MM HG: ICD-10-PCS | Mod: HCNC,CPTII,S$GLB, | Performed by: PODIATRIST

## 2019-05-01 PROCEDURE — 99999 PR PBB SHADOW E&M-EST. PATIENT-LVL V: CPT | Mod: PBBFAC,HCNC,, | Performed by: PODIATRIST

## 2019-05-01 PROCEDURE — 3077F PR MOST RECENT SYSTOLIC BLOOD PRESSURE >= 140 MM HG: ICD-10-PCS | Mod: HCNC,CPTII,S$GLB, | Performed by: PODIATRIST

## 2019-05-01 PROCEDURE — 3045F PR MOST RECENT HEMOGLOBIN A1C LEVEL 7.0-9.0%: ICD-10-PCS | Mod: HCNC,CPTII,S$GLB, | Performed by: PODIATRIST

## 2019-05-01 PROCEDURE — 11056 PARNG/CUTG B9 HYPRKR LES 2-4: CPT | Mod: Q9,HCNC,S$GLB, | Performed by: PODIATRIST

## 2019-05-01 PROCEDURE — 1101F PR PT FALLS ASSESS DOC 0-1 FALLS W/OUT INJ PAST YR: ICD-10-PCS | Mod: HCNC,CPTII,S$GLB, | Performed by: PODIATRIST

## 2019-05-01 PROCEDURE — 3045F PR MOST RECENT HEMOGLOBIN A1C LEVEL 7.0-9.0%: CPT | Mod: HCNC,CPTII,S$GLB, | Performed by: PODIATRIST

## 2019-05-01 PROCEDURE — 3078F DIAST BP <80 MM HG: CPT | Mod: HCNC,CPTII,S$GLB, | Performed by: PODIATRIST

## 2019-05-01 PROCEDURE — 11721 DEBRIDE NAIL 6 OR MORE: CPT | Mod: 59,Q9,HCNC,S$GLB | Performed by: PODIATRIST

## 2019-05-01 PROCEDURE — 1101F PT FALLS ASSESS-DOCD LE1/YR: CPT | Mod: HCNC,CPTII,S$GLB, | Performed by: PODIATRIST

## 2019-05-01 PROCEDURE — 99999 PR PBB SHADOW E&M-EST. PATIENT-LVL V: ICD-10-PCS | Mod: PBBFAC,HCNC,, | Performed by: PODIATRIST

## 2019-05-01 NOTE — LETTER
May 2, 2019      Layo Jett MD  [address] [place] [address]           [place] - Podiatry  [address]  [facility] [address]  Phone: [phone]  Fax: [phone]          Patient: Cecil Pringle   MR Number: [id number]   YOB: 1945   Date of Visit: 5/1/2019       Dear Dr. Layo Jett:    Thank you for referring Cecil Pringle to me for evaluation. Attached you will find relevant portions of my assessment and plan of care.    If you have questions, please do not hesitate to call me. I look forward to following Cecil Pringle along with you.    Sincerely,    Gayatri Alamo, GERBER    Enclosure  CC:  No Recipients    If you would like to receive this communication electronically, please contact [email or web address] or [phone] to request more information on Photobucket Link access.    For providers and/or their staff who would like to refer a patient to [facility], please contact us through our one-stop-shop provider referral line, [facility] [place], at [phone].    If you feel you have received this communication in error or would no longer like to receive these types of communications, please e-mail [email or web address]

## 2019-05-01 NOTE — PROGRESS NOTES
Subjective:      Patient ID: Cecil Pringle is a 73 y.o. male.    Chief Complaint: Callouses and Diabetic Foot Exam (Dr. Cole 4/25/19)    Cecil is a 73 y.o. male who presents to the clinic for evaluation and treatment of high risk feet. Cecil has a past medical history of Anxiety, Arthritis, Coronary artery disease, Depression, Diabetes mellitus, type 2, Hyperlipidemia, Hypertension, Insomnia, and PAD (peripheral artery disease). The patient's chief complaint is long, thick toenails. This patient has documented high risk feet requiring routine maintenance secondary to diabetes mellitis and those secondary complications of diabetes, as mentioned..he is also presenting with painful callus. This is first time that pt was evaluated by me.     PCP: Layo Jett MD    Date Last Seen by PCP: in epic     Current shoe gear:  Affected Foot: Casual shoes     Unaffected Foot: Casual shoes    Hemoglobin A1C   Date Value Ref Range Status   04/18/2019 8.1 (H) 4.0 - 5.6 % Final     Comment:     ADA Screening Guidelines:  5.7-6.4%  Consistent with prediabetes  >or=6.5%  Consistent with diabetes  High levels of fetal hemoglobin interfere with the HbA1C  assay. Heterozygous hemoglobin variants (HbS, HgC, etc)do  not significantly interfere with this assay.   However, presence of multiple variants may affect accuracy.     03/01/2018 7.1 (H) 4.0 - 5.6 % Final     Comment:     According to ADA guidelines, hemoglobin A1c <7.0% represents  optimal control in non-pregnant diabetic patients. Different  metrics may apply to specific patient populations.   Standards of Medical Care in Diabetes-2016.  For the purpose of screening for the presence of diabetes:  <5.7%     Consistent with the absence of diabetes  5.7-6.4%  Consistent with increasing risk for diabetes   (prediabetes)  >or=6.5%  Consistent with diabetes  Currently, no consensus exists for use of hemoglobin A1c  for diagnosis of diabetes for children.  This Hemoglobin  A1c assay has significant interference with fetal   hemoglobin   (HbF). The results are invalid for patients with abnormal amounts of   HbF,   including those with known Hereditary Persistence   of Fetal Hemoglobin. Heterozygous hemoglobin variants (HbAS, HbAC,   HbAD, HbAE, HbA2) do not significantly interfere with this assay;   however, presence of multiple variants in a sample may impact the %   interference.     05/05/2017 6.9 (H) 4.5 - 6.2 % Final     Comment:     According to ADA guidelines, hemoglobin A1C <7.0% represents  optimal control in non-pregnant diabetic patients.  Different  metrics may apply to specific populations.   Standards of Medical Care in Diabetes - 2016.  For the purpose of screening for the presence of diabetes:  <5.7%     Consistent with the absence of diabetes  5.7-6.4%  Consistent with increasing risk for diabetes   (prediabetes)  >or=6.5%  Consistent with diabetes  Currently no consensus exists for use of hemoglobin A1C  for diagnosis of diabetes for children.         Review of Systems   Constitution: Negative for decreased appetite, fever and malaise/fatigue.   HENT: Negative for congestion.    Cardiovascular: Negative for chest pain and leg swelling.   Respiratory: Negative for cough and shortness of breath.    Skin: Positive for color change. Negative for nail changes and rash.   Musculoskeletal: Negative for arthritis, joint pain, joint swelling and muscle weakness.   Gastrointestinal: Negative for bloating, abdominal pain, nausea and vomiting.   Neurological: Positive for numbness. Negative for headaches and weakness.   Psychiatric/Behavioral: Negative for altered mental status.             Past Medical History:   Diagnosis Date    Anxiety     Arthritis     Coronary artery disease     Depression     Diabetes mellitus, type 2     Hyperlipidemia     Hypertension     Insomnia     PAD (peripheral artery disease)        Past Surgical History:   Procedure Laterality Date     COLONOSCOPY N/A 2018    [facility]    CORONARY ARTERY BYPASS GRAFT      CORONARY ARTERY BYPASS GRAFT  2014    coronary stents  2013    x5    left foot surgery  1980    Right hand surgery         Family History   Problem Relation Age of Onset    Heart disease Father          at the age of 56    Heart disease Brother     Heart disease Paternal Aunt     Heart disease Paternal Uncle     Cancer Mother         mesothelioma,  at the age of 78    Heart disease Sister     Heart disease Maternal Aunt     Glaucoma Neg Hx     Macular degeneration Neg Hx     Retinal detachment Neg Hx     Strabismus Neg Hx     Amblyopia Neg Hx     Blindness Neg Hx     Cataracts Neg Hx        Social History     Socioeconomic History    Marital status:      Spouse name: Not on file    Number of children: Not on file    Years of education: Not on file    Highest education level: Not on file   Occupational History    Occupation: Retired    Social Needs    Financial resource strain: Not on file    Food insecurity:     Worry: Not on file     Inability: Not on file    Transportation needs:     Medical: Not on file     Non-medical: Not on file   Tobacco Use    Smoking status: Former Smoker     Packs/day: 2.50     Years: 30.00     Pack years: 75.00     Types: Cigarettes    Smokeless tobacco: Never Used   Substance and Sexual Activity    Alcohol use: No    Drug use: No    Sexual activity: Not on file   Lifestyle    Physical activity:     Days per week: Not on file     Minutes per session: Not on file    Stress: Not on file   Relationships    Social connections:     Talks on phone: Not on file     Gets together: Not on file     Attends Gnosticism service: Not on file     Active member of club or organization: Not on file     Attends meetings of clubs or organizations: Not on file     Relationship status: Not on file   Other Topics Concern    Not on file    Social History Narrative    Not on file       Current Outpatient Medications   Medication Sig Dispense Refill    aspirin (ECOTRIN) 81 MG EC tablet Take 81 mg by mouth once daily.      atorvastatin (LIPITOR) 40 MG tablet Take 1 tablet (40 mg total) by mouth once daily. 90 tablet 3    clopidogrel (PLAVIX) 75 mg tablet Take 1 tablet (75 mg total) by mouth once daily. 90 tablet 3    co-enzyme Q-10 30 mg capsule Take 30 mg by mouth once daily.       dapagliflozin (FARXIGA) 5 mg Tab tablet Take 1 tablet (5 mg total) by mouth once daily. 30 tablet 11    fenofibrate 160 MG Tab TAKE ONE TABLET BY MOUTH ONCE DAILY 90 tablet 3    fish oil-omega-3 fatty acids 300-1,000 mg capsule Take 2 g by mouth 3 (three) times daily.       FLUoxetine 40 MG capsule Take 1 capsule (40 mg total) by mouth once daily. 90 capsule 3    hydrOXYzine pamoate (VISTARIL) 25 MG Cap Take 1 capsule (25 mg total) by mouth nightly as needed (allergies). 90 capsule 3    JANUVIA 100 mg Tab TAKE 1 TABLET BY MOUTH ONCE DAILY 90 tablet 0    lancets Misc To check BG 1 times daily, to use with insurance preferred meter 100 each 3    losartan (COZAAR) 100 MG tablet Take 1 tablet (100 mg total) by mouth once daily. 90 tablet 3    metFORMIN (GLUCOPHAGE) 1000 MG tablet Take 1 tablet (1,000 mg total) by mouth 2 (two) times daily. 180 tablet 3    metoprolol tartrate (LOPRESSOR) 100 MG tablet Take 0.5 tablets (50 mg total) by mouth 2 (two) times daily. 90 tablet 3    multivit-iron-min-folic acid (MULTIVITAMIN-IRON-MINERALS-FOLIC ACID) 3,500-18-0.4 unit-mg-mg Chew Take 1 tablet by mouth once daily.       NIFEdipine (PROCARDIA-XL) 60 MG (OSM) 24 hr tablet TAKE ONE TABLET BY MOUTH ONCE DAILY 90 tablet 3    polyethylene glycol (GOLYTELY,NULYTELY) 236-22.74-6.74 -5.86 gram suspension TAKE AS DIRECTED 4000 mL 0    polyethylene glycol (GOLYTELY,NULYTELY) 236-22.74-6.74 -5.86 gram suspension take as instructed 4000 mL 0    SAW PALMETTO ORAL Take 1 capsule by  "mouth 2 (two) times daily.      temazepam (RESTORIL) 30 mg capsule Take 1 capsule (30 mg total) by mouth nightly as needed for Insomnia. 30 capsule 0    TRUE METRIX GLUCOSE TEST STRIP Strp USE 1 STRIP TO CHECK GLUCOSE ONCE DAILY 100 strip 3    blood-glucose meter kit To check BG 1 times daily, to use with insurance preferred meter 1 each 0     No current facility-administered medications for this visit.        Review of patient's allergies indicates:  No Known Allergies    Vitals:    05/01/19 0838   BP: (!) 165/65   Pulse: (!) 46   Weight: 104.3 kg (230 lb)   Height: 6' 3" (1.905 m)   PainSc: 0-No pain       Objective:      Physical Exam   Constitutional: He is oriented to person, place, and time. He appears well-developed and well-nourished. No distress.   Musculoskeletal: He exhibits tenderness. He exhibits no edema or deformity.   Neurological: He is alert and oriented to person, place, and time.   Skin: Skin is warm. Capillary refill takes 2 to 3 seconds. No erythema.   Psychiatric: He has a normal mood and affect. His behavior is normal.       Vascular: Distal DP nonpulses palpable 0/4, PT weakly palpable 1/4. CRT < 3 sec to tips of toes. No vericosities noted to LEs. warm to touch LE, No edema noted to LE.    Dermatologic: No open lesions, lacerations or wounds. Interdigital spaces clean, dry and intact. No erythema, rubor, calor noted LE, Toenails 1-5 bilaterally are elongated by 2-3 mm, thickened by 2-3 mm, discolored/yellowed, dystrophic, brittle with subungual debris. No incurvation. Mild xerosis noted, bilat. No open wounds. +hyperkeratotic lesion at submet 1 b/l    Musculoskeletal: MMT 5/5 in DF/PF/Inv/Ev resistance with no reproduction of pain in any direction. Passive range of motion of ankle and pedal joints is painless. No calf tenderness LE, Compartments soft/compressible. ROM of ankles with < 10 deg DF is noted b/l   B/l foot: high arch foot, palpable mass non mobile along the medial column of " plantar fascia of left foot.     Neurological: Light touch, proprioception, and sharp/dull sensation are all intact. Protective threshold with the Burlington-Wienstein monofilament is intact b/l. Vibratory sensation intact.         Assessment:       Encounter Diagnoses   Name Primary?    Onychomycosis of multiple toenails with type 2 diabetes mellitus and peripheral angiopathy Yes    PAD (peripheral artery disease)     Type 2 diabetes, uncontrolled, with neuropathy     Onychomycosis due to dermatophyte     Corn or callus          Plan:       Cecil was seen today for callouses and diabetic foot exam.    Diagnoses and all orders for this visit:    Onychomycosis of multiple toenails with type 2 diabetes mellitus and peripheral angiopathy    PAD (peripheral artery disease)    Type 2 diabetes, uncontrolled, with neuropathy    Onychomycosis due to dermatophyte    Corn or callus      I counseled the patient on his conditions, their implications and medical management.    74 y/o male with DM2 with diabetic foot risk assessment, painful nails x 10, callus x 2    -nails x 10 and callus x 2 sharply debrided with #15 blade and nail nipper. Pt tolerated well.  -It was discussed the importance of wearing shoes with adequate room in toe box to accommodate toe deformities. Recommended New Balance/Asics shoe brands with adequate arch supports to alleviate abnormal pressure and improve stability of foot while walking. Avoid flat shoes and barefoot walking as these will exacerbate or worsen symptoms.   -Shoe inspection. General Foot Education. Patient reminded of the importance of good nutrition. Patient instructed on proper foot hygeine. We discussed wearing proper shoe gear, daily foot inspections, never walking without protective shoe gear, caution putting sharp instruments to feet. Discussed general foot care:  Wear comfortable, proper fitting shoes. Wash feet daily. Dry well. After drying, apply moisturizer to feet (no lotion  to saman). Inspect feet daily for skin breaks, blisters, swelling, or redness. Wear cotton socks (preferably white)  Change socks every day. Do NOT walk barefoot. Do NOT use heating pads or warm/hot water soaks   -Advised for optimal glucose control and maintenance per primary care physician. Patient was also educated on healthy diet that is naturally rich in nutrients and low in fat and calories.   -The nature of the condition, options for management, as well as potential risks and complications were discussed in detail with patient. Patient was amenable to my recommendations and left my office fully informed and will follow up as instructed or sooner if necessary.    -Patient was advised of signs and symptoms of infection including redness, drainage, purulence, odor, streaking, fever, chills and I advised patient to seek medical attention (ER or urgent care) if these symptoms arise.   -f/u 4 months

## 2019-05-03 ENCOUNTER — PATIENT MESSAGE (OUTPATIENT)
Dept: INTERNAL MEDICINE | Facility: CLINIC | Age: 74
End: 2019-05-03

## 2019-05-05 ENCOUNTER — PATIENT MESSAGE (OUTPATIENT)
Dept: INTERNAL MEDICINE | Facility: CLINIC | Age: 74
End: 2019-05-05

## 2019-05-06 ENCOUNTER — PATIENT MESSAGE (OUTPATIENT)
Dept: INTERNAL MEDICINE | Facility: CLINIC | Age: 74
End: 2019-05-06

## 2019-05-10 ENCOUNTER — OFFICE VISIT (OUTPATIENT)
Dept: ORTHOPEDICS | Facility: CLINIC | Age: 74
End: 2019-05-10
Payer: MEDICARE

## 2019-05-10 ENCOUNTER — TELEPHONE (OUTPATIENT)
Dept: ORTHOPEDICS | Facility: CLINIC | Age: 74
End: 2019-05-10

## 2019-05-10 VITALS — BODY MASS INDEX: 28.6 KG/M2 | HEIGHT: 75 IN | WEIGHT: 230 LBS

## 2019-05-10 DIAGNOSIS — M65.9 SYNOVITIS OF KNEE: Primary | ICD-10-CM

## 2019-05-10 PROCEDURE — 99202 OFFICE O/P NEW SF 15 MIN: CPT | Mod: HCNC,S$GLB,, | Performed by: ORTHOPAEDIC SURGERY

## 2019-05-10 PROCEDURE — 99999 PR PBB SHADOW E&M-EST. PATIENT-LVL III: ICD-10-PCS | Mod: PBBFAC,HCNC,, | Performed by: ORTHOPAEDIC SURGERY

## 2019-05-10 PROCEDURE — 3288F PR FALLS RISK ASSESSMENT DOCUMENTED: ICD-10-PCS | Mod: HCNC,CPTII,S$GLB, | Performed by: ORTHOPAEDIC SURGERY

## 2019-05-10 PROCEDURE — 3288F FALL RISK ASSESSMENT DOCD: CPT | Mod: HCNC,CPTII,S$GLB, | Performed by: ORTHOPAEDIC SURGERY

## 2019-05-10 PROCEDURE — 99999 PR PBB SHADOW E&M-EST. PATIENT-LVL III: CPT | Mod: PBBFAC,HCNC,, | Performed by: ORTHOPAEDIC SURGERY

## 2019-05-10 PROCEDURE — 99202 PR OFFICE/OUTPT VISIT, NEW, LEVL II, 15-29 MIN: ICD-10-PCS | Mod: HCNC,S$GLB,, | Performed by: ORTHOPAEDIC SURGERY

## 2019-05-10 PROCEDURE — 1100F PTFALLS ASSESS-DOCD GE2>/YR: CPT | Mod: HCNC,CPTII,S$GLB, | Performed by: ORTHOPAEDIC SURGERY

## 2019-05-10 PROCEDURE — 1100F PR PT FALLS ASSESS DOC 2+ FALLS/FALL W/INJURY/YR: ICD-10-PCS | Mod: HCNC,CPTII,S$GLB, | Performed by: ORTHOPAEDIC SURGERY

## 2019-05-10 RX ORDER — COLCHICINE 0.6 MG/1
TABLET ORAL
Qty: 30 TABLET | Refills: 2 | Status: SHIPPED | OUTPATIENT
Start: 2019-05-10 | End: 2021-08-05

## 2019-05-10 NOTE — PROGRESS NOTES
Subjective:      Patient ID: Cecil Pringle is a 73 y.o. male.    Chief Complaint: Pain of the Left Knee    HPI     They have experienced problems with their left knee over the past 1 year. The patient denies relevant history of injury/aggravation.  The patient had intermittent episodes of pain that subside after few days without specific intervention. He is not sure if he has much in the way of swelling. Pain is located diffusely Associated symptoms include NA.  Symptoms are aggravated by no particular activity. They have been treated with NA.   Symptoms have recently stayed the same. Ambulation reportedly has not been impaired. Self care ADLs are not painful.     Review of Systems   Constitution: Negative for fever and weight loss.   HENT: Negative for congestion.    Eyes: Negative for visual disturbance.   Cardiovascular: Negative for chest pain.   Respiratory: Negative for shortness of breath.    Hematologic/Lymphatic: Negative for bleeding problem. Does not bruise/bleed easily.   Skin: Negative for poor wound healing.   Gastrointestinal: Negative for abdominal pain.   Genitourinary: Negative for dysuria.   Neurological: Negative for seizures.   Psychiatric/Behavioral: Negative for altered mental status.   Allergic/Immunologic: Negative for persistent infections.         Objective:      Ortho/SPM Exam      Left knee    The patient is not in acute distress.   Body habitus is normal.   Sclera appear normal  No respiratory distress  The patient walks without a limp.  Resisted SLR negative.   The skin over the knee is intact.  Knee effusion 0  Tendernes is located absent.  Range of motion- Flexion full, Extension full.   Ligament exam:   MCL intact   Lachman intact              Post sag intact    LCL intact  Patellar apprehension negative.  Popliteal cyst negative  Patellar crepitation absent.  Flexion/pinch negative.  Pulses DP present, PT present.  Motor normal 5/5 strength in all tested muscle groups.    Sensory normal.    I reviewed the relevant radiographic images for the patient's condition:  Recent left knee films show osteopenia and vascular calcifications with patellar spurring        Assessment:       Encounter Diagnosis   Name Primary?    Synovitis of knee Yes          Based on history the patient is having intermittent bouts of synovitis, most likely due to gout or pseudogout  Plan:       Cecil was seen today for pain.    Diagnoses and all orders for this visit:    Synovitis of knee        I explained my diagnostic impression and the reasoning behind it in detail, using layman's terms.  Models and/or pictures were used to help in the explanation.    Occasional use of colchicine for 3-5 days was recommended to the patient. I discussed the possible side effects.  The patient agrees to this regimen

## 2019-05-10 NOTE — LETTER
May 10, 2019      Layo Jett MD  [address] [place] [address]           [place] [facility]  [address]  [facility] [address]  Phone: [phone]          Patient: Cecil Pringle   MR Number: [id number]   YOB: 1945   Date of Visit: 5/10/2019       Dear Dr. Layo Jett:    Thank you for referring Cecil Pringle to me for evaluation. Attached you will find relevant portions of my assessment and plan of care.    If you have questions, please do not hesitate to call me. I look forward to following Cecil Pringle along with you.    Sincerely,    Neto Reyez MD    Enclosure  CC:  No Recipients    If you would like to receive this communication electronically, please contact [email or web address] or [phone] to request more information on [employer] Link access.    For providers and/or their staff who would like to refer a patient to [facility], please contact us through our one-stop-shop provider referral line, [facility] , at [phone].    If you feel you have received this communication in error or would no longer like to receive these types of communications, please e-mail [email or web address]

## 2019-05-10 NOTE — TELEPHONE ENCOUNTER
Spoke with [facility] in regards to patient meds . Verbalized understanding   ----- Message from Natasha Robertson sent at 5/10/2019  9:18 AM CDT -----  Contact: [facility] [phone]  Calling to get the directions on patients medication colchicine (COLCRYS) 0.6 mg tablet.

## 2019-05-28 ENCOUNTER — TELEPHONE (OUTPATIENT)
Dept: GASTROENTEROLOGY | Facility: CLINIC | Age: 74
End: 2019-05-28

## 2019-06-12 ENCOUNTER — TELEPHONE (OUTPATIENT)
Dept: ENDOSCOPY | Facility: HOSPITAL | Age: 74
End: 2019-06-12

## 2019-06-12 NOTE — TELEPHONE ENCOUNTER
Spoke with patient about arrival time @. 0700    Prep instructions reviewed: the day before the procedure, follow a clear liquid diet all day, then start the first 1/2 of prep at 5pm and take 2nd 1/2 of prep @.  Pt must be completely NPO when prep completed.              Medications: Do not take Insulin or oral diabetic medications the day of the procedure.  Take as prescribed: heart, seizure and blood pressure medication in the morning with a sip of water (less than an ounce).  Take any breathing medications and bring inhalers to hospital with you Leave all valuables and jewelry at home.     Wear comfortable clothes to procedure to change into hospital gown You cannot drive for 24 hours after your procedure because you will receive sedation for your procedure to make you comfortable.  A ride must be provided at discharge.

## 2019-06-14 ENCOUNTER — HOSPITAL ENCOUNTER (OUTPATIENT)
Facility: HOSPITAL | Age: 74
Discharge: HOME | End: 2019-06-14
Attending: INTERNAL MEDICINE | Admitting: INTERNAL MEDICINE
Payer: MEDICARE

## 2019-06-14 ENCOUNTER — ANESTHESIA (OUTPATIENT)
Dept: ENDOSCOPY | Facility: HOSPITAL | Age: 74
End: 2019-06-14
Payer: MEDICARE

## 2019-06-14 ENCOUNTER — ANESTHESIA EVENT (OUTPATIENT)
Dept: ENDOSCOPY | Facility: HOSPITAL | Age: 74
End: 2019-06-14
Payer: MEDICARE

## 2019-06-14 VITALS
WEIGHT: 225 LBS | SYSTOLIC BLOOD PRESSURE: 161 MMHG | TEMPERATURE: 98 F | OXYGEN SATURATION: 98 % | DIASTOLIC BLOOD PRESSURE: 68 MMHG | HEART RATE: 50 BPM | RESPIRATION RATE: 18 BRPM | HEIGHT: 75 IN | BODY MASS INDEX: 27.98 KG/M2

## 2019-06-14 DIAGNOSIS — K63.5 COLON POLYP: ICD-10-CM

## 2019-06-14 DIAGNOSIS — D12.2 ADENOMATOUS POLYP OF ASCENDING COLON: Primary | ICD-10-CM

## 2019-06-14 LAB
GLUCOSE SERPL-MCNC: 170 MG/DL (ref 70–110)
POCT GLUCOSE: 170 MG/DL (ref 70–110)

## 2019-06-14 PROCEDURE — 45380 PR COLONOSCOPY,BIOPSY: ICD-10-PCS | Mod: PT,HCNC,, | Performed by: INTERNAL MEDICINE

## 2019-06-14 PROCEDURE — 63600175 PHARM REV CODE 636 W HCPCS: Mod: HCNC | Performed by: NURSE ANESTHETIST, CERTIFIED REGISTERED

## 2019-06-14 PROCEDURE — 88305 TISSUE SPECIMEN TO PATHOLOGY - SURGERY: ICD-10-PCS | Mod: 26,HCNC,, | Performed by: PATHOLOGY

## 2019-06-14 PROCEDURE — 45380 COLONOSCOPY AND BIOPSY: CPT | Mod: HCNC | Performed by: INTERNAL MEDICINE

## 2019-06-14 PROCEDURE — 37000009 HC ANESTHESIA EA ADD 15 MINS: Mod: HCNC | Performed by: INTERNAL MEDICINE

## 2019-06-14 PROCEDURE — 37000008 HC ANESTHESIA 1ST 15 MINUTES: Mod: HCNC | Performed by: INTERNAL MEDICINE

## 2019-06-14 PROCEDURE — 88305 TISSUE EXAM BY PATHOLOGIST: CPT | Mod: HCNC | Performed by: PATHOLOGY

## 2019-06-14 PROCEDURE — 88305 TISSUE EXAM BY PATHOLOGIST: CPT | Mod: 26,HCNC,, | Performed by: PATHOLOGY

## 2019-06-14 PROCEDURE — 25000003 PHARM REV CODE 250: Mod: HCNC | Performed by: INTERNAL MEDICINE

## 2019-06-14 PROCEDURE — 27201012 HC FORCEPS, HOT/COLD, DISP: Mod: HCNC | Performed by: INTERNAL MEDICINE

## 2019-06-14 PROCEDURE — 45380 COLONOSCOPY AND BIOPSY: CPT | Mod: PT,HCNC,, | Performed by: INTERNAL MEDICINE

## 2019-06-14 RX ORDER — LIDOCAINE HCL/PF 100 MG/5ML
SYRINGE (ML) INTRAVENOUS
Status: DISCONTINUED | OUTPATIENT
Start: 2019-06-14 | End: 2019-06-14

## 2019-06-14 RX ORDER — TEMAZEPAM 30 MG/1
30 CAPSULE ORAL NIGHTLY PRN
Qty: 30 CAPSULE | Refills: 0 | Status: SHIPPED | OUTPATIENT
Start: 2019-06-14 | End: 2019-11-21 | Stop reason: SDUPTHER

## 2019-06-14 RX ORDER — SODIUM CHLORIDE 0.9 % (FLUSH) 0.9 %
10 SYRINGE (ML) INJECTION
Status: DISCONTINUED | OUTPATIENT
Start: 2019-06-14 | End: 2019-06-14 | Stop reason: HOSPADM

## 2019-06-14 RX ORDER — NIFEDIPINE 60 MG/1
60 TABLET, EXTENDED RELEASE ORAL DAILY
Qty: 90 TABLET | Refills: 3 | Status: SHIPPED | OUTPATIENT
Start: 2019-06-14 | End: 2020-06-07

## 2019-06-14 RX ORDER — SODIUM CHLORIDE 9 MG/ML
INJECTION, SOLUTION INTRAVENOUS CONTINUOUS
Status: DISCONTINUED | OUTPATIENT
Start: 2019-06-14 | End: 2019-06-14 | Stop reason: HOSPADM

## 2019-06-14 RX ORDER — PROPOFOL 10 MG/ML
VIAL (ML) INTRAVENOUS CONTINUOUS PRN
Status: DISCONTINUED | OUTPATIENT
Start: 2019-06-14 | End: 2019-06-14

## 2019-06-14 RX ORDER — PROPOFOL 10 MG/ML
VIAL (ML) INTRAVENOUS
Status: DISCONTINUED | OUTPATIENT
Start: 2019-06-14 | End: 2019-06-14

## 2019-06-14 RX ADMIN — LIDOCAINE HYDROCHLORIDE 100 MG: 20 INJECTION, SOLUTION INTRAVENOUS at 08:06

## 2019-06-14 RX ADMIN — SODIUM CHLORIDE: 0.9 INJECTION, SOLUTION INTRAVENOUS at 07:06

## 2019-06-14 RX ADMIN — PROPOFOL 175 MCG/KG/MIN: 10 INJECTION, EMULSION INTRAVENOUS at 08:06

## 2019-06-14 RX ADMIN — PROPOFOL 70 MG: 10 INJECTION, EMULSION INTRAVENOUS at 08:06

## 2019-06-14 NOTE — ANESTHESIA POSTPROCEDURE EVALUATION
Anesthesia Post Evaluation    Patient: Cecil Pringle    Procedure(s) Performed: Procedure(s) (LRB):  COLONOSCOPY/Golytely (N/A)    Final Anesthesia Type: MAC  Patient location during evaluation: GI PACU  Patient participation: Yes- Able to Participate  Level of consciousness: awake and alert and oriented  Post-procedure vital signs: reviewed and stable  Pain management: adequate  Airway patency: patent  PONV status at discharge: No PONV  Anesthetic complications: no      Cardiovascular status: blood pressure returned to baseline  Respiratory status: unassisted  Hydration status: euvolemic  Follow-up not needed.          Vitals Value Taken Time   /83 6/14/2019  7:35 AM   Temp 36.8 °C (98.2 °F) 6/14/2019  7:35 AM   Pulse 48 6/14/2019  7:35 AM   Resp 17 6/14/2019  7:35 AM   SpO2 97 % 6/14/2019  7:35 AM         No case tracking events are documented in the log.      Pain/Randy Score: No data recorded

## 2019-06-14 NOTE — H&P
Short Stay Endoscopy History and Physical    PCP - Layo Jett MD  Referring Physician - Layo Jett MD  [address] [place] [address]    Procedure - colonoscopy   ASA - per anesthesia  Mallampati - per anesthesia  History of Anesthesia problems - no  Family history Anesthesia problems -  no   Plan of anesthesia - General    HPI:  This is a 73 y.o. male here for evaluation of: f/u after piecemeal resection of colon adenoma    Reflux - no  Dysphagia - no  Abdominal pain - no  Diarrhea - no    ROS:  Constitutional: No fevers, chills, No weight loss  CV: No chest pain  Pulm: No cough, No shortness of breath  GI: see HPI    Medical History:  has a past medical history of Anxiety, Arthritis, Coronary artery disease, Depression, Diabetes mellitus, type 2, Hyperlipidemia, Hypertension, Insomnia, and PAD (peripheral artery disease).    Surgical History:  has a past surgical history that includes Coronary artery bypass graft (1990); Coronary artery bypass graft (2014); coronary stents (2013); Right hand surgery (1980); left foot surgery (1980); and Colonoscopy (N/A, 12/12/2018).    Family History: family history includes Cancer in his mother; Heart disease in his brother, father, maternal aunt, paternal aunt, paternal uncle, and sister..    Social History:  reports that he has quit smoking. His smoking use included cigarettes. He has a 75.00 pack-year smoking history. He has never used smokeless tobacco. He reports that he does not drink alcohol or use drugs.    Review of patient's allergies indicates:  No Known Allergies    Medications:   Medications Prior to Admission   Medication Sig Dispense Refill Last Dose    aspirin (ECOTRIN) 81 MG EC tablet Take 81 mg by mouth once daily.   6/13/2019 at Unknown time    fenofibrate 160 MG Tab TAKE ONE TABLET BY MOUTH ONCE DAILY 90 tablet 3 Past Week at Unknown time    fish oil-omega-3 fatty acids 300-1,000 mg capsule Take 2 g by mouth 3 (three) times  daily.    6/13/2019 at Unknown time    FLUoxetine 40 MG capsule Take 1 capsule (40 mg total) by mouth once daily. 90 capsule 3 6/13/2019 at Unknown time    polyethylene glycol (GOLYTELY,NULYTELY) 236-22.74-6.74 -5.86 gram suspension take as instructed 4000 mL 0 6/14/2019 at Unknown time    SAW PALMETTO ORAL Take 1 capsule by mouth 2 (two) times daily.   6/13/2019 at Unknown time    temazepam (RESTORIL) 30 mg capsule Take 1 capsule (30 mg total) by mouth nightly as needed for Insomnia. 30 capsule 0 6/13/2019 at Unknown time    atorvastatin (LIPITOR) 40 MG tablet Take 1 tablet (40 mg total) by mouth once daily. 90 tablet 3 6/12/2019    blood-glucose meter kit To check BG 1 times daily, to use with insurance preferred meter 1 each 0 Taking    clopidogrel (PLAVIX) 75 mg tablet Take 1 tablet (75 mg total) by mouth once daily. 90 tablet 3 6/8/2019    co-enzyme Q-10 30 mg capsule Take 30 mg by mouth once daily.    6/12/2019    colchicine (COLCRYS) 0.6 mg tablet Take 1 tablet for 3-5 days for acute flares of knee pain 30 tablet 2 Unknown at Unknown time    dapagliflozin (FARXIGA) 5 mg Tab tablet Take 1 tablet (5 mg total) by mouth once daily. 30 tablet 11 Taking    hydrOXYzine pamoate (VISTARIL) 25 MG Cap Take 1 capsule (25 mg total) by mouth nightly as needed (allergies). 90 capsule 3 6/12/2019    JANUVIA 100 mg Tab TAKE 1 TABLET BY MOUTH ONCE DAILY 90 tablet 0 6/12/2019    lancets Misc To check BG 1 times daily, to use with insurance preferred meter 100 each 3 Taking    losartan (COZAAR) 100 MG tablet Take 1 tablet (100 mg total) by mouth once daily. 90 tablet 3 6/12/2019    metFORMIN (GLUCOPHAGE) 1000 MG tablet Take 1 tablet (1,000 mg total) by mouth 2 (two) times daily. 180 tablet 3 6/12/2019    metoprolol tartrate (LOPRESSOR) 100 MG tablet Take 0.5 tablets (50 mg total) by mouth 2 (two) times daily. 90 tablet 3 6/12/2019    multivit-iron-min-folic acid (MULTIVITAMIN-IRON-MINERALS-FOLIC ACID)  3,500-18-0.4 unit-mg-mg Chew Take 1 tablet by mouth once daily.    6/12/2019    NIFEdipine (PROCARDIA-XL) 60 MG (OSM) 24 hr tablet TAKE ONE TABLET BY MOUTH ONCE DAILY 90 tablet 3 6/12/2019    polyethylene glycol (GOLYTELY,NULYTELY) 236-22.74-6.74 -5.86 gram suspension TAKE AS DIRECTED 4000 mL 0 Taking    TRUE METRIX GLUCOSE TEST STRIP Strp USE 1 STRIP TO CHECK GLUCOSE ONCE DAILY 100 strip 3 Taking       Physical Exam:    Vital Signs:   Vitals:    06/14/19 0735   BP: (!) 181/83   Pulse: (!) 48   Resp: 17   Temp: 98.2 °F (36.8 °C)       General Appearance: Well appearing in no acute distress  Eyes:    No scleral icterus  ENT: Neck supple  Lungs: CTA anteriorly  Heart:  Regular  Abdomen: Soft, non tender, non distended with normal bowel sounds.    Labs:  Lab Results   Component Value Date    WBC 8.67 04/18/2019    HGB 13.7 (L) 04/18/2019    HCT 42.7 04/18/2019     04/18/2019    CHOL 148 04/18/2019    TRIG 314 (H) 04/18/2019    HDL 32 (L) 04/18/2019    ALT 26 04/18/2019    AST 19 04/18/2019     (L) 04/18/2019    K 4.4 04/18/2019     04/18/2019    CREATININE 1.2 04/18/2019    CREATININE 1.2 04/18/2019    BUN 21 04/18/2019    CO2 24 04/18/2019    TSH 1.069 04/18/2019    PSA 0.48 04/18/2019    HGBA1C 8.1 (H) 04/18/2019       I have explained the risks and benefits of this endoscopic procedure to the patient including but not limited to bleeding, inflammation, infection, perforation, and death.      Joon Devi MD

## 2019-06-14 NOTE — TRANSFER OF CARE
"Anesthesia Transfer of Care Note    Patient: Cecil Pringle    Procedure(s) Performed: Procedure(s) (LRB):  COLONOSCOPY/Golytely (N/A)    Patient location: GI    Anesthesia Type: MAC    Transport from OR: Transported from OR on room air with adequate spontaneous ventilation    Post pain: adequate analgesia    Post assessment: no apparent anesthetic complications and tolerated procedure well    Post vital signs: stable    Level of consciousness: awake, alert and oriented    Nausea/Vomiting: no nausea/vomiting    Complications: none    Transfer of care protocol was followed      Last vitals:   Visit Vitals  BP (!) 181/83   Pulse (!) 48   Temp 36.8 °C (98.2 °F)   Resp 17   Ht 6' 3" (1.905 m)   Wt 102.1 kg (225 lb)   SpO2 97%   BMI 28.12 kg/m²     "

## 2019-06-14 NOTE — ANESTHESIA PREPROCEDURE EVALUATION
06/14/2019  Cecil Pringle is a 73 y.o., male for colonoscopy under MAC    Past Medical History:   Diagnosis Date    Anxiety     Arthritis     Coronary artery disease     Depression     Diabetes mellitus, type 2     Hyperlipidemia     Hypertension     Insomnia     PAD (peripheral artery disease)      Past Surgical History:   Procedure Laterality Date    COLONOSCOPY N/A 12/12/2018    Performed by [facility]ab[facility] Pollock [facility] at [facility]    CORONARY ARTERY BYPASS GRAFT  1990    CORONARY ARTERY BYPASS GRAFT  2014    coronary stents  2013    x5    left foot surgery  1980    Right hand surgery  1980         Anesthesia Evaluation    I have reviewed the Patient Summary Reports.    I have reviewed the Nursing Notes.   I have reviewed the Medications.     Review of Systems  Social:  Former Smoker        Physical Exam  General:  Well nourished    Airway/Jaw/Neck:  Airway Findings: Mallampati: II      Chest/Lungs:  Chest/Lungs Clear    Heart/Vascular:  Heart Findings: Normal          Lab Results   Component Value Date    WBC 8.67 04/18/2019    HGB 13.7 (L) 04/18/2019    HCT 42.7 04/18/2019     04/18/2019    CHOL 148 04/18/2019    TRIG 314 (H) 04/18/2019    HDL 32 (L) 04/18/2019    ALT 26 04/18/2019    AST 19 04/18/2019     (L) 04/18/2019    K 4.4 04/18/2019     04/18/2019    CREATININE 1.2 04/18/2019    CREATININE 1.2 04/18/2019    BUN 21 04/18/2019    CO2 24 04/18/2019    TSH 1.069 04/18/2019    PSA 0.48 04/18/2019    HGBA1C 8.1 (H) 04/18/2019       CONCLUSIONS     1 - Normal left ventricular systolic function (EF 60-65%).     2 - Impaired LV relaxation, elevated LAP (grade 2 diastolic dysfunction).     3 - Mild left atrial enlargement.     4 - Normal right ventricular systolic function .     5 - The estimated PA systolic pressure is greater than 31 mmHg.     6 - Mild aortic stenosis,  KOBY = 1.77 cm2, AVAi = 0.76 cm2/m2, peak velocity = 2.55 m/s, mean gradient = 15 mmHg.     7 - Mild tricuspid regurgitation.             This document has been electronically    SIGNED BY: Lucy Boyer MD On: 03/01/2018 10:08    Anesthesia Plan  Type of Anesthesia, risks & benefits discussed:  Anesthesia Type:  MAC  Patient's Preference:   Intra-op Monitoring Plan:   Intra-op Monitoring Plan Comments:   Post Op Pain Control Plan:   Post Op Pain Control Plan Comments:   Induction:    Beta Blocker:  Patient is on a Beta-Blocker and has received one dose within the past 24 hours (No further documentation required).       Informed Consent: Patient understands risks and agrees with Anesthesia plan.  Questions answered. Anesthesia consent signed with patient.  ASA Score: 3     Day of Surgery Review of History & Physical:            Ready For Surgery From Anesthesia Perspective.

## 2019-06-14 NOTE — [employer] PATIENT INSTRUCTIONS
Discharge Summary/Instructions after an Endoscopic Procedure  Patient Name: Cecil Pringle  Patient MRN: [id number]  Patient YOB: 1945 Friday, June 14, 2019  Joon Devi MD  RESTRICTIONS:  During your procedure today, you received medications for sedation.  These   medications may affect your judgment, balance and coordination.  Therefore,   for 24 hours, you have the following restrictions:   - DO NOT drive a car, operate machinery, make legal/financial decisions,   sign important papers or drink alcohol.    ACTIVITY:  Today: no heavy lifting, straining or running due to procedural   sedation/anesthesia.  The following day: return to full activity including work.  DIET:  Eat and drink normally unless instructed otherwise.     TREATMENT FOR COMMON SIDE EFFECTS:  - Mild abdominal pain, nausea, belching, bloating or excessive gas:  rest,   eat lightly and use a heating pad.  - Sore Throat: treat with throat lozenges and/or gargle with warm salt   water.  - Because air was used during the procedure, expelling large amounts of air   from your rectum or belching is normal.  - If a bowel prep was taken, you may not have a bowel movement for 1-3 days.    This is normal.  SYMPTOMS TO WATCH FOR AND REPORT TO YOUR PHYSICIAN:  1. Abdominal pain or bloating, other than gas cramps.  2. Chest pain.  3. Back pain.  4. Signs of infection such as: chills or fever occurring within 24 hours   after the procedure.  5. Rectal bleeding, which would show as bright red, maroon, or black stools.   (A tablespoon of blood from the rectum is not serious, especially if   hemorrhoids are present.)  6. Vomiting.  7. Weakness or dizziness.  GO DIRECTLY TO THE NEAREST EMERGENCY ROOM IF YOU HAVE ANY OF THE FOLLOWING:      Difficulty breathing              Chills and/or fever over 101 F   Persistent vomiting and/or vomiting blood   Severe abdominal pain   Severe chest pain   Black, tarry stools   Bleeding- more than one  tablespoon   Any other symptom or condition that you feel may need urgent attention  Your doctor recommends these additional instructions:  If any biopsies were taken, your doctors clinic will contact you in 1 to 2   weeks with any results.  - Discharge patient to home (ambulatory).   - Patient has a contact number available for emergencies.  The signs and   symptoms of potential delayed complications were discussed with the   patient.  Return to normal activities tomorrow.  Written discharge   instructions were provided to the patient.   - Resume previous diet.   - Continue present medications.   - Repeat colonoscopy in 3 years for surveillance.   - Return to primary care physician as previously scheduled.  For questions, problems or results please call your physician - Joon Devi MD at Work:  [phone].  EMERGENCY PHONE NUMBER: [phone],  LAB RESULTS: [phone]  IF A COMPLICATION OR EMERGENCY SITUATION ARISES AND YOU ARE UNABLE TO REACH   YOUR PHYSICIAN - GO DIRECTLY TO THE EMERGENCY ROOM.  Joon Devi MD  6/14/2019 8:55:59 AM  This report has been verified and signed electronically.  [employer]

## 2019-06-17 RX ORDER — FENOFIBRATE 160 MG/1
160 TABLET ORAL DAILY
Qty: 90 TABLET | Refills: 3 | Status: SHIPPED | OUTPATIENT
Start: 2019-06-17 | End: 2020-06-13 | Stop reason: SDUPTHER

## 2019-06-20 ENCOUNTER — TELEPHONE (OUTPATIENT)
Dept: INTERNAL MEDICINE | Facility: CLINIC | Age: 74
End: 2019-06-20

## 2019-06-20 ENCOUNTER — PES CALL (OUTPATIENT)
Dept: ADMINISTRATIVE | Facility: CLINIC | Age: 74
End: 2019-06-20

## 2019-06-20 NOTE — TELEPHONE ENCOUNTER
----- Message from Alexus Willett sent at 6/20/2019  1:50 PM CDT -----  Contact: [facility]  Prescription Request:     Name of medication: temazepam (RESTORIL) 30 mg capsule    Reason for request: New     Pharmacy: [facility]/pharmacy #[id number] - [place] [address]    Please advise.    Thank You

## 2019-06-20 NOTE — TELEPHONE ENCOUNTER
Spoke with [facility]: RX was sent over to them on 6-14-19. She stated that she does not have it.   I gave her a verbal for the temazepam 30 mg #30 no additional refills.

## 2019-07-23 DIAGNOSIS — E66.9 DIABETES MELLITUS TYPE 2 IN OBESE: ICD-10-CM

## 2019-07-23 DIAGNOSIS — E11.69 DIABETES MELLITUS TYPE 2 IN OBESE: ICD-10-CM

## 2019-07-23 RX ORDER — SITAGLIPTIN 100 MG/1
TABLET, FILM COATED ORAL
Qty: 90 TABLET | Refills: 0 | Status: SHIPPED | OUTPATIENT
Start: 2019-07-23 | End: 2019-10-22 | Stop reason: SDUPTHER

## 2019-07-25 ENCOUNTER — PATIENT MESSAGE (OUTPATIENT)
Dept: INTERNAL MEDICINE | Facility: CLINIC | Age: 74
End: 2019-07-25

## 2019-07-26 ENCOUNTER — LAB VISIT (OUTPATIENT)
Dept: LAB | Facility: HOSPITAL | Age: 74
End: 2019-07-26
Attending: FAMILY MEDICINE
Payer: MEDICARE

## 2019-07-26 LAB
ALBUMIN SERPL BCP-MCNC: 4 G/DL (ref 3.5–5.2)
ALP SERPL-CCNC: 51 U/L (ref 55–135)
ALT SERPL W/O P-5'-P-CCNC: 20 U/L (ref 10–44)
ANION GAP SERPL CALC-SCNC: 10 MMOL/L (ref 8–16)
AST SERPL-CCNC: 16 U/L (ref 10–40)
BILIRUB SERPL-MCNC: 0.4 MG/DL (ref 0.1–1)
BUN SERPL-MCNC: 18 MG/DL (ref 8–23)
CALCIUM SERPL-MCNC: 9.8 MG/DL (ref 8.7–10.5)
CHLORIDE SERPL-SCNC: 107 MMOL/L (ref 95–110)
CO2 SERPL-SCNC: 24 MMOL/L (ref 23–29)
CREAT SERPL-MCNC: 1.1 MG/DL (ref 0.5–1.4)
EST. GFR  (AFRICAN AMERICAN): >60 ML/MIN/1.73 M^2
EST. GFR  (NON AFRICAN AMERICAN): >60 ML/MIN/1.73 M^2
ESTIMATED AVG GLUCOSE: 177 MG/DL (ref 68–131)
GLUCOSE SERPL-MCNC: 156 MG/DL (ref 70–110)
HBA1C MFR BLD HPLC: 7.8 % (ref 4–5.6)
POTASSIUM SERPL-SCNC: 4.9 MMOL/L (ref 3.5–5.1)
PROT SERPL-MCNC: 7.2 G/DL (ref 6–8.4)
SODIUM SERPL-SCNC: 141 MMOL/L (ref 136–145)

## 2019-07-26 PROCEDURE — 36415 COLL VENOUS BLD VENIPUNCTURE: CPT | Mod: HCNC,PO

## 2019-07-26 PROCEDURE — 83036 HEMOGLOBIN GLYCOSYLATED A1C: CPT | Mod: HCNC

## 2019-07-26 PROCEDURE — 80053 COMPREHEN METABOLIC PANEL: CPT | Mod: HCNC

## 2019-07-28 ENCOUNTER — PATIENT MESSAGE (OUTPATIENT)
Dept: INTERNAL MEDICINE | Facility: CLINIC | Age: 74
End: 2019-07-28

## 2019-07-28 RX ORDER — DAPAGLIFLOZIN 10 MG/1
10 TABLET, FILM COATED ORAL DAILY
Qty: 30 TABLET | Refills: 11 | Status: SHIPPED | OUTPATIENT
Start: 2019-07-28 | End: 2020-06-13 | Stop reason: SDUPTHER

## 2019-07-31 ENCOUNTER — OFFICE VISIT (OUTPATIENT)
Dept: INTERNAL MEDICINE | Facility: CLINIC | Age: 74
End: 2019-07-31
Payer: MEDICARE

## 2019-07-31 VITALS
RESPIRATION RATE: 15 BRPM | WEIGHT: 228.19 LBS | SYSTOLIC BLOOD PRESSURE: 122 MMHG | OXYGEN SATURATION: 97 % | HEART RATE: 52 BPM | BODY MASS INDEX: 28.52 KG/M2 | DIASTOLIC BLOOD PRESSURE: 66 MMHG

## 2019-07-31 DIAGNOSIS — B35.1 ONYCHOMYCOSIS OF MULTIPLE TOENAILS WITH TYPE 2 DIABETES MELLITUS: ICD-10-CM

## 2019-07-31 DIAGNOSIS — E11.69 ONYCHOMYCOSIS OF MULTIPLE TOENAILS WITH TYPE 2 DIABETES MELLITUS: ICD-10-CM

## 2019-07-31 PROCEDURE — 99999 PR PBB SHADOW E&M-EST. PATIENT-LVL III: ICD-10-PCS | Mod: PBBFAC,HCNC,, | Performed by: FAMILY MEDICINE

## 2019-07-31 PROCEDURE — 1100F PR PT FALLS ASSESS DOC 2+ FALLS/FALL W/INJURY/YR: ICD-10-PCS | Mod: HCNC,CPTII,S$GLB, | Performed by: FAMILY MEDICINE

## 2019-07-31 PROCEDURE — 99499 UNLISTED E&M SERVICE: CPT | Mod: HCNC,S$GLB,, | Performed by: FAMILY MEDICINE

## 2019-07-31 PROCEDURE — 3078F DIAST BP <80 MM HG: CPT | Mod: HCNC,CPTII,S$GLB, | Performed by: FAMILY MEDICINE

## 2019-07-31 PROCEDURE — 3078F PR MOST RECENT DIASTOLIC BLOOD PRESSURE < 80 MM HG: ICD-10-PCS | Mod: HCNC,CPTII,S$GLB, | Performed by: FAMILY MEDICINE

## 2019-07-31 PROCEDURE — 99213 PR OFFICE/OUTPT VISIT, EST, LEVL III, 20-29 MIN: ICD-10-PCS | Mod: HCNC,S$GLB,, | Performed by: FAMILY MEDICINE

## 2019-07-31 PROCEDURE — 99999 PR PBB SHADOW E&M-EST. PATIENT-LVL III: CPT | Mod: PBBFAC,HCNC,, | Performed by: FAMILY MEDICINE

## 2019-07-31 PROCEDURE — 99213 OFFICE O/P EST LOW 20 MIN: CPT | Mod: HCNC,S$GLB,, | Performed by: FAMILY MEDICINE

## 2019-07-31 PROCEDURE — 3288F FALL RISK ASSESSMENT DOCD: CPT | Mod: HCNC,CPTII,S$GLB, | Performed by: FAMILY MEDICINE

## 2019-07-31 PROCEDURE — 3074F SYST BP LT 130 MM HG: CPT | Mod: HCNC,CPTII,S$GLB, | Performed by: FAMILY MEDICINE

## 2019-07-31 PROCEDURE — 3045F PR MOST RECENT HEMOGLOBIN A1C LEVEL 7.0-9.0%: CPT | Mod: HCNC,CPTII,S$GLB, | Performed by: FAMILY MEDICINE

## 2019-07-31 PROCEDURE — 3045F PR MOST RECENT HEMOGLOBIN A1C LEVEL 7.0-9.0%: ICD-10-PCS | Mod: HCNC,CPTII,S$GLB, | Performed by: FAMILY MEDICINE

## 2019-07-31 PROCEDURE — 1100F PTFALLS ASSESS-DOCD GE2>/YR: CPT | Mod: HCNC,CPTII,S$GLB, | Performed by: FAMILY MEDICINE

## 2019-07-31 PROCEDURE — 3074F PR MOST RECENT SYSTOLIC BLOOD PRESSURE < 130 MM HG: ICD-10-PCS | Mod: HCNC,CPTII,S$GLB, | Performed by: FAMILY MEDICINE

## 2019-07-31 PROCEDURE — 99499 RISK ADDL DX/OHS AUDIT: ICD-10-PCS | Mod: HCNC,S$GLB,, | Performed by: FAMILY MEDICINE

## 2019-07-31 PROCEDURE — 3288F PR FALLS RISK ASSESSMENT DOCUMENTED: ICD-10-PCS | Mod: HCNC,CPTII,S$GLB, | Performed by: FAMILY MEDICINE

## 2019-07-31 NOTE — PROGRESS NOTES
Subjective:       Patient ID: Cecil Pringle is a 73 y.o. male.    Chief Complaint: Follow-up    HPI 73-year-old white male presents to clinic today for follow-up uncontrolled type 2 diabetes.  He has been treated with metformin 1000 mg b.i.d. and Januvia 100 mg daily.  At physical exam in April A1c was noted to be elevated at 8.1; therefore, Farxiga 5 mg daily was added.  A1c has improved to 7.8 but is still elevated.  The patient reports that he is attempting to exercise but notes decreased exercise capacity secondary to increased left knee pain.  He feels that this decreased exercise is playing a role in his higher glucose levels.  Review of Systems   Constitutional: Positive for activity change. Negative for appetite change, chills, fatigue, fever and unexpected weight change.   HENT: Positive for hearing loss. Negative for congestion, ear pain, postnasal drip, rhinorrhea, sinus pressure, sore throat, tinnitus and trouble swallowing.    Eyes: Negative for discharge, redness, itching and visual disturbance.   Respiratory: Negative for cough, chest tightness, shortness of breath and wheezing.    Cardiovascular: Negative for chest pain and palpitations.   Gastrointestinal: Negative for abdominal pain, blood in stool, constipation, diarrhea, nausea and vomiting.   Endocrine: Positive for polydipsia and polyuria.   Genitourinary: Positive for urgency. Negative for decreased urine volume, difficulty urinating, dysuria, frequency and hematuria.   Musculoskeletal: Negative for arthralgias, back pain, joint swelling, myalgias, neck pain and neck stiffness.   Skin: Negative for rash.   Neurological: Negative for dizziness, weakness, light-headedness and headaches.   Psychiatric/Behavioral: Positive for dysphoric mood. Negative for confusion.       Objective:      Physical Exam   Constitutional: He is oriented to person, place, and time. He appears well-developed and well-nourished. No distress.   HENT:   Head:  Normocephalic and atraumatic.   Right Ear: External ear normal.   Left Ear: External ear normal.   Nose: Nose normal.   Mouth/Throat: Oropharynx is clear and moist. No oropharyngeal exudate.   Eyes: Pupils are equal, round, and reactive to light. Conjunctivae and EOM are normal. Right eye exhibits no discharge. Left eye exhibits no discharge. No scleral icterus.   Neck: Normal range of motion. Neck supple. No JVD present. No tracheal deviation present. No thyromegaly present.   Cardiovascular: Normal rate, regular rhythm, normal heart sounds and intact distal pulses. Exam reveals no gallop and no friction rub.   No murmur heard.  Pulmonary/Chest: Effort normal and breath sounds normal. No stridor. No respiratory distress. He has no wheezes. He has no rales.   Abdominal: Soft. Bowel sounds are normal. He exhibits no distension and no mass. There is no tenderness. There is no rebound and no guarding.   Musculoskeletal: Normal range of motion. He exhibits no edema or tenderness.   Lymphadenopathy:     He has no cervical adenopathy.   Neurological: He is alert and oriented to person, place, and time.   Skin: Skin is warm and dry. No rash noted. He is not diaphoretic. No erythema. No pallor.   Psychiatric: He has a normal mood and affect. His behavior is normal. Judgment and thought content normal.   Nursing note and vitals reviewed.      Assessment:       1. Uncontrolled type 2 diabetes mellitus with diabetic peripheral angiopathy without gangrene, without long-term current use of insulin    2. Type 2 diabetes, uncontrolled, with neuropathy    3. Onychomycosis of multiple toenails with type 2 diabetes mellitus        Plan:       1.  Continue metformin 1000 mg b.i.d. and Januvia 100 mg daily.  Increase Farxiga to 10 mg daily.  Repeat CMP and A1c in 3 months.  2.  Encourage strict diabetic diet.  3.  Return to clinic as needed or in 3 months for follow-up.

## 2019-10-22 DIAGNOSIS — E11.69 DIABETES MELLITUS TYPE 2 IN OBESE: ICD-10-CM

## 2019-10-22 DIAGNOSIS — E66.9 DIABETES MELLITUS TYPE 2 IN OBESE: ICD-10-CM

## 2019-10-22 RX ORDER — SITAGLIPTIN 100 MG/1
TABLET, FILM COATED ORAL
Qty: 90 TABLET | Refills: 3 | Status: SHIPPED | OUTPATIENT
Start: 2019-10-22 | End: 2020-10-15

## 2019-10-31 ENCOUNTER — LAB VISIT (OUTPATIENT)
Dept: LAB | Facility: HOSPITAL | Age: 74
End: 2019-10-31
Attending: FAMILY MEDICINE
Payer: MEDICARE

## 2019-10-31 LAB
ALBUMIN SERPL BCP-MCNC: 3.6 G/DL (ref 3.5–5.2)
ALP SERPL-CCNC: 52 U/L (ref 55–135)
ALT SERPL W/O P-5'-P-CCNC: 19 U/L (ref 10–44)
ANION GAP SERPL CALC-SCNC: 6 MMOL/L (ref 8–16)
AST SERPL-CCNC: 18 U/L (ref 10–40)
BILIRUB SERPL-MCNC: 0.3 MG/DL (ref 0.1–1)
BUN SERPL-MCNC: 16 MG/DL (ref 8–23)
CALCIUM SERPL-MCNC: 9.3 MG/DL (ref 8.7–10.5)
CHLORIDE SERPL-SCNC: 107 MMOL/L (ref 95–110)
CO2 SERPL-SCNC: 26 MMOL/L (ref 23–29)
CREAT SERPL-MCNC: 1.2 MG/DL (ref 0.5–1.4)
EST. GFR  (AFRICAN AMERICAN): >60 ML/MIN/1.73 M^2
EST. GFR  (NON AFRICAN AMERICAN): >60 ML/MIN/1.73 M^2
ESTIMATED AVG GLUCOSE: 186 MG/DL (ref 68–131)
GLUCOSE SERPL-MCNC: 216 MG/DL (ref 70–110)
HBA1C MFR BLD HPLC: 8.1 % (ref 4–5.6)
POTASSIUM SERPL-SCNC: 5 MMOL/L (ref 3.5–5.1)
PROT SERPL-MCNC: 6.7 G/DL (ref 6–8.4)
SODIUM SERPL-SCNC: 139 MMOL/L (ref 136–145)

## 2019-10-31 PROCEDURE — 80053 COMPREHEN METABOLIC PANEL: CPT | Mod: HCNC

## 2019-10-31 PROCEDURE — 36415 COLL VENOUS BLD VENIPUNCTURE: CPT | Mod: HCNC,PO

## 2019-10-31 PROCEDURE — 83036 HEMOGLOBIN GLYCOSYLATED A1C: CPT | Mod: HCNC

## 2019-11-03 ENCOUNTER — PATIENT MESSAGE (OUTPATIENT)
Dept: INTERNAL MEDICINE | Facility: CLINIC | Age: 74
End: 2019-11-03

## 2019-11-03 RX ORDER — GLIMEPIRIDE 2 MG/1
2 TABLET ORAL
Qty: 30 TABLET | Refills: 11 | Status: SHIPPED | OUTPATIENT
Start: 2019-11-03 | End: 2020-02-05

## 2019-11-04 ENCOUNTER — OFFICE VISIT (OUTPATIENT)
Dept: INTERNAL MEDICINE | Facility: CLINIC | Age: 74
End: 2019-11-04
Payer: MEDICARE

## 2019-11-04 VITALS
WEIGHT: 220.88 LBS | HEART RATE: 50 BPM | RESPIRATION RATE: 15 BRPM | DIASTOLIC BLOOD PRESSURE: 58 MMHG | OXYGEN SATURATION: 99 % | TEMPERATURE: 99 F | BODY MASS INDEX: 27.61 KG/M2 | SYSTOLIC BLOOD PRESSURE: 160 MMHG

## 2019-11-04 DIAGNOSIS — I10 ESSENTIAL HYPERTENSION: ICD-10-CM

## 2019-11-04 PROCEDURE — 3078F DIAST BP <80 MM HG: CPT | Mod: HCNC,CPTII,S$GLB, | Performed by: FAMILY MEDICINE

## 2019-11-04 PROCEDURE — 99214 OFFICE O/P EST MOD 30 MIN: CPT | Mod: HCNC,S$GLB,, | Performed by: FAMILY MEDICINE

## 2019-11-04 PROCEDURE — 1100F PR PT FALLS ASSESS DOC 2+ FALLS/FALL W/INJURY/YR: ICD-10-PCS | Mod: HCNC,CPTII,S$GLB, | Performed by: FAMILY MEDICINE

## 2019-11-04 PROCEDURE — 3077F PR MOST RECENT SYSTOLIC BLOOD PRESSURE >= 140 MM HG: ICD-10-PCS | Mod: HCNC,CPTII,S$GLB, | Performed by: FAMILY MEDICINE

## 2019-11-04 PROCEDURE — 3077F SYST BP >= 140 MM HG: CPT | Mod: HCNC,CPTII,S$GLB, | Performed by: FAMILY MEDICINE

## 2019-11-04 PROCEDURE — 3288F FALL RISK ASSESSMENT DOCD: CPT | Mod: HCNC,CPTII,S$GLB, | Performed by: FAMILY MEDICINE

## 2019-11-04 PROCEDURE — 99214 PR OFFICE/OUTPT VISIT, EST, LEVL IV, 30-39 MIN: ICD-10-PCS | Mod: HCNC,S$GLB,, | Performed by: FAMILY MEDICINE

## 2019-11-04 PROCEDURE — 1100F PTFALLS ASSESS-DOCD GE2>/YR: CPT | Mod: HCNC,CPTII,S$GLB, | Performed by: FAMILY MEDICINE

## 2019-11-04 PROCEDURE — 99999 PR PBB SHADOW E&M-EST. PATIENT-LVL III: CPT | Mod: PBBFAC,HCNC,, | Performed by: FAMILY MEDICINE

## 2019-11-04 PROCEDURE — 99999 PR PBB SHADOW E&M-EST. PATIENT-LVL III: ICD-10-PCS | Mod: PBBFAC,HCNC,, | Performed by: FAMILY MEDICINE

## 2019-11-04 PROCEDURE — 3288F PR FALLS RISK ASSESSMENT DOCUMENTED: ICD-10-PCS | Mod: HCNC,CPTII,S$GLB, | Performed by: FAMILY MEDICINE

## 2019-11-04 PROCEDURE — 3078F PR MOST RECENT DIASTOLIC BLOOD PRESSURE < 80 MM HG: ICD-10-PCS | Mod: HCNC,CPTII,S$GLB, | Performed by: FAMILY MEDICINE

## 2019-11-04 NOTE — PROGRESS NOTES
Subjective:       Patient ID: Cecil Pringle is a 73 y.o. male.    Chief Complaint: Follow-up and Diabetes    HPI 73-year-old white male with uncontrolled type 2 diabetes, coronary artery disease, and hypertension presents to clinic today for follow-up of his diabetes.  Diabetes continues to be uncontrolled with recent increase in A1c to 8.1.  The patient reports frequent travel and increased stress secondary to his job.  He reports that his diet does consist frequently of on processed carbohydrates such as potato chips and mashed potatoes.  He also reports occasionally having cookies.  I have discussed diabetic diet with the patient and have recommended that he cut out all sweets and potatoes.  He reports a recent episode of nose bleed which required emergency room visit and cauterization.  Since this visit he denies any further bleeding.  Review of Systems   Constitutional: Negative for activity change, appetite change, chills, fatigue, fever and unexpected weight change.   HENT: Positive for nosebleeds. Negative for congestion, ear pain, hearing loss, postnasal drip, rhinorrhea, sinus pressure, sore throat, tinnitus and trouble swallowing.    Eyes: Negative for discharge, redness, itching and visual disturbance.   Respiratory: Negative for cough, chest tightness, shortness of breath and wheezing.    Cardiovascular: Negative for chest pain and palpitations.   Gastrointestinal: Negative for abdominal pain, blood in stool, constipation, diarrhea, nausea and vomiting.   Endocrine: Negative for polydipsia and polyuria.   Genitourinary: Positive for urgency. Negative for decreased urine volume, difficulty urinating, dysuria, frequency and hematuria.   Musculoskeletal: Negative for arthralgias, back pain, joint swelling, myalgias, neck pain and neck stiffness.   Skin: Negative for rash.   Neurological: Negative for dizziness, weakness, light-headedness and headaches.   Psychiatric/Behavioral: Positive for dysphoric  mood. Negative for confusion.       Objective:      Physical Exam   Constitutional: He is oriented to person, place, and time. He appears well-developed and well-nourished. No distress.   HENT:   Head: Normocephalic and atraumatic.   Right Ear: External ear normal.   Left Ear: External ear normal.   Nose: Nose normal.   Mouth/Throat: Oropharynx is clear and moist. No oropharyngeal exudate.   Eyes: Pupils are equal, round, and reactive to light. Conjunctivae and EOM are normal. Right eye exhibits no discharge. Left eye exhibits no discharge. No scleral icterus.   Neck: Normal range of motion. Neck supple. No JVD present. No tracheal deviation present. No thyromegaly present.   Cardiovascular: Normal rate, regular rhythm, normal heart sounds and intact distal pulses. Exam reveals no gallop and no friction rub.   No murmur heard.  Pulmonary/Chest: Effort normal and breath sounds normal. No stridor. No respiratory distress. He has no wheezes. He has no rales.   Abdominal: Soft. Bowel sounds are normal. He exhibits no distension and no mass. There is no tenderness. There is no rebound and no guarding.   Musculoskeletal: Normal range of motion. He exhibits no edema or tenderness.   Lymphadenopathy:     He has no cervical adenopathy.   Neurological: He is alert and oriented to person, place, and time.   Skin: Skin is warm and dry. No rash noted. He is not diaphoretic. No erythema. No pallor.   Psychiatric: He has a normal mood and affect. His behavior is normal. Judgment and thought content normal.   Nursing note and vitals reviewed.      Assessment:       1. Uncontrolled type 2 diabetes mellitus with diabetic peripheral angiopathy without gangrene, without long-term current use of insulin    2. Essential hypertension        Plan:       1.  Continue Farxiga 10 mg daily, Januvia 100 mg daily, and metformin 1000 mg b.i.d..  Start glimepiride 2 mg daily.  Continue twice daily glucose monitoring and follow strict diabetic diet  as discussed.  Repeat CMP and A1c in 3 months.  2.  Continue losartan 100 mg daily, metoprolol 50 mg b.i.d., and nifedipine 60 mg daily.  Encourage low-sodium diet and hydration.  3.  Return to clinic as needed or in 1 month for hypertension revaluation.

## 2019-11-05 ENCOUNTER — PATIENT MESSAGE (OUTPATIENT)
Dept: INTERNAL MEDICINE | Facility: CLINIC | Age: 74
End: 2019-11-05

## 2019-11-21 RX ORDER — TEMAZEPAM 30 MG/1
30 CAPSULE ORAL NIGHTLY PRN
Qty: 30 CAPSULE | Refills: 0 | Status: SHIPPED | OUTPATIENT
Start: 2019-11-21 | End: 2020-04-16 | Stop reason: SDUPTHER

## 2019-12-09 ENCOUNTER — OFFICE VISIT (OUTPATIENT)
Dept: INTERNAL MEDICINE | Facility: CLINIC | Age: 74
End: 2019-12-09
Payer: MEDICARE

## 2019-12-09 VITALS
DIASTOLIC BLOOD PRESSURE: 68 MMHG | RESPIRATION RATE: 24 BRPM | BODY MASS INDEX: 27.93 KG/M2 | TEMPERATURE: 98 F | HEART RATE: 60 BPM | HEIGHT: 75 IN | WEIGHT: 224.63 LBS | SYSTOLIC BLOOD PRESSURE: 160 MMHG

## 2019-12-09 DIAGNOSIS — I10 ESSENTIAL HYPERTENSION: Primary | ICD-10-CM

## 2019-12-09 PROCEDURE — 1101F PT FALLS ASSESS-DOCD LE1/YR: CPT | Mod: HCNC,CPTII,S$GLB, | Performed by: FAMILY MEDICINE

## 2019-12-09 PROCEDURE — 1126F AMNT PAIN NOTED NONE PRSNT: CPT | Mod: HCNC,S$GLB,, | Performed by: FAMILY MEDICINE

## 2019-12-09 PROCEDURE — 99499 UNLISTED E&M SERVICE: CPT | Mod: S$GLB,,, | Performed by: FAMILY MEDICINE

## 2019-12-09 PROCEDURE — 1159F MED LIST DOCD IN RCRD: CPT | Mod: HCNC,S$GLB,, | Performed by: FAMILY MEDICINE

## 2019-12-09 PROCEDURE — 3078F PR MOST RECENT DIASTOLIC BLOOD PRESSURE < 80 MM HG: ICD-10-PCS | Mod: HCNC,CPTII,S$GLB, | Performed by: FAMILY MEDICINE

## 2019-12-09 PROCEDURE — 3078F DIAST BP <80 MM HG: CPT | Mod: HCNC,CPTII,S$GLB, | Performed by: FAMILY MEDICINE

## 2019-12-09 PROCEDURE — 99213 PR OFFICE/OUTPT VISIT, EST, LEVL III, 20-29 MIN: ICD-10-PCS | Mod: HCNC,S$GLB,, | Performed by: FAMILY MEDICINE

## 2019-12-09 PROCEDURE — 99999 PR PBB SHADOW E&M-EST. PATIENT-LVL III: CPT | Mod: PBBFAC,HCNC,, | Performed by: FAMILY MEDICINE

## 2019-12-09 PROCEDURE — 99999 PR PBB SHADOW E&M-EST. PATIENT-LVL III: ICD-10-PCS | Mod: PBBFAC,HCNC,, | Performed by: FAMILY MEDICINE

## 2019-12-09 PROCEDURE — 3077F PR MOST RECENT SYSTOLIC BLOOD PRESSURE >= 140 MM HG: ICD-10-PCS | Mod: HCNC,CPTII,S$GLB, | Performed by: FAMILY MEDICINE

## 2019-12-09 PROCEDURE — 3077F SYST BP >= 140 MM HG: CPT | Mod: HCNC,CPTII,S$GLB, | Performed by: FAMILY MEDICINE

## 2019-12-09 PROCEDURE — 99499 RISK ADDL DX/OHS AUDIT: ICD-10-PCS | Mod: S$GLB,,, | Performed by: FAMILY MEDICINE

## 2019-12-09 PROCEDURE — 99213 OFFICE O/P EST LOW 20 MIN: CPT | Mod: HCNC,S$GLB,, | Performed by: FAMILY MEDICINE

## 2019-12-09 PROCEDURE — 1101F PR PT FALLS ASSESS DOC 0-1 FALLS W/OUT INJ PAST YR: ICD-10-PCS | Mod: HCNC,CPTII,S$GLB, | Performed by: FAMILY MEDICINE

## 2019-12-09 PROCEDURE — 1126F PR PAIN SEVERITY QUANTIFIED, NO PAIN PRESENT: ICD-10-PCS | Mod: HCNC,S$GLB,, | Performed by: FAMILY MEDICINE

## 2019-12-09 PROCEDURE — 1159F PR MEDICATION LIST DOCUMENTED IN MEDICAL RECORD: ICD-10-PCS | Mod: HCNC,S$GLB,, | Performed by: FAMILY MEDICINE

## 2019-12-09 RX ORDER — HYDROCHLOROTHIAZIDE 25 MG/1
25 TABLET ORAL DAILY
Qty: 30 TABLET | Refills: 11 | Status: SHIPPED | OUTPATIENT
Start: 2019-12-09 | End: 2020-12-16

## 2019-12-09 NOTE — PROGRESS NOTES
Subjective:       Patient ID: Cecil Pringle is a 74 y.o. male.    Chief Complaint: Follow-up (1 month) and Hypertension    HPI 74-year-old white male presents to clinic today for hypertension follow-up.  He was last seen on November 4, 2019 at which time blood pressure was elevated at 160/58.  At that time he was taking losartan 100 mg daily, metoprolol 50 mg b.i.d., and nifedipine 60 mg daily.  Diet and exercise were strongly encouraged as was low-sodium diet.  Patient returns today with blood pressure log which shows continued elevated blood pressure in the range of 160s over 80s.  He denies any headaches, dizziness, chest pain, or shortness of breath.  Review of Systems   Constitutional: Negative for appetite change, chills, fatigue and fever.   HENT: Negative for congestion, ear pain, hearing loss, postnasal drip, rhinorrhea, sinus pressure, sore throat and tinnitus.    Eyes: Negative for redness, itching and visual disturbance.   Respiratory: Negative for cough, chest tightness and shortness of breath.    Cardiovascular: Negative for chest pain and palpitations.   Gastrointestinal: Negative for abdominal pain, constipation, diarrhea, nausea and vomiting.   Genitourinary: Negative for decreased urine volume, difficulty urinating, dysuria, frequency, hematuria and urgency.   Musculoskeletal: Negative for back pain, myalgias, neck pain and neck stiffness.   Skin: Negative for rash.   Neurological: Negative for dizziness, light-headedness and headaches.   Psychiatric/Behavioral: Negative.        Objective:      Physical Exam   Constitutional: He is oriented to person, place, and time. He appears well-developed and well-nourished. No distress.   HENT:   Head: Normocephalic and atraumatic.   Right Ear: External ear normal.   Left Ear: External ear normal.   Nose: Nose normal.   Mouth/Throat: Oropharynx is clear and moist. No oropharyngeal exudate.   Eyes: Pupils are equal, round, and reactive to light.  Conjunctivae and EOM are normal. Right eye exhibits no discharge. Left eye exhibits no discharge. No scleral icterus.   Neck: Normal range of motion. Neck supple. No JVD present. No tracheal deviation present. No thyromegaly present.   Cardiovascular: Normal rate, regular rhythm, normal heart sounds and intact distal pulses. Exam reveals no gallop and no friction rub.   No murmur heard.  Pulmonary/Chest: Effort normal and breath sounds normal. No stridor. No respiratory distress. He has no wheezes. He has no rales.   Abdominal: Soft. Bowel sounds are normal. He exhibits no distension and no mass. There is no tenderness. There is no rebound and no guarding.   Musculoskeletal: Normal range of motion. He exhibits no edema or tenderness.   Lymphadenopathy:     He has no cervical adenopathy.   Neurological: He is alert and oriented to person, place, and time.   Skin: Skin is warm and dry. No rash noted. He is not diaphoretic. No erythema. No pallor.   Psychiatric: He has a normal mood and affect. His behavior is normal. Judgment and thought content normal.   Nursing note and vitals reviewed.      Assessment:       1. Essential hypertension        Plan:       1.  Continue losartan 100 mg daily, metoprolol 50 mg b.i.d., and nifedipine 60 mg daily.  Start hydrochlorothiazide 25  2.  Return to clinic as needed or in 1 month for hypertension re-evaluation.

## 2019-12-18 ENCOUNTER — PATIENT OUTREACH (OUTPATIENT)
Dept: ADMINISTRATIVE | Facility: HOSPITAL | Age: 74
End: 2019-12-18

## 2020-01-06 ENCOUNTER — PATIENT OUTREACH (OUTPATIENT)
Dept: ADMINISTRATIVE | Facility: OTHER | Age: 75
End: 2020-01-06

## 2020-01-09 ENCOUNTER — OFFICE VISIT (OUTPATIENT)
Dept: INTERNAL MEDICINE | Facility: CLINIC | Age: 75
End: 2020-01-09
Payer: MEDICARE

## 2020-01-09 VITALS
OXYGEN SATURATION: 96 % | TEMPERATURE: 98 F | RESPIRATION RATE: 15 BRPM | DIASTOLIC BLOOD PRESSURE: 70 MMHG | SYSTOLIC BLOOD PRESSURE: 138 MMHG | WEIGHT: 227.75 LBS | HEART RATE: 44 BPM | BODY MASS INDEX: 28.46 KG/M2

## 2020-01-09 DIAGNOSIS — B35.1 ONYCHOMYCOSIS OF MULTIPLE TOENAILS WITH TYPE 2 DIABETES MELLITUS: ICD-10-CM

## 2020-01-09 DIAGNOSIS — I73.9 PAD (PERIPHERAL ARTERY DISEASE): ICD-10-CM

## 2020-01-09 DIAGNOSIS — F33.0 MILD EPISODE OF RECURRENT MAJOR DEPRESSIVE DISORDER: ICD-10-CM

## 2020-01-09 DIAGNOSIS — I10 ESSENTIAL HYPERTENSION: Primary | ICD-10-CM

## 2020-01-09 DIAGNOSIS — E11.69 ONYCHOMYCOSIS OF MULTIPLE TOENAILS WITH TYPE 2 DIABETES MELLITUS: ICD-10-CM

## 2020-01-09 PROCEDURE — 1126F PR PAIN SEVERITY QUANTIFIED, NO PAIN PRESENT: ICD-10-PCS | Mod: HCNC,S$GLB,, | Performed by: FAMILY MEDICINE

## 2020-01-09 PROCEDURE — 1126F AMNT PAIN NOTED NONE PRSNT: CPT | Mod: HCNC,S$GLB,, | Performed by: FAMILY MEDICINE

## 2020-01-09 PROCEDURE — 3078F DIAST BP <80 MM HG: CPT | Mod: HCNC,CPTII,S$GLB, | Performed by: FAMILY MEDICINE

## 2020-01-09 PROCEDURE — 99999 PR PBB SHADOW E&M-EST. PATIENT-LVL III: ICD-10-PCS | Mod: PBBFAC,HCNC,, | Performed by: FAMILY MEDICINE

## 2020-01-09 PROCEDURE — 1101F PR PT FALLS ASSESS DOC 0-1 FALLS W/OUT INJ PAST YR: ICD-10-PCS | Mod: HCNC,CPTII,S$GLB, | Performed by: FAMILY MEDICINE

## 2020-01-09 PROCEDURE — 99499 RISK ADDL DX/OHS AUDIT: ICD-10-PCS | Mod: HCNC,S$GLB,, | Performed by: FAMILY MEDICINE

## 2020-01-09 PROCEDURE — 3078F PR MOST RECENT DIASTOLIC BLOOD PRESSURE < 80 MM HG: ICD-10-PCS | Mod: HCNC,CPTII,S$GLB, | Performed by: FAMILY MEDICINE

## 2020-01-09 PROCEDURE — 99213 OFFICE O/P EST LOW 20 MIN: CPT | Mod: HCNC,S$GLB,, | Performed by: FAMILY MEDICINE

## 2020-01-09 PROCEDURE — 99999 PR PBB SHADOW E&M-EST. PATIENT-LVL III: CPT | Mod: PBBFAC,HCNC,, | Performed by: FAMILY MEDICINE

## 2020-01-09 PROCEDURE — 99499 UNLISTED E&M SERVICE: CPT | Mod: HCNC,S$GLB,, | Performed by: FAMILY MEDICINE

## 2020-01-09 PROCEDURE — 3075F SYST BP GE 130 - 139MM HG: CPT | Mod: HCNC,CPTII,S$GLB, | Performed by: FAMILY MEDICINE

## 2020-01-09 PROCEDURE — 99213 PR OFFICE/OUTPT VISIT, EST, LEVL III, 20-29 MIN: ICD-10-PCS | Mod: HCNC,S$GLB,, | Performed by: FAMILY MEDICINE

## 2020-01-09 PROCEDURE — 3075F PR MOST RECENT SYSTOLIC BLOOD PRESS GE 130-139MM HG: ICD-10-PCS | Mod: HCNC,CPTII,S$GLB, | Performed by: FAMILY MEDICINE

## 2020-01-09 PROCEDURE — 1159F PR MEDICATION LIST DOCUMENTED IN MEDICAL RECORD: ICD-10-PCS | Mod: HCNC,S$GLB,, | Performed by: FAMILY MEDICINE

## 2020-01-09 PROCEDURE — 1101F PT FALLS ASSESS-DOCD LE1/YR: CPT | Mod: HCNC,CPTII,S$GLB, | Performed by: FAMILY MEDICINE

## 2020-01-09 PROCEDURE — 1159F MED LIST DOCD IN RCRD: CPT | Mod: HCNC,S$GLB,, | Performed by: FAMILY MEDICINE

## 2020-01-09 NOTE — PROGRESS NOTES
Subjective:       Patient ID: Cecil Pringle is a 74 y.o. male.    Chief Complaint: Follow-up    HPI 74-year-old male with uncontrolled type 2 diabetes, diabetic neuropathy, peripheral artery disease, onychomycosis, and major depression presents to clinic today for hypertension re-evaluation.  He was last seen 1 month ago at which time blood pressure was elevated at 100 60/68 while on losartan 100 mg daily, metoprolol 50 mg b.i.d., and nifedipine 60 mg daily.  I started the patient on hydrochlorothiazide 25 mg daily and he returns today without complaints.  Review of Systems   Constitutional: Negative for activity change, appetite change, chills, fatigue, fever and unexpected weight change.   HENT: Negative for congestion, ear pain, hearing loss, postnasal drip, rhinorrhea, sinus pressure, sore throat, tinnitus and trouble swallowing.    Eyes: Negative for discharge, redness, itching and visual disturbance.   Respiratory: Negative for cough, chest tightness, shortness of breath and wheezing.    Cardiovascular: Negative for chest pain and palpitations.   Gastrointestinal: Negative for abdominal pain, blood in stool, constipation, diarrhea, nausea and vomiting.   Endocrine: Negative for polydipsia and polyuria.   Genitourinary: Negative for decreased urine volume, difficulty urinating, dysuria, frequency, hematuria and urgency.   Musculoskeletal: Negative for arthralgias, back pain, joint swelling, myalgias, neck pain and neck stiffness.   Skin: Negative for rash.   Neurological: Negative for dizziness, weakness, light-headedness and headaches.   Psychiatric/Behavioral: Negative.  Negative for confusion and dysphoric mood.       Objective:      Physical Exam   Constitutional: He is oriented to person, place, and time. He appears well-developed and well-nourished. No distress.   HENT:   Head: Normocephalic and atraumatic.   Right Ear: External ear normal.   Left Ear: External ear normal.   Nose: Nose normal.    Mouth/Throat: Oropharynx is clear and moist. No oropharyngeal exudate.   Eyes: Pupils are equal, round, and reactive to light. Conjunctivae and EOM are normal. Right eye exhibits no discharge. Left eye exhibits no discharge. No scleral icterus.   Neck: Normal range of motion. Neck supple. No JVD present. No tracheal deviation present. No thyromegaly present.   Cardiovascular: Normal rate, regular rhythm, normal heart sounds and intact distal pulses. Exam reveals no gallop and no friction rub.   No murmur heard.  Pulmonary/Chest: Effort normal and breath sounds normal. No stridor. No respiratory distress. He has no wheezes. He has no rales.   Abdominal: Soft. Bowel sounds are normal. He exhibits no distension and no mass. There is no tenderness. There is no rebound and no guarding.   Musculoskeletal: Normal range of motion. He exhibits no edema or tenderness.   Lymphadenopathy:     He has no cervical adenopathy.   Neurological: He is alert and oriented to person, place, and time.   Skin: Skin is warm and dry. No rash noted. He is not diaphoretic. No erythema. No pallor.   Psychiatric: He has a normal mood and affect. His behavior is normal. Judgment and thought content normal.   Nursing note and vitals reviewed.      Assessment:       1. Essential hypertension    2. Uncontrolled type 2 diabetes mellitus with diabetic peripheral angiopathy without gangrene, without long-term current use of insulin    3. Type 2 diabetes, uncontrolled, with neuropathy    4. PAD (peripheral artery disease)    5. Onychomycosis of multiple toenails with type 2 diabetes mellitus    6. Mild episode of recurrent major depressive disorder        Plan:       1.  Continue hydrochlorothiazide 25 mg daily, losartan 100 mg daily, metoprolol 50 mg b.i.d., and nifedipine 60 mg daily.  Hypertension is well controlled.  2.  Continue metformin 1000 mg b.i.d., Farxiga 10 mg daily, and Januvia 100 mg daily.  Diabetes remained stable.  3.  Continue  follow-up with Podiatry as scheduled.  Onychomycosis and peripheral artery disease remained stable.  4.  Continue Prozac as prescribed.  Depression remained stable.  5.  Return to clinic as needed or in 3 months for annual exam.

## 2020-01-13 ENCOUNTER — OFFICE VISIT (OUTPATIENT)
Dept: PODIATRY | Facility: CLINIC | Age: 75
End: 2020-01-13
Payer: MEDICARE

## 2020-01-13 ENCOUNTER — PATIENT OUTREACH (OUTPATIENT)
Dept: ADMINISTRATIVE | Facility: OTHER | Age: 75
End: 2020-01-13

## 2020-01-13 VITALS
SYSTOLIC BLOOD PRESSURE: 147 MMHG | HEIGHT: 75 IN | BODY MASS INDEX: 28.23 KG/M2 | WEIGHT: 227 LBS | HEART RATE: 44 BPM | DIASTOLIC BLOOD PRESSURE: 71 MMHG

## 2020-01-13 DIAGNOSIS — I73.9 PAD (PERIPHERAL ARTERY DISEASE): ICD-10-CM

## 2020-01-13 PROCEDURE — 11721 DEBRIDE NAIL 6 OR MORE: CPT | Mod: 59,Q9,HCNC,S$GLB | Performed by: STUDENT IN AN ORGANIZED HEALTH CARE EDUCATION/TRAINING PROGRAM

## 2020-01-13 PROCEDURE — 11721 ROUTINE FOOT CARE: ICD-10-PCS | Mod: 59,Q9,HCNC,S$GLB | Performed by: STUDENT IN AN ORGANIZED HEALTH CARE EDUCATION/TRAINING PROGRAM

## 2020-01-13 PROCEDURE — 99999 PR PBB SHADOW E&M-EST. PATIENT-LVL III: ICD-10-PCS | Mod: PBBFAC,HCNC,, | Performed by: STUDENT IN AN ORGANIZED HEALTH CARE EDUCATION/TRAINING PROGRAM

## 2020-01-13 PROCEDURE — 3077F PR MOST RECENT SYSTOLIC BLOOD PRESSURE >= 140 MM HG: ICD-10-PCS | Mod: HCNC,CPTII,S$GLB, | Performed by: STUDENT IN AN ORGANIZED HEALTH CARE EDUCATION/TRAINING PROGRAM

## 2020-01-13 PROCEDURE — 1101F PT FALLS ASSESS-DOCD LE1/YR: CPT | Mod: HCNC,CPTII,S$GLB, | Performed by: STUDENT IN AN ORGANIZED HEALTH CARE EDUCATION/TRAINING PROGRAM

## 2020-01-13 PROCEDURE — 99999 PR PBB SHADOW E&M-EST. PATIENT-LVL III: CPT | Mod: PBBFAC,HCNC,, | Performed by: STUDENT IN AN ORGANIZED HEALTH CARE EDUCATION/TRAINING PROGRAM

## 2020-01-13 PROCEDURE — 1126F PR PAIN SEVERITY QUANTIFIED, NO PAIN PRESENT: ICD-10-PCS | Mod: HCNC,S$GLB,, | Performed by: STUDENT IN AN ORGANIZED HEALTH CARE EDUCATION/TRAINING PROGRAM

## 2020-01-13 PROCEDURE — 3078F PR MOST RECENT DIASTOLIC BLOOD PRESSURE < 80 MM HG: ICD-10-PCS | Mod: HCNC,CPTII,S$GLB, | Performed by: STUDENT IN AN ORGANIZED HEALTH CARE EDUCATION/TRAINING PROGRAM

## 2020-01-13 PROCEDURE — 1126F AMNT PAIN NOTED NONE PRSNT: CPT | Mod: HCNC,S$GLB,, | Performed by: STUDENT IN AN ORGANIZED HEALTH CARE EDUCATION/TRAINING PROGRAM

## 2020-01-13 PROCEDURE — 1159F MED LIST DOCD IN RCRD: CPT | Mod: HCNC,S$GLB,, | Performed by: STUDENT IN AN ORGANIZED HEALTH CARE EDUCATION/TRAINING PROGRAM

## 2020-01-13 PROCEDURE — 99214 PR OFFICE/OUTPT VISIT, EST, LEVL IV, 30-39 MIN: ICD-10-PCS | Mod: 25,HCNC,S$GLB, | Performed by: STUDENT IN AN ORGANIZED HEALTH CARE EDUCATION/TRAINING PROGRAM

## 2020-01-13 PROCEDURE — 11056 ROUTINE FOOT CARE: ICD-10-PCS | Mod: Q9,HCNC,S$GLB, | Performed by: STUDENT IN AN ORGANIZED HEALTH CARE EDUCATION/TRAINING PROGRAM

## 2020-01-13 PROCEDURE — 11056 PARNG/CUTG B9 HYPRKR LES 2-4: CPT | Mod: Q9,HCNC,S$GLB, | Performed by: STUDENT IN AN ORGANIZED HEALTH CARE EDUCATION/TRAINING PROGRAM

## 2020-01-13 PROCEDURE — 3078F DIAST BP <80 MM HG: CPT | Mod: HCNC,CPTII,S$GLB, | Performed by: STUDENT IN AN ORGANIZED HEALTH CARE EDUCATION/TRAINING PROGRAM

## 2020-01-13 PROCEDURE — 1159F PR MEDICATION LIST DOCUMENTED IN MEDICAL RECORD: ICD-10-PCS | Mod: HCNC,S$GLB,, | Performed by: STUDENT IN AN ORGANIZED HEALTH CARE EDUCATION/TRAINING PROGRAM

## 2020-01-13 PROCEDURE — 1101F PR PT FALLS ASSESS DOC 0-1 FALLS W/OUT INJ PAST YR: ICD-10-PCS | Mod: HCNC,CPTII,S$GLB, | Performed by: STUDENT IN AN ORGANIZED HEALTH CARE EDUCATION/TRAINING PROGRAM

## 2020-01-13 PROCEDURE — 99214 OFFICE O/P EST MOD 30 MIN: CPT | Mod: 25,HCNC,S$GLB, | Performed by: STUDENT IN AN ORGANIZED HEALTH CARE EDUCATION/TRAINING PROGRAM

## 2020-01-13 PROCEDURE — 3077F SYST BP >= 140 MM HG: CPT | Mod: HCNC,CPTII,S$GLB, | Performed by: STUDENT IN AN ORGANIZED HEALTH CARE EDUCATION/TRAINING PROGRAM

## 2020-01-13 NOTE — PROCEDURES
Routine Foot Care  Date/Time: 1/13/2020 11:00 AM  Performed by: Terri Starkey DPM  Authorized by: Terri Starkey DPM     Consent Done?:  Yes (Verbal)  Hyperkeratotic Skin Lesions?: Yes    Number of trimmed lesions:  4  Location(s):  Left 1st Metatarsal Head, Right 1st Metatarsal Head, Left 5th Metatarsal Head and Right 5th Metatarsal Head    Nail Care Type:  Debride  Location(s): All  (Left 1st Toe, Left 3rd Toe, Left 2nd Toe, Left 4th Toe, Left 5th Toe, Right 1st Toe, Right 2nd Toe, Right 3rd Toe, Right 4th Toe and Right 5th Toe)  Patient tolerance:  Patient tolerated the procedure well with no immediate complications

## 2020-01-13 NOTE — PROGRESS NOTES
Subjective:      Patient ID: Cecil Pringle is a 74 y.o. male.    Chief Complaint: Callouses and Nail Care    Cecil is a 74 y.o. male who presents to the clinic upon referral from Dr. Hannah rock. provider found  for evaluation and treatment of diabetic feet. Cecil has a past medical history of Anxiety, Arthritis, Coronary artery disease, Depression, Diabetes mellitus, type 2, Hyperlipidemia, Hypertension, Insomnia, and PAD (peripheral artery disease). Patient relates no major problem with feet, states has occasional burning/tinlging sensations to toes. Only complaints today consist of callus and elongated toe nails to rakel feet.  has not had new diabetic shoes in 7 years.     PCP: Layo Jett MD    Date Last Seen by PCP:    Current shoe gear: Rx diabetic extra depth shoes and custom accommodative insoles    Hemoglobin A1C   Date Value Ref Range Status   10/31/2019 8.1 (H) 4.0 - 5.6 % Final     Comment:     ADA Screening Guidelines:  5.7-6.4%  Consistent with prediabetes  >or=6.5%  Consistent with diabetes  High levels of fetal hemoglobin interfere with the HbA1C  assay. Heterozygous hemoglobin variants (HbS, HgC, etc)do  not significantly interfere with this assay.   However, presence of multiple variants may affect accuracy.     07/26/2019 7.8 (H) 4.0 - 5.6 % Final     Comment:     ADA Screening Guidelines:  5.7-6.4%  Consistent with prediabetes  >or=6.5%  Consistent with diabetes  High levels of fetal hemoglobin interfere with the HbA1C  assay. Heterozygous hemoglobin variants (HbS, HgC, etc)do  not significantly interfere with this assay.   However, presence of multiple variants may affect accuracy.     04/18/2019 8.1 (H) 4.0 - 5.6 % Final     Comment:     ADA Screening Guidelines:  5.7-6.4%  Consistent with prediabetes  >or=6.5%  Consistent with diabetes  High levels of fetal hemoglobin interfere with the HbA1C  assay. Heterozygous hemoglobin variants (HbS, HgC, etc)do  not significantly interfere  with this assay.   However, presence of multiple variants may affect accuracy.             Review of Systems   Constitution: Negative for chills, decreased appetite, diaphoresis and fever.   Cardiovascular: Negative for claudication and leg swelling.   Respiratory: Negative for shortness of breath.    Skin: Positive for dry skin and nail changes. Negative for color change, flushing, itching, poor wound healing, rash and suspicious lesions.   Musculoskeletal: Negative for arthritis, back pain, falls, joint pain, joint swelling and myalgias.   Gastrointestinal: Negative for nausea and vomiting.   Neurological: Positive for numbness and paresthesias. Negative for loss of balance.           Objective:      Physical Exam   Constitutional: He is oriented to person, place, and time. He appears well-developed and well-nourished. No distress.   Cardiovascular: Intact distal pulses.   Pulses:       Dorsalis pedis pulses are 1+ on the right side, and 1+ on the left side.        Posterior tibial pulses are 1+ on the right side, and 1+ on the left side.   Skin temperature is within normal limits. Toes are cool to touch and feet are warm proximally. Hair growth is mildly diminished. Skin is mildly atrophic and with mild hyperpigmentation. Mild edema noted, varicosities noted rakel       Musculoskeletal: He exhibits edema and deformity. He exhibits no tenderness.        Right foot: There is decreased range of motion and deformity.        Left foot: There is decreased range of motion and deformity.   No POP noted, rakel, Strength is 5/5 to lower extremity muscle groups, rakel    Rectus arches that reduce with weight bearing, equinus that reduces with knee flexed, rakel.     Bunion and tailors bunion deformity noted, rakel with reducible hammer toes 2-5, rakel   Feet:   Right Foot:   Skin Integrity: Positive for dry skin. Negative for ulcer, blister, skin breakdown, erythema, warmth or callus.   Left Foot:   Skin Integrity: Positive for dry  skin. Negative for ulcer, blister, skin breakdown, erythema, warmth or callus.   Lymphadenopathy:   Negative lymphadenopathy bilateral popliteal fossa and tarsal tunnel.    Neurological: He is alert and oriented to person, place, and time. A sensory deficit is present.   Vibratory sensation diminished, rakel   Skin: Skin is warm and dry. Capillary refill takes less than 2 seconds. No bruising, no ecchymosis, no laceration, no lesion, no petechiae and no rash noted. He is not diaphoretic. No erythema. No pallor.   Skin is warm and dry bilaterally, no acute SOI noted, bilaterally, appears stable.     Nails 1-5 are thickened, discolored, dystrophic and with subungual debris, rakel    HPK noted sub 1st and sub 5th met head, rakel   Psychiatric: He has a normal mood and affect.   Nursing note and vitals reviewed.            Assessment:       Encounter Diagnoses   Name Primary?    Type 2 diabetes, uncontrolled, with neuropathy Yes    PAD (peripheral artery disease)          Plan:       Cecil was seen today for callouses and nail care.    Diagnoses and all orders for this visit:    Type 2 diabetes, uncontrolled, with neuropathy  -     DIABETIC SHOES FOR HOME USE  -     Routine Foot Care    PAD (peripheral artery disease)  -     DIABETIC SHOES FOR HOME USE  -     Routine Foot Care      I counseled the patient on his conditions, their implications and medical management.    -HPK and nails debrided, see procedure note    - Shoe inspection. Diabetic Foot Education. Patient reminded of the importance of good nutrition and blood sugar control to help prevent podiatric complications of diabetes. Patient instructed on proper foot hygeine. We discussed wearing proper shoe gear, daily foot inspections, never walking without protective shoe gear, never putting sharp instruments to feet, routine podiatric nail visits every 2-3 months.      - With patient's permission, nails were aggressively reduced and debrided x 10 to their soft  tissue attachment mechanically and with electric , removing all offending nail and debris. Patient relates relief following the procedure. He will continue to monitor the areas daily, inspect his feet, wear protective shoe gear when ambulatory, moisturizer to maintain skin integrity and follow in this office in approximately 2-3 months, sooner p.r.n.    -Shoe inspection. Diabetic Foot Education. Patient reminded of the importance of good nutrition and blood sugar control to help prevent podiatric complications of diabetes. Patient instructed on proper foot hygeine. We discussed wearing proper shoe gear, daily foot inspections, never walking without protective shoe gear, never putting sharp instruments to feet, routine podiatric nail visits      -I discussed with the  patient  signs and symptoms of infection including redness, drainage, purulence, odor, pain, elevated BS, streaking, fever, chills, etc . Patient is to seek medical attention (ER or urgent care) if these symptoms occur      -RTC in 3 months for routine diabetic foot care

## 2020-02-04 ENCOUNTER — LAB VISIT (OUTPATIENT)
Dept: LAB | Facility: HOSPITAL | Age: 75
End: 2020-02-04
Attending: FAMILY MEDICINE
Payer: MEDICARE

## 2020-02-04 LAB
ALBUMIN SERPL BCP-MCNC: 4.2 G/DL (ref 3.5–5.2)
ALP SERPL-CCNC: 50 U/L (ref 55–135)
ALT SERPL W/O P-5'-P-CCNC: 24 U/L (ref 10–44)
ANION GAP SERPL CALC-SCNC: 11 MMOL/L (ref 8–16)
AST SERPL-CCNC: 17 U/L (ref 10–40)
BILIRUB SERPL-MCNC: 0.3 MG/DL (ref 0.1–1)
BUN SERPL-MCNC: 28 MG/DL (ref 8–23)
CALCIUM SERPL-MCNC: 10 MG/DL (ref 8.7–10.5)
CHLORIDE SERPL-SCNC: 104 MMOL/L (ref 95–110)
CO2 SERPL-SCNC: 24 MMOL/L (ref 23–29)
CREAT SERPL-MCNC: 1.3 MG/DL (ref 0.5–1.4)
EST. GFR  (AFRICAN AMERICAN): >60 ML/MIN/1.73 M^2
EST. GFR  (NON AFRICAN AMERICAN): 54.5 ML/MIN/1.73 M^2
ESTIMATED AVG GLUCOSE: 200 MG/DL (ref 68–131)
GLUCOSE SERPL-MCNC: 156 MG/DL (ref 70–110)
HBA1C MFR BLD HPLC: 8.6 % (ref 4–5.6)
POTASSIUM SERPL-SCNC: 4.4 MMOL/L (ref 3.5–5.1)
PROT SERPL-MCNC: 7.7 G/DL (ref 6–8.4)
SODIUM SERPL-SCNC: 139 MMOL/L (ref 136–145)

## 2020-02-04 PROCEDURE — 36415 COLL VENOUS BLD VENIPUNCTURE: CPT | Mod: HCNC,PO

## 2020-02-04 PROCEDURE — 83036 HEMOGLOBIN GLYCOSYLATED A1C: CPT | Mod: HCNC

## 2020-02-04 PROCEDURE — 80053 COMPREHEN METABOLIC PANEL: CPT | Mod: HCNC

## 2020-02-05 ENCOUNTER — PATIENT MESSAGE (OUTPATIENT)
Dept: INTERNAL MEDICINE | Facility: CLINIC | Age: 75
End: 2020-02-05

## 2020-02-05 RX ORDER — GLIMEPIRIDE 4 MG/1
4 TABLET ORAL
Qty: 30 TABLET | Refills: 11 | Status: SHIPPED | OUTPATIENT
Start: 2020-02-05 | End: 2020-06-11 | Stop reason: SDUPTHER

## 2020-02-05 NOTE — TELEPHONE ENCOUNTER
The patient's A1c has increased to 8.6.  I recommend that he continue all medications as prescribed but increase glimepiride to 4 mg daily.  I have also referred the patient to Endocrinology for further assistance with treatment.  Please schedule the patient with Endocrinology and please schedule a repeat CMP and A1c in 3 months.  Thank you.

## 2020-02-18 NOTE — TELEPHONE ENCOUNTER
Spoke with pt re: increase amaryl to 4mg daily. Pt stated that he already has the RX & started the increase dosage.   Sent referral for Endocrinology to the coordinators.   Pt waiting for their call to schedule.   Made repeat A1C & CMP lab appt.   Mailed to pt.  Pt verbalized understanding

## 2020-03-16 DIAGNOSIS — J30.89 CHRONIC NONSEASONAL ALLERGIC RHINITIS DUE TO POLLEN: ICD-10-CM

## 2020-03-16 RX ORDER — HYDROXYZINE PAMOATE 25 MG/1
CAPSULE ORAL
Qty: 90 CAPSULE | Refills: 2 | Status: SHIPPED | OUTPATIENT
Start: 2020-03-16 | End: 2020-12-16

## 2020-03-16 RX ORDER — FLUOXETINE HYDROCHLORIDE 20 MG/1
CAPSULE ORAL
Qty: 90 CAPSULE | Refills: 2 | Status: SHIPPED | OUTPATIENT
Start: 2020-03-16 | End: 2020-12-16

## 2020-04-14 ENCOUNTER — PATIENT MESSAGE (OUTPATIENT)
Dept: INTERNAL MEDICINE | Facility: CLINIC | Age: 75
End: 2020-04-14

## 2020-04-15 RX ORDER — CLOPIDOGREL BISULFATE 75 MG/1
TABLET ORAL
Qty: 90 TABLET | Refills: 3 | Status: SHIPPED | OUTPATIENT
Start: 2020-04-15 | End: 2021-04-08

## 2020-04-15 RX ORDER — METOPROLOL TARTRATE 100 MG/1
TABLET ORAL
Qty: 90 TABLET | Refills: 3 | Status: SHIPPED | OUTPATIENT
Start: 2020-04-15 | End: 2021-05-06

## 2020-04-15 RX ORDER — METFORMIN HYDROCHLORIDE 1000 MG/1
TABLET ORAL
Qty: 180 TABLET | Refills: 3 | Status: SHIPPED | OUTPATIENT
Start: 2020-04-15 | End: 2021-04-08

## 2020-04-15 RX ORDER — LOSARTAN POTASSIUM 100 MG/1
TABLET ORAL
Qty: 90 TABLET | Refills: 3 | Status: SHIPPED | OUTPATIENT
Start: 2020-04-15 | End: 2021-04-08

## 2020-04-16 RX ORDER — TEMAZEPAM 30 MG/1
30 CAPSULE ORAL NIGHTLY PRN
Qty: 30 CAPSULE | Refills: 0 | Status: SHIPPED | OUTPATIENT
Start: 2020-04-16 | End: 2020-08-17 | Stop reason: SDUPTHER

## 2020-04-22 RX ORDER — ATORVASTATIN CALCIUM 40 MG/1
TABLET, FILM COATED ORAL
Qty: 90 TABLET | Refills: 3 | Status: SHIPPED | OUTPATIENT
Start: 2020-04-22 | End: 2021-04-08

## 2020-05-08 ENCOUNTER — PATIENT MESSAGE (OUTPATIENT)
Dept: INTERNAL MEDICINE | Facility: CLINIC | Age: 75
End: 2020-05-08

## 2020-05-15 DIAGNOSIS — E11.9 TYPE 2 DIABETES MELLITUS WITHOUT COMPLICATION: ICD-10-CM

## 2020-06-04 ENCOUNTER — PATIENT OUTREACH (OUTPATIENT)
Dept: ADMINISTRATIVE | Facility: OTHER | Age: 75
End: 2020-06-04

## 2020-06-04 DIAGNOSIS — E11.9 TYPE 2 DIABETES MELLITUS WITHOUT COMPLICATION, UNSPECIFIED WHETHER LONG TERM INSULIN USE: Primary | ICD-10-CM

## 2020-06-04 NOTE — PROGRESS NOTES
Care Everywhere: updated  Immunization: updated  Health Maintenance: updated  Media Review: reviewed for possible outside eye exam report  Legacy Review: n/a  Order placed: diabetic eye screening photo  Upcoming appts:n/a

## 2020-06-05 ENCOUNTER — CLINICAL SUPPORT (OUTPATIENT)
Dept: DIABETES | Facility: CLINIC | Age: 75
End: 2020-06-05
Payer: MEDICARE

## 2020-06-05 DIAGNOSIS — E11.65 UNCONTROLLED TYPE 2 DIABETES MELLITUS WITH HYPERGLYCEMIA: ICD-10-CM

## 2020-06-05 DIAGNOSIS — E11.9 TYPE 2 DIABETES MELLITUS WITHOUT COMPLICATION: ICD-10-CM

## 2020-06-05 PROCEDURE — G0108 DIAB MANAGE TRN  PER INDIV: HCPCS | Mod: HCNC,S$GLB,, | Performed by: DIETITIAN, REGISTERED

## 2020-06-05 PROCEDURE — 99999 PR PBB SHADOW E&M-EST. PATIENT-LVL III: ICD-10-PCS | Mod: PBBFAC,HCNC,, | Performed by: DIETITIAN, REGISTERED

## 2020-06-05 PROCEDURE — G0108 PR DIAB MANAGE TRN  PER INDIV: ICD-10-PCS | Mod: HCNC,S$GLB,, | Performed by: DIETITIAN, REGISTERED

## 2020-06-05 PROCEDURE — 99999 PR PBB SHADOW E&M-EST. PATIENT-LVL III: CPT | Mod: PBBFAC,HCNC,, | Performed by: DIETITIAN, REGISTERED

## 2020-06-05 NOTE — PROGRESS NOTES
Diabetes Education  Author: Merline Camara RD, CDE  Date: 6/5/2020    Diabetes Care Management Summary  Diabetes Education Record Assessment/Progress: Initial(Education was completed in person)     Diabetes Type  Diabetes Type : Type II    Diabetes History  Diabetes Diagnosis: >10 years  Current Treatment: Oral Medication(Denies missing doses of medication)    Health Maintenance was reviewed today with patient. Discussed with patient importance of routine eye exams, foot exams/foot care, blood work (i.e.: A1c, microalbumin, and lipid), dental visits, yearly flu vaccine, and pneumonia vaccine as indicated by PCP. Patient verbalized understanding.     Health Maintenance Topics with due status: Not Due       Topic Last Completion Date    Colonoscopy 06/14/2019    Foot Exam 01/13/2020    Hemoglobin A1c 02/04/2020    Aspirin/Antiplatelet Therapy 04/15/2020    High Dose Statin 04/22/2020     Health Maintenance Due   Topic Date Due    Hepatitis C Screening  1945    TETANUS VACCINE  11/21/1965    Eye Exam  04/02/2020    Lipid Panel  04/18/2020     Nutrition  Meal Planning: 3 meals per day, water, eats out seldom(Not snacking lately)  What type of sweetener do you use?: none  What type of beverages do you drink?: diet soda/tea, water, other (see comments), milk(Coffee)    Monitoring   Self Monitoring : (Checks 3 times a day)  Blood Glucose Logs: No  Do you use a personal continuous glucose monitor?: No  In the last month, how often have you had a low blood sugar reaction?: never  What are your symptoms of low blood sugar?: (N/A)  How do you treat low blood sugar?: (N/A)  Can you tell when your blood sugar is too high?: yes  How do you treat high blood sugar?: (None)    Exercise   Exercise Type: none    Current Diabetes Treatment   Current Treatment: Oral Medication(Denies missing doses of medication)    Social History  Preferred Learning Method: Face to Face  Primary Support: Self  Smoking Status: Ex Smoker  Alcohol  Use: Never    Barriers to Change  Barriers to Change: None  Learning Challenges : Hearing  Hearing - further explanation: hearing impaired  Hearing -  present?: No    Readiness to Learn   Readiness to Learn : Acceptance    Cultural Influences  Cultural Influences: No    Diabetes Education Assessment/Progress  Diabetes Disease Process (diabetes disease process and treatment options): Instructed, Discussion, Individual Session, Written Materials Provided, Demonstrates Understanding/Competency(verbalizes/demonstrates)  Nutrition (Incorporating nutritional management into one's lifestyle): Instructed, Discussion, Individual Session, Written Materials Provided, Demonstrates Understanding/Competency (verbalizes/demonstrates)(Reviewed CHO counting, label reading and addt'l resources to assist w/ CHO counting; plate method reviewed)  Physical Activity (incorporating physical activity into one's lifestyle): Instructed, Discussion, Individual Session, Written Materials Provided, Demonstrates Understanding/Competency (verbalizes/demonstrates)(Reviewed goals and benefits; discussed use of foot/arm cycle for exercise)  Medications (states correct name, dose, onset, peak, duration, side effects & timing of meds): Instructed, Discussion, Individual Session, Written Materials Provided, Demonstrates Understanding/Competency(verbalizes/demonstrates)(Reviewed medication regimen)  Monitoring (monitoring blood glucose/other parameters & using results): Instructed, Discussion, Individual Session, Written Materials Provided, Demonstrates Understanding/Competency (verbalizes/demonstrates)(Reviewed SMBG schedule and BG goals)  Acute Complications (preventing, detecting, and treating acute complications): Instructed, Discussion, Individual Session, Written Materials Provided, Demonstrates Understanding/Competency (verbalizes/demonstrates)(Reviewed s/s and treatment of hypoglycemia)  Chronic Complications (preventing, detecting,  and treating chronic complications): Instructed, Discussion, Individual Session, Written Materials Provided, Demonstrates Understanding/Competency (verbalizes/demonstrates)(Reviewed standards of care; declined scheduling eye exam at [facility]; plans to schedule outside of [facility])  Clinical (diabetes, other pertinent medical history, and relevant comorbidities reviewed during visit): Discussion, Comprehends Key Points  Cognitive (knowledge of self-management skills, functional health literacy): Discussion, Comprehends Key Points  Psychosocial (emotional response to diabetes): Discussion, Comprehends Key Points  Diabetes Distress and Support Systems: Not Covered/Deferred(Time constraints)  Behavioral (readiness for change, lifestyle practices, self-care behaviors): Discussion, Comprehends Key Points    Goals  Patient has selected/evaluated goals during today's session: Yes, selected  Reducing Risks: Set(Schedule eye exam)    Diabetes Care Plan/Intervention  Education Plan/Intervention: Individual Follow-Up DSMT    Diabetes Meal Plan  Carbohydrate Per Meal: 30-45g  Carbohydrate Per Snack : 15-20g    Today's Self-Management Care Plan was developed with the patient's input and is based on barriers identified during today's assessment.    The long and short-term goals in the care plan were written with the patient/caregiver's input. The patient has agreed to work toward these goals to improve his overall diabetes control.      The patient received a copy of today's self-management plan and verbalized understanding of the care plan, goals, and all of today's instructions.      The patient was encouraged to communicate with his physician and care team regarding his condition(s) and treatment.  I provided the patient with my contact information today and encouraged him to contact me via phone or patient portal as needed.     Education Units of Time   Time Spent: 60 min

## 2020-06-08 ENCOUNTER — TELEPHONE (OUTPATIENT)
Dept: INTERNAL MEDICINE | Facility: CLINIC | Age: 75
End: 2020-06-08

## 2020-06-08 ENCOUNTER — LAB VISIT (OUTPATIENT)
Dept: LAB | Facility: HOSPITAL | Age: 75
End: 2020-06-08
Attending: FAMILY MEDICINE
Payer: MEDICARE

## 2020-06-08 DIAGNOSIS — E11.9 TYPE 2 DIABETES MELLITUS WITHOUT COMPLICATION: ICD-10-CM

## 2020-06-08 LAB
ALBUMIN SERPL BCP-MCNC: 4.1 G/DL (ref 3.5–5.2)
ALP SERPL-CCNC: 45 U/L (ref 55–135)
ALT SERPL W/O P-5'-P-CCNC: 19 U/L (ref 10–44)
ANION GAP SERPL CALC-SCNC: 9 MMOL/L (ref 8–16)
AST SERPL-CCNC: 16 U/L (ref 10–40)
BILIRUB SERPL-MCNC: 0.4 MG/DL (ref 0.1–1)
BUN SERPL-MCNC: 32 MG/DL (ref 8–23)
CALCIUM SERPL-MCNC: 9.6 MG/DL (ref 8.7–10.5)
CHLORIDE SERPL-SCNC: 104 MMOL/L (ref 95–110)
CHOLEST SERPL-MCNC: 136 MG/DL (ref 120–199)
CHOLEST/HDLC SERPL: 4.7 {RATIO} (ref 2–5)
CO2 SERPL-SCNC: 24 MMOL/L (ref 23–29)
CREAT SERPL-MCNC: 1.3 MG/DL (ref 0.5–1.4)
EST. GFR  (AFRICAN AMERICAN): >60 ML/MIN/1.73 M^2
EST. GFR  (NON AFRICAN AMERICAN): 54.5 ML/MIN/1.73 M^2
ESTIMATED AVG GLUCOSE: 174 MG/DL (ref 68–131)
GLUCOSE SERPL-MCNC: 130 MG/DL (ref 70–110)
HBA1C MFR BLD HPLC: 7.7 % (ref 4–5.6)
HDLC SERPL-MCNC: 29 MG/DL (ref 40–75)
HDLC SERPL: 21.3 % (ref 20–50)
LDLC SERPL CALC-MCNC: 32.6 MG/DL (ref 63–159)
NONHDLC SERPL-MCNC: 107 MG/DL
POTASSIUM SERPL-SCNC: 4.7 MMOL/L (ref 3.5–5.1)
PROT SERPL-MCNC: 7.5 G/DL (ref 6–8.4)
SODIUM SERPL-SCNC: 137 MMOL/L (ref 136–145)
TRIGL SERPL-MCNC: 372 MG/DL (ref 30–150)

## 2020-06-08 PROCEDURE — 80061 LIPID PANEL: CPT | Mod: HCNC

## 2020-06-08 PROCEDURE — 83036 HEMOGLOBIN GLYCOSYLATED A1C: CPT | Mod: HCNC

## 2020-06-08 PROCEDURE — 36415 COLL VENOUS BLD VENIPUNCTURE: CPT | Mod: HCNC,PO

## 2020-06-08 PROCEDURE — 80053 COMPREHEN METABOLIC PANEL: CPT | Mod: HCNC

## 2020-06-08 NOTE — TELEPHONE ENCOUNTER
[facility] re: needing an annual physical for this Saturday & DM check

## 2020-06-11 ENCOUNTER — OFFICE VISIT (OUTPATIENT)
Dept: ENDOCRINOLOGY | Facility: CLINIC | Age: 75
End: 2020-06-11
Payer: MEDICARE

## 2020-06-11 ENCOUNTER — TELEPHONE (OUTPATIENT)
Dept: ENDOCRINOLOGY | Facility: CLINIC | Age: 75
End: 2020-06-11

## 2020-06-11 VITALS
HEIGHT: 75 IN | OXYGEN SATURATION: 97 % | WEIGHT: 233 LBS | BODY MASS INDEX: 28.97 KG/M2 | HEART RATE: 52 BPM | SYSTOLIC BLOOD PRESSURE: 126 MMHG | DIASTOLIC BLOOD PRESSURE: 62 MMHG

## 2020-06-11 DIAGNOSIS — E11.69 TYPE 2 DIABETES MELLITUS WITH OTHER SPECIFIED COMPLICATION, WITHOUT LONG-TERM CURRENT USE OF INSULIN: ICD-10-CM

## 2020-06-11 PROBLEM — E11.9 TYPE 2 DIABETES MELLITUS, WITHOUT LONG-TERM CURRENT USE OF INSULIN: Status: ACTIVE | Noted: 2020-06-11

## 2020-06-11 PROCEDURE — 99204 OFFICE O/P NEW MOD 45 MIN: CPT | Mod: HCNC,S$GLB,, | Performed by: INTERNAL MEDICINE

## 2020-06-11 PROCEDURE — 99999 PR PBB SHADOW E&M-EST. PATIENT-LVL V: ICD-10-PCS | Mod: PBBFAC,HCNC,, | Performed by: INTERNAL MEDICINE

## 2020-06-11 PROCEDURE — 3051F PR MOST RECENT HEMOGLOBIN A1C LEVEL 7.0 - < 8.0%: ICD-10-PCS | Mod: HCNC,CPTII,S$GLB, | Performed by: INTERNAL MEDICINE

## 2020-06-11 PROCEDURE — 3074F PR MOST RECENT SYSTOLIC BLOOD PRESSURE < 130 MM HG: ICD-10-PCS | Mod: HCNC,CPTII,S$GLB, | Performed by: INTERNAL MEDICINE

## 2020-06-11 PROCEDURE — 3078F PR MOST RECENT DIASTOLIC BLOOD PRESSURE < 80 MM HG: ICD-10-PCS | Mod: HCNC,CPTII,S$GLB, | Performed by: INTERNAL MEDICINE

## 2020-06-11 PROCEDURE — 3051F HG A1C>EQUAL 7.0%<8.0%: CPT | Mod: HCNC,CPTII,S$GLB, | Performed by: INTERNAL MEDICINE

## 2020-06-11 PROCEDURE — 3074F SYST BP LT 130 MM HG: CPT | Mod: HCNC,CPTII,S$GLB, | Performed by: INTERNAL MEDICINE

## 2020-06-11 PROCEDURE — 1101F PR PT FALLS ASSESS DOC 0-1 FALLS W/OUT INJ PAST YR: ICD-10-PCS | Mod: HCNC,CPTII,S$GLB, | Performed by: INTERNAL MEDICINE

## 2020-06-11 PROCEDURE — 1159F PR MEDICATION LIST DOCUMENTED IN MEDICAL RECORD: ICD-10-PCS | Mod: HCNC,S$GLB,, | Performed by: INTERNAL MEDICINE

## 2020-06-11 PROCEDURE — 3078F DIAST BP <80 MM HG: CPT | Mod: HCNC,CPTII,S$GLB, | Performed by: INTERNAL MEDICINE

## 2020-06-11 PROCEDURE — 1101F PT FALLS ASSESS-DOCD LE1/YR: CPT | Mod: HCNC,CPTII,S$GLB, | Performed by: INTERNAL MEDICINE

## 2020-06-11 PROCEDURE — 99999 PR PBB SHADOW E&M-EST. PATIENT-LVL V: CPT | Mod: PBBFAC,HCNC,, | Performed by: INTERNAL MEDICINE

## 2020-06-11 PROCEDURE — 1126F AMNT PAIN NOTED NONE PRSNT: CPT | Mod: HCNC,S$GLB,, | Performed by: INTERNAL MEDICINE

## 2020-06-11 PROCEDURE — 99204 PR OFFICE/OUTPT VISIT, NEW, LEVL IV, 45-59 MIN: ICD-10-PCS | Mod: HCNC,S$GLB,, | Performed by: INTERNAL MEDICINE

## 2020-06-11 PROCEDURE — 99499 RISK ADDL DX/OHS AUDIT: ICD-10-PCS | Mod: HCNC,S$GLB,, | Performed by: INTERNAL MEDICINE

## 2020-06-11 PROCEDURE — 1126F PR PAIN SEVERITY QUANTIFIED, NO PAIN PRESENT: ICD-10-PCS | Mod: HCNC,S$GLB,, | Performed by: INTERNAL MEDICINE

## 2020-06-11 PROCEDURE — 1159F MED LIST DOCD IN RCRD: CPT | Mod: HCNC,S$GLB,, | Performed by: INTERNAL MEDICINE

## 2020-06-11 PROCEDURE — 99499 UNLISTED E&M SERVICE: CPT | Mod: HCNC,S$GLB,, | Performed by: INTERNAL MEDICINE

## 2020-06-11 RX ORDER — GLIMEPIRIDE 2 MG/1
2 TABLET ORAL
Qty: 30 TABLET | Refills: 11 | Status: SHIPPED | OUTPATIENT
Start: 2020-06-11 | End: 2020-07-10 | Stop reason: DRUGHIGH

## 2020-06-11 NOTE — ASSESSMENT & PLAN NOTE
74-year-old male with history of longstanding type 2 diabetes complicated with coronary artery disease and dyslipidemia here for further evaluation.    Most recent A1c trended down to 7.7.  Goal A1c in his case is around 7-7.5 with no low sugars.    self-reported blood sugars well controlled since last week 2 weeks especially following the dietary changes. Ofnote patient does report his fasting sugars and prelunch sugars less than 100.  Will report will decrease glimepiride to 2 mg once daily    Rest of his regimen remains unchanged.  His new regimen:  Farxiga 10 mg once daily  Glimepiride 2 mg  Januvia 100 mg  Metformin 1000 mg    Patient was recommended to check his sugars before each meal and send his sugar logs in 2 weeks for review    Also discussed in detail about the diabetic dietary lifestyle-with emphasis on low glycemic foods and portion control.       Hypoglycemia precautions discussed.     Hypertension -well controlled on current regimen.    Hypertriglyceridemia:  On fenofibrate, statin and  fish oil.  We will order follow-up lipid panel in 3 months. we anticipate better control of triglycerides especially following the dietary changes with reduction in carbohydrate intake.    Patient to schedule follow-up with eye doctor soon      Coronary artery disease - stressed importance of glycemic, lipid and blood pressure management     Advised foot care.  Follow-up visit in 3-4 months.

## 2020-06-11 NOTE — TELEPHONE ENCOUNTER
Pt has been scheduled for lab appointment. The patient stated that he would schedule his follow up appointment. I told the patient that I schedule it for him but he insisted.

## 2020-06-11 NOTE — PROGRESS NOTES
Subjective:      Patient ID: Cecil Pringle is a 74 y.o. male.    Chief Complaint:  Diabetes (Type 2)      History of Present Illness  74-year-old male with high blood pressure, dyslipidemia, coronary artery disease, referred here for evaluation of Diabetes mellitus type 2     Diagnosed with diabetes >10 years    Current regimen  Farxiga 10 mg once daily  Glimepiride 4 mg  Januvia 100 mg  Metformin 1000 mg    Self reported blood sugars:  Fastins  Before Lunch: around 100  Before Supper:  around 100  Bedtime:  Around 200 sometimes    Patient stated his sugars are very well controlled since last 2 weeks following major dietary changes   Stopped chips and  Following low carb diet    No hypoglycemic episodes ever.  Patient denies any hypoglycemic symptoms even if when his sugars are in 80s     Diabetes Education in the past - Yes    RD in the past - Yes       Work -   Retired          Meals:       Breakfast- bowl of cereal/egg pie+coffee    Lunch-  Chinese needles    Dinner- meat+veggies       Snacks- stopped     Drinks- water     Activity- walk 30 mins day     Weight has  Been stable     Any hospitalizations r/t to diabetes over last year No    Diabetes Management Status    Statin: Taking  ACE/ARB: Taking    Screening or Prevention Patient's value Goal Complete/Controlled?   HgA1C Testing and Control   Lab Results   Component Value Date    HGBA1C 7.9 (H) 09/10/2020      Annually/Less than 8% Yes   Lipid profile : 09/10/2020 Annually Yes   LDL control Lab Results   Component Value Date    LDLCALC Invalid, Trig>400.0 09/10/2020    Annually/Less than 100 mg/dl  Yes   Nephropathy screening Lab Results   Component Value Date    LABMICR 471.0 09/10/2020     Lab Results   Component Value Date    PROTEINUA 1+ (A) 2019    Annually No   Blood pressure BP Readings from Last 1 Encounters:   20 (!) 153/65    Less than 140/90 No   Dilated retinal exam : 2020 Annually Yes   Foot exam   : 2020 Annually  "Yes         Chronic Complications:          Microvascular -  no diabetic retinopathy.  Last evaluated by opthal  on  4/2019                              Mild proteinuria.  occ numbness and tingling in feet.                               Following with   Podiatry, last evaluation on 01/13/2020.                                scheduled  Follow up on [id number]      Macrovascular - coronary artery disease s/p CABD-1990 AND 2011                               No  stroke      Autonomic Dysfunction - denies gastroparesis, history of incontinence at night     Steroids recently - no    Review of Systems:   Review of 7 systems negative except as documented above      Objective:     /62   Pulse (!) 52   Ht 6' 3" (1.905 m)   Wt 105.7 kg (233 lb 0.4 oz)   SpO2 97%   BMI 29.13 kg/m²   BP Readings from Last 3 Encounters:   09/14/20 (!) 153/65   06/16/20 121/61   06/13/20 134/60     Wt Readings from Last 1 Encounters:   09/14/20 0759 105.3 kg (232 lb 3.2 oz)     Body mass index is 29.13 kg/m².      Physical Exam   Constitutional: He is oriented to person, place, and time. He appears well-developed and well-nourished.   Cardiovascular: Normal rate and regular rhythm.   Pulmonary/Chest: Effort normal and breath sounds normal.   Neurological: He is alert and oriented to person, place, and time.   Psychiatric: He has a normal mood and affect. Judgment normal.   Vitals reviewed.             Lab Review:   Lab Results   Component Value Date    HGBA1C 7.9 (H) 09/10/2020     Lab Results   Component Value Date    CHOL 147 09/10/2020    HDL 30 (L) 09/10/2020    LDLCALC Invalid, Trig>400.0 09/10/2020    TRIG 425 (H) 09/10/2020    CHOLHDL 20.4 09/10/2020     Lab Results   Component Value Date     06/08/2020    K 4.7 06/08/2020     06/08/2020    CO2 24 06/08/2020     (H) 06/08/2020    BUN 32 (H) 06/08/2020    CREATININE 1.3 06/08/2020    CALCIUM 9.6 06/08/2020    PROT 7.5 06/08/2020    ALBUMIN 4.1 06/08/2020    BILITOT " 0.4 06/08/2020    ALKPHOS 45 (L) 06/08/2020    AST 16 06/08/2020    ALT 19 06/08/2020    ANIONGAP 9 06/08/2020    ESTGFRAFRICA >60.0 06/08/2020    EGFRNONAA 54.5 (A) 06/08/2020    TSH 1.069 04/18/2019     No results found for: [id number]    Assessment and Plan     Type 2 diabetes mellitus, without long-term current use of insulin  74-year-old male with history of longstanding type 2 diabetes complicated with coronary artery disease and dyslipidemia here for further evaluation.    Most recent A1c trended down to 7.7.  Goal A1c in his case is around 7-7.5 with no low sugars.    self-reported blood sugars well controlled since last week 2 weeks especially following the dietary changes. Ofnote patient does report his fasting sugars and prelunch sugars less than 100.  Will report will decrease glimepiride to 2 mg once daily    Rest of his regimen remains unchanged.  His new regimen:  Farxiga 10 mg once daily  Glimepiride 2 mg  Januvia 100 mg  Metformin 1000 mg    Patient was recommended to check his sugars before each meal and send his sugar logs in 2 weeks for review    Also discussed in detail about the diabetic dietary lifestyle-with emphasis on low glycemic foods and portion control.       Hypoglycemia precautions discussed.     Hypertension -well controlled on current regimen.    Hypertriglyceridemia:  On fenofibrate, statin and  fish oil.  We will order follow-up lipid panel in 3 months. we anticipate better control of triglycerides especially following the dietary changes with reduction in carbohydrate intake.    Patient to schedule follow-up with eye doctor soon      Coronary artery disease - stressed importance of glycemic, lipid and blood pressure management     Advised foot care.  Follow-up visit in 3-4 months.

## 2020-06-13 ENCOUNTER — OFFICE VISIT (OUTPATIENT)
Dept: INTERNAL MEDICINE | Facility: CLINIC | Age: 75
End: 2020-06-13
Payer: MEDICARE

## 2020-06-13 VITALS
BODY MASS INDEX: 28.97 KG/M2 | HEIGHT: 75 IN | HEART RATE: 55 BPM | RESPIRATION RATE: 18 BRPM | SYSTOLIC BLOOD PRESSURE: 134 MMHG | TEMPERATURE: 98 F | DIASTOLIC BLOOD PRESSURE: 60 MMHG | WEIGHT: 233 LBS | OXYGEN SATURATION: 97 %

## 2020-06-13 DIAGNOSIS — E11.51 ONYCHOMYCOSIS OF MULTIPLE TOENAILS WITH TYPE 2 DIABETES MELLITUS AND PERIPHERAL ANGIOPATHY: ICD-10-CM

## 2020-06-13 DIAGNOSIS — E11.69 ONYCHOMYCOSIS OF MULTIPLE TOENAILS WITH TYPE 2 DIABETES MELLITUS AND PERIPHERAL ANGIOPATHY: ICD-10-CM

## 2020-06-13 DIAGNOSIS — B35.1 ONYCHOMYCOSIS OF MULTIPLE TOENAILS WITH TYPE 2 DIABETES MELLITUS AND PERIPHERAL NEUROPATHY: ICD-10-CM

## 2020-06-13 DIAGNOSIS — B35.1 ONYCHOMYCOSIS OF MULTIPLE TOENAILS WITH TYPE 2 DIABETES MELLITUS AND PERIPHERAL ANGIOPATHY: ICD-10-CM

## 2020-06-13 DIAGNOSIS — I65.21 STENOSIS OF RIGHT CAROTID ARTERY: ICD-10-CM

## 2020-06-13 DIAGNOSIS — E78.2 MIXED HYPERLIPIDEMIA: ICD-10-CM

## 2020-06-13 DIAGNOSIS — F33.0 MILD EPISODE OF RECURRENT MAJOR DEPRESSIVE DISORDER: ICD-10-CM

## 2020-06-13 DIAGNOSIS — Z00.00 PREVENTATIVE HEALTH CARE: Primary | ICD-10-CM

## 2020-06-13 DIAGNOSIS — I73.9 PAD (PERIPHERAL ARTERY DISEASE): ICD-10-CM

## 2020-06-13 DIAGNOSIS — I35.0 NONRHEUMATIC AORTIC VALVE STENOSIS: ICD-10-CM

## 2020-06-13 DIAGNOSIS — Z95.1 S/P CABG X 5: ICD-10-CM

## 2020-06-13 DIAGNOSIS — E11.42 ONYCHOMYCOSIS OF MULTIPLE TOENAILS WITH TYPE 2 DIABETES MELLITUS AND PERIPHERAL NEUROPATHY: ICD-10-CM

## 2020-06-13 DIAGNOSIS — I25.10 CORONARY ARTERY DISEASE INVOLVING NATIVE CORONARY ARTERY OF NATIVE HEART WITHOUT ANGINA PECTORIS: ICD-10-CM

## 2020-06-13 DIAGNOSIS — I10 ESSENTIAL HYPERTENSION: ICD-10-CM

## 2020-06-13 DIAGNOSIS — N52.9 ERECTILE DYSFUNCTION, UNSPECIFIED ERECTILE DYSFUNCTION TYPE: ICD-10-CM

## 2020-06-13 DIAGNOSIS — G47.09 OTHER INSOMNIA: ICD-10-CM

## 2020-06-13 DIAGNOSIS — E11.69 TYPE 2 DIABETES MELLITUS WITH OTHER SPECIFIED COMPLICATION, WITHOUT LONG-TERM CURRENT USE OF INSULIN: ICD-10-CM

## 2020-06-13 DIAGNOSIS — E11.69 ONYCHOMYCOSIS OF MULTIPLE TOENAILS WITH TYPE 2 DIABETES MELLITUS AND PERIPHERAL NEUROPATHY: ICD-10-CM

## 2020-06-13 PROCEDURE — 3075F PR MOST RECENT SYSTOLIC BLOOD PRESS GE 130-139MM HG: ICD-10-PCS | Mod: HCNC,CPTII,S$GLB, | Performed by: FAMILY MEDICINE

## 2020-06-13 PROCEDURE — 3051F PR MOST RECENT HEMOGLOBIN A1C LEVEL 7.0 - < 8.0%: ICD-10-PCS | Mod: HCNC,CPTII,S$GLB, | Performed by: FAMILY MEDICINE

## 2020-06-13 PROCEDURE — 99397 PR PREVENTIVE VISIT,EST,65 & OVER: ICD-10-PCS | Mod: HCNC,S$GLB,, | Performed by: FAMILY MEDICINE

## 2020-06-13 PROCEDURE — 3078F DIAST BP <80 MM HG: CPT | Mod: HCNC,CPTII,S$GLB, | Performed by: FAMILY MEDICINE

## 2020-06-13 PROCEDURE — 3075F SYST BP GE 130 - 139MM HG: CPT | Mod: HCNC,CPTII,S$GLB, | Performed by: FAMILY MEDICINE

## 2020-06-13 PROCEDURE — 99999 PR PBB SHADOW E&M-EST. PATIENT-LVL V: ICD-10-PCS | Mod: PBBFAC,HCNC,, | Performed by: FAMILY MEDICINE

## 2020-06-13 PROCEDURE — 99999 PR PBB SHADOW E&M-EST. PATIENT-LVL V: CPT | Mod: PBBFAC,HCNC,, | Performed by: FAMILY MEDICINE

## 2020-06-13 PROCEDURE — 3078F PR MOST RECENT DIASTOLIC BLOOD PRESSURE < 80 MM HG: ICD-10-PCS | Mod: HCNC,CPTII,S$GLB, | Performed by: FAMILY MEDICINE

## 2020-06-13 PROCEDURE — 99397 PER PM REEVAL EST PAT 65+ YR: CPT | Mod: HCNC,S$GLB,, | Performed by: FAMILY MEDICINE

## 2020-06-13 PROCEDURE — 3051F HG A1C>EQUAL 7.0%<8.0%: CPT | Mod: HCNC,CPTII,S$GLB, | Performed by: FAMILY MEDICINE

## 2020-06-13 RX ORDER — SILDENAFIL 100 MG/1
100 TABLET, FILM COATED ORAL DAILY PRN
Qty: 30 TABLET | Refills: 11 | Status: SHIPPED | OUTPATIENT
Start: 2020-06-13 | End: 2021-12-16

## 2020-06-13 RX ORDER — DAPAGLIFLOZIN 10 MG/1
10 TABLET, FILM COATED ORAL DAILY
Qty: 30 TABLET | Refills: 11 | Status: SHIPPED | OUTPATIENT
Start: 2020-06-13 | End: 2022-06-22 | Stop reason: SDUPTHER

## 2020-06-13 RX ORDER — FENOFIBRATE 160 MG/1
160 TABLET ORAL DAILY
Qty: 90 TABLET | Refills: 3 | Status: SHIPPED | OUTPATIENT
Start: 2020-06-13 | End: 2021-05-31

## 2020-06-13 NOTE — PROGRESS NOTES
Subjective:       Patient ID: Cecil Pringle is a 74 y.o. male.    Chief Complaint: Annual Exam    HPI 74-year-old white male presents to clinic today for annual physical exam.  He continues to be followed by Cardiology secondary to coronary artery disease status post CABG in , 5 stent placement in , and repeat CABG in .  He also has peripheral artery disease and remains stable on both aspirin and Plavix.  Hypertension remains well controlled on losartan 100 mg daily, nifedipine 60 mg daily, and metoprolol 100 mg daily.  Hyperlipidemia remains stable on Lipitor 40 mg daily and fenofibrate 160 mg daily.  He continues to be followed by Endocrinology for uncontrolled type 2 diabetes with slight improvement in A1c to 7.7.  He has recently seen Endocrinology and current medication regimen is metformin 1000 mg b.i.d., Januvia 100 mg daily, Farxiga 10 mg daily, and glimepiride 2 mg daily.  He continues to have stable depression on Prozac 20 mg daily.  He has a past surgical history of CABG in  and repeat CABG in , coronary stent placement in , right hand surgery, and left foot surgery.  He has a strong family history of heart disease.  His mother  of asbestosis.  He is up-to-date with all vaccinations.  Review of Systems   Constitutional: Positive for activity change. Negative for appetite change, chills, fatigue, fever and unexpected weight change.   HENT: Positive for hearing loss. Negative for congestion, ear pain, postnasal drip, rhinorrhea, sinus pressure, sore throat, tinnitus and trouble swallowing.    Eyes: Negative for discharge, redness, itching and visual disturbance.   Respiratory: Negative for cough, chest tightness, shortness of breath and wheezing.    Cardiovascular: Negative for chest pain and palpitations.   Gastrointestinal: Negative for abdominal pain, blood in stool, constipation, diarrhea, nausea and vomiting.   Endocrine: Positive for polydipsia and polyuria.    Genitourinary: Negative for decreased urine volume, difficulty urinating, dysuria, frequency, hematuria and urgency.   Musculoskeletal: Negative for arthralgias, back pain, joint swelling, myalgias, neck pain and neck stiffness.   Skin: Negative for rash.   Neurological: Positive for numbness. Negative for dizziness, weakness, light-headedness and headaches.   Psychiatric/Behavioral: Negative.  Negative for confusion and dysphoric mood.       Objective:      Physical Exam  Vitals signs and nursing note reviewed.   Constitutional:       General: He is not in acute distress.     Appearance: He is well-developed. He is not diaphoretic.   HENT:      Head: Normocephalic and atraumatic.      Right Ear: External ear normal.      Left Ear: External ear normal.      Nose: Nose normal.      Mouth/Throat:      Pharynx: No oropharyngeal exudate.   Eyes:      General: No scleral icterus.        Right eye: No discharge.         Left eye: No discharge.      Conjunctiva/sclera: Conjunctivae normal.      Pupils: Pupils are equal, round, and reactive to light.   Neck:      Musculoskeletal: Normal range of motion and neck supple.      Thyroid: No thyromegaly.      Vascular: No JVD.      Trachea: No tracheal deviation.   Cardiovascular:      Rate and Rhythm: Normal rate and regular rhythm.      Pulses:           Dorsalis pedis pulses are 2+ on the right side and 2+ on the left side.        Posterior tibial pulses are 2+ on the right side and 2+ on the left side.      Heart sounds: Normal heart sounds. No murmur. No friction rub. No gallop.    Pulmonary:      Effort: Pulmonary effort is normal. No respiratory distress.      Breath sounds: Normal breath sounds. No stridor. No wheezing or rales.   Abdominal:      General: Bowel sounds are normal. There is no distension.      Palpations: Abdomen is soft. There is no mass.      Tenderness: There is no abdominal tenderness. There is no guarding or rebound.   Musculoskeletal: Normal range  of motion.         General: No tenderness.      Right foot: Normal range of motion. Deformity present. No bunion, Charcot foot, foot drop or prominent metatarsal heads.      Left foot: Normal range of motion. Deformity present. No bunion, Charcot foot, foot drop or prominent metatarsal heads.   Feet:      Right foot:      Protective Sensation: 10 sites tested. 5 sites sensed.      Skin integrity: Callus and dry skin present. No ulcer, blister, skin breakdown, erythema, warmth or fissure.      Toenail Condition: Right toenails are abnormally thick. Fungal disease present.     Left foot:      Protective Sensation: 10 sites tested. 5 sites sensed.      Skin integrity: Callus and dry skin present. No ulcer, blister, skin breakdown, erythema, warmth or fissure.      Toenail Condition: Left toenails are abnormally thick. Fungal disease present.  Lymphadenopathy:      Cervical: No cervical adenopathy.   Skin:     General: Skin is warm and dry.      Coloration: Skin is not pale.      Findings: No erythema or rash.   Neurological:      Mental Status: He is alert and oriented to person, place, and time.   Psychiatric:         Behavior: Behavior normal.         Thought Content: Thought content normal.         Judgment: Judgment normal.         Assessment:       1. Preventative health care    2. Uncontrolled type 2 diabetes mellitus with diabetic peripheral angiopathy without gangrene, without long-term current use of insulin    3. Type 2 diabetes, uncontrolled, with neuropathy    4. Type 2 diabetes mellitus with other specified complication, without long-term current use of insulin    5. Coronary artery disease involving native coronary artery of native heart without angina pectoris    6. S/P CABG x 5    7. Nonrheumatic aortic valve stenosis    8. Stenosis of right carotid artery    9. Essential hypertension    10. PAD (peripheral artery disease)    11. Mixed hyperlipidemia    12. Onychomycosis of multiple toenails with type 2  diabetes mellitus and peripheral neuropathy    13. Onychomycosis of multiple toenails with type 2 diabetes mellitus and peripheral angiopathy    14. Mild episode of recurrent major depressive disorder    15. Other insomnia    16. Erectile dysfunction, unspecified erectile dysfunction type        Plan:       1.  Labs have been reviewed and are overall within normal limits with an improved A1c of 7.7.  2.  Continue Januvia 100 mg daily, metformin 1000 mg b.i.d., farxiga 10 mg daily, and glimepiride 2 mg daily.  Diabetes is improved with an A1c of 7.7.  Continue follow-up endocrinology as scheduled.  3.  Continue follow-up with podiatry as scheduled for continued treatment of diabetic neuropathy with onychomycosis and calluses.  4.  Continue aspirin, Plavix, Lipitor, fenofibrate, losartan, metoprolol, and nifedipine as prescribed.  Coronary artery disease, aortic valve stenosis, carotid artery disease, hypertension, and peripheral artery disease are well controlled.  5.  Continue Lipitor and fenofibrate as prescribed.  Hyperlipidemia stable.  6.  Continue Prozac 20 mg daily.  Depression is stable.  7.  Continue hydroxyzine as needed.  Insomnia stable.  8.  Viagra 100 mg as needed for erectile dysfunction.  9.  Return to clinic as needed or in 1 year for annual exam.

## 2020-06-15 ENCOUNTER — PATIENT OUTREACH (OUTPATIENT)
Dept: ADMINISTRATIVE | Facility: OTHER | Age: 75
End: 2020-06-15

## 2020-06-16 ENCOUNTER — OFFICE VISIT (OUTPATIENT)
Dept: PODIATRY | Facility: CLINIC | Age: 75
End: 2020-06-16
Payer: MEDICARE

## 2020-06-16 VITALS
DIASTOLIC BLOOD PRESSURE: 61 MMHG | SYSTOLIC BLOOD PRESSURE: 121 MMHG | BODY MASS INDEX: 28.97 KG/M2 | HEIGHT: 75 IN | WEIGHT: 233 LBS | HEART RATE: 60 BPM

## 2020-06-16 DIAGNOSIS — B35.1 ONYCHOMYCOSIS OF MULTIPLE TOENAILS WITH TYPE 2 DIABETES MELLITUS AND PERIPHERAL ANGIOPATHY: ICD-10-CM

## 2020-06-16 DIAGNOSIS — E11.69 ONYCHOMYCOSIS OF MULTIPLE TOENAILS WITH TYPE 2 DIABETES MELLITUS AND PERIPHERAL ANGIOPATHY: ICD-10-CM

## 2020-06-16 DIAGNOSIS — E11.51 ONYCHOMYCOSIS OF MULTIPLE TOENAILS WITH TYPE 2 DIABETES MELLITUS AND PERIPHERAL ANGIOPATHY: ICD-10-CM

## 2020-06-16 DIAGNOSIS — L84 CORN OR CALLUS: ICD-10-CM

## 2020-06-16 DIAGNOSIS — I73.9 PAD (PERIPHERAL ARTERY DISEASE): ICD-10-CM

## 2020-06-16 PROCEDURE — 3074F SYST BP LT 130 MM HG: CPT | Mod: HCNC,CPTII,S$GLB, | Performed by: STUDENT IN AN ORGANIZED HEALTH CARE EDUCATION/TRAINING PROGRAM

## 2020-06-16 PROCEDURE — 3051F PR MOST RECENT HEMOGLOBIN A1C LEVEL 7.0 - < 8.0%: ICD-10-PCS | Mod: HCNC,CPTII,S$GLB, | Performed by: STUDENT IN AN ORGANIZED HEALTH CARE EDUCATION/TRAINING PROGRAM

## 2020-06-16 PROCEDURE — 99214 PR OFFICE/OUTPT VISIT, EST, LEVL IV, 30-39 MIN: ICD-10-PCS | Mod: 25,HCNC,S$GLB, | Performed by: STUDENT IN AN ORGANIZED HEALTH CARE EDUCATION/TRAINING PROGRAM

## 2020-06-16 PROCEDURE — 11056 ROUTINE FOOT CARE: ICD-10-PCS | Mod: Q9,HCNC,S$GLB, | Performed by: STUDENT IN AN ORGANIZED HEALTH CARE EDUCATION/TRAINING PROGRAM

## 2020-06-16 PROCEDURE — 11056 PARNG/CUTG B9 HYPRKR LES 2-4: CPT | Mod: Q9,HCNC,S$GLB, | Performed by: STUDENT IN AN ORGANIZED HEALTH CARE EDUCATION/TRAINING PROGRAM

## 2020-06-16 PROCEDURE — 11721 ROUTINE FOOT CARE: ICD-10-PCS | Mod: Q9,59,HCNC,S$GLB | Performed by: STUDENT IN AN ORGANIZED HEALTH CARE EDUCATION/TRAINING PROGRAM

## 2020-06-16 PROCEDURE — 99214 OFFICE O/P EST MOD 30 MIN: CPT | Mod: 25,HCNC,S$GLB, | Performed by: STUDENT IN AN ORGANIZED HEALTH CARE EDUCATION/TRAINING PROGRAM

## 2020-06-16 PROCEDURE — 1126F AMNT PAIN NOTED NONE PRSNT: CPT | Mod: HCNC,S$GLB,, | Performed by: STUDENT IN AN ORGANIZED HEALTH CARE EDUCATION/TRAINING PROGRAM

## 2020-06-16 PROCEDURE — 3051F HG A1C>EQUAL 7.0%<8.0%: CPT | Mod: HCNC,CPTII,S$GLB, | Performed by: STUDENT IN AN ORGANIZED HEALTH CARE EDUCATION/TRAINING PROGRAM

## 2020-06-16 PROCEDURE — 1159F MED LIST DOCD IN RCRD: CPT | Mod: HCNC,S$GLB,, | Performed by: STUDENT IN AN ORGANIZED HEALTH CARE EDUCATION/TRAINING PROGRAM

## 2020-06-16 PROCEDURE — 1159F PR MEDICATION LIST DOCUMENTED IN MEDICAL RECORD: ICD-10-PCS | Mod: HCNC,S$GLB,, | Performed by: STUDENT IN AN ORGANIZED HEALTH CARE EDUCATION/TRAINING PROGRAM

## 2020-06-16 PROCEDURE — 3078F PR MOST RECENT DIASTOLIC BLOOD PRESSURE < 80 MM HG: ICD-10-PCS | Mod: HCNC,CPTII,S$GLB, | Performed by: STUDENT IN AN ORGANIZED HEALTH CARE EDUCATION/TRAINING PROGRAM

## 2020-06-16 PROCEDURE — 1126F PR PAIN SEVERITY QUANTIFIED, NO PAIN PRESENT: ICD-10-PCS | Mod: HCNC,S$GLB,, | Performed by: STUDENT IN AN ORGANIZED HEALTH CARE EDUCATION/TRAINING PROGRAM

## 2020-06-16 PROCEDURE — 99999 PR PBB SHADOW E&M-EST. PATIENT-LVL V: CPT | Mod: PBBFAC,HCNC,, | Performed by: STUDENT IN AN ORGANIZED HEALTH CARE EDUCATION/TRAINING PROGRAM

## 2020-06-16 PROCEDURE — 1101F PT FALLS ASSESS-DOCD LE1/YR: CPT | Mod: HCNC,CPTII,S$GLB, | Performed by: STUDENT IN AN ORGANIZED HEALTH CARE EDUCATION/TRAINING PROGRAM

## 2020-06-16 PROCEDURE — 3074F PR MOST RECENT SYSTOLIC BLOOD PRESSURE < 130 MM HG: ICD-10-PCS | Mod: HCNC,CPTII,S$GLB, | Performed by: STUDENT IN AN ORGANIZED HEALTH CARE EDUCATION/TRAINING PROGRAM

## 2020-06-16 PROCEDURE — 3078F DIAST BP <80 MM HG: CPT | Mod: HCNC,CPTII,S$GLB, | Performed by: STUDENT IN AN ORGANIZED HEALTH CARE EDUCATION/TRAINING PROGRAM

## 2020-06-16 PROCEDURE — 11721 DEBRIDE NAIL 6 OR MORE: CPT | Mod: Q9,59,HCNC,S$GLB | Performed by: STUDENT IN AN ORGANIZED HEALTH CARE EDUCATION/TRAINING PROGRAM

## 2020-06-16 PROCEDURE — 99999 PR PBB SHADOW E&M-EST. PATIENT-LVL V: ICD-10-PCS | Mod: PBBFAC,HCNC,, | Performed by: STUDENT IN AN ORGANIZED HEALTH CARE EDUCATION/TRAINING PROGRAM

## 2020-06-16 PROCEDURE — 1101F PR PT FALLS ASSESS DOC 0-1 FALLS W/OUT INJ PAST YR: ICD-10-PCS | Mod: HCNC,CPTII,S$GLB, | Performed by: STUDENT IN AN ORGANIZED HEALTH CARE EDUCATION/TRAINING PROGRAM

## 2020-06-16 NOTE — PROGRESS NOTES
Subjective:      Patient ID: Cecil Pringle is a 74 y.o. male.    Chief Complaint: Diabetes Mellitus (6/13/2020 office visit with PCP-GIDEON Jett ) and Nail Care    Cecil is a 74 y.o. male who presents to the clinic upon referral from Dr. Hannah rock. provider found  for evaluation and treatment of diabetic feet. Cecil has a past medical history of Anxiety, Arthritis, Coronary artery disease, Depression, Diabetes mellitus, type 2, Hyperlipidemia, Hypertension, Insomnia, and PAD (peripheral artery disease). Patient relates no major problem with feet,  has occasional burning/tinlging sensations to toes. Only complaints today consist of callus and elongated toe nails to rakel feet.  has not had new diabetic shoes in 7 years.     PCP: Layo Jett MD    Date Last Seen by PCP:    Current shoe gear: Rx diabetic extra depth shoes and custom accommodative insoles    Hemoglobin A1C   Date Value Ref Range Status   06/08/2020 7.7 (H) 4.0 - 5.6 % Final     Comment:     ADA Screening Guidelines:  5.7-6.4%  Consistent with prediabetes  >or=6.5%  Consistent with diabetes  High levels of fetal hemoglobin interfere with the HbA1C  assay. Heterozygous hemoglobin variants (HbS, HgC, etc)do  not significantly interfere with this assay.   However, presence of multiple variants may affect accuracy.     02/04/2020 8.6 (H) 4.0 - 5.6 % Final     Comment:     ADA Screening Guidelines:  5.7-6.4%  Consistent with prediabetes  >or=6.5%  Consistent with diabetes  High levels of fetal hemoglobin interfere with the HbA1C  assay. Heterozygous hemoglobin variants (HbS, HgC, etc)do  not significantly interfere with this assay.   However, presence of multiple variants may affect accuracy.     10/31/2019 8.1 (H) 4.0 - 5.6 % Final     Comment:     ADA Screening Guidelines:  5.7-6.4%  Consistent with prediabetes  >or=6.5%  Consistent with diabetes  High levels of fetal hemoglobin interfere with the HbA1C  assay. Heterozygous hemoglobin  variants (HbS, HgC, etc)do  not significantly interfere with this assay.   However, presence of multiple variants may affect accuracy.             Review of Systems   Constitution: Negative for chills, decreased appetite, diaphoresis and fever.   Cardiovascular: Negative for claudication and leg swelling.   Respiratory: Negative for shortness of breath.    Skin: Positive for dry skin and nail changes. Negative for color change, flushing, itching, poor wound healing, rash and suspicious lesions.   Musculoskeletal: Negative for arthritis, back pain, falls, joint pain, joint swelling and myalgias.   Gastrointestinal: Negative for nausea and vomiting.   Neurological: Positive for numbness and paresthesias. Negative for loss of balance.           Objective:      Physical Exam  Vitals signs and nursing note reviewed.   Constitutional:       General: He is not in acute distress.     Appearance: He is well-developed. He is not diaphoretic.   Cardiovascular:      Pulses:           Dorsalis pedis pulses are 1+ on the right side and 1+ on the left side.        Posterior tibial pulses are 1+ on the right side and 1+ on the left side.      Comments: Skin temperature is within normal limits. Toes are cool to touch and feet are warm proximally. Hair growth is mildly diminished. Skin is mildly atrophic and with mild hyperpigmentation. Mild edema noted, varicosities noted rakel      Musculoskeletal:         General: Deformity present. No tenderness.      Right foot: Decreased range of motion. Deformity present.      Left foot: Decreased range of motion. Deformity present.      Comments: No POP noted, rakel, Strength is 5/5 to lower extremity muscle groups, rakel    Rectus arches that reduce with weight bearing, equinus that reduces with knee flexed, rakel.     Bunion and tailors bunion deformity noted, rakel with reducible hammer toes 2-5, rakel   Feet:      Right foot:      Skin integrity: Dry skin present. No ulcer, blister, skin breakdown,  erythema, warmth or callus.      Left foot:      Skin integrity: Dry skin present. No ulcer, blister, skin breakdown, erythema, warmth or callus.   Lymphadenopathy:      Comments: Negative lymphadenopathy bilateral popliteal fossa and tarsal tunnel.    Skin:     General: Skin is warm and dry.      Capillary Refill: Capillary refill takes less than 2 seconds.      Coloration: Skin is not pale.      Findings: No bruising, ecchymosis, erythema, laceration, lesion, petechiae or rash.      Comments: Skin is warm and dry bilaterally, no acute SOI noted, bilaterally, appears stable.     Nails 1-5 are thickened, discolored, dystrophic and with subungual debris, rakel    HPK noted sub 1st met head and medial IPJ of hallux rakel   Neurological:      Mental Status: He is alert and oriented to person, place, and time.      Sensory: Sensory deficit present.      Comments: Vibratory sensation diminished, rakel               Assessment:       Encounter Diagnoses   Name Primary?    Type 2 diabetes, uncontrolled, with neuropathy Yes    PAD (peripheral artery disease)     Onychomycosis of multiple toenails with type 2 diabetes mellitus and peripheral angiopathy     Corn or callus     Uncontrolled type 2 diabetes mellitus with diabetic peripheral angiopathy without gangrene, without long-term current use of insulin          Plan:       Cecil was seen today for diabetes mellitus and nail care.    Diagnoses and all orders for this visit:    Type 2 diabetes, uncontrolled, with neuropathy  -     DIABETIC SHOES FOR HOME USE  -     Routine Foot Care    PAD (peripheral artery disease)  -     Routine Foot Care    Onychomycosis of multiple toenails with type 2 diabetes mellitus and peripheral angiopathy  -     Routine Foot Care    Thor or callus  -     Routine Foot Care    Uncontrolled type 2 diabetes mellitus with diabetic peripheral angiopathy without gangrene, without long-term current use of insulin  -     Routine Foot Care      I  counseled the patient on his conditions, their implications and medical management.    - HPK and nails debrided, see procedure note    - Shoe inspection. Diabetic Foot Education. Patient reminded of the importance of good nutrition and blood sugar control to help prevent podiatric complications of diabetes. Patient instructed on proper foot hygeine. We discussed wearing proper shoe gear, daily foot inspections, never walking without protective shoe gear, never putting sharp instruments to feet, routine podiatric nail visits every 2-3 months.      - With patient's permission, nails were aggressively reduced and debrided x 10 to their soft tissue attachment mechanically and with electric , removing all offending nail and debris. Patient relates relief following the procedure. He will continue to monitor the areas daily, inspect his feet, wear protective shoe gear when ambulatory, moisturizer to maintain skin integrity and follow in this office in approximately 2-3 months, sooner p.r.n.    -Shoe inspection. Diabetic Foot Education. Patient reminded of the importance of good nutrition and blood sugar control to help prevent podiatric complications of diabetes. Patient instructed on proper foot hygeine. We discussed wearing proper shoe gear, daily foot inspections, never walking without protective shoe gear, never putting sharp instruments to feet, routine podiatric nail visits      -I discussed with the  patient  signs and symptoms of infection including redness, drainage, purulence, odor, pain, elevated BS, streaking, fever, chills, etc . Patient is to seek medical attention (ER or urgent care) if these symptoms occur      -RTC in 3 months for routine diabetic foot care

## 2020-06-16 NOTE — PROCEDURES
Routine Foot Care    Date/Time: 6/16/2020 9:15 AM  Performed by: Terri Starkey DPM  Authorized by: Terri Starkey DPM     Consent Done?:  Yes (Verbal)  Hyperkeratotic Skin Lesions?: Yes    Number of trimmed lesions:  4  Location(s):  Left 1st Toe, Right 1st Toe, Left 1st Metatarsal Head and Right 1st Metatarsal Head    Nail Care Type:  Debride  Location(s): All  (Left 1st Toe, Left 3rd Toe, Left 2nd Toe, Left 4th Toe, Left 5th Toe, Right 1st Toe, Right 2nd Toe, Right 3rd Toe, Right 4th Toe and Right 5th Toe)  Patient tolerance:  Patient tolerated the procedure well with no immediate complications

## 2020-07-10 RX ORDER — GLIPIZIDE 5 MG/1
5 TABLET ORAL
Qty: 60 TABLET | Refills: 11 | Status: SHIPPED | OUTPATIENT
Start: 2020-07-10 | End: 2020-09-14

## 2020-07-15 ENCOUNTER — PATIENT OUTREACH (OUTPATIENT)
Dept: ADMINISTRATIVE | Facility: OTHER | Age: 75
End: 2020-07-15

## 2020-07-15 NOTE — PROGRESS NOTES
Care Everywhere: updated  Immunization: updated  Health Maintenance: updated  Media Review:   Legacy Review:   Order placed:   Upcoming appts:optometry 7.17.2020

## 2020-07-17 ENCOUNTER — OFFICE VISIT (OUTPATIENT)
Dept: OPTOMETRY | Facility: CLINIC | Age: 75
End: 2020-07-17
Payer: COMMERCIAL

## 2020-07-17 DIAGNOSIS — Z13.5 GLAUCOMA SCREENING: ICD-10-CM

## 2020-07-17 DIAGNOSIS — E11.9 TYPE 2 DIABETES MELLITUS WITHOUT RETINOPATHY: Primary | ICD-10-CM

## 2020-07-17 DIAGNOSIS — H52.4 ASTIGMATISM WITH PRESBYOPIA, BILATERAL: ICD-10-CM

## 2020-07-17 DIAGNOSIS — H52.203 ASTIGMATISM WITH PRESBYOPIA, BILATERAL: ICD-10-CM

## 2020-07-17 DIAGNOSIS — H25.13 NUCLEAR SCLEROSIS, BILATERAL: ICD-10-CM

## 2020-07-17 PROCEDURE — 92015 PR REFRACTION: ICD-10-PCS | Mod: S$GLB,,, | Performed by: OPTOMETRIST

## 2020-07-17 PROCEDURE — 92014 COMPRE OPH EXAM EST PT 1/>: CPT | Mod: S$GLB,,, | Performed by: OPTOMETRIST

## 2020-07-17 PROCEDURE — 99499 UNLISTED E&M SERVICE: CPT | Mod: S$GLB,,, | Performed by: OPTOMETRIST

## 2020-07-17 PROCEDURE — 99999 PR PBB SHADOW E&M-EST. PATIENT-LVL IV: CPT | Mod: PBBFAC,,, | Performed by: OPTOMETRIST

## 2020-07-17 PROCEDURE — 99499 RISK ADDL DX/OHS AUDIT: ICD-10-PCS | Mod: S$GLB,,, | Performed by: OPTOMETRIST

## 2020-07-17 PROCEDURE — 92014 PR EYE EXAM, EST PATIENT,COMPREHESV: ICD-10-PCS | Mod: S$GLB,,, | Performed by: OPTOMETRIST

## 2020-07-17 PROCEDURE — 99999 PR PBB SHADOW E&M-EST. PATIENT-LVL IV: ICD-10-PCS | Mod: PBBFAC,,, | Performed by: OPTOMETRIST

## 2020-07-17 PROCEDURE — 92015 DETERMINE REFRACTIVE STATE: CPT | Mod: S$GLB,,, | Performed by: OPTOMETRIST

## 2020-08-17 ENCOUNTER — PATIENT MESSAGE (OUTPATIENT)
Dept: ENDOCRINOLOGY | Facility: CLINIC | Age: 75
End: 2020-08-17

## 2020-08-17 RX ORDER — TEMAZEPAM 30 MG/1
30 CAPSULE ORAL NIGHTLY PRN
Qty: 30 CAPSULE | Refills: 0 | Status: SHIPPED | OUTPATIENT
Start: 2020-08-17 | End: 2021-01-12 | Stop reason: SDUPTHER

## 2020-09-10 ENCOUNTER — LAB VISIT (OUTPATIENT)
Dept: LAB | Facility: HOSPITAL | Age: 75
End: 2020-09-10
Attending: INTERNAL MEDICINE
Payer: MEDICARE

## 2020-09-10 LAB
CHOLEST SERPL-MCNC: 147 MG/DL (ref 120–199)
CHOLEST/HDLC SERPL: 4.9 {RATIO} (ref 2–5)
ESTIMATED AVG GLUCOSE: 180 MG/DL (ref 68–131)
HBA1C MFR BLD HPLC: 7.9 % (ref 4–5.6)
HDLC SERPL-MCNC: 30 MG/DL (ref 40–75)
HDLC SERPL: 20.4 % (ref 20–50)
LDLC SERPL CALC-MCNC: ABNORMAL MG/DL (ref 63–159)
NONHDLC SERPL-MCNC: 117 MG/DL
TRIGL SERPL-MCNC: 425 MG/DL (ref 30–150)

## 2020-09-10 PROCEDURE — 83036 HEMOGLOBIN GLYCOSYLATED A1C: CPT | Mod: HCNC

## 2020-09-10 PROCEDURE — 80061 LIPID PANEL: CPT | Mod: HCNC

## 2020-09-10 PROCEDURE — 36415 COLL VENOUS BLD VENIPUNCTURE: CPT | Mod: HCNC,PO

## 2020-09-13 ENCOUNTER — PATIENT OUTREACH (OUTPATIENT)
Dept: ADMINISTRATIVE | Facility: OTHER | Age: 75
End: 2020-09-13

## 2020-09-13 NOTE — PROGRESS NOTES
Health Maintenance Due   Topic Date Due    Hepatitis C Screening  1945    TETANUS VACCINE  11/21/1965    Shingles Vaccine (1 of 2) 11/21/1997    Influenza Vaccine (1) 08/01/2020     Updates were requested from care everywhere.  Chart was reviewed for overdue Proactive [facility] Encounters ([facility]) topics (CRS, Breast Cancer Screening, Eye exam)  Health Maintenance has been updated.  LINKS immunization registry triggered.  Immunizations were reconciled.

## 2020-09-14 ENCOUNTER — OFFICE VISIT (OUTPATIENT)
Dept: ENDOCRINOLOGY | Facility: CLINIC | Age: 75
End: 2020-09-14
Payer: MEDICARE

## 2020-09-14 VITALS
WEIGHT: 232.19 LBS | HEART RATE: 60 BPM | SYSTOLIC BLOOD PRESSURE: 153 MMHG | TEMPERATURE: 98 F | OXYGEN SATURATION: 95 % | BODY MASS INDEX: 28.87 KG/M2 | HEIGHT: 75 IN | DIASTOLIC BLOOD PRESSURE: 65 MMHG

## 2020-09-14 DIAGNOSIS — E11.69 TYPE 2 DIABETES MELLITUS WITH OTHER SPECIFIED COMPLICATION, WITHOUT LONG-TERM CURRENT USE OF INSULIN: Primary | ICD-10-CM

## 2020-09-14 PROCEDURE — 3078F PR MOST RECENT DIASTOLIC BLOOD PRESSURE < 80 MM HG: ICD-10-PCS | Mod: HCNC,CPTII,S$GLB, | Performed by: INTERNAL MEDICINE

## 2020-09-14 PROCEDURE — 1126F AMNT PAIN NOTED NONE PRSNT: CPT | Mod: HCNC,S$GLB,, | Performed by: INTERNAL MEDICINE

## 2020-09-14 PROCEDURE — 1126F PR PAIN SEVERITY QUANTIFIED, NO PAIN PRESENT: ICD-10-PCS | Mod: HCNC,S$GLB,, | Performed by: INTERNAL MEDICINE

## 2020-09-14 PROCEDURE — 3008F PR BODY MASS INDEX (BMI) DOCUMENTED: ICD-10-PCS | Mod: HCNC,CPTII,S$GLB, | Performed by: INTERNAL MEDICINE

## 2020-09-14 PROCEDURE — 3077F PR MOST RECENT SYSTOLIC BLOOD PRESSURE >= 140 MM HG: ICD-10-PCS | Mod: HCNC,CPTII,S$GLB, | Performed by: INTERNAL MEDICINE

## 2020-09-14 PROCEDURE — 3051F PR MOST RECENT HEMOGLOBIN A1C LEVEL 7.0 - < 8.0%: ICD-10-PCS | Mod: HCNC,CPTII,S$GLB, | Performed by: INTERNAL MEDICINE

## 2020-09-14 PROCEDURE — 99214 OFFICE O/P EST MOD 30 MIN: CPT | Mod: HCNC,S$GLB,, | Performed by: INTERNAL MEDICINE

## 2020-09-14 PROCEDURE — 3051F HG A1C>EQUAL 7.0%<8.0%: CPT | Mod: HCNC,CPTII,S$GLB, | Performed by: INTERNAL MEDICINE

## 2020-09-14 PROCEDURE — 99214 PR OFFICE/OUTPT VISIT, EST, LEVL IV, 30-39 MIN: ICD-10-PCS | Mod: HCNC,S$GLB,, | Performed by: INTERNAL MEDICINE

## 2020-09-14 PROCEDURE — 1159F PR MEDICATION LIST DOCUMENTED IN MEDICAL RECORD: ICD-10-PCS | Mod: HCNC,S$GLB,, | Performed by: INTERNAL MEDICINE

## 2020-09-14 PROCEDURE — 1159F MED LIST DOCD IN RCRD: CPT | Mod: HCNC,S$GLB,, | Performed by: INTERNAL MEDICINE

## 2020-09-14 PROCEDURE — 3008F BODY MASS INDEX DOCD: CPT | Mod: HCNC,CPTII,S$GLB, | Performed by: INTERNAL MEDICINE

## 2020-09-14 PROCEDURE — 99999 PR PBB SHADOW E&M-EST. PATIENT-LVL V: CPT | Mod: PBBFAC,HCNC,, | Performed by: INTERNAL MEDICINE

## 2020-09-14 PROCEDURE — 1101F PT FALLS ASSESS-DOCD LE1/YR: CPT | Mod: HCNC,CPTII,S$GLB, | Performed by: INTERNAL MEDICINE

## 2020-09-14 PROCEDURE — 99999 PR PBB SHADOW E&M-EST. PATIENT-LVL V: ICD-10-PCS | Mod: PBBFAC,HCNC,, | Performed by: INTERNAL MEDICINE

## 2020-09-14 PROCEDURE — 1101F PR PT FALLS ASSESS DOC 0-1 FALLS W/OUT INJ PAST YR: ICD-10-PCS | Mod: HCNC,CPTII,S$GLB, | Performed by: INTERNAL MEDICINE

## 2020-09-14 PROCEDURE — 3078F DIAST BP <80 MM HG: CPT | Mod: HCNC,CPTII,S$GLB, | Performed by: INTERNAL MEDICINE

## 2020-09-14 PROCEDURE — 3077F SYST BP >= 140 MM HG: CPT | Mod: HCNC,CPTII,S$GLB, | Performed by: INTERNAL MEDICINE

## 2020-09-14 RX ORDER — GLIPIZIDE 5 MG/1
5 TABLET ORAL
Qty: 30 TABLET | Refills: 11
Start: 2020-09-14 | End: 2021-08-05 | Stop reason: SDUPTHER

## 2020-09-14 NOTE — PROGRESS NOTES
Subjective:      Patient ID: Cecil Pringle is a 74 y.o. male.    Chief Complaint:  Diabetes      History of Present Illness  74-year-old male with high blood pressure, dyslipidemia, coronary artery disease, referred here for evaluation of Diabetes mellitus type 2      Diagnosed with diabetes >10 years     Current regimen  Farxiga 10 mg once daily  glipizide 5 mg twice daily-however taking only once in the morning.  Patient is confused between glipizide and glimepiride.  Reports he needs to check his pill box at home before he can not confirm what exactly he is taking now    Januvia 100 mg  Metformin 1000 mg     Sugar logs reviewed in detail           Sugar readings notable for sugar reading of 77.  Patient denies any hypoglycemic symptoms even if when his sugars are in 80s.  Usually his pre lunch sugars are slightly on the lower side and pre-dinner and bedtime sugars are elevated.     Diabetes Education in the past - Yes    RD in the past - Yes       Work -   Retired          Meals:       Breakfast- bowl of cereal/egg pie+coffee    Lunch- wrap with flax seed+ham/turkey/salad    Dinner-cooked food-  meat+veggies       Snacks- stopped     Drinks- water     Activity- walk 30 mins day     Weight has  Been stable     Any hospitalizations r/t to diabetes over last year No   Chronic Complications:          Microvascular -  no diabetic retinopathy.  Last evaluated by opthal  on  4/2019                              + proteinuria-on losartan.  occ numbness and tingling in feet.                               Following with   Podiatry, last evaluation on 01/13/2020.                                scheduled  Follow up on [id number]       Macrovascular - coronary artery disease s/p CABD-1990 AND 2011                               No  stroke       Autonomic Dysfunction - denies gastroparesis, history of incontinence at night     Steroids recently - no     Diabetes Management Status    Statin: Taking  ACE/ARB: Taking    Screening  "or Prevention Patient's value Goal Complete/Controlled?   HgA1C Testing and Control   Lab Results   Component Value Date    HGBA1C 7.9 (H) 09/10/2020      Annually/Less than 8% Yes   Lipid profile : 09/10/2020 Annually Yes   LDL control Lab Results   Component Value Date    LDLCALC Invalid, Trig>400.0 09/10/2020    Annually/Less than 100 mg/dl  Yes   Nephropathy screening Lab Results   Component Value Date    LABMICR 471.0 09/10/2020     Lab Results   Component Value Date    PROTEINUA 1+ (A) 04/18/2019    Annually Yes   Blood pressure BP Readings from Last 1 Encounters:   09/14/20 (!) 153/65    Less than 140/90 No   Dilated retinal exam : 07/17/2020 Annually Yes   Foot exam   : 06/13/2020 Annually Yes         Review of Systems   Review of 7 systems negative except as documented above and following-decreased hearing    Objective:     BP (!) 153/65 (BP Location: Right arm, Patient Position: Sitting, BP Method: Large (Automatic))   Pulse 60   Temp 97.7 °F (36.5 °C) (Temporal)   Ht 6' 3" (1.905 m)   Wt 105.3 kg (232 lb 3.2 oz)   SpO2 95%   BMI 29.02 kg/m²   BP Readings from Last 3 Encounters:   09/14/20 (!) 153/65   06/16/20 121/61   06/13/20 134/60     Wt Readings from Last 1 Encounters:   09/14/20 0759 105.3 kg (232 lb 3.2 oz)     Body mass index is 29.02 kg/m².      Physical Exam  Vitals signs reviewed.   Constitutional:       Appearance: He is well-developed.   HENT:      Head: Normocephalic and atraumatic.   Neck:      Thyroid: No thyromegaly.   Cardiovascular:      Rate and Rhythm: Normal rate.   Pulmonary:      Effort: Pulmonary effort is normal.      Breath sounds: Normal breath sounds.   Abdominal:      Palpations: Abdomen is soft.   Neurological:      Mental Status: He is oriented to person, place, and time.   Psychiatric:         Judgment: Judgment normal.                Lab Review:   Lab Results   Component Value Date    HGBA1C 7.9 (H) 09/10/2020     Lab Results   Component Value Date    CHOL 147 " 09/10/2020    HDL 30 (L) 09/10/2020    LDLCALC Invalid, Trig>400.0 09/10/2020    TRIG 425 (H) 09/10/2020    CHOLHDL 20.4 09/10/2020     Lab Results   Component Value Date     06/08/2020    K 4.7 06/08/2020     06/08/2020    CO2 24 06/08/2020     (H) 06/08/2020    BUN 32 (H) 06/08/2020    CREATININE 1.3 06/08/2020    CALCIUM 9.6 06/08/2020    PROT 7.5 06/08/2020    ALBUMIN 4.1 06/08/2020    BILITOT 0.4 06/08/2020    ALKPHOS 45 (L) 06/08/2020    AST 16 06/08/2020    ALT 19 06/08/2020    ANIONGAP 9 06/08/2020    ESTGFRAFRICA >60.0 06/08/2020    EGFRNONAA 54.5 (A) 06/08/2020    TSH 1.069 04/18/2019     No results found for: [id number]    Assessment and Plan     Type 2 diabetes mellitus, without long-term current use of insulin    74-year-old male with history of longstanding type 2 diabetes complicated with coronary artery disease and dyslipidemia here for further evaluation.     Most recent A1c at 7.9  Goal A1c in his case is around 7-7.5 with no low sugars.    Discussed with patient in detail about dietary changes and recommended to decrease carb intake by 5% with lunch and dinner.  Also discussed that he needs to know his medications very well.  He is supposed to be on glipizide 5 mg.  Recommended to check his pill box.  Patient was also told to take this medication in the evening before dinner     His  regimen:  Farxiga 10 mg once daily  Glipizide 5 mg before dinner  Januvia 100 mg  Metformin 1000 mg     Patient was recommended to check his sugars before each meal and send his sugar logs in 2 weeks for review     Also discussed in detail about the diabetic dietary lifestyle-with emphasis on low glycemic foods and portion control.       Hypoglycemia precautions discussed.  Offered glucagon emergency kit- however patient refused     Hypertriglyceridemia:  On fenofibrate, statin and  fish oil.  We will order follow-up lipid panel in 3 months. we anticipate better control of triglycerides especially  following the dietary changes with reduction in carbohydrate intake.  Also recommended to completely abstain from alcohol.  If follow-up triglycerides remain elevated, will consider starting Zetia     Coronary artery disease - stressed importance of glycemic, lipid and blood pressure management     Advised foot care.    Follow-up visit in 3-4 months.       Patient verbalized understanding of the above documented plan

## 2020-09-14 NOTE — ASSESSMENT & PLAN NOTE
74-year-old male with history of longstanding type 2 diabetes complicated with coronary artery disease and dyslipidemia here for further evaluation.     Most recent A1c at 7.9  Goal A1c in his case is around 7-7.5 with no low sugars.    Discussed with patient in detail about dietary changes and recommended to decrease carb intake by 5% with lunch and dinner.  Also discussed that he needs to know his medications very well.  He is supposed to be on glipizide 5 mg.  Recommended to check his pill box.  Patient was also told to take this medication in the evening before dinner     His  regimen:  Farxiga 10 mg once daily  Glipizide 5 mg before dinner  Januvia 100 mg  Metformin 1000 mg     Patient was recommended to check his sugars before each meal and send his sugar logs in 2 weeks for review     Also discussed in detail about the diabetic dietary lifestyle-with emphasis on low glycemic foods and portion control.       Hypoglycemia precautions discussed.  Offered glucagon emergency kit- however patient refused     Hypertriglyceridemia:  On fenofibrate, statin and  fish oil.  We will order follow-up lipid panel in 3 months. we anticipate better control of triglycerides especially following the dietary changes with reduction in carbohydrate intake.  Also recommended to completely abstain from alcohol.  If follow-up triglycerides remain elevated, will consider starting Zetia     Coronary artery disease - stressed importance of glycemic, lipid and blood pressure management     Advised foot care.    Follow-up visit in 3-4 months.

## 2020-09-29 ENCOUNTER — PATIENT MESSAGE (OUTPATIENT)
Dept: OTHER | Facility: OTHER | Age: 75
End: 2020-09-29

## 2020-09-30 ENCOUNTER — PATIENT MESSAGE (OUTPATIENT)
Dept: OPTOMETRY | Facility: CLINIC | Age: 75
End: 2020-09-30

## 2020-11-19 ENCOUNTER — TELEPHONE (OUTPATIENT)
Dept: INTERNAL MEDICINE | Facility: CLINIC | Age: 75
End: 2020-11-19

## 2020-11-19 NOTE — TELEPHONE ENCOUNTER
Called pt to obtain a recent blood pressure. Pt states that hs blood pressure has been elevated. 141/84

## 2020-12-10 ENCOUNTER — PATIENT MESSAGE (OUTPATIENT)
Dept: ENDOCRINOLOGY | Facility: CLINIC | Age: 75
End: 2020-12-10

## 2020-12-11 ENCOUNTER — LAB VISIT (OUTPATIENT)
Dept: LAB | Facility: HOSPITAL | Age: 75
End: 2020-12-11
Attending: INTERNAL MEDICINE
Payer: MEDICARE

## 2020-12-11 ENCOUNTER — PATIENT MESSAGE (OUTPATIENT)
Dept: OTHER | Facility: OTHER | Age: 75
End: 2020-12-11

## 2020-12-11 ENCOUNTER — PATIENT OUTREACH (OUTPATIENT)
Dept: ADMINISTRATIVE | Facility: OTHER | Age: 75
End: 2020-12-11

## 2020-12-11 DIAGNOSIS — E11.69 TYPE 2 DIABETES MELLITUS WITH OTHER SPECIFIED COMPLICATION, WITHOUT LONG-TERM CURRENT USE OF INSULIN: ICD-10-CM

## 2020-12-11 LAB
CHOLEST SERPL-MCNC: 137 MG/DL (ref 120–199)
CHOLEST/HDLC SERPL: 4.7 {RATIO} (ref 2–5)
ESTIMATED AVG GLUCOSE: 177 MG/DL (ref 68–131)
HBA1C MFR BLD HPLC: 7.8 % (ref 4–5.6)
HDLC SERPL-MCNC: 29 MG/DL (ref 40–75)
HDLC SERPL: 21.2 % (ref 20–50)
LDLC SERPL CALC-MCNC: 45.4 MG/DL (ref 63–159)
NONHDLC SERPL-MCNC: 108 MG/DL
TRIGL SERPL-MCNC: 313 MG/DL (ref 30–150)

## 2020-12-11 PROCEDURE — 83036 HEMOGLOBIN GLYCOSYLATED A1C: CPT | Mod: HCNC

## 2020-12-11 PROCEDURE — 36415 COLL VENOUS BLD VENIPUNCTURE: CPT | Mod: HCNC,PO

## 2020-12-11 PROCEDURE — 80061 LIPID PANEL: CPT | Mod: HCNC

## 2020-12-11 NOTE — TELEPHONE ENCOUNTER
Patient has follow-up with Dr. Jeter  on Monday, recommend to keep his appointment for review of his sugar logs/dose titration

## 2020-12-14 ENCOUNTER — OFFICE VISIT (OUTPATIENT)
Dept: ENDOCRINOLOGY | Facility: CLINIC | Age: 75
End: 2020-12-14
Payer: MEDICARE

## 2020-12-14 VITALS
WEIGHT: 228.81 LBS | OXYGEN SATURATION: 96 % | BODY MASS INDEX: 28.45 KG/M2 | HEIGHT: 75 IN | DIASTOLIC BLOOD PRESSURE: 70 MMHG | HEART RATE: 49 BPM | SYSTOLIC BLOOD PRESSURE: 166 MMHG | RESPIRATION RATE: 18 BRPM

## 2020-12-14 DIAGNOSIS — I25.10 CORONARY ARTERY DISEASE INVOLVING NATIVE CORONARY ARTERY OF NATIVE HEART WITHOUT ANGINA PECTORIS: ICD-10-CM

## 2020-12-14 DIAGNOSIS — E78.2 MIXED HYPERLIPIDEMIA: ICD-10-CM

## 2020-12-14 DIAGNOSIS — E11.69 TYPE 2 DIABETES MELLITUS WITH OTHER SPECIFIED COMPLICATION, WITHOUT LONG-TERM CURRENT USE OF INSULIN: Primary | ICD-10-CM

## 2020-12-14 PROCEDURE — 99999 PR PBB SHADOW E&M-EST. PATIENT-LVL V: ICD-10-PCS | Mod: PBBFAC,HCNC,, | Performed by: INTERNAL MEDICINE

## 2020-12-14 PROCEDURE — 3072F PR LOW RISK FOR RETINOPATHY: ICD-10-PCS | Mod: HCNC,S$GLB,, | Performed by: INTERNAL MEDICINE

## 2020-12-14 PROCEDURE — 3051F PR MOST RECENT HEMOGLOBIN A1C LEVEL 7.0 - < 8.0%: ICD-10-PCS | Mod: HCNC,CPTII,S$GLB, | Performed by: INTERNAL MEDICINE

## 2020-12-14 PROCEDURE — 99999 PR PBB SHADOW E&M-EST. PATIENT-LVL V: CPT | Mod: PBBFAC,HCNC,, | Performed by: INTERNAL MEDICINE

## 2020-12-14 PROCEDURE — 99214 PR OFFICE/OUTPT VISIT, EST, LEVL IV, 30-39 MIN: ICD-10-PCS | Mod: HCNC,S$GLB,, | Performed by: INTERNAL MEDICINE

## 2020-12-14 PROCEDURE — 1159F MED LIST DOCD IN RCRD: CPT | Mod: HCNC,S$GLB,, | Performed by: INTERNAL MEDICINE

## 2020-12-14 PROCEDURE — 3078F DIAST BP <80 MM HG: CPT | Mod: HCNC,CPTII,S$GLB, | Performed by: INTERNAL MEDICINE

## 2020-12-14 PROCEDURE — 3078F PR MOST RECENT DIASTOLIC BLOOD PRESSURE < 80 MM HG: ICD-10-PCS | Mod: HCNC,CPTII,S$GLB, | Performed by: INTERNAL MEDICINE

## 2020-12-14 PROCEDURE — 3008F BODY MASS INDEX DOCD: CPT | Mod: HCNC,CPTII,S$GLB, | Performed by: INTERNAL MEDICINE

## 2020-12-14 PROCEDURE — 3288F FALL RISK ASSESSMENT DOCD: CPT | Mod: HCNC,CPTII,S$GLB, | Performed by: INTERNAL MEDICINE

## 2020-12-14 PROCEDURE — 3288F PR FALLS RISK ASSESSMENT DOCUMENTED: ICD-10-PCS | Mod: HCNC,CPTII,S$GLB, | Performed by: INTERNAL MEDICINE

## 2020-12-14 PROCEDURE — 3008F PR BODY MASS INDEX (BMI) DOCUMENTED: ICD-10-PCS | Mod: HCNC,CPTII,S$GLB, | Performed by: INTERNAL MEDICINE

## 2020-12-14 PROCEDURE — 1126F AMNT PAIN NOTED NONE PRSNT: CPT | Mod: HCNC,S$GLB,, | Performed by: INTERNAL MEDICINE

## 2020-12-14 PROCEDURE — 1101F PR PT FALLS ASSESS DOC 0-1 FALLS W/OUT INJ PAST YR: ICD-10-PCS | Mod: HCNC,CPTII,S$GLB, | Performed by: INTERNAL MEDICINE

## 2020-12-14 PROCEDURE — 1126F PR PAIN SEVERITY QUANTIFIED, NO PAIN PRESENT: ICD-10-PCS | Mod: HCNC,S$GLB,, | Performed by: INTERNAL MEDICINE

## 2020-12-14 PROCEDURE — 1101F PT FALLS ASSESS-DOCD LE1/YR: CPT | Mod: HCNC,CPTII,S$GLB, | Performed by: INTERNAL MEDICINE

## 2020-12-14 PROCEDURE — 3077F PR MOST RECENT SYSTOLIC BLOOD PRESSURE >= 140 MM HG: ICD-10-PCS | Mod: HCNC,CPTII,S$GLB, | Performed by: INTERNAL MEDICINE

## 2020-12-14 PROCEDURE — 3072F LOW RISK FOR RETINOPATHY: CPT | Mod: HCNC,S$GLB,, | Performed by: INTERNAL MEDICINE

## 2020-12-14 PROCEDURE — 3077F SYST BP >= 140 MM HG: CPT | Mod: HCNC,CPTII,S$GLB, | Performed by: INTERNAL MEDICINE

## 2020-12-14 PROCEDURE — 3051F HG A1C>EQUAL 7.0%<8.0%: CPT | Mod: HCNC,CPTII,S$GLB, | Performed by: INTERNAL MEDICINE

## 2020-12-14 PROCEDURE — 99214 OFFICE O/P EST MOD 30 MIN: CPT | Mod: HCNC,S$GLB,, | Performed by: INTERNAL MEDICINE

## 2020-12-14 PROCEDURE — 1159F PR MEDICATION LIST DOCUMENTED IN MEDICAL RECORD: ICD-10-PCS | Mod: HCNC,S$GLB,, | Performed by: INTERNAL MEDICINE

## 2020-12-14 RX ORDER — BLOOD-GLUCOSE SENSOR
3 EACH MISCELLANEOUS CONTINUOUS PRN
Qty: 3 EACH | Status: SHIPPED | OUTPATIENT
Start: 2020-12-14 | End: 2021-08-05

## 2020-12-14 RX ORDER — BLOOD-GLUCOSE TRANSMITTER
1 EACH MISCELLANEOUS CONTINUOUS PRN
Qty: 1 EACH | Status: SHIPPED | OUTPATIENT
Start: 2020-12-14 | End: 2021-08-05

## 2020-12-14 RX ORDER — DULAGLUTIDE 0.75 MG/.5ML
0.75 INJECTION, SOLUTION SUBCUTANEOUS
Qty: 2 ML | Refills: 11 | Status: SHIPPED | OUTPATIENT
Start: 2020-12-14 | End: 2021-08-05

## 2020-12-14 NOTE — ASSESSMENT & PLAN NOTE
Reviewed goals of therapy are to get the best control we can without hypoglycemia.    Currently meeting glycemic target: no    Referral sent for diabetes education    Medication changes:   Stop:   Januvia (sitagliptin)  Start:    Trulicity (dulaglutide) 0.75 mg weekly  Continue:     Metformin 1000 mg b.i.d.   Glipizide 5 mg nightly with supper   Farxiga (dapagliflozin) 10 mg daily       Advised frequent self blood glucose monitoring. Patient encouraged to document glucose results and bring them to every clinic visit.  Patient would benefit from CGM technology.  He is currently monitoring his blood sugars 4 times per day.  Prescription sent for Dexcom G6.    Hypoglycemia precautions discussed. Instructed on precautions before driving.      Close adherence to lifestyle changes recommended.      Eyes: Following regularly with ophthalmology , next appointment due 7/17/2021  Feet: Foot exam up to date; next exam due 06/2020  Kidneys: Urine microalbumin/creatinine is up to date; patient is taking an SGLT-2i and ARB  HbA1c: Not at goal - Check in 3 months  Lipids: LDL at goal on current dose of statin - repeat lipids In three months with next labs  ASA: yes    Lab Results   Component Value Date    HGBA1C 7.8 (H) 12/11/2020    HGBA1C 7.9 (H) 09/10/2020    HGBA1C 7.7 (H) 06/08/2020

## 2020-12-14 NOTE — ASSESSMENT & PLAN NOTE
On high intensity statin and fenofibrate.  Patient reports family history of early-onset coronary artery disease.  Reports multiple family members with very high triglyceride levels.  Suspect familial hypertriglyceridemia.

## 2020-12-14 NOTE — ASSESSMENT & PLAN NOTE
Trulicity (dulaglutide) has been shown to decrease major adverse cardiac events in high risk patients.

## 2020-12-14 NOTE — PROGRESS NOTES
FOLLOW-UP VISIT    Subjective:      Chief Complaint:  Follow-up for diabetes      HPI: Cecil Pringle is a 75 y.o. male who is here for a follow-up evaluation for diabetes      Patient is new to me and last seen by Dr. Elliot Bowie on 9/14/2020.      Past Medical History:   Diagnosis Date    Anxiety     Arthritis     Coronary artery disease     Depression     Diabetes mellitus, type 2     Hyperlipidemia     Hypertension     Insomnia     PAD (peripheral artery disease)          With regards to the diabetes:  The patient was initially diagnosed with Type 2 diabetes mellitus:  Over 10 years ago.    He reports continued difficulty controlling his blood sugars.  He has been trying to modify his diet, but blood sugars have still been elevated.  He is monitoring his blood sugars 4 times per day by fingerstick, and this is causing difficulty due to pain in his finger tips.  He is inquiring about a continuous glucose monitor.    He took Victoza (liraglutide) in the past as part of a clinical trial and did really well with that.  His blood sugars were very well controlled and he was able to lose weight.    Known diabetic complications: nephropathy (CKD Stage 3), peripheral neuropathy, cardiovascular disease and peripheral vascular disease  Cardiovascular risk factors: advanced age (older than 55 for men, 65 for women), diabetes mellitus, dyslipidemia, family history of premature cardiovascular disease, hypertension, male gender and microalbuminuria    Current diabetic medications include:    Metformin 1000 mg twice daily   Glipizide 5 mg nightly   Farxiga (dapagliflozin) 10 mg daily   Januvia (sitagliptin) 100 mg daily    Other medications tried:   Victoza (liraglutide) - did well with that in the past, but it was too expensive so he was not able to continue after the clinical trial      Last visit with diabetes education:  Not recently    Current diet:   Breakfast:  Blood sugars increase if he eats cereal  or fresh fruit.  They usually stay okay if he eats Harvey Quentin sausage.  Lunch:  Earp/role with flaxseed wrap  Dinner:  Meat + Veggie  Snacks:  Rarely cookies, but otherwise does not snack much.  Drinks:  Crystal lite or water    Current exercise:  Not exercising much due to an issue with his knee.    Current monitoring regimen: Fingerstick glucose four times per day before meals and bedtime                    Any episodes of hypoglycemia? no      BP Readings from Last 3 Encounters:   12/14/20 (!) 166/70   09/14/20 (!) 153/65   06/16/20 121/61       Wt Readings from Last 10 Encounters:   12/14/20 103.8 kg (228 lb 13.4 oz)   09/14/20 105.3 kg (232 lb 3.2 oz)   06/16/20 105.7 kg (233 lb 0.4 oz)   06/13/20 105.7 kg (233 lb 0.4 oz)   06/11/20 105.7 kg (233 lb 0.4 oz)   01/13/20 103 kg (227 lb)   01/09/20 103.3 kg (227 lb 11.8 oz)   12/09/19 101.9 kg (224 lb 10.4 oz)   11/04/19 100.2 kg (220 lb 14.4 oz)   10/30/19 103.4 kg (228 lb)       Diabetes Management Status    Statin: Taking  ACE/ARB: Taking    Screening or Prevention Patient's value Goal Complete/Controlled?   HgA1C Testing and Control   Lab Results   Component Value Date    HGBA1C 7.8 (H) 12/11/2020      Annually/Less than 8% Yes   Lipid profile : 12/11/2020 Annually Yes   LDL control Lab Results   Component Value Date    LDLCALC 45.4 (L) 12/11/2020    Annually/Less than 100 mg/dl  Yes   Nephropathy screening Lab Results   Component Value Date    LABMICR 471.0 09/10/2020     Lab Results   Component Value Date    PROTEINUA 1+ (A) 04/18/2019    Annually Yes   Blood pressure BP Readings from Last 1 Encounters:   12/14/20 (!) 166/70    Less than 140/90 No   Dilated retinal exam : 07/17/2020 Annually Yes   Foot exam   : 06/13/2020 Annually Yes      Lab Results   Component Value Date    MICALBCREAT 242.8 (H) 09/10/2020    MICALBCREAT 484.9 (H) 04/18/2019    MICALBCREAT 729.4 (H) 01/30/2017            Low dose ASA?: Yes       Lab Results   Component Value Date     HGBA1C 7.8 (H) 12/11/2020    HGBA1C 7.9 (H) 09/10/2020    HGBA1C 7.7 (H) 06/08/2020    HGBA1C 8.6 (H) 02/04/2020    HGBA1C 8.1 (H) 10/31/2019    HGBA1C 7.8 (H) 07/26/2019    HGBA1C 8.1 (H) 04/18/2019    HGBA1C 7.1 (H) 03/01/2018    HGBA1C 6.9 (H) 05/05/2017    HGBA1C 8.1 (H) 01/30/2017         Reviewed past medical, family, social history and updated as appropriate.    Review of Systems   Respiratory: Negative for shortness of breath.    Cardiovascular: Negative for chest pain.   Gastrointestinal: Negative for abdominal pain.     Objective:     Vitals:    12/14/20 0807   BP: (!) 166/70   Pulse: (!) 49   Resp: 18         BP Readings from Last 5 Encounters:   12/14/20 (!) 166/70   09/14/20 (!) 153/65   06/16/20 121/61   06/13/20 134/60   06/11/20 126/62         Physical Exam  Vitals signs and nursing note reviewed.   Constitutional:       General: He is not in acute distress.     Appearance: He is well-developed.   HENT:      Head: Normocephalic and atraumatic.   Eyes:      General:         Right eye: No discharge.         Left eye: No discharge.      Conjunctiva/sclera: Conjunctivae normal.   Neck:      Thyroid: No thyromegaly.      Trachea: No tracheal deviation.   Cardiovascular:      Rate and Rhythm: Normal rate.   Pulmonary:      Effort: Pulmonary effort is normal. No respiratory distress.   Musculoskeletal:      Comments: No digital clubbing or extremity cyanosis   Neurological:      Mental Status: He is alert and oriented to person, place, and time.      Coordination: Coordination normal.   Psychiatric:         Behavior: Behavior normal.           Wt Readings from Last 30 Encounters:   12/14/20 0807 103.8 kg (228 lb 13.4 oz)   09/14/20 0759 105.3 kg (232 lb 3.2 oz)   06/16/20 0914 105.7 kg (233 lb 0.4 oz)   06/13/20 0814 105.7 kg (233 lb 0.4 oz)   06/11/20 0822 105.7 kg (233 lb 0.4 oz)   01/13/20 1058 103 kg (227 lb)   01/09/20 0728 103.3 kg (227 lb 11.8 oz)   12/09/19 0729 101.9 kg (224 lb 10.4 oz)   11/04/19  0739 100.2 kg (220 lb 14.4 oz)   10/30/19 1205 103.4 kg (228 lb)   07/31/19 0733 103.5 kg (228 lb 2.8 oz)   06/14/19 0735 102.1 kg (225 lb)   05/10/19 0812 104.3 kg (230 lb)   05/01/19 0838 104.3 kg (230 lb)   04/25/19 0754 104.7 kg (230 lb 13.2 oz)   12/12/18 0818 99.8 kg (220 lb)   08/22/18 0904 105.4 kg (232 lb 7.6 oz)   06/26/18 0817 105.2 kg (232 lb)   03/27/18 0805 105.2 kg (232 lb)   03/15/18 1036 105.3 kg (232 lb 2.3 oz)   03/08/18 0823 105.3 kg (232 lb 0.6 oz)   09/21/17 0815 104.5 kg (230 lb 7.9 oz)   05/17/17 0811 104.7 kg (230 lb 13.2 oz)   02/27/17 0744 105.3 kg (232 lb 2.3 oz)   02/14/17 0811 106.8 kg (235 lb 7.2 oz)   02/09/17 1023 105.2 kg (232 lb)   02/07/17 0750 105.7 kg (232 lb 14.7 oz)   01/26/17 1250 107.3 kg (236 lb 8.9 oz)   07/26/16 0852 104.3 kg (230 lb)   11/25/15 0825 104.3 kg (230 lb)         Lab Results   Component Value Date    HGBA1C 7.8 (H) 12/11/2020     Lab Results   Component Value Date    CHOL 137 12/11/2020    HDL 29 (L) 12/11/2020    LDLCALC 45.4 (L) 12/11/2020    TRIG 313 (H) 12/11/2020    CHOLHDL 21.2 12/11/2020     Lab Results   Component Value Date     06/08/2020    K 4.7 06/08/2020     06/08/2020    CO2 24 06/08/2020     (H) 06/08/2020    BUN 32 (H) 06/08/2020    CREATININE 1.3 06/08/2020    CALCIUM 9.6 06/08/2020    PROT 7.5 06/08/2020    ALBUMIN 4.1 06/08/2020    BILITOT 0.4 06/08/2020    ALKPHOS 45 (L) 06/08/2020    AST 16 06/08/2020    ALT 19 06/08/2020    ANIONGAP 9 06/08/2020    ESTGFRAFRICA >60.0 06/08/2020    EGFRNONAA 54.5 (A) 06/08/2020    TSH 1.069 04/18/2019      Lab Results   Component Value Date    MICALBCREAT 242.8 (H) 09/10/2020       Assessment/Plan:       Mixed hyperlipidemia  On high intensity statin and fenofibrate.  Patient reports family history of early-onset coronary artery disease.  Reports multiple family members with very high triglyceride levels.  Suspect familial hypertriglyceridemia.    Type 2 diabetes, uncontrolled, with  neuropathy  Reviewed goals of therapy are to get the best control we can without hypoglycemia.    Currently meeting glycemic target: no    Referral sent for diabetes education    Medication changes:   Stop:   Januvia (sitagliptin)  Start:    Trulicity (dulaglutide) 0.75 mg weekly  Continue:     Metformin 1000 mg b.i.d.   Glipizide 5 mg nightly with supper   Farxiga (dapagliflozin) 10 mg daily       Advised frequent self blood glucose monitoring. Patient encouraged to document glucose results and bring them to every clinic visit.  Patient would benefit from CGM technology.  He is currently monitoring his blood sugars 4 times per day.  Prescription sent for Dexcom G6.    Hypoglycemia precautions discussed. Instructed on precautions before driving.      Close adherence to lifestyle changes recommended.      Eyes: Following regularly with ophthalmology , next appointment due 7/17/2021  Feet: Foot exam up to date; next exam due 06/2020  Kidneys: Urine microalbumin/creatinine is up to date; patient is taking an SGLT-2i and ARB  HbA1c: Not at goal - Check in 3 months  Lipids: LDL at goal on current dose of statin - repeat lipids In three months with next labs  ASA: yes    Lab Results   Component Value Date    HGBA1C 7.8 (H) 12/11/2020    HGBA1C 7.9 (H) 09/10/2020    HGBA1C 7.7 (H) 06/08/2020         Uncontrolled type 2 diabetes mellitus with diabetic peripheral angiopathy without gangrene, without long-term current use of insulin  See above.    Coronary artery disease involving native coronary artery of native heart without angina pectoris  Trulicity (dulaglutide) has been shown to decrease major adverse cardiac events in high risk patients.        RTC in 3 months

## 2021-01-12 RX ORDER — TEMAZEPAM 30 MG/1
30 CAPSULE ORAL NIGHTLY PRN
Qty: 30 CAPSULE | Refills: 0 | Status: SHIPPED | OUTPATIENT
Start: 2021-01-12 | End: 2021-04-08

## 2021-03-23 ENCOUNTER — PATIENT MESSAGE (OUTPATIENT)
Dept: ADMINISTRATIVE | Facility: OTHER | Age: 76
End: 2021-03-23

## 2021-03-30 ENCOUNTER — LAB VISIT (OUTPATIENT)
Dept: LAB | Facility: HOSPITAL | Age: 76
End: 2021-03-30
Attending: INTERNAL MEDICINE
Payer: MEDICARE

## 2021-03-30 DIAGNOSIS — E11.69 TYPE 2 DIABETES MELLITUS WITH OTHER SPECIFIED COMPLICATION, WITHOUT LONG-TERM CURRENT USE OF INSULIN: ICD-10-CM

## 2021-03-30 LAB
ALBUMIN SERPL BCP-MCNC: 4.2 G/DL (ref 3.5–5.2)
ALP SERPL-CCNC: 48 U/L (ref 55–135)
ALT SERPL W/O P-5'-P-CCNC: 20 U/L (ref 10–44)
ANION GAP SERPL CALC-SCNC: 11 MMOL/L (ref 8–16)
AST SERPL-CCNC: 17 U/L (ref 10–40)
BILIRUB SERPL-MCNC: 0.3 MG/DL (ref 0.1–1)
BUN SERPL-MCNC: 28 MG/DL (ref 8–23)
CALCIUM SERPL-MCNC: 9.6 MG/DL (ref 8.7–10.5)
CHLORIDE SERPL-SCNC: 105 MMOL/L (ref 95–110)
CHOLEST SERPL-MCNC: 158 MG/DL (ref 120–199)
CHOLEST/HDLC SERPL: 5.3 {RATIO} (ref 2–5)
CO2 SERPL-SCNC: 20 MMOL/L (ref 23–29)
CREAT SERPL-MCNC: 1.3 MG/DL (ref 0.5–1.4)
EST. GFR  (AFRICAN AMERICAN): >60 ML/MIN/1.73 M^2
EST. GFR  (NON AFRICAN AMERICAN): 54.1 ML/MIN/1.73 M^2
ESTIMATED AVG GLUCOSE: 166 MG/DL (ref 68–131)
GLUCOSE SERPL-MCNC: 171 MG/DL (ref 70–110)
HBA1C MFR BLD: 7.4 % (ref 4–5.6)
HDLC SERPL-MCNC: 30 MG/DL (ref 40–75)
HDLC SERPL: 19 % (ref 20–50)
LDLC SERPL CALC-MCNC: ABNORMAL MG/DL (ref 63–159)
NONHDLC SERPL-MCNC: 128 MG/DL
POTASSIUM SERPL-SCNC: 4.6 MMOL/L (ref 3.5–5.1)
PROT SERPL-MCNC: 7.7 G/DL (ref 6–8.4)
SODIUM SERPL-SCNC: 136 MMOL/L (ref 136–145)
TRIGL SERPL-MCNC: 401 MG/DL (ref 30–150)

## 2021-03-30 PROCEDURE — 36415 COLL VENOUS BLD VENIPUNCTURE: CPT | Mod: PO | Performed by: INTERNAL MEDICINE

## 2021-03-30 PROCEDURE — 83036 HEMOGLOBIN GLYCOSYLATED A1C: CPT | Performed by: INTERNAL MEDICINE

## 2021-03-30 PROCEDURE — 80061 LIPID PANEL: CPT | Performed by: INTERNAL MEDICINE

## 2021-03-30 PROCEDURE — 80053 COMPREHEN METABOLIC PANEL: CPT | Performed by: INTERNAL MEDICINE

## 2021-04-06 ENCOUNTER — OFFICE VISIT (OUTPATIENT)
Dept: ENDOCRINOLOGY | Facility: CLINIC | Age: 76
End: 2021-04-06
Payer: MEDICARE

## 2021-04-06 ENCOUNTER — PATIENT MESSAGE (OUTPATIENT)
Dept: ENDOCRINOLOGY | Facility: CLINIC | Age: 76
End: 2021-04-06

## 2021-04-06 ENCOUNTER — PATIENT MESSAGE (OUTPATIENT)
Dept: INTERNAL MEDICINE | Facility: CLINIC | Age: 76
End: 2021-04-06

## 2021-04-06 VITALS
HEART RATE: 49 BPM | OXYGEN SATURATION: 99 % | SYSTOLIC BLOOD PRESSURE: 110 MMHG | HEIGHT: 75 IN | RESPIRATION RATE: 18 BRPM | WEIGHT: 230.25 LBS | BODY MASS INDEX: 28.63 KG/M2 | DIASTOLIC BLOOD PRESSURE: 72 MMHG

## 2021-04-06 DIAGNOSIS — E11.69 TYPE 2 DIABETES MELLITUS WITH OTHER SPECIFIED COMPLICATION, WITHOUT LONG-TERM CURRENT USE OF INSULIN: Primary | ICD-10-CM

## 2021-04-06 DIAGNOSIS — I25.10 CORONARY ARTERY DISEASE INVOLVING NATIVE CORONARY ARTERY OF NATIVE HEART WITHOUT ANGINA PECTORIS: Chronic | ICD-10-CM

## 2021-04-06 DIAGNOSIS — Z00.00 PREVENTATIVE HEALTH CARE: Primary | ICD-10-CM

## 2021-04-06 DIAGNOSIS — G47.09 OTHER INSOMNIA: ICD-10-CM

## 2021-04-06 DIAGNOSIS — E11.42 ONYCHOMYCOSIS OF MULTIPLE TOENAILS WITH TYPE 2 DIABETES MELLITUS AND PERIPHERAL NEUROPATHY: ICD-10-CM

## 2021-04-06 DIAGNOSIS — B35.1 ONYCHOMYCOSIS OF MULTIPLE TOENAILS WITH TYPE 2 DIABETES MELLITUS AND PERIPHERAL ANGIOPATHY: ICD-10-CM

## 2021-04-06 DIAGNOSIS — E11.69 ONYCHOMYCOSIS OF MULTIPLE TOENAILS WITH TYPE 2 DIABETES MELLITUS AND PERIPHERAL ANGIOPATHY: ICD-10-CM

## 2021-04-06 DIAGNOSIS — E11.51 ONYCHOMYCOSIS OF MULTIPLE TOENAILS WITH TYPE 2 DIABETES MELLITUS AND PERIPHERAL ANGIOPATHY: ICD-10-CM

## 2021-04-06 DIAGNOSIS — Z12.5 PROSTATE CANCER SCREENING: ICD-10-CM

## 2021-04-06 DIAGNOSIS — F33.0 MILD EPISODE OF RECURRENT MAJOR DEPRESSIVE DISORDER: ICD-10-CM

## 2021-04-06 DIAGNOSIS — I73.9 PAD (PERIPHERAL ARTERY DISEASE): ICD-10-CM

## 2021-04-06 DIAGNOSIS — E78.2 MIXED HYPERLIPIDEMIA: Chronic | ICD-10-CM

## 2021-04-06 DIAGNOSIS — K63.5 POLYP OF COLON, UNSPECIFIED PART OF COLON, UNSPECIFIED TYPE: ICD-10-CM

## 2021-04-06 DIAGNOSIS — N18.31 TYPE 2 DIABETES MELLITUS WITH STAGE 3A CHRONIC KIDNEY DISEASE, WITHOUT LONG-TERM CURRENT USE OF INSULIN: ICD-10-CM

## 2021-04-06 DIAGNOSIS — Z95.1 S/P CABG X 5: ICD-10-CM

## 2021-04-06 DIAGNOSIS — I35.0 NONRHEUMATIC AORTIC VALVE STENOSIS: ICD-10-CM

## 2021-04-06 DIAGNOSIS — I65.21 STENOSIS OF RIGHT CAROTID ARTERY: ICD-10-CM

## 2021-04-06 DIAGNOSIS — E11.69 ONYCHOMYCOSIS OF MULTIPLE TOENAILS WITH TYPE 2 DIABETES MELLITUS AND PERIPHERAL NEUROPATHY: ICD-10-CM

## 2021-04-06 DIAGNOSIS — B35.1 ONYCHOMYCOSIS OF MULTIPLE TOENAILS WITH TYPE 2 DIABETES MELLITUS AND PERIPHERAL NEUROPATHY: ICD-10-CM

## 2021-04-06 DIAGNOSIS — I10 ESSENTIAL HYPERTENSION: ICD-10-CM

## 2021-04-06 DIAGNOSIS — E11.22 TYPE 2 DIABETES MELLITUS WITH STAGE 3A CHRONIC KIDNEY DISEASE, WITHOUT LONG-TERM CURRENT USE OF INSULIN: ICD-10-CM

## 2021-04-06 PROCEDURE — 3072F PR LOW RISK FOR RETINOPATHY: ICD-10-PCS | Mod: S$GLB,,, | Performed by: INTERNAL MEDICINE

## 2021-04-06 PROCEDURE — 99213 OFFICE O/P EST LOW 20 MIN: CPT | Mod: S$GLB,,, | Performed by: INTERNAL MEDICINE

## 2021-04-06 PROCEDURE — 1126F AMNT PAIN NOTED NONE PRSNT: CPT | Mod: S$GLB,,, | Performed by: INTERNAL MEDICINE

## 2021-04-06 PROCEDURE — 3072F LOW RISK FOR RETINOPATHY: CPT | Mod: S$GLB,,, | Performed by: INTERNAL MEDICINE

## 2021-04-06 PROCEDURE — 1159F MED LIST DOCD IN RCRD: CPT | Mod: S$GLB,,, | Performed by: INTERNAL MEDICINE

## 2021-04-06 PROCEDURE — 3078F PR MOST RECENT DIASTOLIC BLOOD PRESSURE < 80 MM HG: ICD-10-PCS | Mod: CPTII,S$GLB,, | Performed by: INTERNAL MEDICINE

## 2021-04-06 PROCEDURE — 3288F PR FALLS RISK ASSESSMENT DOCUMENTED: ICD-10-PCS | Mod: CPTII,S$GLB,, | Performed by: INTERNAL MEDICINE

## 2021-04-06 PROCEDURE — 1101F PR PT FALLS ASSESS DOC 0-1 FALLS W/OUT INJ PAST YR: ICD-10-PCS | Mod: CPTII,S$GLB,, | Performed by: INTERNAL MEDICINE

## 2021-04-06 PROCEDURE — 3288F FALL RISK ASSESSMENT DOCD: CPT | Mod: CPTII,S$GLB,, | Performed by: INTERNAL MEDICINE

## 2021-04-06 PROCEDURE — 99999 PR PBB SHADOW E&M-EST. PATIENT-LVL V: CPT | Mod: PBBFAC,,, | Performed by: INTERNAL MEDICINE

## 2021-04-06 PROCEDURE — 3008F PR BODY MASS INDEX (BMI) DOCUMENTED: ICD-10-PCS | Mod: CPTII,S$GLB,, | Performed by: INTERNAL MEDICINE

## 2021-04-06 PROCEDURE — 3051F PR MOST RECENT HEMOGLOBIN A1C LEVEL 7.0 - < 8.0%: ICD-10-PCS | Mod: CPTII,S$GLB,, | Performed by: INTERNAL MEDICINE

## 2021-04-06 PROCEDURE — 3051F HG A1C>EQUAL 7.0%<8.0%: CPT | Mod: CPTII,S$GLB,, | Performed by: INTERNAL MEDICINE

## 2021-04-06 PROCEDURE — 99213 PR OFFICE/OUTPT VISIT, EST, LEVL III, 20-29 MIN: ICD-10-PCS | Mod: S$GLB,,, | Performed by: INTERNAL MEDICINE

## 2021-04-06 PROCEDURE — 99999 PR PBB SHADOW E&M-EST. PATIENT-LVL V: ICD-10-PCS | Mod: PBBFAC,,, | Performed by: INTERNAL MEDICINE

## 2021-04-06 PROCEDURE — 3074F SYST BP LT 130 MM HG: CPT | Mod: CPTII,S$GLB,, | Performed by: INTERNAL MEDICINE

## 2021-04-06 PROCEDURE — 3078F DIAST BP <80 MM HG: CPT | Mod: CPTII,S$GLB,, | Performed by: INTERNAL MEDICINE

## 2021-04-06 PROCEDURE — 1101F PT FALLS ASSESS-DOCD LE1/YR: CPT | Mod: CPTII,S$GLB,, | Performed by: INTERNAL MEDICINE

## 2021-04-06 PROCEDURE — 3008F BODY MASS INDEX DOCD: CPT | Mod: CPTII,S$GLB,, | Performed by: INTERNAL MEDICINE

## 2021-04-06 PROCEDURE — 3074F PR MOST RECENT SYSTOLIC BLOOD PRESSURE < 130 MM HG: ICD-10-PCS | Mod: CPTII,S$GLB,, | Performed by: INTERNAL MEDICINE

## 2021-04-06 PROCEDURE — 1126F PR PAIN SEVERITY QUANTIFIED, NO PAIN PRESENT: ICD-10-PCS | Mod: S$GLB,,, | Performed by: INTERNAL MEDICINE

## 2021-04-06 PROCEDURE — 1159F PR MEDICATION LIST DOCUMENTED IN MEDICAL RECORD: ICD-10-PCS | Mod: S$GLB,,, | Performed by: INTERNAL MEDICINE

## 2021-05-18 ENCOUNTER — PATIENT MESSAGE (OUTPATIENT)
Dept: ENDOCRINOLOGY | Facility: CLINIC | Age: 76
End: 2021-05-18

## 2021-05-18 ENCOUNTER — TELEPHONE (OUTPATIENT)
Dept: INTERNAL MEDICINE | Facility: CLINIC | Age: 76
End: 2021-05-18

## 2021-05-25 ENCOUNTER — PATIENT MESSAGE (OUTPATIENT)
Dept: PHARMACY | Facility: CLINIC | Age: 76
End: 2021-05-25

## 2021-05-26 ENCOUNTER — PATIENT MESSAGE (OUTPATIENT)
Dept: ADMINISTRATIVE | Facility: OTHER | Age: 76
End: 2021-05-26

## 2021-05-28 ENCOUNTER — LAB VISIT (OUTPATIENT)
Dept: LAB | Facility: HOSPITAL | Age: 76
End: 2021-05-28
Attending: FAMILY MEDICINE
Payer: MEDICARE

## 2021-05-28 DIAGNOSIS — E11.69 ONYCHOMYCOSIS OF MULTIPLE TOENAILS WITH TYPE 2 DIABETES MELLITUS AND PERIPHERAL ANGIOPATHY: ICD-10-CM

## 2021-05-28 DIAGNOSIS — E11.51 ONYCHOMYCOSIS OF MULTIPLE TOENAILS WITH TYPE 2 DIABETES MELLITUS AND PERIPHERAL ANGIOPATHY: ICD-10-CM

## 2021-05-28 DIAGNOSIS — Z12.5 PROSTATE CANCER SCREENING: ICD-10-CM

## 2021-05-28 DIAGNOSIS — E78.2 MIXED HYPERLIPIDEMIA: Chronic | ICD-10-CM

## 2021-05-28 DIAGNOSIS — E11.42 ONYCHOMYCOSIS OF MULTIPLE TOENAILS WITH TYPE 2 DIABETES MELLITUS AND PERIPHERAL NEUROPATHY: ICD-10-CM

## 2021-05-28 DIAGNOSIS — I65.21 STENOSIS OF RIGHT CAROTID ARTERY: ICD-10-CM

## 2021-05-28 DIAGNOSIS — G47.09 OTHER INSOMNIA: ICD-10-CM

## 2021-05-28 DIAGNOSIS — B35.1 ONYCHOMYCOSIS OF MULTIPLE TOENAILS WITH TYPE 2 DIABETES MELLITUS AND PERIPHERAL NEUROPATHY: ICD-10-CM

## 2021-05-28 DIAGNOSIS — K63.5 POLYP OF COLON, UNSPECIFIED PART OF COLON, UNSPECIFIED TYPE: ICD-10-CM

## 2021-05-28 DIAGNOSIS — B35.1 ONYCHOMYCOSIS OF MULTIPLE TOENAILS WITH TYPE 2 DIABETES MELLITUS AND PERIPHERAL ANGIOPATHY: ICD-10-CM

## 2021-05-28 DIAGNOSIS — I35.0 NONRHEUMATIC AORTIC VALVE STENOSIS: ICD-10-CM

## 2021-05-28 DIAGNOSIS — I10 ESSENTIAL HYPERTENSION: ICD-10-CM

## 2021-05-28 DIAGNOSIS — N18.31 TYPE 2 DIABETES MELLITUS WITH STAGE 3A CHRONIC KIDNEY DISEASE, WITHOUT LONG-TERM CURRENT USE OF INSULIN: ICD-10-CM

## 2021-05-28 DIAGNOSIS — E11.22 TYPE 2 DIABETES MELLITUS WITH STAGE 3A CHRONIC KIDNEY DISEASE, WITHOUT LONG-TERM CURRENT USE OF INSULIN: ICD-10-CM

## 2021-05-28 DIAGNOSIS — Z00.00 PREVENTATIVE HEALTH CARE: ICD-10-CM

## 2021-05-28 DIAGNOSIS — I73.9 PAD (PERIPHERAL ARTERY DISEASE): ICD-10-CM

## 2021-05-28 DIAGNOSIS — F33.0 MILD EPISODE OF RECURRENT MAJOR DEPRESSIVE DISORDER: ICD-10-CM

## 2021-05-28 DIAGNOSIS — E11.69 ONYCHOMYCOSIS OF MULTIPLE TOENAILS WITH TYPE 2 DIABETES MELLITUS AND PERIPHERAL NEUROPATHY: ICD-10-CM

## 2021-05-28 DIAGNOSIS — I25.10 CORONARY ARTERY DISEASE INVOLVING NATIVE CORONARY ARTERY OF NATIVE HEART WITHOUT ANGINA PECTORIS: Chronic | ICD-10-CM

## 2021-05-28 DIAGNOSIS — Z95.1 S/P CABG X 5: ICD-10-CM

## 2021-05-28 LAB
25(OH)D3+25(OH)D2 SERPL-MCNC: 24 NG/ML (ref 30–96)
ALBUMIN SERPL BCP-MCNC: 4.1 G/DL (ref 3.5–5.2)
ALP SERPL-CCNC: 48 U/L (ref 55–135)
ALT SERPL W/O P-5'-P-CCNC: 18 U/L (ref 10–44)
ANION GAP SERPL CALC-SCNC: 10 MMOL/L (ref 8–16)
AST SERPL-CCNC: 16 U/L (ref 10–40)
BASOPHILS # BLD AUTO: 0.07 K/UL (ref 0–0.2)
BASOPHILS NFR BLD: 0.7 % (ref 0–1.9)
BILIRUB SERPL-MCNC: 0.3 MG/DL (ref 0.1–1)
BUN SERPL-MCNC: 33 MG/DL (ref 8–23)
CALCIUM SERPL-MCNC: 10.1 MG/DL (ref 8.7–10.5)
CHLORIDE SERPL-SCNC: 105 MMOL/L (ref 95–110)
CHOLEST SERPL-MCNC: 159 MG/DL (ref 120–199)
CHOLEST/HDLC SERPL: 5.3 {RATIO} (ref 2–5)
CO2 SERPL-SCNC: 22 MMOL/L (ref 23–29)
COMPLEXED PSA SERPL-MCNC: 0.54 NG/ML (ref 0–4)
CREAT SERPL-MCNC: 1.3 MG/DL (ref 0.5–1.4)
DIFFERENTIAL METHOD: ABNORMAL
EOSINOPHIL # BLD AUTO: 0.2 K/UL (ref 0–0.5)
EOSINOPHIL NFR BLD: 1.9 % (ref 0–8)
ERYTHROCYTE [DISTWIDTH] IN BLOOD BY AUTOMATED COUNT: 14.7 % (ref 11.5–14.5)
EST. GFR  (AFRICAN AMERICAN): >60 ML/MIN/1.73 M^2
EST. GFR  (NON AFRICAN AMERICAN): 54.1 ML/MIN/1.73 M^2
ESTIMATED AVG GLUCOSE: 169 MG/DL (ref 68–131)
GLUCOSE SERPL-MCNC: 164 MG/DL (ref 70–110)
HBA1C MFR BLD: 7.5 % (ref 4–5.6)
HCT VFR BLD AUTO: 44 % (ref 40–54)
HDLC SERPL-MCNC: 30 MG/DL (ref 40–75)
HDLC SERPL: 18.9 % (ref 20–50)
HGB BLD-MCNC: 14.3 G/DL (ref 14–18)
IMM GRANULOCYTES # BLD AUTO: 0.07 K/UL (ref 0–0.04)
IMM GRANULOCYTES NFR BLD AUTO: 0.7 % (ref 0–0.5)
LDLC SERPL CALC-MCNC: ABNORMAL MG/DL (ref 63–159)
LYMPHOCYTES # BLD AUTO: 3 K/UL (ref 1–4.8)
LYMPHOCYTES NFR BLD: 30.6 % (ref 18–48)
MCH RBC QN AUTO: 27.6 PG (ref 27–31)
MCHC RBC AUTO-ENTMCNC: 32.5 G/DL (ref 32–36)
MCV RBC AUTO: 85 FL (ref 82–98)
MONOCYTES # BLD AUTO: 0.6 K/UL (ref 0.3–1)
MONOCYTES NFR BLD: 6.3 % (ref 4–15)
NEUTROPHILS # BLD AUTO: 5.8 K/UL (ref 1.8–7.7)
NEUTROPHILS NFR BLD: 59.8 % (ref 38–73)
NONHDLC SERPL-MCNC: 129 MG/DL
NRBC BLD-RTO: 0 /100 WBC
PLATELET # BLD AUTO: 274 K/UL (ref 150–450)
PMV BLD AUTO: 11 FL (ref 9.2–12.9)
POTASSIUM SERPL-SCNC: 4.8 MMOL/L (ref 3.5–5.1)
PROT SERPL-MCNC: 7.6 G/DL (ref 6–8.4)
RBC # BLD AUTO: 5.18 M/UL (ref 4.6–6.2)
SODIUM SERPL-SCNC: 137 MMOL/L (ref 136–145)
T4 FREE SERPL-MCNC: 1.05 NG/DL (ref 0.71–1.51)
TRIGL SERPL-MCNC: 453 MG/DL (ref 30–150)
TSH SERPL DL<=0.005 MIU/L-ACNC: 1.03 UIU/ML (ref 0.4–4)
WBC # BLD AUTO: 9.76 K/UL (ref 3.9–12.7)

## 2021-05-28 PROCEDURE — 36415 COLL VENOUS BLD VENIPUNCTURE: CPT | Mod: PO | Performed by: FAMILY MEDICINE

## 2021-05-28 PROCEDURE — 84153 ASSAY OF PSA TOTAL: CPT | Performed by: FAMILY MEDICINE

## 2021-05-28 PROCEDURE — 80053 COMPREHEN METABOLIC PANEL: CPT | Performed by: FAMILY MEDICINE

## 2021-05-28 PROCEDURE — 85025 COMPLETE CBC W/AUTO DIFF WBC: CPT | Performed by: FAMILY MEDICINE

## 2021-05-28 PROCEDURE — 82306 VITAMIN D 25 HYDROXY: CPT | Performed by: FAMILY MEDICINE

## 2021-05-28 PROCEDURE — 80061 LIPID PANEL: CPT | Performed by: FAMILY MEDICINE

## 2021-05-28 PROCEDURE — 84439 ASSAY OF FREE THYROXINE: CPT | Performed by: FAMILY MEDICINE

## 2021-05-28 PROCEDURE — 83036 HEMOGLOBIN GLYCOSYLATED A1C: CPT | Performed by: FAMILY MEDICINE

## 2021-05-28 PROCEDURE — 84443 ASSAY THYROID STIM HORMONE: CPT | Performed by: FAMILY MEDICINE

## 2021-05-30 ENCOUNTER — PATIENT MESSAGE (OUTPATIENT)
Dept: INTERNAL MEDICINE | Facility: CLINIC | Age: 76
End: 2021-05-30

## 2021-06-02 ENCOUNTER — OFFICE VISIT (OUTPATIENT)
Dept: INTERNAL MEDICINE | Facility: CLINIC | Age: 76
End: 2021-06-02
Payer: MEDICARE

## 2021-06-02 ENCOUNTER — TELEPHONE (OUTPATIENT)
Dept: INTERNAL MEDICINE | Facility: CLINIC | Age: 76
End: 2021-06-02

## 2021-06-02 VITALS
HEART RATE: 54 BPM | TEMPERATURE: 98 F | HEIGHT: 75 IN | BODY MASS INDEX: 28.43 KG/M2 | DIASTOLIC BLOOD PRESSURE: 70 MMHG | SYSTOLIC BLOOD PRESSURE: 130 MMHG | RESPIRATION RATE: 14 BRPM | WEIGHT: 228.63 LBS | OXYGEN SATURATION: 99 %

## 2021-06-02 DIAGNOSIS — Z95.1 S/P CABG X 5: ICD-10-CM

## 2021-06-02 DIAGNOSIS — I10 ESSENTIAL HYPERTENSION: ICD-10-CM

## 2021-06-02 DIAGNOSIS — I65.21 STENOSIS OF RIGHT CAROTID ARTERY: ICD-10-CM

## 2021-06-02 DIAGNOSIS — Z00.00 PREVENTATIVE HEALTH CARE: Primary | ICD-10-CM

## 2021-06-02 DIAGNOSIS — E11.42 ONYCHOMYCOSIS OF MULTIPLE TOENAILS WITH TYPE 2 DIABETES MELLITUS AND PERIPHERAL NEUROPATHY: ICD-10-CM

## 2021-06-02 DIAGNOSIS — E11.69 ONYCHOMYCOSIS OF MULTIPLE TOENAILS WITH TYPE 2 DIABETES MELLITUS AND PERIPHERAL NEUROPATHY: ICD-10-CM

## 2021-06-02 DIAGNOSIS — E78.2 MIXED HYPERLIPIDEMIA: ICD-10-CM

## 2021-06-02 DIAGNOSIS — B35.1 ONYCHOMYCOSIS OF MULTIPLE TOENAILS WITH TYPE 2 DIABETES MELLITUS AND PERIPHERAL NEUROPATHY: ICD-10-CM

## 2021-06-02 DIAGNOSIS — I25.10 CORONARY ARTERY DISEASE INVOLVING NATIVE CORONARY ARTERY OF NATIVE HEART WITHOUT ANGINA PECTORIS: Chronic | ICD-10-CM

## 2021-06-02 DIAGNOSIS — G47.09 OTHER INSOMNIA: ICD-10-CM

## 2021-06-02 DIAGNOSIS — I35.0 NONRHEUMATIC AORTIC VALVE STENOSIS: ICD-10-CM

## 2021-06-02 DIAGNOSIS — F33.0 MILD EPISODE OF RECURRENT MAJOR DEPRESSIVE DISORDER: ICD-10-CM

## 2021-06-02 DIAGNOSIS — I73.9 PAD (PERIPHERAL ARTERY DISEASE): ICD-10-CM

## 2021-06-02 DIAGNOSIS — E11.22 TYPE 2 DIABETES MELLITUS WITH STAGE 3A CHRONIC KIDNEY DISEASE, WITHOUT LONG-TERM CURRENT USE OF INSULIN: ICD-10-CM

## 2021-06-02 DIAGNOSIS — E11.69 ONYCHOMYCOSIS OF MULTIPLE TOENAILS WITH TYPE 2 DIABETES MELLITUS AND PERIPHERAL ANGIOPATHY: ICD-10-CM

## 2021-06-02 DIAGNOSIS — E11.51 ONYCHOMYCOSIS OF MULTIPLE TOENAILS WITH TYPE 2 DIABETES MELLITUS AND PERIPHERAL ANGIOPATHY: ICD-10-CM

## 2021-06-02 DIAGNOSIS — B35.1 ONYCHOMYCOSIS OF MULTIPLE TOENAILS WITH TYPE 2 DIABETES MELLITUS AND PERIPHERAL ANGIOPATHY: ICD-10-CM

## 2021-06-02 DIAGNOSIS — N18.31 TYPE 2 DIABETES MELLITUS WITH STAGE 3A CHRONIC KIDNEY DISEASE, WITHOUT LONG-TERM CURRENT USE OF INSULIN: ICD-10-CM

## 2021-06-02 DIAGNOSIS — J30.89 CHRONIC NONSEASONAL ALLERGIC RHINITIS DUE TO POLLEN: ICD-10-CM

## 2021-06-02 PROCEDURE — 99499 RISK ADDL DX/OHS AUDIT: ICD-10-PCS | Mod: S$GLB,,, | Performed by: FAMILY MEDICINE

## 2021-06-02 PROCEDURE — 3072F PR LOW RISK FOR RETINOPATHY: ICD-10-PCS | Mod: S$GLB,,, | Performed by: FAMILY MEDICINE

## 2021-06-02 PROCEDURE — 99397 PR PREVENTIVE VISIT,EST,65 & OVER: ICD-10-PCS | Mod: S$GLB,,, | Performed by: FAMILY MEDICINE

## 2021-06-02 PROCEDURE — 3051F PR MOST RECENT HEMOGLOBIN A1C LEVEL 7.0 - < 8.0%: ICD-10-PCS | Mod: CPTII,S$GLB,, | Performed by: FAMILY MEDICINE

## 2021-06-02 PROCEDURE — 99999 PR PBB SHADOW E&M-EST. PATIENT-LVL III: CPT | Mod: PBBFAC,,, | Performed by: FAMILY MEDICINE

## 2021-06-02 PROCEDURE — 3051F HG A1C>EQUAL 7.0%<8.0%: CPT | Mod: CPTII,S$GLB,, | Performed by: FAMILY MEDICINE

## 2021-06-02 PROCEDURE — 3008F BODY MASS INDEX DOCD: CPT | Mod: CPTII,S$GLB,, | Performed by: FAMILY MEDICINE

## 2021-06-02 PROCEDURE — 3072F LOW RISK FOR RETINOPATHY: CPT | Mod: S$GLB,,, | Performed by: FAMILY MEDICINE

## 2021-06-02 PROCEDURE — 99397 PER PM REEVAL EST PAT 65+ YR: CPT | Mod: S$GLB,,, | Performed by: FAMILY MEDICINE

## 2021-06-02 PROCEDURE — 99999 PR PBB SHADOW E&M-EST. PATIENT-LVL III: ICD-10-PCS | Mod: PBBFAC,,, | Performed by: FAMILY MEDICINE

## 2021-06-02 PROCEDURE — 3008F PR BODY MASS INDEX (BMI) DOCUMENTED: ICD-10-PCS | Mod: CPTII,S$GLB,, | Performed by: FAMILY MEDICINE

## 2021-06-02 PROCEDURE — 99499 UNLISTED E&M SERVICE: CPT | Mod: S$GLB,,, | Performed by: FAMILY MEDICINE

## 2021-06-02 RX ORDER — TEMAZEPAM 30 MG/1
30 CAPSULE ORAL NIGHTLY PRN
Qty: 30 CAPSULE | Refills: 0 | Status: SHIPPED | OUTPATIENT
Start: 2021-06-02 | End: 2021-09-13

## 2021-06-02 RX ORDER — ROSUVASTATIN CALCIUM 20 MG/1
20 TABLET, COATED ORAL DAILY
Qty: 90 TABLET | Refills: 3 | Status: SHIPPED | OUTPATIENT
Start: 2021-06-02 | End: 2021-08-01

## 2021-06-02 RX ORDER — FLUOXETINE HYDROCHLORIDE 20 MG/1
20 CAPSULE ORAL DAILY
Qty: 90 CAPSULE | Refills: 3 | Status: SHIPPED | OUTPATIENT
Start: 2021-06-02 | End: 2022-06-18

## 2021-06-02 RX ORDER — METOPROLOL TARTRATE 100 MG/1
50 TABLET ORAL 2 TIMES DAILY
Qty: 90 TABLET | Refills: 3 | Status: SHIPPED | OUTPATIENT
Start: 2021-06-02 | End: 2022-06-22 | Stop reason: SDUPTHER

## 2021-06-02 RX ORDER — HYDROXYZINE PAMOATE 25 MG/1
CAPSULE ORAL
Qty: 90 CAPSULE | Refills: 3 | Status: SHIPPED | OUTPATIENT
Start: 2021-06-02 | End: 2022-06-19

## 2021-07-29 ENCOUNTER — LAB VISIT (OUTPATIENT)
Dept: LAB | Facility: HOSPITAL | Age: 76
End: 2021-07-29
Attending: INTERNAL MEDICINE
Payer: MEDICARE

## 2021-07-29 DIAGNOSIS — E78.2 MIXED HYPERLIPIDEMIA: ICD-10-CM

## 2021-07-29 DIAGNOSIS — E11.69 TYPE 2 DIABETES MELLITUS WITH OTHER SPECIFIED COMPLICATION, WITHOUT LONG-TERM CURRENT USE OF INSULIN: ICD-10-CM

## 2021-07-29 LAB
ALBUMIN SERPL BCP-MCNC: 4 G/DL (ref 3.5–5.2)
ALP SERPL-CCNC: 48 U/L (ref 55–135)
ALT SERPL W/O P-5'-P-CCNC: 21 U/L (ref 10–44)
ANION GAP SERPL CALC-SCNC: 15 MMOL/L (ref 8–16)
AST SERPL-CCNC: 17 U/L (ref 10–40)
BASOPHILS # BLD AUTO: 0.07 K/UL (ref 0–0.2)
BASOPHILS NFR BLD: 0.9 % (ref 0–1.9)
BILIRUB SERPL-MCNC: 0.3 MG/DL (ref 0.1–1)
BUN SERPL-MCNC: 28 MG/DL (ref 8–23)
CALCIUM SERPL-MCNC: 10.9 MG/DL (ref 8.7–10.5)
CHLORIDE SERPL-SCNC: 105 MMOL/L (ref 95–110)
CHOLEST SERPL-MCNC: 169 MG/DL (ref 120–199)
CHOLEST/HDLC SERPL: 6.5 {RATIO} (ref 2–5)
CO2 SERPL-SCNC: 19 MMOL/L (ref 23–29)
CREAT SERPL-MCNC: 1.3 MG/DL (ref 0.5–1.4)
DIFFERENTIAL METHOD: ABNORMAL
EOSINOPHIL # BLD AUTO: 0.2 K/UL (ref 0–0.5)
EOSINOPHIL NFR BLD: 2 % (ref 0–8)
ERYTHROCYTE [DISTWIDTH] IN BLOOD BY AUTOMATED COUNT: 15.1 % (ref 11.5–14.5)
EST. GFR  (AFRICAN AMERICAN): >60 ML/MIN/1.73 M^2
EST. GFR  (NON AFRICAN AMERICAN): 54.1 ML/MIN/1.73 M^2
GLUCOSE SERPL-MCNC: 157 MG/DL (ref 70–110)
HCT VFR BLD AUTO: 43.9 % (ref 40–54)
HDLC SERPL-MCNC: 26 MG/DL (ref 40–75)
HDLC SERPL: 15.4 % (ref 20–50)
HGB BLD-MCNC: 14.6 G/DL (ref 14–18)
IMM GRANULOCYTES # BLD AUTO: 0.06 K/UL (ref 0–0.04)
IMM GRANULOCYTES NFR BLD AUTO: 0.8 % (ref 0–0.5)
LDLC SERPL CALC-MCNC: ABNORMAL MG/DL (ref 63–159)
LYMPHOCYTES # BLD AUTO: 2.1 K/UL (ref 1–4.8)
LYMPHOCYTES NFR BLD: 26.5 % (ref 18–48)
MCH RBC QN AUTO: 29 PG (ref 27–31)
MCHC RBC AUTO-ENTMCNC: 33.3 G/DL (ref 32–36)
MCV RBC AUTO: 87 FL (ref 82–98)
MONOCYTES # BLD AUTO: 0.6 K/UL (ref 0.3–1)
MONOCYTES NFR BLD: 7.9 % (ref 4–15)
NEUTROPHILS # BLD AUTO: 4.9 K/UL (ref 1.8–7.7)
NEUTROPHILS NFR BLD: 61.9 % (ref 38–73)
NONHDLC SERPL-MCNC: 143 MG/DL
NRBC BLD-RTO: 0 /100 WBC
PLATELET # BLD AUTO: 276 K/UL (ref 150–450)
PMV BLD AUTO: 11.6 FL (ref 9.2–12.9)
POTASSIUM SERPL-SCNC: 4.3 MMOL/L (ref 3.5–5.1)
PROT SERPL-MCNC: 7.5 G/DL (ref 6–8.4)
RBC # BLD AUTO: 5.03 M/UL (ref 4.6–6.2)
SODIUM SERPL-SCNC: 139 MMOL/L (ref 136–145)
TRIGL SERPL-MCNC: 720 MG/DL (ref 30–150)
WBC # BLD AUTO: 7.88 K/UL (ref 3.9–12.7)

## 2021-07-29 PROCEDURE — 83036 HEMOGLOBIN GLYCOSYLATED A1C: CPT | Performed by: INTERNAL MEDICINE

## 2021-07-29 PROCEDURE — 80061 LIPID PANEL: CPT | Performed by: FAMILY MEDICINE

## 2021-07-29 PROCEDURE — 85025 COMPLETE CBC W/AUTO DIFF WBC: CPT | Performed by: INTERNAL MEDICINE

## 2021-07-29 PROCEDURE — 80053 COMPREHEN METABOLIC PANEL: CPT | Performed by: INTERNAL MEDICINE

## 2021-07-29 PROCEDURE — 36415 COLL VENOUS BLD VENIPUNCTURE: CPT | Mod: PO | Performed by: INTERNAL MEDICINE

## 2021-07-30 LAB
ESTIMATED AVG GLUCOSE: 171 MG/DL (ref 68–131)
HBA1C MFR BLD: 7.6 % (ref 4–5.6)

## 2021-08-01 ENCOUNTER — PATIENT MESSAGE (OUTPATIENT)
Dept: INTERNAL MEDICINE | Facility: CLINIC | Age: 76
End: 2021-08-01

## 2021-08-01 DIAGNOSIS — E78.2 MIXED HYPERLIPIDEMIA: Primary | ICD-10-CM

## 2021-08-01 RX ORDER — ROSUVASTATIN CALCIUM 40 MG/1
40 TABLET, COATED ORAL DAILY
Qty: 90 TABLET | Refills: 3 | Status: SHIPPED | OUTPATIENT
Start: 2021-08-01 | End: 2021-08-03

## 2021-08-03 RX ORDER — ROSUVASTATIN CALCIUM 20 MG/1
20 TABLET, COATED ORAL NIGHTLY
Qty: 90 TABLET | Refills: 3 | Status: SHIPPED | OUTPATIENT
Start: 2021-08-03 | End: 2022-10-29

## 2021-08-05 ENCOUNTER — OFFICE VISIT (OUTPATIENT)
Dept: ENDOCRINOLOGY | Facility: CLINIC | Age: 76
End: 2021-08-05
Payer: MEDICARE

## 2021-08-05 ENCOUNTER — PATIENT MESSAGE (OUTPATIENT)
Dept: ENDOCRINOLOGY | Facility: CLINIC | Age: 76
End: 2021-08-05

## 2021-08-05 ENCOUNTER — PATIENT OUTREACH (OUTPATIENT)
Dept: ADMINISTRATIVE | Facility: OTHER | Age: 76
End: 2021-08-05

## 2021-08-05 DIAGNOSIS — I25.10 CORONARY ARTERY DISEASE INVOLVING NATIVE CORONARY ARTERY OF NATIVE HEART WITHOUT ANGINA PECTORIS: Chronic | ICD-10-CM

## 2021-08-05 DIAGNOSIS — E78.2 MIXED HYPERLIPIDEMIA: Chronic | ICD-10-CM

## 2021-08-05 DIAGNOSIS — E11.9 TYPE 2 DIABETES MELLITUS WITHOUT COMPLICATION, UNSPECIFIED WHETHER LONG TERM INSULIN USE: Primary | ICD-10-CM

## 2021-08-05 DIAGNOSIS — E11.69 TYPE 2 DIABETES MELLITUS WITH OTHER SPECIFIED COMPLICATION, WITHOUT LONG-TERM CURRENT USE OF INSULIN: Primary | ICD-10-CM

## 2021-08-05 DIAGNOSIS — E11.22 TYPE 2 DIABETES MELLITUS WITH STAGE 3A CHRONIC KIDNEY DISEASE, WITHOUT LONG-TERM CURRENT USE OF INSULIN: ICD-10-CM

## 2021-08-05 DIAGNOSIS — E83.52 HYPERCALCEMIA: ICD-10-CM

## 2021-08-05 DIAGNOSIS — N18.31 TYPE 2 DIABETES MELLITUS WITH STAGE 3A CHRONIC KIDNEY DISEASE, WITHOUT LONG-TERM CURRENT USE OF INSULIN: ICD-10-CM

## 2021-08-05 PROCEDURE — 99499 UNLISTED E&M SERVICE: CPT | Mod: 95,,, | Performed by: INTERNAL MEDICINE

## 2021-08-05 PROCEDURE — 3051F HG A1C>EQUAL 7.0%<8.0%: CPT | Mod: CPTII,95,, | Performed by: INTERNAL MEDICINE

## 2021-08-05 PROCEDURE — 99214 PR OFFICE/OUTPT VISIT, EST, LEVL IV, 30-39 MIN: ICD-10-PCS | Mod: 95,,, | Performed by: INTERNAL MEDICINE

## 2021-08-05 PROCEDURE — 3072F LOW RISK FOR RETINOPATHY: CPT | Mod: CPTII,95,, | Performed by: INTERNAL MEDICINE

## 2021-08-05 PROCEDURE — 3051F PR MOST RECENT HEMOGLOBIN A1C LEVEL 7.0 - < 8.0%: ICD-10-PCS | Mod: CPTII,95,, | Performed by: INTERNAL MEDICINE

## 2021-08-05 PROCEDURE — 99214 OFFICE O/P EST MOD 30 MIN: CPT | Mod: 95,,, | Performed by: INTERNAL MEDICINE

## 2021-08-05 PROCEDURE — 3072F PR LOW RISK FOR RETINOPATHY: ICD-10-PCS | Mod: CPTII,95,, | Performed by: INTERNAL MEDICINE

## 2021-08-05 PROCEDURE — 99499 RISK ADDL DX/OHS AUDIT: ICD-10-PCS | Mod: 95,,, | Performed by: INTERNAL MEDICINE

## 2021-08-05 RX ORDER — GLIPIZIDE 5 MG/1
10 TABLET ORAL 2 TIMES DAILY WITH MEALS
Qty: 360 TABLET | Refills: 3 | Status: SHIPPED | OUTPATIENT
Start: 2021-08-05 | End: 2023-07-08 | Stop reason: SDUPTHER

## 2021-08-06 ENCOUNTER — PATIENT MESSAGE (OUTPATIENT)
Dept: INTERNAL MEDICINE | Facility: CLINIC | Age: 76
End: 2021-08-06

## 2021-08-06 ENCOUNTER — TELEPHONE (OUTPATIENT)
Dept: INTERNAL MEDICINE | Facility: CLINIC | Age: 76
End: 2021-08-06

## 2021-09-14 ENCOUNTER — PATIENT MESSAGE (OUTPATIENT)
Dept: ENDOCRINOLOGY | Facility: CLINIC | Age: 76
End: 2021-09-14

## 2021-09-27 ENCOUNTER — OFFICE VISIT (OUTPATIENT)
Dept: INTERNAL MEDICINE | Facility: CLINIC | Age: 76
End: 2021-09-27
Payer: MEDICARE

## 2021-09-27 VITALS
SYSTOLIC BLOOD PRESSURE: 98 MMHG | WEIGHT: 227.31 LBS | HEIGHT: 75 IN | BODY MASS INDEX: 28.26 KG/M2 | DIASTOLIC BLOOD PRESSURE: 62 MMHG | OXYGEN SATURATION: 99 % | TEMPERATURE: 97 F | RESPIRATION RATE: 16 BRPM | HEART RATE: 54 BPM

## 2021-09-27 DIAGNOSIS — E11.51 ONYCHOMYCOSIS OF MULTIPLE TOENAILS WITH TYPE 2 DIABETES MELLITUS AND PERIPHERAL ANGIOPATHY: ICD-10-CM

## 2021-09-27 DIAGNOSIS — B35.1 ONYCHOMYCOSIS OF MULTIPLE TOENAILS WITH TYPE 2 DIABETES MELLITUS AND PERIPHERAL NEUROPATHY: Primary | ICD-10-CM

## 2021-09-27 DIAGNOSIS — E11.42 ONYCHOMYCOSIS OF MULTIPLE TOENAILS WITH TYPE 2 DIABETES MELLITUS AND PERIPHERAL NEUROPATHY: Primary | ICD-10-CM

## 2021-09-27 DIAGNOSIS — I10 ESSENTIAL HYPERTENSION: ICD-10-CM

## 2021-09-27 DIAGNOSIS — B35.1 ONYCHOMYCOSIS OF MULTIPLE TOENAILS WITH TYPE 2 DIABETES MELLITUS AND PERIPHERAL ANGIOPATHY: ICD-10-CM

## 2021-09-27 DIAGNOSIS — E11.69 ONYCHOMYCOSIS OF MULTIPLE TOENAILS WITH TYPE 2 DIABETES MELLITUS AND PERIPHERAL ANGIOPATHY: ICD-10-CM

## 2021-09-27 DIAGNOSIS — L98.9 DERMATOSIS: ICD-10-CM

## 2021-09-27 DIAGNOSIS — H91.90 HEARING LOSS, UNSPECIFIED HEARING LOSS TYPE, UNSPECIFIED LATERALITY: ICD-10-CM

## 2021-09-27 DIAGNOSIS — I73.9 PAD (PERIPHERAL ARTERY DISEASE): ICD-10-CM

## 2021-09-27 DIAGNOSIS — E11.69 ONYCHOMYCOSIS OF MULTIPLE TOENAILS WITH TYPE 2 DIABETES MELLITUS AND PERIPHERAL NEUROPATHY: Primary | ICD-10-CM

## 2021-09-27 PROCEDURE — 4010F PR ACE/ARB THEARPY RXD/TAKEN: ICD-10-PCS | Mod: CPTII,S$GLB,, | Performed by: FAMILY MEDICINE

## 2021-09-27 PROCEDURE — 4010F ACE/ARB THERAPY RXD/TAKEN: CPT | Mod: CPTII,S$GLB,, | Performed by: FAMILY MEDICINE

## 2021-09-27 PROCEDURE — 3288F PR FALLS RISK ASSESSMENT DOCUMENTED: ICD-10-PCS | Mod: CPTII,S$GLB,, | Performed by: FAMILY MEDICINE

## 2021-09-27 PROCEDURE — 1159F MED LIST DOCD IN RCRD: CPT | Mod: CPTII,S$GLB,, | Performed by: FAMILY MEDICINE

## 2021-09-27 PROCEDURE — 3051F PR MOST RECENT HEMOGLOBIN A1C LEVEL 7.0 - < 8.0%: ICD-10-PCS | Mod: CPTII,S$GLB,, | Performed by: FAMILY MEDICINE

## 2021-09-27 PROCEDURE — 3072F PR LOW RISK FOR RETINOPATHY: ICD-10-PCS | Mod: CPTII,S$GLB,, | Performed by: FAMILY MEDICINE

## 2021-09-27 PROCEDURE — 3074F PR MOST RECENT SYSTOLIC BLOOD PRESSURE < 130 MM HG: ICD-10-PCS | Mod: CPTII,S$GLB,, | Performed by: FAMILY MEDICINE

## 2021-09-27 PROCEDURE — 1126F PR PAIN SEVERITY QUANTIFIED, NO PAIN PRESENT: ICD-10-PCS | Mod: CPTII,S$GLB,, | Performed by: FAMILY MEDICINE

## 2021-09-27 PROCEDURE — 1160F RVW MEDS BY RX/DR IN RCRD: CPT | Mod: CPTII,S$GLB,, | Performed by: FAMILY MEDICINE

## 2021-09-27 PROCEDURE — 1160F PR REVIEW ALL MEDS BY PRESCRIBER/CLIN PHARMACIST DOCUMENTED: ICD-10-PCS | Mod: CPTII,S$GLB,, | Performed by: FAMILY MEDICINE

## 2021-09-27 PROCEDURE — 99999 PR PBB SHADOW E&M-EST. PATIENT-LVL V: ICD-10-PCS | Mod: PBBFAC,,, | Performed by: FAMILY MEDICINE

## 2021-09-27 PROCEDURE — 1159F PR MEDICATION LIST DOCUMENTED IN MEDICAL RECORD: ICD-10-PCS | Mod: CPTII,S$GLB,, | Performed by: FAMILY MEDICINE

## 2021-09-27 PROCEDURE — 3060F PR POS MICROALBUMINURIA RESULT DOCUMENTED/REVIEW: ICD-10-PCS | Mod: CPTII,S$GLB,, | Performed by: FAMILY MEDICINE

## 2021-09-27 PROCEDURE — 3008F PR BODY MASS INDEX (BMI) DOCUMENTED: ICD-10-PCS | Mod: CPTII,S$GLB,, | Performed by: FAMILY MEDICINE

## 2021-09-27 PROCEDURE — 1101F PT FALLS ASSESS-DOCD LE1/YR: CPT | Mod: CPTII,S$GLB,, | Performed by: FAMILY MEDICINE

## 2021-09-27 PROCEDURE — 3066F PR DOCUMENTATION OF TREATMENT FOR NEPHROPATHY: ICD-10-PCS | Mod: CPTII,S$GLB,, | Performed by: FAMILY MEDICINE

## 2021-09-27 PROCEDURE — 3060F POS MICROALBUMINURIA REV: CPT | Mod: CPTII,S$GLB,, | Performed by: FAMILY MEDICINE

## 2021-09-27 PROCEDURE — 3288F FALL RISK ASSESSMENT DOCD: CPT | Mod: CPTII,S$GLB,, | Performed by: FAMILY MEDICINE

## 2021-09-27 PROCEDURE — 3008F BODY MASS INDEX DOCD: CPT | Mod: CPTII,S$GLB,, | Performed by: FAMILY MEDICINE

## 2021-09-27 PROCEDURE — 99214 OFFICE O/P EST MOD 30 MIN: CPT | Mod: S$GLB,,, | Performed by: FAMILY MEDICINE

## 2021-09-27 PROCEDURE — 3066F NEPHROPATHY DOC TX: CPT | Mod: CPTII,S$GLB,, | Performed by: FAMILY MEDICINE

## 2021-09-27 PROCEDURE — 99214 PR OFFICE/OUTPT VISIT, EST, LEVL IV, 30-39 MIN: ICD-10-PCS | Mod: S$GLB,,, | Performed by: FAMILY MEDICINE

## 2021-09-27 PROCEDURE — 3074F SYST BP LT 130 MM HG: CPT | Mod: CPTII,S$GLB,, | Performed by: FAMILY MEDICINE

## 2021-09-27 PROCEDURE — 1126F AMNT PAIN NOTED NONE PRSNT: CPT | Mod: CPTII,S$GLB,, | Performed by: FAMILY MEDICINE

## 2021-09-27 PROCEDURE — 99999 PR PBB SHADOW E&M-EST. PATIENT-LVL V: CPT | Mod: PBBFAC,,, | Performed by: FAMILY MEDICINE

## 2021-09-27 PROCEDURE — 3078F DIAST BP <80 MM HG: CPT | Mod: CPTII,S$GLB,, | Performed by: FAMILY MEDICINE

## 2021-09-27 PROCEDURE — 3078F PR MOST RECENT DIASTOLIC BLOOD PRESSURE < 80 MM HG: ICD-10-PCS | Mod: CPTII,S$GLB,, | Performed by: FAMILY MEDICINE

## 2021-09-27 PROCEDURE — 1101F PR PT FALLS ASSESS DOC 0-1 FALLS W/OUT INJ PAST YR: ICD-10-PCS | Mod: CPTII,S$GLB,, | Performed by: FAMILY MEDICINE

## 2021-09-27 PROCEDURE — 3072F LOW RISK FOR RETINOPATHY: CPT | Mod: CPTII,S$GLB,, | Performed by: FAMILY MEDICINE

## 2021-09-27 PROCEDURE — 3051F HG A1C>EQUAL 7.0%<8.0%: CPT | Mod: CPTII,S$GLB,, | Performed by: FAMILY MEDICINE

## 2021-09-27 RX ORDER — HYDROCHLOROTHIAZIDE 12.5 MG/1
12.5 TABLET ORAL DAILY
Qty: 90 TABLET | Refills: 3 | Status: SHIPPED | OUTPATIENT
Start: 2021-09-27 | End: 2022-12-07

## 2021-10-06 ENCOUNTER — TELEPHONE (OUTPATIENT)
Dept: OPTOMETRY | Facility: CLINIC | Age: 76
End: 2021-10-06

## 2021-11-03 ENCOUNTER — LAB VISIT (OUTPATIENT)
Dept: LAB | Facility: HOSPITAL | Age: 76
End: 2021-11-03
Attending: FAMILY MEDICINE
Payer: MEDICARE

## 2021-11-03 DIAGNOSIS — E83.52 HYPERCALCEMIA: ICD-10-CM

## 2021-11-03 DIAGNOSIS — E78.2 MIXED HYPERLIPIDEMIA: ICD-10-CM

## 2021-11-03 DIAGNOSIS — E11.69 TYPE 2 DIABETES MELLITUS WITH OTHER SPECIFIED COMPLICATION, WITHOUT LONG-TERM CURRENT USE OF INSULIN: ICD-10-CM

## 2021-11-03 LAB
ALBUMIN SERPL BCP-MCNC: 3.9 G/DL (ref 3.5–5.2)
ALP SERPL-CCNC: 52 U/L (ref 55–135)
ALT SERPL W/O P-5'-P-CCNC: 17 U/L (ref 10–44)
ANION GAP SERPL CALC-SCNC: 10 MMOL/L (ref 8–16)
AST SERPL-CCNC: 12 U/L (ref 10–40)
BILIRUB SERPL-MCNC: 0.3 MG/DL (ref 0.1–1)
BUN SERPL-MCNC: 28 MG/DL (ref 8–23)
CALCIUM SERPL-MCNC: 9.9 MG/DL (ref 8.7–10.5)
CHLORIDE SERPL-SCNC: 105 MMOL/L (ref 95–110)
CHOLEST SERPL-MCNC: 119 MG/DL (ref 120–199)
CHOLEST/HDLC SERPL: 4.1 {RATIO} (ref 2–5)
CO2 SERPL-SCNC: 23 MMOL/L (ref 23–29)
CREAT SERPL-MCNC: 1.2 MG/DL (ref 0.5–1.4)
EST. GFR  (AFRICAN AMERICAN): >60 ML/MIN/1.73 M^2
EST. GFR  (NON AFRICAN AMERICAN): 59.6 ML/MIN/1.73 M^2
ESTIMATED AVG GLUCOSE: 192 MG/DL (ref 68–131)
GLUCOSE SERPL-MCNC: 188 MG/DL (ref 70–110)
HBA1C MFR BLD: 8.3 % (ref 4–5.6)
HDLC SERPL-MCNC: 29 MG/DL (ref 40–75)
HDLC SERPL: 24.4 % (ref 20–50)
LDLC SERPL CALC-MCNC: 15.4 MG/DL (ref 63–159)
NONHDLC SERPL-MCNC: 90 MG/DL
PHOSPHATE SERPL-MCNC: 2.5 MG/DL (ref 2.7–4.5)
POTASSIUM SERPL-SCNC: 4.6 MMOL/L (ref 3.5–5.1)
PROT SERPL-MCNC: 7.1 G/DL (ref 6–8.4)
PTH-INTACT SERPL-MCNC: 31.3 PG/ML (ref 9–77)
SODIUM SERPL-SCNC: 138 MMOL/L (ref 136–145)
TRIGL SERPL-MCNC: 373 MG/DL (ref 30–150)

## 2021-11-03 PROCEDURE — 80053 COMPREHEN METABOLIC PANEL: CPT | Performed by: FAMILY MEDICINE

## 2021-11-03 PROCEDURE — 83970 ASSAY OF PARATHORMONE: CPT | Performed by: INTERNAL MEDICINE

## 2021-11-03 PROCEDURE — 80061 LIPID PANEL: CPT | Performed by: FAMILY MEDICINE

## 2021-11-03 PROCEDURE — 83036 HEMOGLOBIN GLYCOSYLATED A1C: CPT | Performed by: INTERNAL MEDICINE

## 2021-11-03 PROCEDURE — 36415 COLL VENOUS BLD VENIPUNCTURE: CPT | Mod: PO | Performed by: FAMILY MEDICINE

## 2021-11-03 PROCEDURE — 84100 ASSAY OF PHOSPHORUS: CPT | Performed by: INTERNAL MEDICINE

## 2021-11-04 ENCOUNTER — PATIENT MESSAGE (OUTPATIENT)
Dept: ENDOCRINOLOGY | Facility: CLINIC | Age: 76
End: 2021-11-04
Payer: MEDICARE

## 2021-11-10 ENCOUNTER — PATIENT MESSAGE (OUTPATIENT)
Dept: INTERNAL MEDICINE | Facility: CLINIC | Age: 76
End: 2021-11-10
Payer: MEDICARE

## 2021-11-10 ENCOUNTER — TELEPHONE (OUTPATIENT)
Dept: INTERNAL MEDICINE | Facility: CLINIC | Age: 76
End: 2021-11-10
Payer: MEDICARE

## 2021-11-10 RX ORDER — ATORVASTATIN CALCIUM 40 MG/1
TABLET, FILM COATED ORAL
COMMUNITY
Start: 2021-10-24 | End: 2021-11-10

## 2021-11-10 RX ORDER — DULAGLUTIDE 0.75 MG/.5ML
INJECTION, SOLUTION SUBCUTANEOUS
COMMUNITY
Start: 2021-09-10 | End: 2021-12-02

## 2021-11-15 ENCOUNTER — PATIENT OUTREACH (OUTPATIENT)
Dept: ADMINISTRATIVE | Facility: OTHER | Age: 76
End: 2021-11-15
Payer: MEDICARE

## 2021-11-16 ENCOUNTER — PATIENT MESSAGE (OUTPATIENT)
Dept: ADMINISTRATIVE | Facility: OTHER | Age: 76
End: 2021-11-16
Payer: MEDICARE

## 2021-11-16 ENCOUNTER — OFFICE VISIT (OUTPATIENT)
Dept: PODIATRY | Facility: CLINIC | Age: 76
End: 2021-11-16
Payer: MEDICARE

## 2021-11-16 VITALS
SYSTOLIC BLOOD PRESSURE: 133 MMHG | DIASTOLIC BLOOD PRESSURE: 61 MMHG | HEART RATE: 58 BPM | BODY MASS INDEX: 28.23 KG/M2 | WEIGHT: 227 LBS | HEIGHT: 75 IN

## 2021-11-16 DIAGNOSIS — I73.9 PAD (PERIPHERAL ARTERY DISEASE): ICD-10-CM

## 2021-11-16 DIAGNOSIS — E11.51 ONYCHOMYCOSIS OF MULTIPLE TOENAILS WITH TYPE 2 DIABETES MELLITUS AND PERIPHERAL ANGIOPATHY: ICD-10-CM

## 2021-11-16 DIAGNOSIS — E11.69 ONYCHOMYCOSIS OF MULTIPLE TOENAILS WITH TYPE 2 DIABETES MELLITUS AND PERIPHERAL ANGIOPATHY: ICD-10-CM

## 2021-11-16 DIAGNOSIS — L84 CORN OR CALLUS: ICD-10-CM

## 2021-11-16 DIAGNOSIS — B35.1 ONYCHOMYCOSIS OF MULTIPLE TOENAILS WITH TYPE 2 DIABETES MELLITUS AND PERIPHERAL ANGIOPATHY: ICD-10-CM

## 2021-11-16 PROCEDURE — 11056 PARNG/CUTG B9 HYPRKR LES 2-4: CPT | Mod: Q9,S$GLB,, | Performed by: STUDENT IN AN ORGANIZED HEALTH CARE EDUCATION/TRAINING PROGRAM

## 2021-11-16 PROCEDURE — 1101F PR PT FALLS ASSESS DOC 0-1 FALLS W/OUT INJ PAST YR: ICD-10-PCS | Mod: CPTII,S$GLB,, | Performed by: STUDENT IN AN ORGANIZED HEALTH CARE EDUCATION/TRAINING PROGRAM

## 2021-11-16 PROCEDURE — 3078F PR MOST RECENT DIASTOLIC BLOOD PRESSURE < 80 MM HG: ICD-10-PCS | Mod: CPTII,S$GLB,, | Performed by: STUDENT IN AN ORGANIZED HEALTH CARE EDUCATION/TRAINING PROGRAM

## 2021-11-16 PROCEDURE — 3052F HG A1C>EQUAL 8.0%<EQUAL 9.0%: CPT | Mod: CPTII,S$GLB,, | Performed by: STUDENT IN AN ORGANIZED HEALTH CARE EDUCATION/TRAINING PROGRAM

## 2021-11-16 PROCEDURE — 99214 PR OFFICE/OUTPT VISIT, EST, LEVL IV, 30-39 MIN: ICD-10-PCS | Mod: 25,S$GLB,, | Performed by: STUDENT IN AN ORGANIZED HEALTH CARE EDUCATION/TRAINING PROGRAM

## 2021-11-16 PROCEDURE — 99499 RISK ADDL DX/OHS AUDIT: ICD-10-PCS | Mod: S$GLB,,, | Performed by: STUDENT IN AN ORGANIZED HEALTH CARE EDUCATION/TRAINING PROGRAM

## 2021-11-16 PROCEDURE — 3060F POS MICROALBUMINURIA REV: CPT | Mod: CPTII,S$GLB,, | Performed by: STUDENT IN AN ORGANIZED HEALTH CARE EDUCATION/TRAINING PROGRAM

## 2021-11-16 PROCEDURE — 3066F PR DOCUMENTATION OF TREATMENT FOR NEPHROPATHY: ICD-10-PCS | Mod: CPTII,S$GLB,, | Performed by: STUDENT IN AN ORGANIZED HEALTH CARE EDUCATION/TRAINING PROGRAM

## 2021-11-16 PROCEDURE — 3075F SYST BP GE 130 - 139MM HG: CPT | Mod: CPTII,S$GLB,, | Performed by: STUDENT IN AN ORGANIZED HEALTH CARE EDUCATION/TRAINING PROGRAM

## 2021-11-16 PROCEDURE — 1159F MED LIST DOCD IN RCRD: CPT | Mod: CPTII,S$GLB,, | Performed by: STUDENT IN AN ORGANIZED HEALTH CARE EDUCATION/TRAINING PROGRAM

## 2021-11-16 PROCEDURE — 1101F PT FALLS ASSESS-DOCD LE1/YR: CPT | Mod: CPTII,S$GLB,, | Performed by: STUDENT IN AN ORGANIZED HEALTH CARE EDUCATION/TRAINING PROGRAM

## 2021-11-16 PROCEDURE — 3075F PR MOST RECENT SYSTOLIC BLOOD PRESS GE 130-139MM HG: ICD-10-PCS | Mod: CPTII,S$GLB,, | Performed by: STUDENT IN AN ORGANIZED HEALTH CARE EDUCATION/TRAINING PROGRAM

## 2021-11-16 PROCEDURE — 3288F PR FALLS RISK ASSESSMENT DOCUMENTED: ICD-10-PCS | Mod: CPTII,S$GLB,, | Performed by: STUDENT IN AN ORGANIZED HEALTH CARE EDUCATION/TRAINING PROGRAM

## 2021-11-16 PROCEDURE — 99214 OFFICE O/P EST MOD 30 MIN: CPT | Mod: 25,S$GLB,, | Performed by: STUDENT IN AN ORGANIZED HEALTH CARE EDUCATION/TRAINING PROGRAM

## 2021-11-16 PROCEDURE — 3008F BODY MASS INDEX DOCD: CPT | Mod: CPTII,S$GLB,, | Performed by: STUDENT IN AN ORGANIZED HEALTH CARE EDUCATION/TRAINING PROGRAM

## 2021-11-16 PROCEDURE — 3072F LOW RISK FOR RETINOPATHY: CPT | Mod: CPTII,S$GLB,, | Performed by: STUDENT IN AN ORGANIZED HEALTH CARE EDUCATION/TRAINING PROGRAM

## 2021-11-16 PROCEDURE — 3072F PR LOW RISK FOR RETINOPATHY: ICD-10-PCS | Mod: CPTII,S$GLB,, | Performed by: STUDENT IN AN ORGANIZED HEALTH CARE EDUCATION/TRAINING PROGRAM

## 2021-11-16 PROCEDURE — 3288F FALL RISK ASSESSMENT DOCD: CPT | Mod: CPTII,S$GLB,, | Performed by: STUDENT IN AN ORGANIZED HEALTH CARE EDUCATION/TRAINING PROGRAM

## 2021-11-16 PROCEDURE — 99499 UNLISTED E&M SERVICE: CPT | Mod: S$GLB,,, | Performed by: STUDENT IN AN ORGANIZED HEALTH CARE EDUCATION/TRAINING PROGRAM

## 2021-11-16 PROCEDURE — 3078F DIAST BP <80 MM HG: CPT | Mod: CPTII,S$GLB,, | Performed by: STUDENT IN AN ORGANIZED HEALTH CARE EDUCATION/TRAINING PROGRAM

## 2021-11-16 PROCEDURE — 4010F PR ACE/ARB THEARPY RXD/TAKEN: ICD-10-PCS | Mod: CPTII,S$GLB,, | Performed by: STUDENT IN AN ORGANIZED HEALTH CARE EDUCATION/TRAINING PROGRAM

## 2021-11-16 PROCEDURE — 4010F ACE/ARB THERAPY RXD/TAKEN: CPT | Mod: CPTII,S$GLB,, | Performed by: STUDENT IN AN ORGANIZED HEALTH CARE EDUCATION/TRAINING PROGRAM

## 2021-11-16 PROCEDURE — 3060F PR POS MICROALBUMINURIA RESULT DOCUMENTED/REVIEW: ICD-10-PCS | Mod: CPTII,S$GLB,, | Performed by: STUDENT IN AN ORGANIZED HEALTH CARE EDUCATION/TRAINING PROGRAM

## 2021-11-16 PROCEDURE — 3066F NEPHROPATHY DOC TX: CPT | Mod: CPTII,S$GLB,, | Performed by: STUDENT IN AN ORGANIZED HEALTH CARE EDUCATION/TRAINING PROGRAM

## 2021-11-16 PROCEDURE — 11721 DEBRIDE NAIL 6 OR MORE: CPT | Mod: 59,Q9,S$GLB, | Performed by: STUDENT IN AN ORGANIZED HEALTH CARE EDUCATION/TRAINING PROGRAM

## 2021-11-16 PROCEDURE — 3052F PR MOST RECENT HEMOGLOBIN A1C LEVEL 8.0 - < 9.0%: ICD-10-PCS | Mod: CPTII,S$GLB,, | Performed by: STUDENT IN AN ORGANIZED HEALTH CARE EDUCATION/TRAINING PROGRAM

## 2021-11-16 PROCEDURE — 1159F PR MEDICATION LIST DOCUMENTED IN MEDICAL RECORD: ICD-10-PCS | Mod: CPTII,S$GLB,, | Performed by: STUDENT IN AN ORGANIZED HEALTH CARE EDUCATION/TRAINING PROGRAM

## 2021-11-16 PROCEDURE — 11056 ROUTINE FOOT CARE: ICD-10-PCS | Mod: Q9,S$GLB,, | Performed by: STUDENT IN AN ORGANIZED HEALTH CARE EDUCATION/TRAINING PROGRAM

## 2021-11-16 PROCEDURE — 11721 ROUTINE FOOT CARE: ICD-10-PCS | Mod: 59,Q9,S$GLB, | Performed by: STUDENT IN AN ORGANIZED HEALTH CARE EDUCATION/TRAINING PROGRAM

## 2021-11-16 PROCEDURE — 3008F PR BODY MASS INDEX (BMI) DOCUMENTED: ICD-10-PCS | Mod: CPTII,S$GLB,, | Performed by: STUDENT IN AN ORGANIZED HEALTH CARE EDUCATION/TRAINING PROGRAM

## 2021-11-16 PROCEDURE — 99999 PR PBB SHADOW E&M-EST. PATIENT-LVL IV: CPT | Mod: PBBFAC,,, | Performed by: STUDENT IN AN ORGANIZED HEALTH CARE EDUCATION/TRAINING PROGRAM

## 2021-11-16 PROCEDURE — 99999 PR PBB SHADOW E&M-EST. PATIENT-LVL IV: ICD-10-PCS | Mod: PBBFAC,,, | Performed by: STUDENT IN AN ORGANIZED HEALTH CARE EDUCATION/TRAINING PROGRAM

## 2021-11-16 RX ORDER — CICLOPIROX 80 MG/ML
SOLUTION TOPICAL NIGHTLY
Qty: 1 EACH | Refills: 11 | Status: SHIPPED | OUTPATIENT
Start: 2021-11-16 | End: 2022-12-09

## 2021-11-18 ENCOUNTER — OFFICE VISIT (OUTPATIENT)
Dept: DERMATOLOGY | Facility: CLINIC | Age: 76
End: 2021-11-18
Payer: MEDICARE

## 2021-11-18 VITALS — WEIGHT: 227 LBS | BODY MASS INDEX: 28.37 KG/M2

## 2021-11-18 DIAGNOSIS — D22.9 NEVUS OF MULTIPLE SITES: ICD-10-CM

## 2021-11-18 DIAGNOSIS — Z12.83 SKIN EXAM, SCREENING FOR CANCER: ICD-10-CM

## 2021-11-18 DIAGNOSIS — L82.1 SEBORRHEIC KERATOSES: Primary | ICD-10-CM

## 2021-11-18 DIAGNOSIS — L81.4 LENTIGINES: ICD-10-CM

## 2021-11-18 DIAGNOSIS — L81.7 SCHAMBERG'S CAPILLARITIS: ICD-10-CM

## 2021-11-18 PROCEDURE — 99202 OFFICE O/P NEW SF 15 MIN: CPT | Mod: S$PBB,,, | Performed by: DERMATOLOGY

## 2021-11-18 PROCEDURE — 99999 PR PBB SHADOW E&M-EST. PATIENT-LVL IV: ICD-10-PCS | Mod: PBBFAC,,, | Performed by: DERMATOLOGY

## 2021-11-18 PROCEDURE — 99999 PR PBB SHADOW E&M-EST. PATIENT-LVL IV: CPT | Mod: PBBFAC,,, | Performed by: DERMATOLOGY

## 2021-11-18 PROCEDURE — 99202 PR OFFICE/OUTPT VISIT, NEW, LEVL II, 15-29 MIN: ICD-10-PCS | Mod: S$PBB,,, | Performed by: DERMATOLOGY

## 2021-12-02 ENCOUNTER — PATIENT MESSAGE (OUTPATIENT)
Dept: ENDOCRINOLOGY | Facility: CLINIC | Age: 76
End: 2021-12-02
Payer: MEDICARE

## 2021-12-02 DIAGNOSIS — E11.69 TYPE 2 DIABETES MELLITUS WITH OTHER SPECIFIED COMPLICATION, WITHOUT LONG-TERM CURRENT USE OF INSULIN: Primary | ICD-10-CM

## 2021-12-06 ENCOUNTER — PATIENT MESSAGE (OUTPATIENT)
Dept: ENDOCRINOLOGY | Facility: CLINIC | Age: 76
End: 2021-12-06
Payer: MEDICARE

## 2021-12-06 ENCOUNTER — TELEPHONE (OUTPATIENT)
Dept: INTERNAL MEDICINE | Facility: CLINIC | Age: 76
End: 2021-12-06
Payer: MEDICARE

## 2021-12-06 DIAGNOSIS — Z95.1 S/P CABG X 5: ICD-10-CM

## 2021-12-06 DIAGNOSIS — E11.69 ONYCHOMYCOSIS OF MULTIPLE TOENAILS WITH TYPE 2 DIABETES MELLITUS AND PERIPHERAL ANGIOPATHY: ICD-10-CM

## 2021-12-06 DIAGNOSIS — I65.21 STENOSIS OF RIGHT CAROTID ARTERY: ICD-10-CM

## 2021-12-06 DIAGNOSIS — F33.0 MILD EPISODE OF RECURRENT MAJOR DEPRESSIVE DISORDER: ICD-10-CM

## 2021-12-06 DIAGNOSIS — E11.22 TYPE 2 DIABETES MELLITUS WITH STAGE 3A CHRONIC KIDNEY DISEASE, WITHOUT LONG-TERM CURRENT USE OF INSULIN: ICD-10-CM

## 2021-12-06 DIAGNOSIS — E11.51 ONYCHOMYCOSIS OF MULTIPLE TOENAILS WITH TYPE 2 DIABETES MELLITUS AND PERIPHERAL ANGIOPATHY: ICD-10-CM

## 2021-12-06 DIAGNOSIS — Z00.00 PREVENTATIVE HEALTH CARE: Primary | ICD-10-CM

## 2021-12-06 DIAGNOSIS — I10 ESSENTIAL HYPERTENSION: ICD-10-CM

## 2021-12-06 DIAGNOSIS — E11.42 ONYCHOMYCOSIS OF MULTIPLE TOENAILS WITH TYPE 2 DIABETES MELLITUS AND PERIPHERAL NEUROPATHY: ICD-10-CM

## 2021-12-06 DIAGNOSIS — I25.10 CORONARY ARTERY DISEASE INVOLVING NATIVE CORONARY ARTERY OF NATIVE HEART WITHOUT ANGINA PECTORIS: Chronic | ICD-10-CM

## 2021-12-06 DIAGNOSIS — B35.1 ONYCHOMYCOSIS OF MULTIPLE TOENAILS WITH TYPE 2 DIABETES MELLITUS AND PERIPHERAL NEUROPATHY: ICD-10-CM

## 2021-12-06 DIAGNOSIS — E83.52 HYPERCALCEMIA: ICD-10-CM

## 2021-12-06 DIAGNOSIS — B35.1 ONYCHOMYCOSIS OF MULTIPLE TOENAILS WITH TYPE 2 DIABETES MELLITUS AND PERIPHERAL ANGIOPATHY: ICD-10-CM

## 2021-12-06 DIAGNOSIS — I73.9 PAD (PERIPHERAL ARTERY DISEASE): ICD-10-CM

## 2021-12-06 DIAGNOSIS — N18.31 TYPE 2 DIABETES MELLITUS WITH STAGE 3A CHRONIC KIDNEY DISEASE, WITHOUT LONG-TERM CURRENT USE OF INSULIN: ICD-10-CM

## 2021-12-06 DIAGNOSIS — R35.1 NOCTURIA: ICD-10-CM

## 2021-12-06 DIAGNOSIS — G47.09 OTHER INSOMNIA: ICD-10-CM

## 2021-12-06 DIAGNOSIS — E78.2 MIXED HYPERLIPIDEMIA: Chronic | ICD-10-CM

## 2021-12-06 DIAGNOSIS — E11.69 ONYCHOMYCOSIS OF MULTIPLE TOENAILS WITH TYPE 2 DIABETES MELLITUS AND PERIPHERAL NEUROPATHY: ICD-10-CM

## 2021-12-06 DIAGNOSIS — I35.0 NONRHEUMATIC AORTIC VALVE STENOSIS: ICD-10-CM

## 2021-12-08 ENCOUNTER — PATIENT MESSAGE (OUTPATIENT)
Dept: ENDOCRINOLOGY | Facility: CLINIC | Age: 76
End: 2021-12-08
Payer: MEDICARE

## 2021-12-09 ENCOUNTER — LAB VISIT (OUTPATIENT)
Dept: LAB | Facility: HOSPITAL | Age: 76
End: 2021-12-09
Attending: FAMILY MEDICINE
Payer: MEDICARE

## 2021-12-09 DIAGNOSIS — I25.10 CORONARY ARTERY DISEASE INVOLVING NATIVE CORONARY ARTERY OF NATIVE HEART WITHOUT ANGINA PECTORIS: Chronic | ICD-10-CM

## 2021-12-09 DIAGNOSIS — I35.0 NONRHEUMATIC AORTIC VALVE STENOSIS: ICD-10-CM

## 2021-12-09 DIAGNOSIS — E83.52 HYPERCALCEMIA: ICD-10-CM

## 2021-12-09 DIAGNOSIS — E11.69 ONYCHOMYCOSIS OF MULTIPLE TOENAILS WITH TYPE 2 DIABETES MELLITUS AND PERIPHERAL NEUROPATHY: ICD-10-CM

## 2021-12-09 DIAGNOSIS — Z00.00 PREVENTATIVE HEALTH CARE: ICD-10-CM

## 2021-12-09 DIAGNOSIS — E78.2 MIXED HYPERLIPIDEMIA: Chronic | ICD-10-CM

## 2021-12-09 DIAGNOSIS — G47.09 OTHER INSOMNIA: ICD-10-CM

## 2021-12-09 DIAGNOSIS — I73.9 PAD (PERIPHERAL ARTERY DISEASE): ICD-10-CM

## 2021-12-09 DIAGNOSIS — N18.31 TYPE 2 DIABETES MELLITUS WITH STAGE 3A CHRONIC KIDNEY DISEASE, WITHOUT LONG-TERM CURRENT USE OF INSULIN: ICD-10-CM

## 2021-12-09 DIAGNOSIS — Z95.1 S/P CABG X 5: ICD-10-CM

## 2021-12-09 DIAGNOSIS — R35.1 NOCTURIA: ICD-10-CM

## 2021-12-09 DIAGNOSIS — I10 ESSENTIAL HYPERTENSION: ICD-10-CM

## 2021-12-09 DIAGNOSIS — E11.22 TYPE 2 DIABETES MELLITUS WITH STAGE 3A CHRONIC KIDNEY DISEASE, WITHOUT LONG-TERM CURRENT USE OF INSULIN: ICD-10-CM

## 2021-12-09 DIAGNOSIS — B35.1 ONYCHOMYCOSIS OF MULTIPLE TOENAILS WITH TYPE 2 DIABETES MELLITUS AND PERIPHERAL ANGIOPATHY: ICD-10-CM

## 2021-12-09 DIAGNOSIS — F33.0 MILD EPISODE OF RECURRENT MAJOR DEPRESSIVE DISORDER: ICD-10-CM

## 2021-12-09 DIAGNOSIS — E11.69 ONYCHOMYCOSIS OF MULTIPLE TOENAILS WITH TYPE 2 DIABETES MELLITUS AND PERIPHERAL ANGIOPATHY: ICD-10-CM

## 2021-12-09 DIAGNOSIS — E11.42 ONYCHOMYCOSIS OF MULTIPLE TOENAILS WITH TYPE 2 DIABETES MELLITUS AND PERIPHERAL NEUROPATHY: ICD-10-CM

## 2021-12-09 DIAGNOSIS — B35.1 ONYCHOMYCOSIS OF MULTIPLE TOENAILS WITH TYPE 2 DIABETES MELLITUS AND PERIPHERAL NEUROPATHY: ICD-10-CM

## 2021-12-09 DIAGNOSIS — E11.51 ONYCHOMYCOSIS OF MULTIPLE TOENAILS WITH TYPE 2 DIABETES MELLITUS AND PERIPHERAL ANGIOPATHY: ICD-10-CM

## 2021-12-09 DIAGNOSIS — I65.21 STENOSIS OF RIGHT CAROTID ARTERY: ICD-10-CM

## 2021-12-09 LAB
25(OH)D3+25(OH)D2 SERPL-MCNC: 22 NG/ML (ref 30–96)
ALBUMIN SERPL BCP-MCNC: 4 G/DL (ref 3.5–5.2)
ALP SERPL-CCNC: 42 U/L (ref 55–135)
ALT SERPL W/O P-5'-P-CCNC: 23 U/L (ref 10–44)
ANION GAP SERPL CALC-SCNC: 13 MMOL/L (ref 8–16)
AST SERPL-CCNC: 16 U/L (ref 10–40)
BASOPHILS # BLD AUTO: 0.07 K/UL (ref 0–0.2)
BASOPHILS NFR BLD: 0.9 % (ref 0–1.9)
BILIRUB SERPL-MCNC: 0.4 MG/DL (ref 0.1–1)
BUN SERPL-MCNC: 27 MG/DL (ref 8–23)
CALCIUM SERPL-MCNC: 9.6 MG/DL (ref 8.7–10.5)
CHLORIDE SERPL-SCNC: 104 MMOL/L (ref 95–110)
CHOLEST SERPL-MCNC: 146 MG/DL (ref 120–199)
CHOLEST/HDLC SERPL: 5 {RATIO} (ref 2–5)
CO2 SERPL-SCNC: 20 MMOL/L (ref 23–29)
COMPLEXED PSA SERPL-MCNC: 0.58 NG/ML (ref 0–4)
CREAT SERPL-MCNC: 1.1 MG/DL (ref 0.5–1.4)
DIFFERENTIAL METHOD: ABNORMAL
EOSINOPHIL # BLD AUTO: 0.1 K/UL (ref 0–0.5)
EOSINOPHIL NFR BLD: 1.8 % (ref 0–8)
ERYTHROCYTE [DISTWIDTH] IN BLOOD BY AUTOMATED COUNT: 15.2 % (ref 11.5–14.5)
EST. GFR  (AFRICAN AMERICAN): >60 ML/MIN/1.73 M^2
EST. GFR  (NON AFRICAN AMERICAN): >60 ML/MIN/1.73 M^2
ESTIMATED AVG GLUCOSE: 180 MG/DL (ref 68–131)
GLUCOSE SERPL-MCNC: 138 MG/DL (ref 70–110)
HBA1C MFR BLD: 7.9 % (ref 4–5.6)
HCT VFR BLD AUTO: 44.8 % (ref 40–54)
HDLC SERPL-MCNC: 29 MG/DL (ref 40–75)
HDLC SERPL: 19.9 % (ref 20–50)
HGB BLD-MCNC: 14.4 G/DL (ref 14–18)
IMM GRANULOCYTES # BLD AUTO: 0.08 K/UL (ref 0–0.04)
IMM GRANULOCYTES NFR BLD AUTO: 1 % (ref 0–0.5)
LDLC SERPL CALC-MCNC: 42.4 MG/DL (ref 63–159)
LYMPHOCYTES # BLD AUTO: 2.6 K/UL (ref 1–4.8)
LYMPHOCYTES NFR BLD: 33.8 % (ref 18–48)
MCH RBC QN AUTO: 28.1 PG (ref 27–31)
MCHC RBC AUTO-ENTMCNC: 32.1 G/DL (ref 32–36)
MCV RBC AUTO: 87 FL (ref 82–98)
MONOCYTES # BLD AUTO: 0.6 K/UL (ref 0.3–1)
MONOCYTES NFR BLD: 7.5 % (ref 4–15)
NEUTROPHILS # BLD AUTO: 4.2 K/UL (ref 1.8–7.7)
NEUTROPHILS NFR BLD: 55 % (ref 38–73)
NONHDLC SERPL-MCNC: 117 MG/DL
NRBC BLD-RTO: 0 /100 WBC
PLATELET # BLD AUTO: 269 K/UL (ref 150–450)
PMV BLD AUTO: 11.3 FL (ref 9.2–12.9)
POTASSIUM SERPL-SCNC: 4.3 MMOL/L (ref 3.5–5.1)
PROT SERPL-MCNC: 7.1 G/DL (ref 6–8.4)
RBC # BLD AUTO: 5.13 M/UL (ref 4.6–6.2)
SODIUM SERPL-SCNC: 137 MMOL/L (ref 136–145)
T4 FREE SERPL-MCNC: 0.96 NG/DL (ref 0.71–1.51)
TRIGL SERPL-MCNC: 373 MG/DL (ref 30–150)
TSH SERPL DL<=0.005 MIU/L-ACNC: 1.05 UIU/ML (ref 0.4–4)
WBC # BLD AUTO: 7.72 K/UL (ref 3.9–12.7)

## 2021-12-09 PROCEDURE — 82306 VITAMIN D 25 HYDROXY: CPT | Performed by: FAMILY MEDICINE

## 2021-12-09 PROCEDURE — 84443 ASSAY THYROID STIM HORMONE: CPT | Performed by: FAMILY MEDICINE

## 2021-12-09 PROCEDURE — 84153 ASSAY OF PSA TOTAL: CPT | Performed by: FAMILY MEDICINE

## 2021-12-09 PROCEDURE — 85025 COMPLETE CBC W/AUTO DIFF WBC: CPT | Performed by: FAMILY MEDICINE

## 2021-12-09 PROCEDURE — 36415 COLL VENOUS BLD VENIPUNCTURE: CPT | Mod: PO | Performed by: FAMILY MEDICINE

## 2021-12-09 PROCEDURE — 80053 COMPREHEN METABOLIC PANEL: CPT | Performed by: FAMILY MEDICINE

## 2021-12-09 PROCEDURE — 80061 LIPID PANEL: CPT | Performed by: FAMILY MEDICINE

## 2021-12-09 PROCEDURE — 83036 HEMOGLOBIN GLYCOSYLATED A1C: CPT | Performed by: FAMILY MEDICINE

## 2021-12-09 PROCEDURE — 84439 ASSAY OF FREE THYROXINE: CPT | Performed by: FAMILY MEDICINE

## 2021-12-10 ENCOUNTER — OFFICE VISIT (OUTPATIENT)
Dept: ENDOCRINOLOGY | Facility: CLINIC | Age: 76
End: 2021-12-10
Payer: MEDICARE

## 2021-12-10 DIAGNOSIS — E78.2 MIXED HYPERLIPIDEMIA: Chronic | ICD-10-CM

## 2021-12-10 DIAGNOSIS — E11.69 TYPE 2 DIABETES MELLITUS WITH OTHER SPECIFIED COMPLICATION, WITHOUT LONG-TERM CURRENT USE OF INSULIN: Primary | ICD-10-CM

## 2021-12-10 DIAGNOSIS — I10 ESSENTIAL HYPERTENSION: ICD-10-CM

## 2021-12-10 DIAGNOSIS — E55.9 VITAMIN D DEFICIENCY: ICD-10-CM

## 2021-12-10 DIAGNOSIS — N18.31 TYPE 2 DIABETES MELLITUS WITH STAGE 3A CHRONIC KIDNEY DISEASE, WITHOUT LONG-TERM CURRENT USE OF INSULIN: Chronic | ICD-10-CM

## 2021-12-10 DIAGNOSIS — E11.22 TYPE 2 DIABETES MELLITUS WITH STAGE 3A CHRONIC KIDNEY DISEASE, WITHOUT LONG-TERM CURRENT USE OF INSULIN: Chronic | ICD-10-CM

## 2021-12-10 DIAGNOSIS — I25.10 CORONARY ARTERY DISEASE INVOLVING NATIVE CORONARY ARTERY OF NATIVE HEART WITHOUT ANGINA PECTORIS: Chronic | ICD-10-CM

## 2021-12-10 PROBLEM — E83.52 HYPERCALCEMIA: Status: RESOLVED | Noted: 2021-08-05 | Resolved: 2021-12-10

## 2021-12-10 PROCEDURE — 3060F PR POS MICROALBUMINURIA RESULT DOCUMENTED/REVIEW: ICD-10-PCS | Mod: CPTII,95,, | Performed by: INTERNAL MEDICINE

## 2021-12-10 PROCEDURE — 3072F LOW RISK FOR RETINOPATHY: CPT | Mod: CPTII,95,, | Performed by: INTERNAL MEDICINE

## 2021-12-10 PROCEDURE — 3066F PR DOCUMENTATION OF TREATMENT FOR NEPHROPATHY: ICD-10-PCS | Mod: CPTII,95,, | Performed by: INTERNAL MEDICINE

## 2021-12-10 PROCEDURE — 3060F POS MICROALBUMINURIA REV: CPT | Mod: CPTII,95,, | Performed by: INTERNAL MEDICINE

## 2021-12-10 PROCEDURE — 4010F ACE/ARB THERAPY RXD/TAKEN: CPT | Mod: CPTII,95,, | Performed by: INTERNAL MEDICINE

## 2021-12-10 PROCEDURE — 99214 OFFICE O/P EST MOD 30 MIN: CPT | Mod: 95,,, | Performed by: INTERNAL MEDICINE

## 2021-12-10 PROCEDURE — 4010F PR ACE/ARB THEARPY RXD/TAKEN: ICD-10-PCS | Mod: CPTII,95,, | Performed by: INTERNAL MEDICINE

## 2021-12-10 PROCEDURE — 99214 PR OFFICE/OUTPT VISIT, EST, LEVL IV, 30-39 MIN: ICD-10-PCS | Mod: 95,,, | Performed by: INTERNAL MEDICINE

## 2021-12-10 PROCEDURE — 3072F PR LOW RISK FOR RETINOPATHY: ICD-10-PCS | Mod: CPTII,95,, | Performed by: INTERNAL MEDICINE

## 2021-12-10 PROCEDURE — 3066F NEPHROPATHY DOC TX: CPT | Mod: CPTII,95,, | Performed by: INTERNAL MEDICINE

## 2021-12-10 RX ORDER — VIT C/E/ZN/COPPR/LUTEIN/ZEAXAN 250MG-90MG
2000 CAPSULE ORAL DAILY
Refills: 0
Start: 2021-12-10

## 2021-12-14 RX ORDER — HYDROCHLOROTHIAZIDE 25 MG/1
TABLET ORAL
Qty: 90 TABLET | Refills: 3 | OUTPATIENT
Start: 2021-12-14

## 2021-12-15 ENCOUNTER — PATIENT MESSAGE (OUTPATIENT)
Dept: INTERNAL MEDICINE | Facility: CLINIC | Age: 76
End: 2021-12-15
Payer: MEDICARE

## 2021-12-16 ENCOUNTER — OFFICE VISIT (OUTPATIENT)
Dept: INTERNAL MEDICINE | Facility: CLINIC | Age: 76
End: 2021-12-16
Payer: MEDICARE

## 2021-12-16 VITALS
WEIGHT: 225.5 LBS | SYSTOLIC BLOOD PRESSURE: 135 MMHG | RESPIRATION RATE: 16 BRPM | BODY MASS INDEX: 28.04 KG/M2 | HEIGHT: 75 IN | OXYGEN SATURATION: 98 % | HEART RATE: 60 BPM | TEMPERATURE: 98 F | DIASTOLIC BLOOD PRESSURE: 70 MMHG

## 2021-12-16 DIAGNOSIS — I10 ESSENTIAL HYPERTENSION: ICD-10-CM

## 2021-12-16 DIAGNOSIS — Z09 FOLLOW UP: Primary | ICD-10-CM

## 2021-12-16 PROCEDURE — 3060F POS MICROALBUMINURIA REV: CPT | Mod: CPTII,S$GLB,, | Performed by: FAMILY MEDICINE

## 2021-12-16 PROCEDURE — 99214 OFFICE O/P EST MOD 30 MIN: CPT | Mod: S$GLB,,, | Performed by: FAMILY MEDICINE

## 2021-12-16 PROCEDURE — 4010F ACE/ARB THERAPY RXD/TAKEN: CPT | Mod: CPTII,S$GLB,, | Performed by: FAMILY MEDICINE

## 2021-12-16 PROCEDURE — 4010F PR ACE/ARB THEARPY RXD/TAKEN: ICD-10-PCS | Mod: CPTII,S$GLB,, | Performed by: FAMILY MEDICINE

## 2021-12-16 PROCEDURE — 3072F PR LOW RISK FOR RETINOPATHY: ICD-10-PCS | Mod: CPTII,S$GLB,, | Performed by: FAMILY MEDICINE

## 2021-12-16 PROCEDURE — 3072F LOW RISK FOR RETINOPATHY: CPT | Mod: CPTII,S$GLB,, | Performed by: FAMILY MEDICINE

## 2021-12-16 PROCEDURE — 99999 PR PBB SHADOW E&M-EST. PATIENT-LVL V: CPT | Mod: PBBFAC,,, | Performed by: FAMILY MEDICINE

## 2021-12-16 PROCEDURE — 3060F PR POS MICROALBUMINURIA RESULT DOCUMENTED/REVIEW: ICD-10-PCS | Mod: CPTII,S$GLB,, | Performed by: FAMILY MEDICINE

## 2021-12-16 PROCEDURE — 3066F NEPHROPATHY DOC TX: CPT | Mod: CPTII,S$GLB,, | Performed by: FAMILY MEDICINE

## 2021-12-16 PROCEDURE — 3066F PR DOCUMENTATION OF TREATMENT FOR NEPHROPATHY: ICD-10-PCS | Mod: CPTII,S$GLB,, | Performed by: FAMILY MEDICINE

## 2021-12-16 PROCEDURE — 99214 PR OFFICE/OUTPT VISIT, EST, LEVL IV, 30-39 MIN: ICD-10-PCS | Mod: S$GLB,,, | Performed by: FAMILY MEDICINE

## 2021-12-16 PROCEDURE — 99999 PR PBB SHADOW E&M-EST. PATIENT-LVL V: ICD-10-PCS | Mod: PBBFAC,,, | Performed by: FAMILY MEDICINE

## 2022-01-10 ENCOUNTER — PATIENT MESSAGE (OUTPATIENT)
Dept: ENDOCRINOLOGY | Facility: CLINIC | Age: 77
End: 2022-01-10
Payer: MEDICARE

## 2022-01-14 ENCOUNTER — TELEPHONE (OUTPATIENT)
Dept: ENDOCRINOLOGY | Facility: CLINIC | Age: 77
End: 2022-01-14
Payer: MEDICARE

## 2022-01-18 ENCOUNTER — PATIENT MESSAGE (OUTPATIENT)
Dept: OPTOMETRY | Facility: CLINIC | Age: 77
End: 2022-01-18
Payer: MEDICARE

## 2022-01-18 RX ORDER — TEMAZEPAM 30 MG/1
30 CAPSULE ORAL NIGHTLY PRN
Qty: 30 CAPSULE | Refills: 5 | Status: SHIPPED | OUTPATIENT
Start: 2022-01-18 | End: 2022-11-28 | Stop reason: SDUPTHER

## 2022-01-18 NOTE — TELEPHONE ENCOUNTER
No new care gaps identified.  Powered by [employer]. Reference number: [id number].   1/18/2022 2:09:28 PM CST

## 2022-01-25 ENCOUNTER — PATIENT MESSAGE (OUTPATIENT)
Dept: ADMINISTRATIVE | Facility: HOSPITAL | Age: 77
End: 2022-01-25
Payer: MEDICARE

## 2022-02-02 ENCOUNTER — PATIENT MESSAGE (OUTPATIENT)
Dept: ENDOCRINOLOGY | Facility: CLINIC | Age: 77
End: 2022-02-02
Payer: MEDICARE

## 2022-02-07 ENCOUNTER — PATIENT OUTREACH (OUTPATIENT)
Dept: ADMINISTRATIVE | Facility: OTHER | Age: 77
End: 2022-02-07
Payer: MEDICARE

## 2022-02-07 NOTE — PROGRESS NOTES
Health Maintenance Due   Topic Date Due    Hepatitis C Screening  Never done    COVID-19 Vaccine (1) Never done    TETANUS VACCINE  Never done    Shingles Vaccine (1 of 2) Never done    Eye Exam  07/17/2021     Updates were requested from care everywhere.  Chart was reviewed for overdue Proactive [facility] Encounters ([facility]) topics (CRS, Breast Cancer Screening, Eye exam)  Health Maintenance has been updated.  LIN[place] immunization registry triggered.  Immunizations were reconciled.

## 2022-02-08 ENCOUNTER — OFFICE VISIT (OUTPATIENT)
Dept: PODIATRY | Facility: CLINIC | Age: 77
End: 2022-02-08
Payer: MEDICARE

## 2022-02-08 VITALS — BODY MASS INDEX: 28.19 KG/M2 | HEIGHT: 75 IN

## 2022-02-08 DIAGNOSIS — E11.51 ONYCHOMYCOSIS OF MULTIPLE TOENAILS WITH TYPE 2 DIABETES MELLITUS AND PERIPHERAL ANGIOPATHY: Primary | ICD-10-CM

## 2022-02-08 DIAGNOSIS — B35.1 ONYCHOMYCOSIS OF MULTIPLE TOENAILS WITH TYPE 2 DIABETES MELLITUS AND PERIPHERAL ANGIOPATHY: Primary | ICD-10-CM

## 2022-02-08 DIAGNOSIS — L84 CORN OR CALLUS: ICD-10-CM

## 2022-02-08 DIAGNOSIS — E11.69 ONYCHOMYCOSIS OF MULTIPLE TOENAILS WITH TYPE 2 DIABETES MELLITUS AND PERIPHERAL ANGIOPATHY: Primary | ICD-10-CM

## 2022-02-08 PROCEDURE — 99499 UNLISTED E&M SERVICE: CPT | Mod: S$GLB,,, | Performed by: STUDENT IN AN ORGANIZED HEALTH CARE EDUCATION/TRAINING PROGRAM

## 2022-02-08 PROCEDURE — 99499 RISK ADDL DX/OHS AUDIT: ICD-10-PCS | Mod: S$GLB,,, | Performed by: STUDENT IN AN ORGANIZED HEALTH CARE EDUCATION/TRAINING PROGRAM

## 2022-02-08 PROCEDURE — 11056 PARNG/CUTG B9 HYPRKR LES 2-4: CPT | Mod: Q9,S$GLB,, | Performed by: STUDENT IN AN ORGANIZED HEALTH CARE EDUCATION/TRAINING PROGRAM

## 2022-02-08 PROCEDURE — 1159F PR MEDICATION LIST DOCUMENTED IN MEDICAL RECORD: ICD-10-PCS | Mod: CPTII,S$GLB,, | Performed by: STUDENT IN AN ORGANIZED HEALTH CARE EDUCATION/TRAINING PROGRAM

## 2022-02-08 PROCEDURE — 3008F BODY MASS INDEX DOCD: CPT | Mod: CPTII,S$GLB,, | Performed by: STUDENT IN AN ORGANIZED HEALTH CARE EDUCATION/TRAINING PROGRAM

## 2022-02-08 PROCEDURE — 1125F PR PAIN SEVERITY QUANTIFIED, PAIN PRESENT: ICD-10-PCS | Mod: CPTII,S$GLB,, | Performed by: STUDENT IN AN ORGANIZED HEALTH CARE EDUCATION/TRAINING PROGRAM

## 2022-02-08 PROCEDURE — 99999 PR PBB SHADOW E&M-EST. PATIENT-LVL III: ICD-10-PCS | Mod: PBBFAC,,, | Performed by: STUDENT IN AN ORGANIZED HEALTH CARE EDUCATION/TRAINING PROGRAM

## 2022-02-08 PROCEDURE — 3008F PR BODY MASS INDEX (BMI) DOCUMENTED: ICD-10-PCS | Mod: CPTII,S$GLB,, | Performed by: STUDENT IN AN ORGANIZED HEALTH CARE EDUCATION/TRAINING PROGRAM

## 2022-02-08 PROCEDURE — 11721 ROUTINE FOOT CARE: ICD-10-PCS | Mod: 59,Q9,S$GLB, | Performed by: STUDENT IN AN ORGANIZED HEALTH CARE EDUCATION/TRAINING PROGRAM

## 2022-02-08 PROCEDURE — 11056 ROUTINE FOOT CARE: ICD-10-PCS | Mod: Q9,S$GLB,, | Performed by: STUDENT IN AN ORGANIZED HEALTH CARE EDUCATION/TRAINING PROGRAM

## 2022-02-08 PROCEDURE — 1125F AMNT PAIN NOTED PAIN PRSNT: CPT | Mod: CPTII,S$GLB,, | Performed by: STUDENT IN AN ORGANIZED HEALTH CARE EDUCATION/TRAINING PROGRAM

## 2022-02-08 PROCEDURE — 99999 PR PBB SHADOW E&M-EST. PATIENT-LVL III: CPT | Mod: PBBFAC,,, | Performed by: STUDENT IN AN ORGANIZED HEALTH CARE EDUCATION/TRAINING PROGRAM

## 2022-02-08 PROCEDURE — 11721 DEBRIDE NAIL 6 OR MORE: CPT | Mod: 59,Q9,S$GLB, | Performed by: STUDENT IN AN ORGANIZED HEALTH CARE EDUCATION/TRAINING PROGRAM

## 2022-02-08 PROCEDURE — 1159F MED LIST DOCD IN RCRD: CPT | Mod: CPTII,S$GLB,, | Performed by: STUDENT IN AN ORGANIZED HEALTH CARE EDUCATION/TRAINING PROGRAM

## 2022-02-08 NOTE — PROCEDURES
"Routine Foot Care    Date/Time: 2/8/2022 8:15 AM  Performed by: Terri Starkey DPM  Authorized by: Terri Starkey DPM     Time out: Immediately prior to procedure a "time out" was called to verify the correct patient, procedure, equipment, support staff and site/side marked as required.    Consent Done?:  Yes (Verbal)  Hyperkeratotic Skin Lesions?: Yes    Number of trimmed lesions:  2  Location(s):  Left 1st Toe, Right 1st Toe, Left 1st Metatarsal Head and Right 1st Metatarsal Head    Nail Care Type:  Debride  Location(s): All  (Left 1st Toe, Left 3rd Toe, Left 2nd Toe, Left 4th Toe, Left 5th Toe, Right 1st Toe, Right 2nd Toe, Right 3rd Toe, Right 4th Toe and Right 5th Toe)  Patient tolerance:  Patient tolerated the procedure well with no immediate complications      "

## 2022-02-08 NOTE — PROGRESS NOTES
Subjective:      Patient ID: Cecil Pringle is a 76 y.o. male.    Chief Complaint: Diabetes Mellitus (PCP Dr. Jett,12/16/21), Diabetic Foot Exam, Routine Foot Care, and Peripheral Neuropathy    Cecil is a 76 y.o. male who presents to the clinic upon referral from Dr. Hannah rock. provider found  for evaluation and treatment of diabetic feet. Cecil has a past medical history of Anxiety, Arthritis, Coronary artery disease, Depression, Diabetes mellitus, type 2, Hyperlipidemia, Hypertension, Insomnia, and PAD (peripheral artery disease). Patient relates no major problem with feet, states has occasional burning/tinlging sensations to toes. Only complaints today consist of callus and elongated toe nails to rakel feet.  has not had new diabetic shoes in 7 years.      11/16/21: Pt seen today for annual diabetic foot exam and RFC. No new pedal complaints.     2/8/22: Pt seen today for RFC. No new pedal complaints.     PCP: Layo Jett MD    Date Last Seen by PCP: 9/27/21    Current shoe gear: Rx diabetic extra depth shoes and custom accommodative insoles    Hemoglobin A1C   Date Value Ref Range Status   12/09/2021 7.9 (H) 4.0 - 5.6 % Final     Comment:     ADA Screening Guidelines:  5.7-6.4%  Consistent with prediabetes  >or=6.5%  Consistent with diabetes    High levels of fetal hemoglobin interfere with the HbA1C  assay. Heterozygous hemoglobin variants (HbS, HgC, etc)do  not significantly interfere with this assay.   However, presence of multiple variants may affect accuracy.     11/03/2021 8.3 (H) 4.0 - 5.6 % Final     Comment:     ADA Screening Guidelines:  5.7-6.4%  Consistent with prediabetes  >or=6.5%  Consistent with diabetes    High levels of fetal hemoglobin interfere with the HbA1C  assay. Heterozygous hemoglobin variants (HbS, HgC, etc)do  not significantly interfere with this assay.   However, presence of multiple variants may affect accuracy.     07/29/2021 7.6 (H) 4.0 - 5.6 % Final     Comment:      ADA Screening Guidelines:  5.7-6.4%  Consistent with prediabetes  >or=6.5%  Consistent with diabetes    High levels of fetal hemoglobin interfere with the HbA1C  assay. Heterozygous hemoglobin variants (HbS, HgC, etc)do  not significantly interfere with this assay.   However, presence of multiple variants may affect accuracy.             Review of Systems   Constitutional: Negative for chills, decreased appetite, diaphoresis and fever.   Cardiovascular: Negative for claudication and leg swelling.   Respiratory: Negative for shortness of breath.    Skin: Positive for dry skin and nail changes. Negative for color change, flushing, itching, poor wound healing, rash and suspicious lesions.   Musculoskeletal: Negative for arthritis, back pain, falls, joint pain, joint swelling and myalgias.   Gastrointestinal: Negative for nausea and vomiting.   Neurological: Positive for numbness and paresthesias. Negative for loss of balance.           Objective:      Physical Exam  Vitals and nursing note reviewed.   Constitutional:       General: He is not in acute distress.     Appearance: He is well-developed. He is not diaphoretic.   Cardiovascular:      Pulses:           Dorsalis pedis pulses are 1+ on the right side and 1+ on the left side.        Posterior tibial pulses are 1+ on the right side and 1+ on the left side.      Comments: Skin temperature is within normal limits. Toes are cool to touch and feet are warm proximally. Hair growth is mildly diminished. Skin is mildly atrophic and with mild hyperpigmentation. Mild edema noted, varicosities noted rakel      Musculoskeletal:         General: Deformity present. No tenderness.      Right foot: Decreased range of motion. Deformity present.      Left foot: Decreased range of motion. Deformity present.      Comments: Strength is 5/5 to lower extremity muscle groups, rakel       Feet:      Right foot:      Skin integrity: Dry skin present. No ulcer, blister, skin breakdown,  erythema, warmth or callus.      Left foot:      Skin integrity: Dry skin present. No ulcer, blister, skin breakdown, erythema, warmth or callus.   Lymphadenopathy:      Comments: Negative lymphadenopathy bilateral popliteal fossa and tarsal tunnel.    Skin:     General: Skin is warm and dry.      Capillary Refill: Capillary refill takes less than 2 seconds.      Coloration: Skin is not pale.      Findings: No bruising, ecchymosis, erythema, laceration, lesion, petechiae or rash.      Comments: Skin is warm and dry bilaterally, no acute SOI noted, bilaterally, appears stable.     Nails 1-5 are thickened, discolored, dystrophic and with subungual debris, rakel    HPK noted sub 1st met head and medial IPJ of hallux rakel   Neurological:      Mental Status: He is alert and oriented to person, place, and time.      Sensory: Sensory deficit present.      Comments: Vibratory sensation diminished, rakel               Assessment:       Encounter Diagnoses   Name Primary?    Type 2 diabetes, uncontrolled, with neuropathy     Onychomycosis of multiple toenails with type 2 diabetes mellitus and peripheral angiopathy Yes    Corn or callus          Plan:       Cecil was seen today for diabetes mellitus, diabetic foot exam, routine foot care and peripheral neuropathy.    Diagnoses and all orders for this visit:    Onychomycosis of multiple toenails with type 2 diabetes mellitus and peripheral angiopathy  -     Routine Foot Care    Type 2 diabetes, uncontrolled, with neuropathy  -     Routine Foot Care    Lanexa or callus  -     Routine Foot Care      I counseled the patient on his conditions, their implications and medical management.    - HPK and nails debrided, see procedure note    - Shoe inspection. Diabetic Foot Education. Patient reminded of the importance of good nutrition and blood sugar control to help prevent podiatric complications of diabetes. Patient instructed on proper foot hygeine. We discussed wearing proper shoe  gear, daily foot inspections, never walking without protective shoe gear, never putting sharp instruments to feet, routine podiatric nail visits every 2-3 months.      -Continue penlac for onychomycosis    -RTC in 2-3 months for routine diabetic foot care

## 2022-02-15 ENCOUNTER — PATIENT MESSAGE (OUTPATIENT)
Dept: ENDOCRINOLOGY | Facility: CLINIC | Age: 77
End: 2022-02-15
Payer: MEDICARE

## 2022-02-23 ENCOUNTER — PATIENT MESSAGE (OUTPATIENT)
Dept: ENDOCRINOLOGY | Facility: CLINIC | Age: 77
End: 2022-02-23
Payer: MEDICARE

## 2022-03-07 LAB
LEFT EYE DM RETINOPATHY: NEGATIVE
RIGHT EYE DM RETINOPATHY: NEGATIVE

## 2022-03-08 ENCOUNTER — PATIENT MESSAGE (OUTPATIENT)
Dept: INTERNAL MEDICINE | Facility: CLINIC | Age: 77
End: 2022-03-08
Payer: MEDICARE

## 2022-03-10 ENCOUNTER — PATIENT OUTREACH (OUTPATIENT)
Dept: ADMINISTRATIVE | Facility: HOSPITAL | Age: 77
End: 2022-03-10
Payer: MEDICARE

## 2022-03-29 RX ORDER — LOSARTAN POTASSIUM 100 MG/1
TABLET ORAL
Qty: 90 TABLET | Refills: 2 | Status: SHIPPED | OUTPATIENT
Start: 2022-03-29 | End: 2022-12-07

## 2022-03-29 NOTE — TELEPHONE ENCOUNTER
Refill Authorization Note   Cecil Pringle  is requesting a refill authorization.  Brief Assessment and Rationale for Refill:  Approve     Medication Therapy Plan:  FLOS 04/11/22    Medication Reconciliation Completed: No   Comments:   --->Care Gap information included below if applicable.   Orders Placed This Encounter    losartan (COZAAR) 100 MG tablet      Requested Prescriptions   Signed Prescriptions Disp Refills    losartan (COZAAR) 100 MG tablet 90 tablet 2     Sig: TAKE 1 TABLET BY MOUTH EVERY DAY       Cardiovascular:  Angiotensin Receptor Blockers Passed - 3/29/2022 12:08 AM        Passed - Patient is at least 18 years old        Passed - Last BP in normal range within 360 days     BP Readings from Last 3 Encounters:   12/16/21 135/70   11/16/21 133/61   09/27/21 98/62               Passed - Valid encounter within last 15 months     Recent Visits  Date Type Provider Dept   12/16/21 Office Visit Layo Jett MD [facility]   09/27/21 Office Visit Layo Jett MD [facility]   06/02/21 Office Visit Layo Jett MD [facility]   06/13/20 Office Visit Layo Jett MD [facility]   Showing recent visits within past 720 days and meeting all other requirements  Future Appointments  No visits were found meeting these conditions.  Showing future appointments within next 150 days and meeting all other requirements      Future Appointments              In 1 week LAB, [facility] - Lab, [place]    In 2 weeks MD Johnny Gordon - Endo Diabetes 6th Fl, [facility]    In 1 month [facility] - Podiatry, [facility]    In 2 months MD Tha Cabrera [facility], [place]                Passed - Matches previous order       Previous Authorizing Provider: Layo Jett MD (losartan (COZAAR) 100 MG tablet)  Previous Pharmacy: [facility]/pharmacy #[id number] - [address]  [address]            Passed - No ED/Hospital visits since last PCP visit     Last PCP Visit: 12/16/2021 Last Admission: 6/14/2019 Last ED Visit:           Passed - Cr is 1.39 or below and within 360 days     Lab Results   Component Value Date    CREATININE 1.1 12/09/2021    CREATININE 1.2 11/03/2021    CREATININE 1.3 07/29/2021              Passed - K is 5.2 or below and within 360 days     Potassium   Date Value Ref Range Status   12/09/2021 4.3 3.5 - 5.1 mmol/L Final   11/03/2021 4.6 3.5 - 5.1 mmol/L Final   07/29/2021 4.3 3.5 - 5.1 mmol/L Final              Passed - eGFR within 360 days     Lab Results   Component Value Date    EGFRNONAA >60.0 12/09/2021    EGFRNONAA 59.6 (A) 11/03/2021    EGFRNONAA 54.1 (A) 07/29/2021                    Appointments  past 12m or future 3m with PCP    Date Provider   Last Visit   12/16/2021 Layo Jett MD   Next Visit   6/22/2022 Layo Jett MD   ED visits in past 90 days: 0     Note composed:1:00 PM 03/29/2022

## 2022-03-29 NOTE — TELEPHONE ENCOUNTER
Care Due:                  Date            Visit Type   Department     Provider  --------------------------------------------------------------------------------                                MYCHART                              ANNUAL                              CHECKUP/PHY  [facility] INTERNAL  Last Visit: 12-      S            CHIVO Jett                              MYCHARZACH                              FOLLOWUP/OF  [facility] INTERNAL  Next Visit: 06-      FICE VISIT   [facility]       Layo Jett                                                            Last  Test          Frequency    Reason                     Performed    Due Date  --------------------------------------------------------------------------------    HBA1C.......  6 months...  dapagliflozin, metFORMIN.  12- 06-    Powered by [employer]. Reference number: [id number].   3/29/2022 12:08:59 AM CDT

## 2022-04-04 ENCOUNTER — TELEPHONE (OUTPATIENT)
Dept: ENDOSCOPY | Facility: HOSPITAL | Age: 77
End: 2022-04-04
Payer: MEDICARE

## 2022-04-04 DIAGNOSIS — K63.5 POLYP OF COLON, UNSPECIFIED PART OF COLON, UNSPECIFIED TYPE: Primary | ICD-10-CM

## 2022-04-11 ENCOUNTER — LAB VISIT (OUTPATIENT)
Dept: LAB | Facility: HOSPITAL | Age: 77
End: 2022-04-11
Attending: INTERNAL MEDICINE
Payer: MEDICARE

## 2022-04-11 DIAGNOSIS — E11.69 TYPE 2 DIABETES MELLITUS WITH OTHER SPECIFIED COMPLICATION, WITHOUT LONG-TERM CURRENT USE OF INSULIN: ICD-10-CM

## 2022-04-11 LAB
ALBUMIN SERPL BCP-MCNC: 4 G/DL (ref 3.5–5.2)
ALP SERPL-CCNC: 42 U/L (ref 55–135)
ALT SERPL W/O P-5'-P-CCNC: 22 U/L (ref 10–44)
ANION GAP SERPL CALC-SCNC: 11 MMOL/L (ref 8–16)
AST SERPL-CCNC: 15 U/L (ref 10–40)
BILIRUB SERPL-MCNC: 0.3 MG/DL (ref 0.1–1)
BUN SERPL-MCNC: 20 MG/DL (ref 8–23)
CALCIUM SERPL-MCNC: 10.4 MG/DL (ref 8.7–10.5)
CHLORIDE SERPL-SCNC: 105 MMOL/L (ref 95–110)
CHOLEST SERPL-MCNC: 130 MG/DL (ref 120–199)
CHOLEST/HDLC SERPL: 4.3 {RATIO} (ref 2–5)
CO2 SERPL-SCNC: 25 MMOL/L (ref 23–29)
CREAT SERPL-MCNC: 1.3 MG/DL (ref 0.5–1.4)
EST. GFR  (AFRICAN AMERICAN): >60 ML/MIN/1.73 M^2
EST. GFR  (NON AFRICAN AMERICAN): 53.8 ML/MIN/1.73 M^2
ESTIMATED AVG GLUCOSE: 169 MG/DL (ref 68–131)
GLUCOSE SERPL-MCNC: 114 MG/DL (ref 70–110)
HBA1C MFR BLD: 7.5 % (ref 4–5.6)
HDLC SERPL-MCNC: 30 MG/DL (ref 40–75)
HDLC SERPL: 23.1 % (ref 20–50)
LDLC SERPL CALC-MCNC: 25.2 MG/DL (ref 63–159)
NONHDLC SERPL-MCNC: 100 MG/DL
POTASSIUM SERPL-SCNC: 4.2 MMOL/L (ref 3.5–5.1)
PROT SERPL-MCNC: 7.1 G/DL (ref 6–8.4)
SODIUM SERPL-SCNC: 141 MMOL/L (ref 136–145)
TRIGL SERPL-MCNC: 374 MG/DL (ref 30–150)

## 2022-04-11 PROCEDURE — 36415 COLL VENOUS BLD VENIPUNCTURE: CPT | Mod: PO | Performed by: INTERNAL MEDICINE

## 2022-04-11 PROCEDURE — 80053 COMPREHEN METABOLIC PANEL: CPT | Performed by: INTERNAL MEDICINE

## 2022-04-11 PROCEDURE — 80061 LIPID PANEL: CPT | Performed by: INTERNAL MEDICINE

## 2022-04-11 PROCEDURE — 83036 HEMOGLOBIN GLYCOSYLATED A1C: CPT | Performed by: INTERNAL MEDICINE

## 2022-04-13 ENCOUNTER — PES CALL (OUTPATIENT)
Dept: ADMINISTRATIVE | Facility: CLINIC | Age: 77
End: 2022-04-13
Payer: MEDICARE

## 2022-04-18 ENCOUNTER — OFFICE VISIT (OUTPATIENT)
Dept: ENDOCRINOLOGY | Facility: CLINIC | Age: 77
End: 2022-04-18
Payer: MEDICARE

## 2022-04-18 DIAGNOSIS — E78.2 MIXED HYPERLIPIDEMIA: Chronic | ICD-10-CM

## 2022-04-18 DIAGNOSIS — I25.10 CORONARY ARTERY DISEASE INVOLVING NATIVE CORONARY ARTERY OF NATIVE HEART WITHOUT ANGINA PECTORIS: Chronic | ICD-10-CM

## 2022-04-18 DIAGNOSIS — E11.22 TYPE 2 DIABETES MELLITUS WITH STAGE 3A CHRONIC KIDNEY DISEASE, WITHOUT LONG-TERM CURRENT USE OF INSULIN: Chronic | ICD-10-CM

## 2022-04-18 DIAGNOSIS — N18.31 TYPE 2 DIABETES MELLITUS WITH STAGE 3A CHRONIC KIDNEY DISEASE, WITHOUT LONG-TERM CURRENT USE OF INSULIN: Chronic | ICD-10-CM

## 2022-04-18 PROCEDURE — 4010F ACE/ARB THERAPY RXD/TAKEN: CPT | Mod: CPTII,95,, | Performed by: INTERNAL MEDICINE

## 2022-04-18 PROCEDURE — 3051F PR MOST RECENT HEMOGLOBIN A1C LEVEL 7.0 - < 8.0%: ICD-10-PCS | Mod: CPTII,95,, | Performed by: INTERNAL MEDICINE

## 2022-04-18 PROCEDURE — 4010F PR ACE/ARB THEARPY RXD/TAKEN: ICD-10-PCS | Mod: CPTII,95,, | Performed by: INTERNAL MEDICINE

## 2022-04-18 PROCEDURE — 99213 PR OFFICE/OUTPT VISIT, EST, LEVL III, 20-29 MIN: ICD-10-PCS | Mod: 95,,, | Performed by: INTERNAL MEDICINE

## 2022-04-18 PROCEDURE — 3051F HG A1C>EQUAL 7.0%<8.0%: CPT | Mod: CPTII,95,, | Performed by: INTERNAL MEDICINE

## 2022-04-18 PROCEDURE — 99213 OFFICE O/P EST LOW 20 MIN: CPT | Mod: 95,,, | Performed by: INTERNAL MEDICINE

## 2022-04-18 NOTE — PROGRESS NOTES
FOLLOW-UP VISIT    Subjective:      Chief Complaint:  Follow-up for diabetes       HPI: Cecil Pringle is a 76 y.o. male who is here for a follow-up evaluation for diabetes    The patient's last visit with me was on 8/5/2021.      Past Medical History:   Diagnosis Date    Anxiety     Arthritis     Coronary artery disease     Depression     Diabetes mellitus, type 2     Hyperlipidemia     Hypertension     Insomnia     PAD (peripheral artery disease)      With regards to the diabetes:    4/18/2022 (Virtual):  Started on the new dose of Trulicity 1.5 mg weekly approximately four weeks ago.  He is tolerating the new dose well without side effects.  He is still having issues with intermittent dizziness, which has been going on for about the past two years.      Current diabetic medications include:    Metformin 1000 mg twice daily   Glipizide 10 mg BID   Farxiga (dapagliflozin) 10 mg daily   Trulicity 1.5 mg weekly (increasing to 1.5 mg as soon as the shipment is in)        History of diabetes:  The patient was initially diagnosed with Type 2 diabetes mellitus:  Over 10 years ago.    Known diabetic complications: nephropathy (CKD Stage 3), peripheral neuropathy, cardiovascular disease and peripheral vascular disease     Cardiovascular risk factors: advanced age (older than 55 for men, 65 for women), diabetes mellitus, dyslipidemia, family history of premature cardiovascular disease, hypertension, male gender and microalbuminuria      Other medications tried:   Victoza (liraglutide) - did well with that in the past, but it was too expensive so he was not able to continue after the clinical trial   Januvia - stopped after initiating Trulicity   Ozempic - took for 2 years as part of a clinic trial before FDA approval. Did well with it.    Last visit with Diabetes Educator: : 06/05/2020      BP Readings from Last 3 Encounters:   12/16/21 135/70   11/16/21 133/61   09/27/21 98/62       Wt Readings from  Last 10 Encounters:   12/16/21 102.3 kg (225 lb 8.5 oz)   11/18/21 103 kg (227 lb)   11/16/21 103 kg (227 lb)   09/27/21 103.1 kg (227 lb 4.7 oz)   06/02/21 103.7 kg (228 lb 9.9 oz)   04/06/21 104.5 kg (230 lb 4.3 oz)   12/14/20 103.8 kg (228 lb 13.4 oz)   09/14/20 105.3 kg (232 lb 3.2 oz)   06/16/20 105.7 kg (233 lb 0.4 oz)   06/13/20 105.7 kg (233 lb 0.4 oz)       Diabetes Management Status    Statin: Taking  ACE/ARB: Taking    Screening or Prevention Patient's value Goal Complete/Controlled?   HgA1C Testing and Control   Lab Results   Component Value Date    HGBA1C 7.5 (H) 04/11/2022      Annually/Less than 8% Yes   Lipid profile : 04/11/2022 Annually Yes   LDL control Lab Results   Component Value Date    LDLCALC 25.2 (L) 04/11/2022    Annually/Less than 100 mg/dl  Yes   Nephropathy screening Lab Results   Component Value Date    LABMICR 164.0 12/09/2021     Lab Results   Component Value Date    PROTEINUA 1+ (A) 12/09/2021     No results found for: UTPCR   Annually Yes   Blood pressure BP Readings from Last 1 Encounters:   12/16/21 135/70    Less than 140/90 Yes   Dilated retinal exam : 03/07/2022 Annually Yes   Foot exam   : 06/02/2021 Annually Yes            Low dose ASA?: Yes    Lab Results   Component Value Date    HGBA1C 7.5 (H) 04/11/2022    HGBA1C 7.9 (H) 12/09/2021    HGBA1C 8.3 (H) 11/03/2021    HGBA1C 7.6 (H) 07/29/2021    HGBA1C 7.5 (H) 05/28/2021    HGBA1C 7.4 (H) 03/30/2021    HGBA1C 7.8 (H) 12/11/2020    HGBA1C 7.9 (H) 09/10/2020    HGBA1C 7.7 (H) 06/08/2020    HGBA1C 8.6 (H) 02/04/2020         Reviewed past medical, family, social history and updated as appropriate.      Objective:     Physical exam was not performed and vitals were not obtained due to this being a telemedicine (virtual) visit.    There were no vitals filed for this visit.      BP Readings from Last 5 Encounters:   12/16/21 135/70   11/16/21 133/61   09/27/21 98/62   06/02/21 130/70   04/06/21 110/72         Physical Exam      Wt  Readings from Last 30 Encounters:   12/16/21 0729 102.3 kg (225 lb 8.5 oz)   11/18/21 0823 103 kg (227 lb)   11/16/21 0814 103 kg (227 lb)   09/27/21 1118 103.1 kg (227 lb 4.7 oz)   06/02/21 0719 103.7 kg (228 lb 9.9 oz)   04/06/21 0802 104.5 kg (230 lb 4.3 oz)   12/14/20 0807 103.8 kg (228 lb 13.4 oz)   09/14/20 0759 105.3 kg (232 lb 3.2 oz)   06/16/20 0914 105.7 kg (233 lb 0.4 oz)   06/13/20 0814 105.7 kg (233 lb 0.4 oz)   06/11/20 0822 105.7 kg (233 lb 0.4 oz)   01/13/20 1058 103 kg (227 lb)   01/09/20 0728 103.3 kg (227 lb 11.8 oz)   12/09/19 0729 101.9 kg (224 lb 10.4 oz)   11/04/19 0739 100.2 kg (220 lb 14.4 oz)   10/30/19 1205 103.4 kg (228 lb)   07/31/19 0733 103.5 kg (228 lb 2.8 oz)   06/14/19 0735 102.1 kg (225 lb)   05/10/19 0812 104.3 kg (230 lb)   05/01/19 0838 104.3 kg (230 lb)   04/25/19 0754 104.7 kg (230 lb 13.2 oz)   12/12/18 0818 99.8 kg (220 lb)   08/22/18 0904 105.4 kg (232 lb 7.6 oz)   06/26/18 0817 105.2 kg (232 lb)   03/27/18 0805 105.2 kg (232 lb)   03/15/18 1036 105.3 kg (232 lb 2.3 oz)   03/08/18 0823 105.3 kg (232 lb 0.6 oz)   09/21/17 0815 104.5 kg (230 lb 7.9 oz)   05/17/17 0811 104.7 kg (230 lb 13.2 oz)   02/27/17 0744 105.3 kg (232 lb 2.3 oz)         Lab Results   Component Value Date    HGBA1C 7.5 (H) 04/11/2022     Lab Results   Component Value Date    CHOL 130 04/11/2022    HDL 30 (L) 04/11/2022    LDLCALC 25.2 (L) 04/11/2022    TRIG 374 (H) 04/11/2022    CHOLHDL 23.1 04/11/2022     Lab Results   Component Value Date     04/11/2022    K 4.2 04/11/2022     04/11/2022    CO2 25 04/11/2022     (H) 04/11/2022    BUN 20 04/11/2022    CREATININE 1.3 04/11/2022    CALCIUM 10.4 04/11/2022    PROT 7.1 04/11/2022    ALBUMIN 4.0 04/11/2022    BILITOT 0.3 04/11/2022    ALKPHOS 42 (L) 04/11/2022    AST 15 04/11/2022    ALT 22 04/11/2022    ANIONGAP 11 04/11/2022    ESTGFRAFRICA >60.0 04/11/2022    EGFRNONAA 53.8 (A) 04/11/2022    TSH 1.054 12/09/2021      Lab Results    Component Value Date    MICALBCREAT 207.6 (H) 12/09/2021       Assessment/Plan:       Type 2 diabetes mellitus with stage 3a chronic kidney disease, without long-term current use of insulin  Reviewed goals of therapy are to get the best control we can without hypoglycemia.    Currently meeting glycemic target: no, but trending in the right direction; because he only recently started on the higher dose of Trulicity, I chose not to increase the dose just yet.  We will repeat the A1c in 3-4 months to determine if we need to go up to 3.0 mg weekly.      Medication changes:   Continue:     Trulicity 1.5 mg weekly   Metformin 1000 mg b.i.d.   Glipizide 10 mg BID with breakfast and supper   Farxiga (dapagliflozin) 10 mg daily      Advised frequent self blood glucose monitoring. Patient encouraged to document glucose results and bring them to every clinic visit. He was not able to get Dexcom 2/2 cost.    Hypoglycemia precautions discussed.     Close adherence to lifestyle changes recommended.      Eyes: Following regularly with ophthalmology; next due in March, 2023  Feet: Foot exam up to date; next exam due 06/2022  Kidneys: Urine microalbumin/creatinine is up to date; patient is taking an SGLT-2i and ARB; microalbumin gradually trending down. Cr stable at 1.3. Next check due 12/2022  HbA1c: Not at goal - Check in 3 months  Lipids: On fenofibrate and Crestor.  Repeat lipids in April, 2023  ASA: yes    Lab Results   Component Value Date    HGBA1C 7.5 (H) 04/11/2022    HGBA1C 7.9 (H) 12/09/2021    HGBA1C 8.3 (H) 11/03/2021       Uncontrolled type 2 diabetes mellitus with diabetic peripheral angiopathy without gangrene, without long-term current use of insulin  See above.    Coronary artery disease involving native coronary artery of native heart without angina pectoris  Trulicity (dulaglutide) has been shown to decrease major adverse cardiac events in high risk patients.   Farxiga (dapagliflozin) has been shown to  decrease major adverse cardiac events in high risk patients.      Mixed hyperlipidemia  See above.        The patient location is: home  The chief complaint leading to consultation is: diabetes    Visit type: audiovisual    Face to Face time with patient: 25 minutes of total time spent on the encounter, which includes face to face time and non-face to face time preparing to see the patient (eg, review of tests), Obtaining and/or reviewing separately obtained history, Documenting clinical information in the electronic or other health record, Independently interpreting results (not separately reported) and communicating results to the patient/family/caregiver, or Care coordination (not separately reported).         Each patient to whom he or she provides medical services by telemedicine is:  (1) informed of the relationship between the physician and patient and the respective role of any other health care provider with respect to management of the patient; and (2) notified that he or she may decline to receive medical services by telemedicine and may withdraw from such care at any time.    Notes:     RTC in 4 months with repeat labs

## 2022-04-18 NOTE — ASSESSMENT & PLAN NOTE
Trulicity (dulaglutide) has been shown to decrease major adverse cardiac events in high risk patients.   Farxiga (dapagliflozin) has been shown to decrease major adverse cardiac events in high risk patients.

## 2022-04-18 NOTE — ASSESSMENT & PLAN NOTE
Reviewed goals of therapy are to get the best control we can without hypoglycemia.    Currently meeting glycemic target: no, but trending in the right direction; because he only recently started on the higher dose of Trulicity, I chose not to increase the dose just yet.  We will repeat the A1c in 3-4 months to determine if we need to go up to 3.0 mg weekly.      Medication changes:   Continue:     Trulicity 1.5 mg weekly   Metformin 1000 mg b.i.d.   Glipizide 10 mg BID with breakfast and supper   Farxiga (dapagliflozin) 10 mg daily      Advised frequent self blood glucose monitoring. Patient encouraged to document glucose results and bring them to every clinic visit. He was not able to get Dexcom 2/2 cost.    Hypoglycemia precautions discussed.     Close adherence to lifestyle changes recommended.      Eyes: Following regularly with ophthalmology; next due in March, 2023  Feet: Foot exam up to date; next exam due 06/2022  Kidneys: Urine microalbumin/creatinine is up to date; patient is taking an SGLT-2i and ARB; microalbumin gradually trending down. Cr stable at 1.3. Next check due 12/2022  HbA1c: Not at goal - Check in 3 months  Lipids: On fenofibrate and Crestor.  Repeat lipids in April, 2023  ASA: yes    Lab Results   Component Value Date    HGBA1C 7.5 (H) 04/11/2022    HGBA1C 7.9 (H) 12/09/2021    HGBA1C 8.3 (H) 11/03/2021

## 2022-05-08 NOTE — TELEPHONE ENCOUNTER
No new care gaps identified.  [facility] Embedded Care Gaps. Reference number: [id number]. 5/08/2022   12:13:48 AM LUIS MIGUELT

## 2022-05-09 ENCOUNTER — PATIENT OUTREACH (OUTPATIENT)
Dept: ADMINISTRATIVE | Facility: OTHER | Age: 77
End: 2022-05-09
Payer: MEDICARE

## 2022-05-09 RX ORDER — METFORMIN HYDROCHLORIDE 1000 MG/1
TABLET ORAL
Qty: 180 TABLET | Refills: 1 | Status: SHIPPED | OUTPATIENT
Start: 2022-05-09 | End: 2022-10-25

## 2022-05-09 RX ORDER — CLOPIDOGREL BISULFATE 75 MG/1
TABLET ORAL
Qty: 90 TABLET | Refills: 2 | Status: SHIPPED | OUTPATIENT
Start: 2022-05-09 | End: 2023-02-14

## 2022-05-09 NOTE — PROGRESS NOTES
Health Maintenance Due   Topic Date Due    Hepatitis C Screening  Never done    COVID-19 Vaccine (1) Never done    TETANUS VACCINE  Never done    Shingles Vaccine (1 of 2) Never done    Foot Exam  06/02/2022    Colorectal Cancer Screening  06/14/2022     Updates were requested from care everywhere.  Chart was reviewed for overdue Proactive [facility] Encounters ([facility]) topics (CRS, Breast Cancer Screening, Eye exam)  Health Maintenance has been updated.  [facility] immunization registry triggered.  Immunizations were reconciled.

## 2022-05-09 NOTE — TELEPHONE ENCOUNTER
Refill Routing Note   Medication(s) are not appropriate for processing by [facility] for the following reason(s):      - Drug-Disease Interaction ( metFORMIN and Type 2 diabetes mellitus with stage 3a chronic kidney disease, without long-term current use of insulin)    [facility] action(s):  Defer  Approve Medication-related problems identified: Drug-disease interaction        Medication reconciliation completed: No     Appointments  past 12m or future 3m with PCP    Date Provider   Last Visit   12/16/2021 Layo Jett MD   Next Visit   6/22/2022 Layo Jett MD   ED visits in past 90 days: 0        Note composed:11:15 AM 05/09/2022

## 2022-05-10 ENCOUNTER — OFFICE VISIT (OUTPATIENT)
Dept: PODIATRY | Facility: CLINIC | Age: 77
End: 2022-05-10
Payer: MEDICARE

## 2022-05-10 VITALS
SYSTOLIC BLOOD PRESSURE: 115 MMHG | HEIGHT: 75 IN | DIASTOLIC BLOOD PRESSURE: 60 MMHG | HEART RATE: 62 BPM | BODY MASS INDEX: 28.19 KG/M2

## 2022-05-10 DIAGNOSIS — E11.51 ONYCHOMYCOSIS OF MULTIPLE TOENAILS WITH TYPE 2 DIABETES MELLITUS AND PERIPHERAL ANGIOPATHY: Primary | ICD-10-CM

## 2022-05-10 DIAGNOSIS — L84 CORN OR CALLUS: ICD-10-CM

## 2022-05-10 DIAGNOSIS — E08.42 DIABETIC POLYNEUROPATHY ASSOCIATED WITH DIABETES MELLITUS DUE TO UNDERLYING CONDITION: ICD-10-CM

## 2022-05-10 DIAGNOSIS — E11.69 ONYCHOMYCOSIS OF MULTIPLE TOENAILS WITH TYPE 2 DIABETES MELLITUS AND PERIPHERAL ANGIOPATHY: Primary | ICD-10-CM

## 2022-05-10 DIAGNOSIS — B35.1 ONYCHOMYCOSIS OF MULTIPLE TOENAILS WITH TYPE 2 DIABETES MELLITUS AND PERIPHERAL ANGIOPATHY: Primary | ICD-10-CM

## 2022-05-10 DIAGNOSIS — I73.9 PAD (PERIPHERAL ARTERY DISEASE): ICD-10-CM

## 2022-05-10 PROCEDURE — 3078F PR MOST RECENT DIASTOLIC BLOOD PRESSURE < 80 MM HG: ICD-10-PCS | Mod: CPTII,S$GLB,, | Performed by: STUDENT IN AN ORGANIZED HEALTH CARE EDUCATION/TRAINING PROGRAM

## 2022-05-10 PROCEDURE — 99999 PR PBB SHADOW E&M-EST. PATIENT-LVL IV: CPT | Mod: PBBFAC,,, | Performed by: STUDENT IN AN ORGANIZED HEALTH CARE EDUCATION/TRAINING PROGRAM

## 2022-05-10 PROCEDURE — 11721 DEBRIDE NAIL 6 OR MORE: CPT | Mod: Q8,59,S$GLB, | Performed by: STUDENT IN AN ORGANIZED HEALTH CARE EDUCATION/TRAINING PROGRAM

## 2022-05-10 PROCEDURE — 1159F MED LIST DOCD IN RCRD: CPT | Mod: CPTII,S$GLB,, | Performed by: STUDENT IN AN ORGANIZED HEALTH CARE EDUCATION/TRAINING PROGRAM

## 2022-05-10 PROCEDURE — 1126F PR PAIN SEVERITY QUANTIFIED, NO PAIN PRESENT: ICD-10-PCS | Mod: CPTII,S$GLB,, | Performed by: STUDENT IN AN ORGANIZED HEALTH CARE EDUCATION/TRAINING PROGRAM

## 2022-05-10 PROCEDURE — 1126F AMNT PAIN NOTED NONE PRSNT: CPT | Mod: CPTII,S$GLB,, | Performed by: STUDENT IN AN ORGANIZED HEALTH CARE EDUCATION/TRAINING PROGRAM

## 2022-05-10 PROCEDURE — 11721 ROUTINE FOOT CARE: ICD-10-PCS | Mod: Q8,59,S$GLB, | Performed by: STUDENT IN AN ORGANIZED HEALTH CARE EDUCATION/TRAINING PROGRAM

## 2022-05-10 PROCEDURE — 11057 ROUTINE FOOT CARE: ICD-10-PCS | Mod: Q8,S$GLB,, | Performed by: STUDENT IN AN ORGANIZED HEALTH CARE EDUCATION/TRAINING PROGRAM

## 2022-05-10 PROCEDURE — 3051F PR MOST RECENT HEMOGLOBIN A1C LEVEL 7.0 - < 8.0%: ICD-10-PCS | Mod: CPTII,S$GLB,, | Performed by: STUDENT IN AN ORGANIZED HEALTH CARE EDUCATION/TRAINING PROGRAM

## 2022-05-10 PROCEDURE — 3008F PR BODY MASS INDEX (BMI) DOCUMENTED: ICD-10-PCS | Mod: CPTII,S$GLB,, | Performed by: STUDENT IN AN ORGANIZED HEALTH CARE EDUCATION/TRAINING PROGRAM

## 2022-05-10 PROCEDURE — 4010F PR ACE/ARB THEARPY RXD/TAKEN: ICD-10-PCS | Mod: CPTII,S$GLB,, | Performed by: STUDENT IN AN ORGANIZED HEALTH CARE EDUCATION/TRAINING PROGRAM

## 2022-05-10 PROCEDURE — 3074F PR MOST RECENT SYSTOLIC BLOOD PRESSURE < 130 MM HG: ICD-10-PCS | Mod: CPTII,S$GLB,, | Performed by: STUDENT IN AN ORGANIZED HEALTH CARE EDUCATION/TRAINING PROGRAM

## 2022-05-10 PROCEDURE — 11057 PARNG/CUTG B9 HYPRKR LES >4: CPT | Mod: Q8,S$GLB,, | Performed by: STUDENT IN AN ORGANIZED HEALTH CARE EDUCATION/TRAINING PROGRAM

## 2022-05-10 PROCEDURE — 3051F HG A1C>EQUAL 7.0%<8.0%: CPT | Mod: CPTII,S$GLB,, | Performed by: STUDENT IN AN ORGANIZED HEALTH CARE EDUCATION/TRAINING PROGRAM

## 2022-05-10 PROCEDURE — 99999 PR PBB SHADOW E&M-EST. PATIENT-LVL IV: ICD-10-PCS | Mod: PBBFAC,,, | Performed by: STUDENT IN AN ORGANIZED HEALTH CARE EDUCATION/TRAINING PROGRAM

## 2022-05-10 PROCEDURE — 3008F BODY MASS INDEX DOCD: CPT | Mod: CPTII,S$GLB,, | Performed by: STUDENT IN AN ORGANIZED HEALTH CARE EDUCATION/TRAINING PROGRAM

## 2022-05-10 PROCEDURE — 99499 UNLISTED E&M SERVICE: CPT | Mod: S$GLB,,, | Performed by: STUDENT IN AN ORGANIZED HEALTH CARE EDUCATION/TRAINING PROGRAM

## 2022-05-10 PROCEDURE — 99499 NO LOS: ICD-10-PCS | Mod: S$GLB,,, | Performed by: STUDENT IN AN ORGANIZED HEALTH CARE EDUCATION/TRAINING PROGRAM

## 2022-05-10 PROCEDURE — 1159F PR MEDICATION LIST DOCUMENTED IN MEDICAL RECORD: ICD-10-PCS | Mod: CPTII,S$GLB,, | Performed by: STUDENT IN AN ORGANIZED HEALTH CARE EDUCATION/TRAINING PROGRAM

## 2022-05-10 PROCEDURE — 3074F SYST BP LT 130 MM HG: CPT | Mod: CPTII,S$GLB,, | Performed by: STUDENT IN AN ORGANIZED HEALTH CARE EDUCATION/TRAINING PROGRAM

## 2022-05-10 PROCEDURE — 3078F DIAST BP <80 MM HG: CPT | Mod: CPTII,S$GLB,, | Performed by: STUDENT IN AN ORGANIZED HEALTH CARE EDUCATION/TRAINING PROGRAM

## 2022-05-10 PROCEDURE — 4010F ACE/ARB THERAPY RXD/TAKEN: CPT | Mod: CPTII,S$GLB,, | Performed by: STUDENT IN AN ORGANIZED HEALTH CARE EDUCATION/TRAINING PROGRAM

## 2022-05-10 NOTE — PROGRESS NOTES
Subjective:      Patient ID: Cecil Pringle is a 76 y.o. male.    Chief Complaint: Diabetes Mellitus (PCP Dr. Jett, 12/16/21, Endo Dr. Mccurdy 4/18/22), Diabetic Foot Exam, Routine Foot Care, Nail Problem, and Callouses    Cecil is a 76 y.o. male who presents to the clinic upon referral from Dr. Hannah rock. provider found  for evaluation and treatment of diabetic feet. Cecil has a past medical history of Anxiety, Arthritis, Coronary artery disease, Depression, Diabetes mellitus, type 2, Hyperlipidemia, Hypertension, Insomnia, and PAD (peripheral artery disease). Patient relates no major problem with feet, states has occasional burning/tinlging sensations to toes. Only complaints today consist of callus and elongated toe nails to rakel feet.  has not had new diabetic shoes in 7 years.      11/16/21: Pt seen today for annual diabetic foot exam and RFC. No new pedal complaints.     2/8/22: Pt seen today for RFC. No new pedal complaints.     5/10/22: Pt seen today for RFC. No new pedal complaints.     PCP: Layo Jett MD    Date Last Seen by PCP: 12/16/21    Current shoe gear: Rx diabetic extra depth shoes and custom accommodative insoles    Hemoglobin A1C   Date Value Ref Range Status   04/11/2022 7.5 (H) 4.0 - 5.6 % Final     Comment:     ADA Screening Guidelines:  5.7-6.4%  Consistent with prediabetes  >or=6.5%  Consistent with diabetes    High levels of fetal hemoglobin interfere with the HbA1C  assay. Heterozygous hemoglobin variants (HbS, HgC, etc)do  not significantly interfere with this assay.   However, presence of multiple variants may affect accuracy.     12/09/2021 7.9 (H) 4.0 - 5.6 % Final     Comment:     ADA Screening Guidelines:  5.7-6.4%  Consistent with prediabetes  >or=6.5%  Consistent with diabetes    High levels of fetal hemoglobin interfere with the HbA1C  assay. Heterozygous hemoglobin variants (HbS, HgC, etc)do  not significantly interfere with this assay.   However, presence of  multiple variants may affect accuracy.     11/03/2021 8.3 (H) 4.0 - 5.6 % Final     Comment:     ADA Screening Guidelines:  5.7-6.4%  Consistent with prediabetes  >or=6.5%  Consistent with diabetes    High levels of fetal hemoglobin interfere with the HbA1C  assay. Heterozygous hemoglobin variants (HbS, HgC, etc)do  not significantly interfere with this assay.   However, presence of multiple variants may affect accuracy.             Review of Systems   Constitutional: Negative for chills, decreased appetite, diaphoresis and fever.   Cardiovascular: Negative for claudication and leg swelling.   Respiratory: Negative for shortness of breath.    Skin: Positive for dry skin and nail changes. Negative for color change, flushing, itching, poor wound healing, rash and suspicious lesions.   Musculoskeletal: Negative for arthritis, back pain, falls, joint pain, joint swelling and myalgias.   Gastrointestinal: Negative for nausea and vomiting.   Neurological: Positive for numbness and paresthesias. Negative for loss of balance.           Objective:      Physical Exam  Vitals and nursing note reviewed.   Constitutional:       General: He is not in acute distress.     Appearance: He is well-developed. He is not diaphoretic.   Cardiovascular:      Pulses:           Dorsalis pedis pulses are 1+ on the right side and 1+ on the left side.        Posterior tibial pulses are 1+ on the right side and 1+ on the left side.      Comments: Skin temperature is within normal limits. Toes are cool to touch and feet are warm proximally. Hair growth is mildly diminished. Skin is mildly atrophic and with mild hyperpigmentation. Mild edema noted, varicosities noted rakel      Musculoskeletal:         General: Deformity present. No tenderness.      Right foot: Decreased range of motion. Deformity present.      Left foot: Decreased range of motion. Deformity present.      Comments: Strength is 5/5 to lower extremity muscle groups, rakel       Feet:       Right foot:      Skin integrity: Dry skin present. No ulcer, blister, skin breakdown, erythema, warmth or callus.      Left foot:      Skin integrity: Dry skin present. No ulcer, blister, skin breakdown, erythema, warmth or callus.   Lymphadenopathy:      Comments: Negative lymphadenopathy bilateral popliteal fossa and tarsal tunnel.    Skin:     General: Skin is warm and dry.      Capillary Refill: Capillary refill takes less than 2 seconds.      Coloration: Skin is not pale.      Findings: No bruising, ecchymosis, erythema, laceration, lesion, petechiae or rash.      Comments: Skin is warm and dry bilaterally, no acute SOI noted, bilaterally, appears stable.     Nails 1-5 are thickened, discolored, dystrophic and with subungual debris, rakel    HPK noted sub 1st and 2nd met head and medial IPJ of hallux rakel   Neurological:      Mental Status: He is alert and oriented to person, place, and time.      Sensory: Sensory deficit present.      Comments: Vibratory sensation diminished, rakel               Assessment:       Encounter Diagnoses   Name Primary?    Onychomycosis of multiple toenails with type 2 diabetes mellitus and peripheral angiopathy Yes    Corn or callus     PAD (peripheral artery disease)     Diabetic polyneuropathy associated with diabetes mellitus due to underlying condition          Plan:       Cecil was seen today for diabetes mellitus, diabetic foot exam, routine foot care, nail problem and callouses.    Diagnoses and all orders for this visit:    Onychomycosis of multiple toenails with type 2 diabetes mellitus and peripheral angiopathy  -     Routine Foot Care    Deerton or callus  -     Routine Foot Care    PAD (peripheral artery disease)  -     Routine Foot Care    Diabetic polyneuropathy associated with diabetes mellitus due to underlying condition  -     Routine Foot Care      I counseled the patient on his conditions, their implications and medical management.    - HPK and nails debrided,  see procedure note    - Shoe inspection. Diabetic Foot Education. Patient reminded of the importance of good nutrition and blood sugar control to help prevent podiatric complications of diabetes. Patient instructed on proper foot hygeine. We discussed wearing proper shoe gear, daily foot inspections, never walking without protective shoe gear, never putting sharp instruments to feet, routine podiatric nail visits every 2-3 months.      -Continue penlac for onychomycosis    -RTC in 2-3 months for routine diabetic foot care

## 2022-05-10 NOTE — PROCEDURES
"Routine Foot Care    Date/Time: 5/10/2022 8:15 AM  Performed by: Terri Starkey DPM  Authorized by: Terri Starkey DPM     Time out: Immediately prior to procedure a "time out" was called to verify the correct patient, procedure, equipment, support staff and site/side marked as required.    Consent Done?:  Yes (Verbal)  Hyperkeratotic Skin Lesions?: Yes    Number of trimmed lesions:  6  Location(s):  Left 1st Toe, Right 1st Toe, Left 1st Metatarsal Head, Right 1st Metatarsal Head, Left 2nd Metatarsal Head and Right 2nd Metatarsal Head    Nail Care Type:  Debride  Location(s): All  (Left 1st Toe, Left 3rd Toe, Left 2nd Toe, Left 4th Toe, Left 5th Toe, Right 1st Toe, Right 2nd Toe, Right 3rd Toe, Right 4th Toe and Right 5th Toe)  Patient tolerance:  Patient tolerated the procedure well with no immediate complications      "

## 2022-06-07 RX ORDER — NIFEDIPINE 60 MG/1
TABLET, EXTENDED RELEASE ORAL
Qty: 90 TABLET | Refills: 1 | Status: SHIPPED | OUTPATIENT
Start: 2022-06-07 | End: 2022-12-07

## 2022-06-07 RX ORDER — FENOFIBRATE 160 MG/1
160 TABLET ORAL DAILY
Qty: 90 TABLET | Refills: 1 | Status: SHIPPED | OUTPATIENT
Start: 2022-06-07 | End: 2022-12-07

## 2022-06-07 NOTE — TELEPHONE ENCOUNTER
No new care gaps identified.  [facility] Embedded Care Gaps. Reference number: [id number]. 6/07/2022   12:09:02 AM CHRISTOPHER

## 2022-06-07 NOTE — TELEPHONE ENCOUNTER
Refill Authorization Note   Cecil Pringle  is requesting a refill authorization.  Brief Assessment and Rationale for Refill:  Approve     Medication Therapy Plan:       Medication Reconciliation Completed: No   Comments:     No Care Gaps recommended.     Note composed:1:58 PM 06/07/2022

## 2022-06-22 ENCOUNTER — PATIENT MESSAGE (OUTPATIENT)
Dept: ENDOCRINOLOGY | Facility: CLINIC | Age: 77
End: 2022-06-22
Payer: MEDICARE

## 2022-06-22 ENCOUNTER — OFFICE VISIT (OUTPATIENT)
Dept: INTERNAL MEDICINE | Facility: CLINIC | Age: 77
End: 2022-06-22
Payer: MEDICARE

## 2022-06-22 ENCOUNTER — LAB VISIT (OUTPATIENT)
Dept: LAB | Facility: HOSPITAL | Age: 77
End: 2022-06-22
Attending: FAMILY MEDICINE
Payer: MEDICARE

## 2022-06-22 VITALS
WEIGHT: 224.19 LBS | TEMPERATURE: 98 F | SYSTOLIC BLOOD PRESSURE: 138 MMHG | DIASTOLIC BLOOD PRESSURE: 68 MMHG | BODY MASS INDEX: 27.87 KG/M2 | HEIGHT: 75 IN | RESPIRATION RATE: 14 BRPM | OXYGEN SATURATION: 98 % | HEART RATE: 56 BPM

## 2022-06-22 DIAGNOSIS — Z95.1 S/P CABG X 5: ICD-10-CM

## 2022-06-22 DIAGNOSIS — Z12.5 PROSTATE CANCER SCREENING: ICD-10-CM

## 2022-06-22 DIAGNOSIS — I35.0 NONRHEUMATIC AORTIC VALVE STENOSIS: ICD-10-CM

## 2022-06-22 DIAGNOSIS — E11.22 TYPE 2 DIABETES MELLITUS WITH STAGE 3A CHRONIC KIDNEY DISEASE, WITHOUT LONG-TERM CURRENT USE OF INSULIN: Chronic | ICD-10-CM

## 2022-06-22 DIAGNOSIS — I73.9 PAD (PERIPHERAL ARTERY DISEASE): ICD-10-CM

## 2022-06-22 DIAGNOSIS — E11.51 ONYCHOMYCOSIS OF MULTIPLE TOENAILS WITH TYPE 2 DIABETES MELLITUS AND PERIPHERAL ANGIOPATHY: ICD-10-CM

## 2022-06-22 DIAGNOSIS — N18.31 TYPE 2 DIABETES MELLITUS WITH STAGE 3A CHRONIC KIDNEY DISEASE, WITHOUT LONG-TERM CURRENT USE OF INSULIN: Chronic | ICD-10-CM

## 2022-06-22 DIAGNOSIS — F33.0 MILD EPISODE OF RECURRENT MAJOR DEPRESSIVE DISORDER: ICD-10-CM

## 2022-06-22 DIAGNOSIS — I10 ESSENTIAL HYPERTENSION: ICD-10-CM

## 2022-06-22 DIAGNOSIS — E78.2 MIXED HYPERLIPIDEMIA: Chronic | ICD-10-CM

## 2022-06-22 DIAGNOSIS — I65.21 STENOSIS OF RIGHT CAROTID ARTERY: ICD-10-CM

## 2022-06-22 DIAGNOSIS — E11.69 ONYCHOMYCOSIS OF MULTIPLE TOENAILS WITH TYPE 2 DIABETES MELLITUS AND PERIPHERAL ANGIOPATHY: ICD-10-CM

## 2022-06-22 DIAGNOSIS — E11.42 ONYCHOMYCOSIS OF MULTIPLE TOENAILS WITH TYPE 2 DIABETES MELLITUS AND PERIPHERAL NEUROPATHY: ICD-10-CM

## 2022-06-22 DIAGNOSIS — B35.1 ONYCHOMYCOSIS OF MULTIPLE TOENAILS WITH TYPE 2 DIABETES MELLITUS AND PERIPHERAL ANGIOPATHY: ICD-10-CM

## 2022-06-22 DIAGNOSIS — I25.10 CORONARY ARTERY DISEASE INVOLVING NATIVE CORONARY ARTERY OF NATIVE HEART WITHOUT ANGINA PECTORIS: Chronic | ICD-10-CM

## 2022-06-22 DIAGNOSIS — E11.69 ONYCHOMYCOSIS OF MULTIPLE TOENAILS WITH TYPE 2 DIABETES MELLITUS AND PERIPHERAL NEUROPATHY: ICD-10-CM

## 2022-06-22 DIAGNOSIS — B35.1 ONYCHOMYCOSIS OF MULTIPLE TOENAILS WITH TYPE 2 DIABETES MELLITUS AND PERIPHERAL NEUROPATHY: ICD-10-CM

## 2022-06-22 DIAGNOSIS — G47.09 OTHER INSOMNIA: ICD-10-CM

## 2022-06-22 DIAGNOSIS — Z00.00 PREVENTATIVE HEALTH CARE: ICD-10-CM

## 2022-06-22 DIAGNOSIS — Z00.00 PREVENTATIVE HEALTH CARE: Primary | ICD-10-CM

## 2022-06-22 LAB
25(OH)D3+25(OH)D2 SERPL-MCNC: 26 NG/ML (ref 30–96)
ALBUMIN SERPL BCP-MCNC: 4.3 G/DL (ref 3.5–5.2)
ALP SERPL-CCNC: 49 U/L (ref 55–135)
ALT SERPL W/O P-5'-P-CCNC: 20 U/L (ref 10–44)
ANION GAP SERPL CALC-SCNC: 12 MMOL/L (ref 8–16)
AST SERPL-CCNC: 14 U/L (ref 10–40)
BASOPHILS # BLD AUTO: 0.07 K/UL (ref 0–0.2)
BASOPHILS NFR BLD: 1 % (ref 0–1.9)
BILIRUB SERPL-MCNC: 0.4 MG/DL (ref 0.1–1)
BUN SERPL-MCNC: 19 MG/DL (ref 8–23)
CALCIUM SERPL-MCNC: 9.7 MG/DL (ref 8.7–10.5)
CHLORIDE SERPL-SCNC: 105 MMOL/L (ref 95–110)
CHOLEST SERPL-MCNC: 127 MG/DL (ref 120–199)
CHOLEST/HDLC SERPL: 3.7 {RATIO} (ref 2–5)
CO2 SERPL-SCNC: 23 MMOL/L (ref 23–29)
COMPLEXED PSA SERPL-MCNC: 0.62 NG/ML (ref 0–4)
CREAT SERPL-MCNC: 1.3 MG/DL (ref 0.5–1.4)
DIFFERENTIAL METHOD: ABNORMAL
EOSINOPHIL # BLD AUTO: 0.2 K/UL (ref 0–0.5)
EOSINOPHIL NFR BLD: 2.5 % (ref 0–8)
ERYTHROCYTE [DISTWIDTH] IN BLOOD BY AUTOMATED COUNT: 14.8 % (ref 11.5–14.5)
EST. GFR  (AFRICAN AMERICAN): >60 ML/MIN/1.73 M^2
EST. GFR  (NON AFRICAN AMERICAN): 53.8 ML/MIN/1.73 M^2
ESTIMATED AVG GLUCOSE: 157 MG/DL (ref 68–131)
GLUCOSE SERPL-MCNC: 149 MG/DL (ref 70–110)
HBA1C MFR BLD: 7.1 % (ref 4–5.6)
HCT VFR BLD AUTO: 43.8 % (ref 40–54)
HDLC SERPL-MCNC: 34 MG/DL (ref 40–75)
HDLC SERPL: 26.8 % (ref 20–50)
HGB BLD-MCNC: 14.3 G/DL (ref 14–18)
IMM GRANULOCYTES # BLD AUTO: 0.05 K/UL (ref 0–0.04)
IMM GRANULOCYTES NFR BLD AUTO: 0.7 % (ref 0–0.5)
LDLC SERPL CALC-MCNC: 23.6 MG/DL (ref 63–159)
LYMPHOCYTES # BLD AUTO: 2 K/UL (ref 1–4.8)
LYMPHOCYTES NFR BLD: 27 % (ref 18–48)
MCH RBC QN AUTO: 28.7 PG (ref 27–31)
MCHC RBC AUTO-ENTMCNC: 32.6 G/DL (ref 32–36)
MCV RBC AUTO: 88 FL (ref 82–98)
MONOCYTES # BLD AUTO: 0.5 K/UL (ref 0.3–1)
MONOCYTES NFR BLD: 7 % (ref 4–15)
NEUTROPHILS # BLD AUTO: 4.5 K/UL (ref 1.8–7.7)
NEUTROPHILS NFR BLD: 61.8 % (ref 38–73)
NONHDLC SERPL-MCNC: 93 MG/DL
NRBC BLD-RTO: 0 /100 WBC
PLATELET # BLD AUTO: 275 K/UL (ref 150–450)
PMV BLD AUTO: 10.5 FL (ref 9.2–12.9)
POTASSIUM SERPL-SCNC: 4.9 MMOL/L (ref 3.5–5.1)
PROT SERPL-MCNC: 7.1 G/DL (ref 6–8.4)
RBC # BLD AUTO: 4.98 M/UL (ref 4.6–6.2)
SODIUM SERPL-SCNC: 140 MMOL/L (ref 136–145)
T4 FREE SERPL-MCNC: 0.89 NG/DL (ref 0.71–1.51)
TRIGL SERPL-MCNC: 347 MG/DL (ref 30–150)
TSH SERPL DL<=0.005 MIU/L-ACNC: 0.67 UIU/ML (ref 0.4–4)
WBC # BLD AUTO: 7.25 K/UL (ref 3.9–12.7)

## 2022-06-22 PROCEDURE — 1159F PR MEDICATION LIST DOCUMENTED IN MEDICAL RECORD: ICD-10-PCS | Mod: CPTII,S$GLB,, | Performed by: FAMILY MEDICINE

## 2022-06-22 PROCEDURE — 3051F HG A1C>EQUAL 7.0%<8.0%: CPT | Mod: CPTII,S$GLB,, | Performed by: FAMILY MEDICINE

## 2022-06-22 PROCEDURE — 3075F SYST BP GE 130 - 139MM HG: CPT | Mod: CPTII,S$GLB,, | Performed by: FAMILY MEDICINE

## 2022-06-22 PROCEDURE — 3078F PR MOST RECENT DIASTOLIC BLOOD PRESSURE < 80 MM HG: ICD-10-PCS | Mod: CPTII,S$GLB,, | Performed by: FAMILY MEDICINE

## 2022-06-22 PROCEDURE — 80061 LIPID PANEL: CPT | Performed by: FAMILY MEDICINE

## 2022-06-22 PROCEDURE — 1160F RVW MEDS BY RX/DR IN RCRD: CPT | Mod: CPTII,S$GLB,, | Performed by: FAMILY MEDICINE

## 2022-06-22 PROCEDURE — 84443 ASSAY THYROID STIM HORMONE: CPT | Performed by: FAMILY MEDICINE

## 2022-06-22 PROCEDURE — 99999 PR PBB SHADOW E&M-EST. PATIENT-LVL III: CPT | Mod: PBBFAC,,, | Performed by: FAMILY MEDICINE

## 2022-06-22 PROCEDURE — 1159F MED LIST DOCD IN RCRD: CPT | Mod: CPTII,S$GLB,, | Performed by: FAMILY MEDICINE

## 2022-06-22 PROCEDURE — 85025 COMPLETE CBC W/AUTO DIFF WBC: CPT | Performed by: FAMILY MEDICINE

## 2022-06-22 PROCEDURE — 99397 PR PREVENTIVE VISIT,EST,65 & OVER: ICD-10-PCS | Mod: S$GLB,,, | Performed by: FAMILY MEDICINE

## 2022-06-22 PROCEDURE — 84153 ASSAY OF PSA TOTAL: CPT | Performed by: FAMILY MEDICINE

## 2022-06-22 PROCEDURE — 80053 COMPREHEN METABOLIC PANEL: CPT | Performed by: FAMILY MEDICINE

## 2022-06-22 PROCEDURE — 1160F PR REVIEW ALL MEDS BY PRESCRIBER/CLIN PHARMACIST DOCUMENTED: ICD-10-PCS | Mod: CPTII,S$GLB,, | Performed by: FAMILY MEDICINE

## 2022-06-22 PROCEDURE — 36415 COLL VENOUS BLD VENIPUNCTURE: CPT | Mod: PO | Performed by: FAMILY MEDICINE

## 2022-06-22 PROCEDURE — 4010F ACE/ARB THERAPY RXD/TAKEN: CPT | Mod: CPTII,S$GLB,, | Performed by: FAMILY MEDICINE

## 2022-06-22 PROCEDURE — 99999 PR PBB SHADOW E&M-EST. PATIENT-LVL III: ICD-10-PCS | Mod: PBBFAC,,, | Performed by: FAMILY MEDICINE

## 2022-06-22 PROCEDURE — 84439 ASSAY OF FREE THYROXINE: CPT | Performed by: FAMILY MEDICINE

## 2022-06-22 PROCEDURE — 3008F BODY MASS INDEX DOCD: CPT | Mod: CPTII,S$GLB,, | Performed by: FAMILY MEDICINE

## 2022-06-22 PROCEDURE — 4010F PR ACE/ARB THEARPY RXD/TAKEN: ICD-10-PCS | Mod: CPTII,S$GLB,, | Performed by: FAMILY MEDICINE

## 2022-06-22 PROCEDURE — 3078F DIAST BP <80 MM HG: CPT | Mod: CPTII,S$GLB,, | Performed by: FAMILY MEDICINE

## 2022-06-22 PROCEDURE — 99397 PER PM REEVAL EST PAT 65+ YR: CPT | Mod: S$GLB,,, | Performed by: FAMILY MEDICINE

## 2022-06-22 PROCEDURE — 83036 HEMOGLOBIN GLYCOSYLATED A1C: CPT | Performed by: FAMILY MEDICINE

## 2022-06-22 PROCEDURE — 82306 VITAMIN D 25 HYDROXY: CPT | Performed by: FAMILY MEDICINE

## 2022-06-22 PROCEDURE — 3075F PR MOST RECENT SYSTOLIC BLOOD PRESS GE 130-139MM HG: ICD-10-PCS | Mod: CPTII,S$GLB,, | Performed by: FAMILY MEDICINE

## 2022-06-22 PROCEDURE — 99499 RISK ADDL DX/OHS AUDIT: ICD-10-PCS | Mod: S$GLB,,, | Performed by: FAMILY MEDICINE

## 2022-06-22 PROCEDURE — 3051F PR MOST RECENT HEMOGLOBIN A1C LEVEL 7.0 - < 8.0%: ICD-10-PCS | Mod: CPTII,S$GLB,, | Performed by: FAMILY MEDICINE

## 2022-06-22 PROCEDURE — 99499 UNLISTED E&M SERVICE: CPT | Mod: S$GLB,,, | Performed by: FAMILY MEDICINE

## 2022-06-22 PROCEDURE — 3008F PR BODY MASS INDEX (BMI) DOCUMENTED: ICD-10-PCS | Mod: CPTII,S$GLB,, | Performed by: FAMILY MEDICINE

## 2022-06-22 RX ORDER — METOPROLOL TARTRATE 100 MG/1
50 TABLET ORAL 2 TIMES DAILY
Qty: 90 TABLET | Refills: 3 | Status: SHIPPED | OUTPATIENT
Start: 2022-06-22 | End: 2023-05-15 | Stop reason: SDUPTHER

## 2022-06-22 RX ORDER — DAPAGLIFLOZIN 10 MG/1
10 TABLET, FILM COATED ORAL DAILY
Qty: 90 TABLET | Refills: 3 | Status: SHIPPED | OUTPATIENT
Start: 2022-06-22 | End: 2023-05-17 | Stop reason: SDUPTHER

## 2022-06-22 NOTE — PROGRESS NOTES
Subjective:       Patient ID: Cecil Pringle is a 76 y.o. male.    Chief Complaint: Annual Exam    HPI 76-year-old white male presents to clinic today for annual physical exam.  He continues to be followed by Cardiology secondary to coronary artery disease status post CABG in , 5 stent placement in , and repeat CABG in . He is also monitored for peripheral artery disease which remains stable on aspirin and Plavix.  Hypertension remains well controlled on losartan 100 mg daily, nifedipine 60 mg daily, hydrochlorothiazide 12.5 mg daily, and metoprolol 50 mg b.i.d..  He continues to be treated for hyperlipidemia which remains stable on Crestor 20 mg daily and fenofibrate 160 mg daily.  He continues to be followed by Endocrinology for treatment of diabetes which remains uncontrolled.  Current hemoglobin A1c is 7.5.  He remains stable on metformin 1000 mg b.i.d., glipizide 5 mg daily, Trulicity 1.5 mg weekly, and Farxiga 10 mg daily.   Depression remains stable on Prozac 20 mg daily.  He has a past surgical history of CABG in  and repeat CABG in , stent placement in , right hand surgery, and left foot surgery.  He has a strong family history of heart disease.  His mother  of asbestosis.  Colonoscopy has been ordered.  Review of Systems   Constitutional: Negative for activity change, appetite change, chills, fatigue, fever and unexpected weight change.   HENT: Positive for hearing loss and rhinorrhea. Negative for nasal congestion, ear pain, postnasal drip, sinus pressure/congestion, sore throat, tinnitus and trouble swallowing.    Eyes: Negative for discharge, redness, itching and visual disturbance.   Respiratory: Negative for cough, chest tightness, shortness of breath and wheezing.    Cardiovascular: Negative for chest pain and palpitations.   Gastrointestinal: Negative for abdominal pain, blood in stool, constipation, diarrhea, nausea and vomiting.   Endocrine: Negative for polydipsia  and polyuria.   Genitourinary: Positive for difficulty urinating and urgency. Negative for decreased urine volume, dysuria, frequency and hematuria.   Musculoskeletal: Positive for neck pain. Negative for arthralgias, back pain, joint swelling, myalgias and neck stiffness.   Integumentary:  Negative for rash.   Neurological: Negative for dizziness, weakness, light-headedness and headaches.   Psychiatric/Behavioral: Negative.  Negative for confusion and dysphoric mood.         Objective:      Physical Exam  Vitals and nursing note reviewed.   Constitutional:       General: He is not in acute distress.     Appearance: He is well-developed. He is not diaphoretic.   HENT:      Head: Normocephalic and atraumatic.      Right Ear: External ear normal.      Left Ear: External ear normal.      Nose: Nose normal.      Mouth/Throat:      Pharynx: No oropharyngeal exudate.   Eyes:      General: No scleral icterus.        Right eye: No discharge.         Left eye: No discharge.      Conjunctiva/sclera: Conjunctivae normal.      Pupils: Pupils are equal, round, and reactive to light.   Neck:      Thyroid: No thyromegaly.      Vascular: No JVD.      Trachea: No tracheal deviation.   Cardiovascular:      Rate and Rhythm: Normal rate and regular rhythm.      Heart sounds: Normal heart sounds. No murmur heard.    No friction rub. No gallop.   Pulmonary:      Effort: Pulmonary effort is normal. No respiratory distress.      Breath sounds: Normal breath sounds. No stridor. No wheezing or rales.   Abdominal:      General: Bowel sounds are normal. There is no distension.      Palpations: Abdomen is soft. There is no mass.      Tenderness: There is no abdominal tenderness. There is no guarding or rebound.   Musculoskeletal:         General: No tenderness. Normal range of motion.      Cervical back: Normal range of motion and neck supple.   Lymphadenopathy:      Cervical: No cervical adenopathy.   Skin:     General: Skin is warm and dry.       Coloration: Skin is not pale.      Findings: No erythema or rash.   Neurological:      Mental Status: He is alert and oriented to person, place, and time.   Psychiatric:         Behavior: Behavior normal.         Thought Content: Thought content normal.         Judgment: Judgment normal.         Assessment:       Problem List Items Addressed This Visit     Coronary artery disease involving native coronary artery of native heart without angina pectoris (Chronic)    Relevant Orders    CBC Auto Differential    Comprehensive Metabolic Panel    Lipid Panel    T4, Free    TSH    Urinalysis    Vitamin D    Hemoglobin A1C    Microalbumin/Creatinine Ratio, Urine    PSA, Screening    Mixed hyperlipidemia (Chronic)    Relevant Orders    CBC Auto Differential    Comprehensive Metabolic Panel    Lipid Panel    T4, Free    TSH    Urinalysis    Vitamin D    Hemoglobin A1C    Microalbumin/Creatinine Ratio, Urine    PSA, Screening    Type 2 diabetes mellitus with stage 3a chronic kidney disease, without long-term current use of insulin (Chronic)    Relevant Orders    CBC Auto Differential    Comprehensive Metabolic Panel    Lipid Panel    T4, Free    TSH    Urinalysis    Vitamin D    Hemoglobin A1C    Microalbumin/Creatinine Ratio, Urine    PSA, Screening    Uncontrolled type 2 diabetes mellitus with diabetic peripheral angiopathy without gangrene, without long-term current use of insulin (Chronic)    Relevant Medications    dapagliflozin (FARXIGA) 10 mg tablet    Other Relevant Orders    CBC Auto Differential    Comprehensive Metabolic Panel    Lipid Panel    T4, Free    TSH    Urinalysis    Vitamin D    Hemoglobin A1C    Microalbumin/Creatinine Ratio, Urine    PSA, Screening    Essential hypertension    Relevant Orders    CBC Auto Differential    Comprehensive Metabolic Panel    Lipid Panel    T4, Free    TSH    Urinalysis    Vitamin D    Hemoglobin A1C    Microalbumin/Creatinine Ratio, Urine    PSA, Screening    Mild episode  of recurrent major depressive disorder    Relevant Orders    CBC Auto Differential    Comprehensive Metabolic Panel    Lipid Panel    T4, Free    TSH    Urinalysis    Vitamin D    Hemoglobin A1C    Microalbumin/Creatinine Ratio, Urine    PSA, Screening    Nonrheumatic aortic valve stenosis    Relevant Orders    CBC Auto Differential    Comprehensive Metabolic Panel    Lipid Panel    T4, Free    TSH    Urinalysis    Vitamin D    Hemoglobin A1C    Microalbumin/Creatinine Ratio, Urine    PSA, Screening    Onychomycosis of multiple toenails with type 2 diabetes mellitus and peripheral angiopathy    Relevant Orders    CBC Auto Differential    Comprehensive Metabolic Panel    Lipid Panel    T4, Free    TSH    Urinalysis    Vitamin D    Hemoglobin A1C    Microalbumin/Creatinine Ratio, Urine    PSA, Screening    Onychomycosis of multiple toenails with type 2 diabetes mellitus and peripheral neuropathy    Relevant Orders    CBC Auto Differential    Comprehensive Metabolic Panel    Lipid Panel    T4, Free    TSH    Urinalysis    Vitamin D    Hemoglobin A1C    Microalbumin/Creatinine Ratio, Urine    PSA, Screening    Other insomnia    Relevant Orders    CBC Auto Differential    Comprehensive Metabolic Panel    Lipid Panel    T4, Free    TSH    Urinalysis    Vitamin D    Hemoglobin A1C    Microalbumin/Creatinine Ratio, Urine    PSA, Screening    PAD (peripheral artery disease)    Relevant Orders    CBC Auto Differential    Comprehensive Metabolic Panel    Lipid Panel    T4, Free    TSH    Urinalysis    Vitamin D    Hemoglobin A1C    Microalbumin/Creatinine Ratio, Urine    PSA, Screening    S/P CABG x 5    Relevant Orders    CBC Auto Differential    Comprehensive Metabolic Panel    Lipid Panel    T4, Free    TSH    Urinalysis    Vitamin D    Hemoglobin A1C    Microalbumin/Creatinine Ratio, Urine    PSA, Screening    Stenosis of right carotid artery    Relevant Orders    CBC Auto Differential    Comprehensive Metabolic Panel     Lipid Panel    T4, Free    TSH    Urinalysis    Vitamin D    Hemoglobin A1C    Microalbumin/Creatinine Ratio, Urine    PSA, Screening      Other Visit Diagnoses     Preventative health care    -  Primary    Relevant Orders    CBC Auto Differential    Comprehensive Metabolic Panel    Lipid Panel    T4, Free    TSH    Urinalysis    Vitamin D    Hemoglobin A1C    Microalbumin/Creatinine Ratio, Urine    PSA, Screening    Prostate cancer screening        Relevant Orders    CBC Auto Differential    Comprehensive Metabolic Panel    Lipid Panel    T4, Free    TSH    Urinalysis    Vitamin D    Hemoglobin A1C    Microalbumin/Creatinine Ratio, Urine    PSA, Screening          Plan:       1. CBC, CMP, UA, TSH, free T4, fasting lipids, vitamin-D level, hemoglobin A1c, urine microalbumin to creatinine ratio, and PSA.  2. Continue aspirin 81 mg daily and Plavix 75 mg daily.  Coronary artery disease and peripheral artery disease remains stable.  Continue follow-up with cardiology.  3. Continue  losartan 100 mg daily, nifedipine 60 mg daily, hydrochlorothiazide 12.5 mg daily, and metoprolol 50 mg b.i.d..  Hypertension remains well controlled.  Continue follow-up with cardiology.  4. Continue Crestor 20 mg daily and fenofibrate 160 mg daily.  Hyperlipidemia remains stable.  Continue follow-up with cardiology.  5. Continue metformin 1000 mg b.i.d., glipizide 5 mg daily, Trulicity 1.5 mg weekly, and Farxiga 10 mg daily.  Diabetes remains uncontrolled with last A1c of 7.5.  Continue follow-up with endocrinology as scheduled.  6. Continue follow-up with Podiatry as scheduled for continued treatment of onychomycosis, diabetic peripheral angiopathy, and diabetic peripheral neuropathy.  7. Continue Prozac 20 mg daily.  Depression remains stable.  8. Continue use of temazepam as needed.  Insomnia is stable.  9. Return to clinic as needed or in 1 year for annual physical exam.

## 2022-08-11 ENCOUNTER — PATIENT MESSAGE (OUTPATIENT)
Dept: INTERNAL MEDICINE | Facility: CLINIC | Age: 77
End: 2022-08-11
Payer: MEDICARE

## 2022-08-17 ENCOUNTER — OFFICE VISIT (OUTPATIENT)
Dept: PODIATRY | Facility: CLINIC | Age: 77
End: 2022-08-17
Payer: MEDICARE

## 2022-08-17 VITALS
HEIGHT: 75 IN | HEART RATE: 58 BPM | WEIGHT: 224 LBS | SYSTOLIC BLOOD PRESSURE: 137 MMHG | BODY MASS INDEX: 27.85 KG/M2 | DIASTOLIC BLOOD PRESSURE: 73 MMHG

## 2022-08-17 DIAGNOSIS — E11.51 ONYCHOMYCOSIS OF MULTIPLE TOENAILS WITH TYPE 2 DIABETES MELLITUS AND PERIPHERAL ANGIOPATHY: ICD-10-CM

## 2022-08-17 DIAGNOSIS — E11.69 ONYCHOMYCOSIS OF MULTIPLE TOENAILS WITH TYPE 2 DIABETES MELLITUS AND PERIPHERAL ANGIOPATHY: ICD-10-CM

## 2022-08-17 DIAGNOSIS — I73.9 PAD (PERIPHERAL ARTERY DISEASE): Primary | ICD-10-CM

## 2022-08-17 DIAGNOSIS — E08.42 DIABETIC POLYNEUROPATHY ASSOCIATED WITH DIABETES MELLITUS DUE TO UNDERLYING CONDITION: ICD-10-CM

## 2022-08-17 DIAGNOSIS — B35.1 ONYCHOMYCOSIS OF MULTIPLE TOENAILS WITH TYPE 2 DIABETES MELLITUS AND PERIPHERAL ANGIOPATHY: ICD-10-CM

## 2022-08-17 DIAGNOSIS — L84 CORN OR CALLUS: ICD-10-CM

## 2022-08-17 PROCEDURE — 1101F PR PT FALLS ASSESS DOC 0-1 FALLS W/OUT INJ PAST YR: ICD-10-PCS | Mod: CPTII,S$GLB,, | Performed by: STUDENT IN AN ORGANIZED HEALTH CARE EDUCATION/TRAINING PROGRAM

## 2022-08-17 PROCEDURE — 4010F ACE/ARB THERAPY RXD/TAKEN: CPT | Mod: CPTII,S$GLB,, | Performed by: STUDENT IN AN ORGANIZED HEALTH CARE EDUCATION/TRAINING PROGRAM

## 2022-08-17 PROCEDURE — 3075F PR MOST RECENT SYSTOLIC BLOOD PRESS GE 130-139MM HG: ICD-10-PCS | Mod: CPTII,S$GLB,, | Performed by: STUDENT IN AN ORGANIZED HEALTH CARE EDUCATION/TRAINING PROGRAM

## 2022-08-17 PROCEDURE — 11056 PARNG/CUTG B9 HYPRKR LES 2-4: CPT | Mod: Q9,S$GLB,, | Performed by: STUDENT IN AN ORGANIZED HEALTH CARE EDUCATION/TRAINING PROGRAM

## 2022-08-17 PROCEDURE — 3288F PR FALLS RISK ASSESSMENT DOCUMENTED: ICD-10-PCS | Mod: CPTII,S$GLB,, | Performed by: STUDENT IN AN ORGANIZED HEALTH CARE EDUCATION/TRAINING PROGRAM

## 2022-08-17 PROCEDURE — 11721 ROUTINE FOOT CARE: ICD-10-PCS | Mod: Q9,59,S$GLB, | Performed by: STUDENT IN AN ORGANIZED HEALTH CARE EDUCATION/TRAINING PROGRAM

## 2022-08-17 PROCEDURE — 4010F PR ACE/ARB THEARPY RXD/TAKEN: ICD-10-PCS | Mod: CPTII,S$GLB,, | Performed by: STUDENT IN AN ORGANIZED HEALTH CARE EDUCATION/TRAINING PROGRAM

## 2022-08-17 PROCEDURE — 3060F PR POS MICROALBUMINURIA RESULT DOCUMENTED/REVIEW: ICD-10-PCS | Mod: CPTII,S$GLB,, | Performed by: STUDENT IN AN ORGANIZED HEALTH CARE EDUCATION/TRAINING PROGRAM

## 2022-08-17 PROCEDURE — 3060F POS MICROALBUMINURIA REV: CPT | Mod: CPTII,S$GLB,, | Performed by: STUDENT IN AN ORGANIZED HEALTH CARE EDUCATION/TRAINING PROGRAM

## 2022-08-17 PROCEDURE — 1126F AMNT PAIN NOTED NONE PRSNT: CPT | Mod: CPTII,S$GLB,, | Performed by: STUDENT IN AN ORGANIZED HEALTH CARE EDUCATION/TRAINING PROGRAM

## 2022-08-17 PROCEDURE — 1101F PT FALLS ASSESS-DOCD LE1/YR: CPT | Mod: CPTII,S$GLB,, | Performed by: STUDENT IN AN ORGANIZED HEALTH CARE EDUCATION/TRAINING PROGRAM

## 2022-08-17 PROCEDURE — 3078F DIAST BP <80 MM HG: CPT | Mod: CPTII,S$GLB,, | Performed by: STUDENT IN AN ORGANIZED HEALTH CARE EDUCATION/TRAINING PROGRAM

## 2022-08-17 PROCEDURE — 99214 OFFICE O/P EST MOD 30 MIN: CPT | Mod: 25,S$GLB,, | Performed by: STUDENT IN AN ORGANIZED HEALTH CARE EDUCATION/TRAINING PROGRAM

## 2022-08-17 PROCEDURE — 99999 PR PBB SHADOW E&M-EST. PATIENT-LVL III: CPT | Mod: PBBFAC,,, | Performed by: STUDENT IN AN ORGANIZED HEALTH CARE EDUCATION/TRAINING PROGRAM

## 2022-08-17 PROCEDURE — 3008F PR BODY MASS INDEX (BMI) DOCUMENTED: ICD-10-PCS | Mod: CPTII,S$GLB,, | Performed by: STUDENT IN AN ORGANIZED HEALTH CARE EDUCATION/TRAINING PROGRAM

## 2022-08-17 PROCEDURE — 1126F PR PAIN SEVERITY QUANTIFIED, NO PAIN PRESENT: ICD-10-PCS | Mod: CPTII,S$GLB,, | Performed by: STUDENT IN AN ORGANIZED HEALTH CARE EDUCATION/TRAINING PROGRAM

## 2022-08-17 PROCEDURE — 99999 PR PBB SHADOW E&M-EST. PATIENT-LVL III: ICD-10-PCS | Mod: PBBFAC,,, | Performed by: STUDENT IN AN ORGANIZED HEALTH CARE EDUCATION/TRAINING PROGRAM

## 2022-08-17 PROCEDURE — 3288F FALL RISK ASSESSMENT DOCD: CPT | Mod: CPTII,S$GLB,, | Performed by: STUDENT IN AN ORGANIZED HEALTH CARE EDUCATION/TRAINING PROGRAM

## 2022-08-17 PROCEDURE — 11721 DEBRIDE NAIL 6 OR MORE: CPT | Mod: Q9,59,S$GLB, | Performed by: STUDENT IN AN ORGANIZED HEALTH CARE EDUCATION/TRAINING PROGRAM

## 2022-08-17 PROCEDURE — 3075F SYST BP GE 130 - 139MM HG: CPT | Mod: CPTII,S$GLB,, | Performed by: STUDENT IN AN ORGANIZED HEALTH CARE EDUCATION/TRAINING PROGRAM

## 2022-08-17 PROCEDURE — 3051F PR MOST RECENT HEMOGLOBIN A1C LEVEL 7.0 - < 8.0%: ICD-10-PCS | Mod: CPTII,S$GLB,, | Performed by: STUDENT IN AN ORGANIZED HEALTH CARE EDUCATION/TRAINING PROGRAM

## 2022-08-17 PROCEDURE — 3051F HG A1C>EQUAL 7.0%<8.0%: CPT | Mod: CPTII,S$GLB,, | Performed by: STUDENT IN AN ORGANIZED HEALTH CARE EDUCATION/TRAINING PROGRAM

## 2022-08-17 PROCEDURE — 11056 ROUTINE FOOT CARE: ICD-10-PCS | Mod: Q9,S$GLB,, | Performed by: STUDENT IN AN ORGANIZED HEALTH CARE EDUCATION/TRAINING PROGRAM

## 2022-08-17 PROCEDURE — 3008F BODY MASS INDEX DOCD: CPT | Mod: CPTII,S$GLB,, | Performed by: STUDENT IN AN ORGANIZED HEALTH CARE EDUCATION/TRAINING PROGRAM

## 2022-08-17 PROCEDURE — 3066F NEPHROPATHY DOC TX: CPT | Mod: CPTII,S$GLB,, | Performed by: STUDENT IN AN ORGANIZED HEALTH CARE EDUCATION/TRAINING PROGRAM

## 2022-08-17 PROCEDURE — 99214 PR OFFICE/OUTPT VISIT, EST, LEVL IV, 30-39 MIN: ICD-10-PCS | Mod: 25,S$GLB,, | Performed by: STUDENT IN AN ORGANIZED HEALTH CARE EDUCATION/TRAINING PROGRAM

## 2022-08-17 PROCEDURE — 3066F PR DOCUMENTATION OF TREATMENT FOR NEPHROPATHY: ICD-10-PCS | Mod: CPTII,S$GLB,, | Performed by: STUDENT IN AN ORGANIZED HEALTH CARE EDUCATION/TRAINING PROGRAM

## 2022-08-17 PROCEDURE — 3078F PR MOST RECENT DIASTOLIC BLOOD PRESSURE < 80 MM HG: ICD-10-PCS | Mod: CPTII,S$GLB,, | Performed by: STUDENT IN AN ORGANIZED HEALTH CARE EDUCATION/TRAINING PROGRAM

## 2022-08-17 NOTE — PROCEDURES
"Routine Foot Care    Date/Time: 8/17/2022 8:15 AM  Performed by: Terri Starkey DPM  Authorized by: Terri Starkey DPM     Time out: Immediately prior to procedure a "time out" was called to verify the correct patient, procedure, equipment, support staff and site/side marked as required.    Consent Done?:  Yes (Verbal)  Hyperkeratotic Skin Lesions?: Yes    Number of trimmed lesions:  3  Location(s):  Left 1st Metatarsal Head, Right 1st Metatarsal Head and Right 5th Metatarsal Head    Nail Care Type:  Debride  Location(s): All  (Left 1st Toe, Left 3rd Toe, Left 2nd Toe, Left 4th Toe, Left 5th Toe, Right 1st Toe, Right 2nd Toe, Right 3rd Toe, Right 4th Toe and Right 5th Toe)  Patient tolerance:  Patient tolerated the procedure well with no immediate complications      "

## 2022-08-17 NOTE — PROGRESS NOTES
Subjective:      Patient ID: Cecil Pringle is a 76 y.o. male.    Chief Complaint: Nail Care and Callouses    Cecil is a 76 y.o. male who presents to the clinic upon referral from Dr. Hannah rock. provider found  for evaluation and treatment of diabetic feet. Cecil has a past medical history of Anxiety, Arthritis, Coronary artery disease, Depression, Diabetes mellitus, type 2, Diabetic neuropathy, Hyperlipidemia, Hypertension, Insomnia, and PAD (peripheral artery disease). Patient relates no major problem with feet, states has occasional burning/tinlging sensations to toes. Only complaints today consist of callus and elongated toe nails to rakel feet.  has not had new diabetic shoes in 7 years.      11/16/21: Pt seen today for annual diabetic foot exam and RFC. No new pedal complaints.     2/8/22: Pt seen today for RFC. No new pedal complaints.     5/10/22: Pt seen today for RFC. No new pedal complaints.    8/17/22: Seen today, relates to worsening neuropathy to his feet, here for RFC. Plan for diabetic foot exam during upcoming visit.      PCP: Layo Jett MD    Date Last Seen by PCP: 12/16/21    Current shoe gear: Rx diabetic extra depth shoes and custom accommodative insoles    Hemoglobin A1C   Date Value Ref Range Status   06/22/2022 7.1 (H) 4.0 - 5.6 % Final     Comment:     ADA Screening Guidelines:  5.7-6.4%  Consistent with prediabetes  >or=6.5%  Consistent with diabetes    High levels of fetal hemoglobin interfere with the HbA1C  assay. Heterozygous hemoglobin variants (HbS, HgC, etc)do  not significantly interfere with this assay.   However, presence of multiple variants may affect accuracy.     04/11/2022 7.5 (H) 4.0 - 5.6 % Final     Comment:     ADA Screening Guidelines:  5.7-6.4%  Consistent with prediabetes  >or=6.5%  Consistent with diabetes    High levels of fetal hemoglobin interfere with the HbA1C  assay. Heterozygous hemoglobin variants (HbS, HgC, etc)do  not significantly interfere  with this assay.   However, presence of multiple variants may affect accuracy.     12/09/2021 7.9 (H) 4.0 - 5.6 % Final     Comment:     ADA Screening Guidelines:  5.7-6.4%  Consistent with prediabetes  >or=6.5%  Consistent with diabetes    High levels of fetal hemoglobin interfere with the HbA1C  assay. Heterozygous hemoglobin variants (HbS, HgC, etc)do  not significantly interfere with this assay.   However, presence of multiple variants may affect accuracy.             Review of Systems   Constitutional: Negative for chills, decreased appetite, diaphoresis and fever.   Cardiovascular: Negative for claudication and leg swelling.   Respiratory: Negative for shortness of breath.    Skin: Positive for dry skin and nail changes. Negative for color change, flushing, itching, poor wound healing, rash and suspicious lesions.   Musculoskeletal: Negative for arthritis, back pain, falls, joint pain, joint swelling and myalgias.   Gastrointestinal: Negative for nausea and vomiting.   Neurological: Positive for numbness and paresthesias. Negative for loss of balance.           Objective:      Physical Exam  Vitals and nursing note reviewed.   Constitutional:       General: He is not in acute distress.     Appearance: He is well-developed. He is not diaphoretic.   Cardiovascular:      Pulses:           Dorsalis pedis pulses are 1+ on the right side and 1+ on the left side.        Posterior tibial pulses are 1+ on the right side and 1+ on the left side.      Comments: Skin temperature is within normal limits. Toes are cool to touch and feet are warm proximally. Hair growth is mildly diminished. Skin is mildly atrophic and with mild hyperpigmentation. Mild edema noted, varicosities noted rakel      Musculoskeletal:         General: Deformity present. No tenderness.      Right foot: Decreased range of motion. Deformity present.      Left foot: Decreased range of motion. Deformity present.      Comments: Strength is 5/5 to lower  extremity muscle groups, rakel    Pes cavus foot type, bilaterally    Feet:      Right foot:      Skin integrity: Dry skin present. No ulcer, blister, skin breakdown, erythema, warmth or callus.      Left foot:      Skin integrity: Dry skin present. No ulcer, blister, skin breakdown, erythema, warmth or callus.   Lymphadenopathy:      Comments: Negative lymphadenopathy bilateral popliteal fossa and tarsal tunnel.    Skin:     General: Skin is warm and dry.      Capillary Refill: Capillary refill takes less than 2 seconds.      Coloration: Skin is not pale.      Findings: No bruising, ecchymosis, erythema, laceration, lesion, petechiae or rash.      Comments: Skin is warm and dry bilaterally, no acute SOI noted, bilaterally, appears stable.     Nails 1-5 are thickened, discolored, dystrophic and with subungual debris, rakel    HPK noted medial 1st MTP and sub 5th MTP bilaterally    Neurological:      Mental Status: He is alert and oriented to person, place, and time.      Sensory: Sensory deficit present.      Comments: Vibratory sensation diminished, rakel               Assessment:       Encounter Diagnoses   Name Primary?    PAD (peripheral artery disease) Yes    Onychomycosis of multiple toenails with type 2 diabetes mellitus and peripheral angiopathy     [place] or callus     Diabetic polyneuropathy associated with diabetes mellitus due to underlying condition          Plan:       Cecil was seen today for nail care and callouses.    Diagnoses and all orders for this visit:    PAD (peripheral artery disease)  -     Routine Foot Care    Onychomycosis of multiple toenails with type 2 diabetes mellitus and peripheral angiopathy  -     Routine Foot Care    [place] or callus  -     Routine Foot Care    Diabetic polyneuropathy associated with diabetes mellitus due to underlying condition  -     Routine Foot Care      I counseled the patient on his conditions, their implications and medical management.    - HPK and nails  debrided, see procedure note    -Recommend follow up with PCP for diabetic neuropathy for consideration of Gabapentin for symptoms.    - Shoe inspection. Diabetic Foot Education. Patient reminded of the importance of good nutrition and blood sugar control to help prevent podiatric complications of diabetes. Patient instructed on proper foot hygeine. We discussed wearing proper shoe gear, daily foot inspections, never walking without protective shoe gear, never putting sharp instruments to feet, routine podiatric nail visits every 2-3 months.      -Continue penlac for onychomycosis    -RTC in 2-3 months for routine diabetic foot care

## 2022-08-23 ENCOUNTER — TELEPHONE (OUTPATIENT)
Dept: ENDOSCOPY | Facility: HOSPITAL | Age: 77
End: 2022-08-23
Payer: MEDICARE

## 2022-08-23 NOTE — TELEPHONE ENCOUNTER
Patient called to schedule colonoscopy. Patient last seen by cardiology on 3/8/2018 with follow -up recommended for 10/22/2018. Patient informed will need to follow up with cardiology. Patient verbalized understanding.

## 2022-08-31 ENCOUNTER — OFFICE VISIT (OUTPATIENT)
Dept: ENDOCRINOLOGY | Facility: CLINIC | Age: 77
End: 2022-08-31
Payer: MEDICARE

## 2022-08-31 VITALS
DIASTOLIC BLOOD PRESSURE: 60 MMHG | RESPIRATION RATE: 18 BRPM | SYSTOLIC BLOOD PRESSURE: 120 MMHG | OXYGEN SATURATION: 97 % | HEART RATE: 66 BPM | HEIGHT: 75 IN | BODY MASS INDEX: 27.89 KG/M2 | WEIGHT: 224.31 LBS

## 2022-08-31 DIAGNOSIS — I25.10 CORONARY ARTERY DISEASE INVOLVING NATIVE CORONARY ARTERY OF NATIVE HEART WITHOUT ANGINA PECTORIS: Chronic | ICD-10-CM

## 2022-08-31 DIAGNOSIS — N18.31 TYPE 2 DIABETES MELLITUS WITH STAGE 3A CHRONIC KIDNEY DISEASE, WITHOUT LONG-TERM CURRENT USE OF INSULIN: Primary | ICD-10-CM

## 2022-08-31 DIAGNOSIS — E11.22 TYPE 2 DIABETES MELLITUS WITH STAGE 3A CHRONIC KIDNEY DISEASE, WITHOUT LONG-TERM CURRENT USE OF INSULIN: Primary | ICD-10-CM

## 2022-08-31 DIAGNOSIS — E78.2 MIXED HYPERLIPIDEMIA: Chronic | ICD-10-CM

## 2022-08-31 PROCEDURE — 3051F PR MOST RECENT HEMOGLOBIN A1C LEVEL 7.0 - < 8.0%: ICD-10-PCS | Mod: CPTII,S$GLB,, | Performed by: INTERNAL MEDICINE

## 2022-08-31 PROCEDURE — 3060F PR POS MICROALBUMINURIA RESULT DOCUMENTED/REVIEW: ICD-10-PCS | Mod: CPTII,S$GLB,, | Performed by: INTERNAL MEDICINE

## 2022-08-31 PROCEDURE — 3008F PR BODY MASS INDEX (BMI) DOCUMENTED: ICD-10-PCS | Mod: CPTII,S$GLB,, | Performed by: INTERNAL MEDICINE

## 2022-08-31 PROCEDURE — 3060F POS MICROALBUMINURIA REV: CPT | Mod: CPTII,S$GLB,, | Performed by: INTERNAL MEDICINE

## 2022-08-31 PROCEDURE — 1159F MED LIST DOCD IN RCRD: CPT | Mod: CPTII,S$GLB,, | Performed by: INTERNAL MEDICINE

## 2022-08-31 PROCEDURE — 3288F PR FALLS RISK ASSESSMENT DOCUMENTED: ICD-10-PCS | Mod: CPTII,S$GLB,, | Performed by: INTERNAL MEDICINE

## 2022-08-31 PROCEDURE — 3066F NEPHROPATHY DOC TX: CPT | Mod: CPTII,S$GLB,, | Performed by: INTERNAL MEDICINE

## 2022-08-31 PROCEDURE — 99214 OFFICE O/P EST MOD 30 MIN: CPT | Mod: S$GLB,,, | Performed by: INTERNAL MEDICINE

## 2022-08-31 PROCEDURE — 3074F PR MOST RECENT SYSTOLIC BLOOD PRESSURE < 130 MM HG: ICD-10-PCS | Mod: CPTII,S$GLB,, | Performed by: INTERNAL MEDICINE

## 2022-08-31 PROCEDURE — 1100F PR PT FALLS ASSESS DOC 2+ FALLS/FALL W/INJURY/YR: ICD-10-PCS | Mod: CPTII,S$GLB,, | Performed by: INTERNAL MEDICINE

## 2022-08-31 PROCEDURE — 3074F SYST BP LT 130 MM HG: CPT | Mod: CPTII,S$GLB,, | Performed by: INTERNAL MEDICINE

## 2022-08-31 PROCEDURE — 3008F BODY MASS INDEX DOCD: CPT | Mod: CPTII,S$GLB,, | Performed by: INTERNAL MEDICINE

## 2022-08-31 PROCEDURE — 1125F AMNT PAIN NOTED PAIN PRSNT: CPT | Mod: CPTII,S$GLB,, | Performed by: INTERNAL MEDICINE

## 2022-08-31 PROCEDURE — 3078F PR MOST RECENT DIASTOLIC BLOOD PRESSURE < 80 MM HG: ICD-10-PCS | Mod: CPTII,S$GLB,, | Performed by: INTERNAL MEDICINE

## 2022-08-31 PROCEDURE — 3288F FALL RISK ASSESSMENT DOCD: CPT | Mod: CPTII,S$GLB,, | Performed by: INTERNAL MEDICINE

## 2022-08-31 PROCEDURE — 99214 PR OFFICE/OUTPT VISIT, EST, LEVL IV, 30-39 MIN: ICD-10-PCS | Mod: S$GLB,,, | Performed by: INTERNAL MEDICINE

## 2022-08-31 PROCEDURE — 3078F DIAST BP <80 MM HG: CPT | Mod: CPTII,S$GLB,, | Performed by: INTERNAL MEDICINE

## 2022-08-31 PROCEDURE — 99999 PR PBB SHADOW E&M-EST. PATIENT-LVL V: CPT | Mod: PBBFAC,,, | Performed by: INTERNAL MEDICINE

## 2022-08-31 PROCEDURE — 3051F HG A1C>EQUAL 7.0%<8.0%: CPT | Mod: CPTII,S$GLB,, | Performed by: INTERNAL MEDICINE

## 2022-08-31 PROCEDURE — 3066F PR DOCUMENTATION OF TREATMENT FOR NEPHROPATHY: ICD-10-PCS | Mod: CPTII,S$GLB,, | Performed by: INTERNAL MEDICINE

## 2022-08-31 PROCEDURE — 99999 PR PBB SHADOW E&M-EST. PATIENT-LVL V: ICD-10-PCS | Mod: PBBFAC,,, | Performed by: INTERNAL MEDICINE

## 2022-08-31 PROCEDURE — 4010F ACE/ARB THERAPY RXD/TAKEN: CPT | Mod: CPTII,S$GLB,, | Performed by: INTERNAL MEDICINE

## 2022-08-31 PROCEDURE — 1100F PTFALLS ASSESS-DOCD GE2>/YR: CPT | Mod: CPTII,S$GLB,, | Performed by: INTERNAL MEDICINE

## 2022-08-31 PROCEDURE — 4010F PR ACE/ARB THEARPY RXD/TAKEN: ICD-10-PCS | Mod: CPTII,S$GLB,, | Performed by: INTERNAL MEDICINE

## 2022-08-31 PROCEDURE — 1125F PR PAIN SEVERITY QUANTIFIED, PAIN PRESENT: ICD-10-PCS | Mod: CPTII,S$GLB,, | Performed by: INTERNAL MEDICINE

## 2022-08-31 PROCEDURE — 1159F PR MEDICATION LIST DOCUMENTED IN MEDICAL RECORD: ICD-10-PCS | Mod: CPTII,S$GLB,, | Performed by: INTERNAL MEDICINE

## 2022-08-31 NOTE — ASSESSMENT & PLAN NOTE
Reviewed goals of therapy are to get the best control we can without hypoglycemia.    Currently meeting glycemic target: no, but trending in the right direction.  We will repeat the A1c in 3-4 months to determine if we need to go up to 3.0 mg weekly.      Medication changes:   Continue:     Trulicity 1.5 mg weekly   Metformin 1000 mg b.i.d.   Glipizide 10 mg BID with breakfast and supper   Farxiga (dapagliflozin) 10 mg daily      Advised frequent self blood glucose monitoring. Patient encouraged to document glucose results and bring them to every clinic visit. He was not able to get Dexcom 2/2 cost.    Hypoglycemia precautions discussed.     Close adherence to lifestyle changes recommended.      Eyes: Following regularly with ophthalmology; next due in March, 2023  Feet: Foot exam up to date; next exam due 05/2023  Kidneys: Urine microalbumin/creatinine is up to date; patient is taking an SGLT-2i and ARB; microalbumin gradually trending down. Cr stable at 1.3. Recheck with next labs.  HbA1c: Not at goal - Check in 3 months  Lipids: On fenofibrate and Crestor.  Repeat lipids in April, 2023  ASA: yes    Lab Results   Component Value Date    HGBA1C 7.1 (H) 06/22/2022    HGBA1C 7.5 (H) 04/11/2022    HGBA1C 7.9 (H) 12/09/2021

## 2022-08-31 NOTE — PROGRESS NOTES
FOLLOW-UP VISIT    Subjective:      Chief Complaint:  Follow-up for diabetes    HPI: Cecil Pringle is a 76 y.o. male who is here for a follow-up evaluation for diabetes    The patient's last visit with me was on 4/18/2022.      Past Medical History:   Diagnosis Date    Anxiety     Arthritis     Coronary artery disease     Depression     Diabetes mellitus, type 2     Diabetic neuropathy     Hyperlipidemia     Hypertension     Insomnia     PAD (peripheral artery disease)      With regards to the diabetes:    8/31/2022 (current visit):  Started on the new dose of Trulicity 1.5 mg weekly approximately four weeks ago.  He is tolerating the new dose well without side effects.  He is still having issues with intermittent dizziness, which has been going on for about the past two years.    Checking BG: Average 130-140     Current diabetic medications include:   Metformin 1000 mg twice daily  Glipizide 10 mg BID  Farxiga (dapagliflozin) 10 mg daily  Trulicity 1.5 mg weekly       History of diabetes:  The patient was initially diagnosed with Type 2 diabetes mellitus:  Over 10 years ago.    Known diabetic complications: nephropathy (CKD Stage 3), peripheral neuropathy, cardiovascular disease and peripheral vascular disease     Cardiovascular risk factors: advanced age (older than 55 for men, 65 for women), diabetes mellitus, dyslipidemia, family history of premature cardiovascular disease, hypertension, male gender and microalbuminuria      Other medications tried:  Victoza (liraglutide) - did well with that in the past, but it was too expensive so he was not able to continue after the clinical trial  Januvia - stopped after initiating Trulicity  Ozempic - took for 2 years as part of a clinic trial before FDA approval. Did well with it.    Last visit with Diabetes Educator: Last Education Visit: Not Found      BP Readings from Last 3 Encounters:   08/31/22 120/60   08/17/22 137/73   06/22/22 138/68       Wt Readings from  Last 10 Encounters:   08/31/22 101.8 kg (224 lb 5.1 oz)   08/17/22 101.6 kg (224 lb)   06/22/22 101.7 kg (224 lb 3.3 oz)   12/16/21 102.3 kg (225 lb 8.5 oz)   11/18/21 103 kg (227 lb)   11/16/21 103 kg (227 lb)   09/27/21 103.1 kg (227 lb 4.7 oz)   06/02/21 103.7 kg (228 lb 9.9 oz)   04/06/21 104.5 kg (230 lb 4.3 oz)   12/14/20 103.8 kg (228 lb 13.4 oz)       Diabetes Management Status    Statin: Taking  ACE/ARB: Taking    Screening or Prevention Patient's value Goal Complete/Controlled?   HgA1C Testing and Control   Lab Results   Component Value Date    HGBA1C 7.1 (H) 06/22/2022      Annually/Less than 8% Yes   Lipid profile : 06/22/2022 Annually Yes   LDL control Lab Results   Component Value Date    LDLCALC 23.6 (L) 06/22/2022    Annually/Less than 100 mg/dl  Yes   Nephropathy screening Lab Results   Component Value Date    LABMICR 263.0 06/22/2022     Lab Results   Component Value Date    PROTEINUA 1+ (A) 06/22/2022     No results found for: UTPCR   Annually Yes   Blood pressure BP Readings from Last 1 Encounters:   08/31/22 120/60    Less than 140/90 Yes   Dilated retinal exam : 03/07/2022 Annually Yes   Foot exam   : 05/10/2022 Annually Yes            Low dose ASA?: Yes    Lab Results   Component Value Date    HGBA1C 7.1 (H) 06/22/2022    HGBA1C 7.5 (H) 04/11/2022    HGBA1C 7.9 (H) 12/09/2021    HGBA1C 8.3 (H) 11/03/2021    HGBA1C 7.6 (H) 07/29/2021    HGBA1C 7.5 (H) 05/28/2021    HGBA1C 7.4 (H) 03/30/2021    HGBA1C 7.8 (H) 12/11/2020    HGBA1C 7.9 (H) 09/10/2020    HGBA1C 7.7 (H) 06/08/2020         Reviewed past medical, family, social history and updated as appropriate.      Objective:       Vitals:    08/31/22 1329   BP: 120/60   Pulse: 66   Resp: 18       BP Readings from Last 5 Encounters:   08/31/22 120/60   08/17/22 137/73   06/22/22 138/68   05/10/22 115/60   12/16/21 135/70         Physical Exam      Wt Readings from Last 30 Encounters:   08/31/22 1329 101.8 kg (224 lb 5.1 oz)   08/17/22 0810 101.6 kg  (224 lb)   06/22/22 0713 101.7 kg (224 lb 3.3 oz)   12/16/21 0729 102.3 kg (225 lb 8.5 oz)   11/18/21 0823 103 kg (227 lb)   11/16/21 0814 103 kg (227 lb)   09/27/21 1118 103.1 kg (227 lb 4.7 oz)   06/02/21 0719 103.7 kg (228 lb 9.9 oz)   04/06/21 0802 104.5 kg (230 lb 4.3 oz)   12/14/20 0807 103.8 kg (228 lb 13.4 oz)   09/14/20 0759 105.3 kg (232 lb 3.2 oz)   06/16/20 0914 105.7 kg (233 lb 0.4 oz)   06/13/20 0814 105.7 kg (233 lb 0.4 oz)   06/11/20 0822 105.7 kg (233 lb 0.4 oz)   01/13/20 1058 103 kg (227 lb)   01/09/20 0728 103.3 kg (227 lb 11.8 oz)   12/09/19 0729 101.9 kg (224 lb 10.4 oz)   11/04/19 0739 100.2 kg (220 lb 14.4 oz)   10/30/19 1205 103.4 kg (228 lb)   07/31/19 0733 103.5 kg (228 lb 2.8 oz)   06/14/19 0735 102.1 kg (225 lb)   05/10/19 0812 104.3 kg (230 lb)   05/01/19 0838 104.3 kg (230 lb)   04/25/19 0754 104.7 kg (230 lb 13.2 oz)   12/12/18 0818 99.8 kg (220 lb)   08/22/18 0904 105.4 kg (232 lb 7.6 oz)   06/26/18 0817 105.2 kg (232 lb)   03/27/18 0805 105.2 kg (232 lb)   03/15/18 1036 105.3 kg (232 lb 2.3 oz)   03/08/18 0823 105.3 kg (232 lb 0.6 oz)         Lab Results   Component Value Date    HGBA1C 7.1 (H) 06/22/2022     Lab Results   Component Value Date    CHOL 127 06/22/2022    HDL 34 (L) 06/22/2022    LDLCALC 23.6 (L) 06/22/2022    TRIG 347 (H) 06/22/2022    CHOLHDL 26.8 06/22/2022     Lab Results   Component Value Date     06/22/2022    K 4.9 06/22/2022     06/22/2022    CO2 23 06/22/2022     (H) 06/22/2022    BUN 19 06/22/2022    CREATININE 1.3 06/22/2022    CALCIUM 9.7 06/22/2022    PROT 7.1 06/22/2022    ALBUMIN 4.3 06/22/2022    BILITOT 0.4 06/22/2022    ALKPHOS 49 (L) 06/22/2022    AST 14 06/22/2022    ALT 20 06/22/2022    ANIONGAP 12 06/22/2022    ESTGFRAFRICA >60.0 06/22/2022    EGFRNONAA 53.8 (A) 06/22/2022    TSH 0.669 06/22/2022      Lab Results   Component Value Date    MICALBCREAT 282.8 (H) 06/22/2022       Assessment/Plan:       Type 2 diabetes mellitus with  stage 3a chronic kidney disease, without long-term current use of insulin  Reviewed goals of therapy are to get the best control we can without hypoglycemia.    Currently meeting glycemic target: no, but trending in the right direction.  We will repeat the A1c in 3-4 months to determine if we need to go up to 3.0 mg weekly.      Medication changes:   Continue:    Trulicity 1.5 mg weekly  Metformin 1000 mg b.i.d.  Glipizide 10 mg BID with breakfast and supper  Farxiga (dapagliflozin) 10 mg daily      Advised frequent self blood glucose monitoring. Patient encouraged to document glucose results and bring them to every clinic visit. He was not able to get Dexcom 2/2 cost.    Hypoglycemia precautions discussed.     Close adherence to lifestyle changes recommended.      Eyes: Following regularly with ophthalmology; next due in March, 2023  Feet: Foot exam up to date; next exam due 05/2023  Kidneys: Urine microalbumin/creatinine is up to date; patient is taking an SGLT-2i and ARB; microalbumin gradually trending down. Cr stable at 1.3. Recheck with next labs.  HbA1c: Not at goal - Check in 3 months  Lipids: On fenofibrate and Crestor.  Repeat lipids in April, 2023  ASA: yes    Lab Results   Component Value Date    HGBA1C 7.1 (H) 06/22/2022    HGBA1C 7.5 (H) 04/11/2022    HGBA1C 7.9 (H) 12/09/2021       Uncontrolled type 2 diabetes mellitus with diabetic peripheral angiopathy without gangrene, without long-term current use of insulin  See above.    Coronary artery disease involving native coronary artery of native heart without angina pectoris  Trulicity (dulaglutide) has been shown to decrease major adverse cardiac events in high risk patients.   Farxiga (dapagliflozin) has been shown to decrease major adverse cardiac events in high risk patients.      Mixed hyperlipidemia  See above.       Notes:     RTC in 6 months with repeat labs

## 2022-09-02 ENCOUNTER — OFFICE VISIT (OUTPATIENT)
Dept: CARDIOLOGY | Facility: CLINIC | Age: 77
End: 2022-09-02
Payer: MEDICARE

## 2022-09-02 VITALS
BODY MASS INDEX: 27.73 KG/M2 | DIASTOLIC BLOOD PRESSURE: 70 MMHG | SYSTOLIC BLOOD PRESSURE: 150 MMHG | HEART RATE: 68 BPM | WEIGHT: 223 LBS | HEIGHT: 75 IN

## 2022-09-02 DIAGNOSIS — Z95.1 S/P CABG X 5: ICD-10-CM

## 2022-09-02 DIAGNOSIS — E11.22 TYPE 2 DIABETES MELLITUS WITH STAGE 3A CHRONIC KIDNEY DISEASE, WITHOUT LONG-TERM CURRENT USE OF INSULIN: ICD-10-CM

## 2022-09-02 DIAGNOSIS — I35.0 NONRHEUMATIC AORTIC VALVE STENOSIS: ICD-10-CM

## 2022-09-02 DIAGNOSIS — I25.10 CORONARY ARTERY DISEASE INVOLVING NATIVE CORONARY ARTERY OF NATIVE HEART WITHOUT ANGINA PECTORIS: Primary | Chronic | ICD-10-CM

## 2022-09-02 DIAGNOSIS — E11.59 HYPERTENSION ASSOCIATED WITH DIABETES: ICD-10-CM

## 2022-09-02 DIAGNOSIS — I67.2 CEREBRAL ATHEROSCLEROSIS: ICD-10-CM

## 2022-09-02 DIAGNOSIS — I15.2 HYPERTENSION ASSOCIATED WITH DIABETES: ICD-10-CM

## 2022-09-02 DIAGNOSIS — E78.2 MIXED HYPERLIPIDEMIA: Chronic | ICD-10-CM

## 2022-09-02 DIAGNOSIS — N18.31 TYPE 2 DIABETES MELLITUS WITH STAGE 3A CHRONIC KIDNEY DISEASE, WITHOUT LONG-TERM CURRENT USE OF INSULIN: ICD-10-CM

## 2022-09-02 DIAGNOSIS — R09.89 LEFT CAROTID BRUIT: ICD-10-CM

## 2022-09-02 PROCEDURE — 1126F PR PAIN SEVERITY QUANTIFIED, NO PAIN PRESENT: ICD-10-PCS | Mod: CPTII,S$GLB,, | Performed by: INTERNAL MEDICINE

## 2022-09-02 PROCEDURE — 1159F PR MEDICATION LIST DOCUMENTED IN MEDICAL RECORD: ICD-10-PCS | Mod: CPTII,S$GLB,, | Performed by: INTERNAL MEDICINE

## 2022-09-02 PROCEDURE — 4010F PR ACE/ARB THEARPY RXD/TAKEN: ICD-10-PCS | Mod: CPTII,S$GLB,, | Performed by: INTERNAL MEDICINE

## 2022-09-02 PROCEDURE — 1100F PTFALLS ASSESS-DOCD GE2>/YR: CPT | Mod: CPTII,S$GLB,, | Performed by: INTERNAL MEDICINE

## 2022-09-02 PROCEDURE — 93005 EKG 12-LEAD: ICD-10-PCS | Mod: S$GLB,,, | Performed by: INTERNAL MEDICINE

## 2022-09-02 PROCEDURE — 1126F AMNT PAIN NOTED NONE PRSNT: CPT | Mod: CPTII,S$GLB,, | Performed by: INTERNAL MEDICINE

## 2022-09-02 PROCEDURE — 3066F NEPHROPATHY DOC TX: CPT | Mod: CPTII,S$GLB,, | Performed by: INTERNAL MEDICINE

## 2022-09-02 PROCEDURE — 4010F ACE/ARB THERAPY RXD/TAKEN: CPT | Mod: CPTII,S$GLB,, | Performed by: INTERNAL MEDICINE

## 2022-09-02 PROCEDURE — 3051F PR MOST RECENT HEMOGLOBIN A1C LEVEL 7.0 - < 8.0%: ICD-10-PCS | Mod: CPTII,S$GLB,, | Performed by: INTERNAL MEDICINE

## 2022-09-02 PROCEDURE — 1159F MED LIST DOCD IN RCRD: CPT | Mod: CPTII,S$GLB,, | Performed by: INTERNAL MEDICINE

## 2022-09-02 PROCEDURE — 3008F BODY MASS INDEX DOCD: CPT | Mod: CPTII,S$GLB,, | Performed by: INTERNAL MEDICINE

## 2022-09-02 PROCEDURE — 3008F PR BODY MASS INDEX (BMI) DOCUMENTED: ICD-10-PCS | Mod: CPTII,S$GLB,, | Performed by: INTERNAL MEDICINE

## 2022-09-02 PROCEDURE — 3051F HG A1C>EQUAL 7.0%<8.0%: CPT | Mod: CPTII,S$GLB,, | Performed by: INTERNAL MEDICINE

## 2022-09-02 PROCEDURE — 3078F DIAST BP <80 MM HG: CPT | Mod: CPTII,S$GLB,, | Performed by: INTERNAL MEDICINE

## 2022-09-02 PROCEDURE — 3078F PR MOST RECENT DIASTOLIC BLOOD PRESSURE < 80 MM HG: ICD-10-PCS | Mod: CPTII,S$GLB,, | Performed by: INTERNAL MEDICINE

## 2022-09-02 PROCEDURE — 3060F PR POS MICROALBUMINURIA RESULT DOCUMENTED/REVIEW: ICD-10-PCS | Mod: CPTII,S$GLB,, | Performed by: INTERNAL MEDICINE

## 2022-09-02 PROCEDURE — 99204 PR OFFICE/OUTPT VISIT, NEW, LEVL IV, 45-59 MIN: ICD-10-PCS | Mod: 25,S$GLB,, | Performed by: INTERNAL MEDICINE

## 2022-09-02 PROCEDURE — 3288F PR FALLS RISK ASSESSMENT DOCUMENTED: ICD-10-PCS | Mod: CPTII,S$GLB,, | Performed by: INTERNAL MEDICINE

## 2022-09-02 PROCEDURE — 3288F FALL RISK ASSESSMENT DOCD: CPT | Mod: CPTII,S$GLB,, | Performed by: INTERNAL MEDICINE

## 2022-09-02 PROCEDURE — 99499 RISK ADDL DX/OHS AUDIT: ICD-10-PCS | Mod: S$GLB,,, | Performed by: INTERNAL MEDICINE

## 2022-09-02 PROCEDURE — 93010 ELECTROCARDIOGRAM REPORT: CPT | Mod: S$GLB,,, | Performed by: INTERNAL MEDICINE

## 2022-09-02 PROCEDURE — 99999 PR PBB SHADOW E&M-EST. PATIENT-LVL V: CPT | Mod: PBBFAC,,, | Performed by: INTERNAL MEDICINE

## 2022-09-02 PROCEDURE — 3060F POS MICROALBUMINURIA REV: CPT | Mod: CPTII,S$GLB,, | Performed by: INTERNAL MEDICINE

## 2022-09-02 PROCEDURE — 3077F SYST BP >= 140 MM HG: CPT | Mod: CPTII,S$GLB,, | Performed by: INTERNAL MEDICINE

## 2022-09-02 PROCEDURE — 99999 PR PBB SHADOW E&M-EST. PATIENT-LVL V: ICD-10-PCS | Mod: PBBFAC,,, | Performed by: INTERNAL MEDICINE

## 2022-09-02 PROCEDURE — 3066F PR DOCUMENTATION OF TREATMENT FOR NEPHROPATHY: ICD-10-PCS | Mod: CPTII,S$GLB,, | Performed by: INTERNAL MEDICINE

## 2022-09-02 PROCEDURE — 99204 OFFICE O/P NEW MOD 45 MIN: CPT | Mod: 25,S$GLB,, | Performed by: INTERNAL MEDICINE

## 2022-09-02 PROCEDURE — 93005 ELECTROCARDIOGRAM TRACING: CPT | Mod: S$GLB,,, | Performed by: INTERNAL MEDICINE

## 2022-09-02 PROCEDURE — 1100F PR PT FALLS ASSESS DOC 2+ FALLS/FALL W/INJURY/YR: ICD-10-PCS | Mod: CPTII,S$GLB,, | Performed by: INTERNAL MEDICINE

## 2022-09-02 PROCEDURE — 93010 EKG 12-LEAD: ICD-10-PCS | Mod: S$GLB,,, | Performed by: INTERNAL MEDICINE

## 2022-09-02 PROCEDURE — 99499 UNLISTED E&M SERVICE: CPT | Mod: S$GLB,,, | Performed by: INTERNAL MEDICINE

## 2022-09-02 PROCEDURE — 3077F PR MOST RECENT SYSTOLIC BLOOD PRESSURE >= 140 MM HG: ICD-10-PCS | Mod: CPTII,S$GLB,, | Performed by: INTERNAL MEDICINE

## 2022-09-02 RX ORDER — OMEGA-3-ACID ETHYL ESTERS 1 G/1
2 CAPSULE, LIQUID FILLED ORAL 3 TIMES DAILY
Qty: 180 CAPSULE | Refills: 6 | Status: SHIPPED | OUTPATIENT
Start: 2022-09-02 | End: 2023-03-06

## 2022-09-02 NOTE — PROGRESS NOTES
Subjective:     Problem List:  CAD  CABG in 1990s and 2014  HTN  Mixed hyperlipidemia  DM ~1995  PAD asymptomatic   Aortic stenosis - mild  Heart murmur since childhood    HPI:   Cecil Pringle is a 76 y.o. male who presents for follow-up of Pre-op Exam  He plans on having a colonoscopy soon.  It is not scheduled yet.  He underwent CABG x 5 in the 1990s. In 2011 he had 5 stents and in 2014 he underwent a second CABG x 4 at [facility]. He does not report angina or shortness of breath with exertion.  He does not recall having angina prior to any of the coronary interventions.   He has occasional chest and abdominal discomfort at night releived after he has a bowel movement.  On rosuvastatin 20 and fenofibrate 160 mg a day triglycerides remain elevated 347-374 and as high as 700 in 7/2021. HbA1C was 7.1% previously 7.5-8.3%.        Review of patient's allergies indicates:  No Known Allergies     Current Outpatient Medications   Medication Sig    aspirin (ECOTRIN) 81 MG EC tablet Take 81 mg by mouth once daily.    blood-glucose meter kit To check BG 1 times daily, to use with insurance preferred meter    cholecalciferol, vitamin D3, (VITAMIN D3) 25 mcg (1,000 unit) capsule Take 2 capsules (2,000 Units total) by mouth once daily.    ciclopirox (PENLAC) 8 % Soln Apply topically nightly.    clopidogreL (PLAVIX) 75 mg tablet TAKE 1 TABLET BY MOUTH EVERY DAY    co-enzyme Q-10 30 mg capsule Take 30 mg by mouth once daily.     dapagliflozin (FARXIGA) 10 mg tablet Take 1 tablet (10 mg total) by mouth once daily.    dulaglutide (TRULICITY) 1.5 mg/0.5 mL pen injector Inject 1.5 mg into the skin every 7 days. [facility] updated prescription    fenofibrate 160 MG Tab TAKE 1 TABLET (160 MG TOTAL) BY MOUTH ONCE DAILY.    fish oil-omega-3 fatty acids 300-1,000 mg capsule Take 2 g by mouth 3 (three) times daily.    FLUoxetine 20 MG capsule TAKE 1 CAPSULE BY MOUTH EVERY DAY    glipiZIDE (GLUCOTROL) 5 MG tablet Take 2  "tablets (10 mg total) by mouth 2 (two) times daily with meals.    hydroCHLOROthiazide (HYDRODIURIL) 12.5 MG Tab Take 1 tablet (12.5 mg total) by mouth once daily.    hydrOXYzine pamoate (VISTARIL) 25 MG Cap TAKE 1 CAPSULE BY MOUTH EVERY EVENING FOR ALLERGIES    lancets Misc To check BG 1 times daily, to use with insurance preferred meter    losartan (COZAAR) 100 MG tablet TAKE 1 TABLET BY MOUTH EVERY DAY    metFORMIN (GLUCOPHAGE) 1000 MG tablet TAKE 1 TABLET BY MOUTH TWICE A DAY    metoprolol tartrate (LOPRESSOR) 100 MG tablet Take 0.5 tablets (50 mg total) by mouth 2 (two) times daily.    MULTIVIT-IRON-MIN-FOLIC ACID 3,500-18-0.4 UNIT-MG-MG ORAL CHEW Take 1 tablet by mouth once daily.    NIFEdipine (PROCARDIA-XL) 60 MG (OSM) 24 hr tablet TAKE 1 TABLET BY MOUTH EVERY DAY    rosuvastatin (CRESTOR) 20 MG tablet Take 1 tablet (20 mg total) by mouth every evening.    SAW PALMETTO ORAL Take 1 capsule by mouth 2 (two) times daily.    temazepam (RESTORIL) 30 mg capsule Take 1 capsule (30 mg total) by mouth nightly as needed for Insomnia.    TRUE METRIX GLUCOSE TEST STRIP Strp USE 1 STRIP TO CHECK GLUCOSE TWO TIMES DAILY     No current facility-administered medications for this visit.       Social history:  Cecil Pringle  reports that he has quit smoking. His smoking use included cigarettes. He has a 75.00 pack-year smoking history. He has quit using smokeless tobacco. He reports that he does not drink alcohol and does not use drugs.      Objective:       Physical Exam  Constitutional:       Appearance: He is well-developed.      Comments: BP (!) 150/70   Pulse 68   Ht 6' 3" (1.905 m)   Wt 101.1 kg (222 lb 15.9 oz)   BMI 27.87 kg/m²      HENT:      Head: Normocephalic and atraumatic.   Neck:      Vascular: Carotid bruit (left side) present. No JVD.   Cardiovascular:      Rate and Rhythm: Normal rate and regular rhythm.      Pulses:           Radial pulses are 2+ on the right side and 2+ on the left side.        " Posterior tibial pulses are 0 on the right side and 0 on the left side.      Heart sounds: S1 normal and S2 normal. Murmur heard.   Harsh midsystolic murmur is present with a grade of 3/6 at the upper right sternal border.     No gallop.   Pulmonary:      Effort: Pulmonary effort is normal.      Breath sounds: No wheezing or rales.   Chest:      Chest wall: There is no dullness to percussion.   Abdominal:      Palpations: Abdomen is soft. There is no hepatomegaly or splenomegaly.      Tenderness: There is no abdominal tenderness.   Musculoskeletal:      Right lower leg: No edema.      Left lower leg: No edema.   Skin:     General: Skin is warm and dry.      Findings: No bruising.      Nails: There is no clubbing.   Neurological:      Mental Status: He is alert and oriented to person, place, and time.      Gait: Gait normal.   Psychiatric:         Speech: Speech normal.         Behavior: Behavior normal.         Thought Content: Thought content normal.         Judgment: Judgment normal.           Lab Results   Component Value Date    CHOL 127 06/22/2022    HDL 34 (L) 06/22/2022    LDLCALC 23.6 (L) 06/22/2022    TRIG 347 (H) 06/22/2022    CHOLHDL 26.8 06/22/2022     Lab Results   Component Value Date     (H) 06/22/2022    CREATININE 1.3 06/22/2022    BUN 19 06/22/2022     06/22/2022    K 4.9 06/22/2022     06/22/2022    CO2 23 06/22/2022     Lab Results   Component Value Date    ALT 20 06/22/2022    AST 14 06/22/2022    ALKPHOS 49 (L) 06/22/2022    BILITOT 0.4 06/22/2022         Reviewed ECG performed today.        Assessment and Plan:       ICD-10-CM ICD-9-CM   1. Coronary artery disease involving native coronary artery of native heart without angina pectoris  I25.10 414.01   2. S/P CABG x 5  Z95.1 V45.81   3. Mixed hyperlipidemia  E78.2 272.2   4. Nonrheumatic aortic valve stenosis  I35.0 424.1   5. Left carotid bruit  R09.89 785.9   6. Cerebral atherosclerosis  I67.2 437.0   7. Hypertension  associated with diabetes  E11.59 250.80    I15.2 401.9   8. Type 2 diabetes mellitus with stage 3a chronic kidney disease, without long-term current use of insulin  E11.22 250.40    N18.31 585.3           CAD is stable.  Continue same meds including anti-platelet therapy.  Hold clopidogrel for 5 days prior to colonoscopy.  Mixed hyperlipidemia treated with the rosuvastatin, fenofibrate and over-the-counter fish oil.  Triglycerides were as high as 700 last year, currently ~ 350 mg/dl. HB A1c 7.1%.  Nutritional counseling.  Switch fish oil to prescription Lovaza 2-g bid or tid.  Needs a repeat echocardiogram for aortic stenosis.  Needs a carotid ultrasound for left-sided bruit.  No symptoms of TIA or stroke.  Blood pressure is elevated in clinic today.  Continue same meds for now.  Check blood pressure at home and increase nifedipine to 90 mg if blood pressures remain elevated.  He has bilateral leg pain, worse on the left side but this does not appear to be claudication.    Orders placed during this encounter:     Coronary artery disease involving native coronary artery of native heart without angina pectoris  -     IN OFFICE EKG 12-LEAD (to Muse)  -     EKG 12-lead; Future; Expected date: 09/02/2023    S/P CABG x 5    Mixed hyperlipidemia  -     omega-3 acid ethyl esters (LOVAZA) 1 gram capsule; Take 2 capsules (2 g total) by mouth 3 (three) times daily.  Dispense: 180 capsule; Refill: 6  -     Lipid Panel; Future; Expected date: 09/03/2023    Nonrheumatic aortic valve stenosis  -     Echo; Future; Expected date: 09/02/2022  -     Echo; Future; Expected date: 09/02/2023    Left carotid bruit  -     CV Ultrasound Bilateral Doppler Carotid; Future    Cerebral atherosclerosis  -     CV Ultrasound Bilateral Doppler Carotid; Future    Hypertension associated with diabetes  -     Comprehensive Metabolic Panel; Future; Expected date: 09/03/2023    Type 2 diabetes mellitus with stage 3a chronic kidney disease, without  long-term current use of insulin       Cleared for colonoscopy.  Hold clopidogrel for 5 days prior to colonoscopy.  Do not discontinue aspirin.  Resume clopidogrel soon as possible after colonoscopy.    Follow up in about 1 year (around 9/2/2023).

## 2022-09-19 ENCOUNTER — HOSPITAL ENCOUNTER (OUTPATIENT)
Dept: CARDIOLOGY | Facility: HOSPITAL | Age: 77
Discharge: HOME | End: 2022-09-19
Attending: INTERNAL MEDICINE
Payer: MEDICARE

## 2022-09-19 VITALS
BODY MASS INDEX: 27.6 KG/M2 | WEIGHT: 222 LBS | DIASTOLIC BLOOD PRESSURE: 70 MMHG | SYSTOLIC BLOOD PRESSURE: 140 MMHG | HEART RATE: 57 BPM | HEIGHT: 75 IN

## 2022-09-19 DIAGNOSIS — R09.89 LEFT CAROTID BRUIT: ICD-10-CM

## 2022-09-19 DIAGNOSIS — I67.2 CEREBRAL ATHEROSCLEROSIS: ICD-10-CM

## 2022-09-19 DIAGNOSIS — I35.0 NONRHEUMATIC AORTIC VALVE STENOSIS: ICD-10-CM

## 2022-09-19 LAB
ASCENDING AORTA: 3.94 CM
AV INDEX (PROSTH): 0.43
AV MEAN GRADIENT: 12 MMHG
AV PEAK GRADIENT: 19 MMHG
AV VALVE AREA: 1.62 CM2
AV VELOCITY RATIO: 0.41
BSA FOR ECHO PROCEDURE: 2.31 M2
CV ECHO LV RWT: 0.32 CM
DOP CALC AO PEAK VEL: 2.16 M/S
DOP CALC AO VTI: 50.86 CM
DOP CALC LVOT AREA: 3.8 CM2
DOP CALC LVOT DIAMETER: 2.2 CM
DOP CALC LVOT PEAK VEL: 0.89 M/S
DOP CALC LVOT STROKE VOLUME: 82.18 CM3
DOP CALCLVOT PEAK VEL VTI: 21.63 CM
E WAVE DECELERATION TIME: 237.57 MSEC
E/A RATIO: 0.9
E/E' RATIO: 14.4 M/S
ECHO LV POSTERIOR WALL: 0.84 CM (ref 0.6–1.1)
EJECTION FRACTION: 65 %
FRACTIONAL SHORTENING: 35 % (ref 28–44)
INTERVENTRICULAR SEPTUM: 0.77 CM (ref 0.6–1.1)
IVRT: 131.3 MSEC
LA MAJOR: 5.23 CM
LA MINOR: 5.2 CM
LA WIDTH: 4.1 CM
LEFT ARM DIASTOLIC BLOOD PRESSURE: 60 MMHG
LEFT ARM SYSTOLIC BLOOD PRESSURE: 140 MMHG
LEFT ATRIUM SIZE: 3.97 CM
LEFT ATRIUM VOLUME INDEX MOD: 24.8 ML/M2
LEFT ATRIUM VOLUME INDEX: 31.5 ML/M2
LEFT ATRIUM VOLUME MOD: 56.74 CM3
LEFT ATRIUM VOLUME: 72.15 CM3
LEFT CBA DIAS: 9 CM/S
LEFT CBA SYS: 38 CM/S
LEFT CCA DIST DIAS: 8 CM/S
LEFT CCA DIST SYS: 49 CM/S
LEFT CCA MID DIAS: 8 CM/S
LEFT CCA MID SYS: 58 CM/S
LEFT CCA PROX DIAS: 8 CM/S
LEFT CCA PROX SYS: 59 CM/S
LEFT ECA DIAS: 7 CM/S
LEFT ECA SYS: 88 CM/S
LEFT ICA DIST DIAS: 18 CM/S
LEFT ICA DIST SYS: 93 CM/S
LEFT ICA MID DIAS: 20 CM/S
LEFT ICA MID SYS: 107 CM/S
LEFT ICA PROX DIAS: 9 CM/S
LEFT ICA PROX SYS: 41 CM/S
LEFT INTERNAL DIMENSION IN SYSTOLE: 3.43 CM (ref 2.1–4)
LEFT VENTRICLE DIASTOLIC VOLUME INDEX: 59.29 ML/M2
LEFT VENTRICLE DIASTOLIC VOLUME: 135.78 ML
LEFT VENTRICLE MASS INDEX: 66 G/M2
LEFT VENTRICLE SYSTOLIC VOLUME INDEX: 21.2 ML/M2
LEFT VENTRICLE SYSTOLIC VOLUME: 48.62 ML
LEFT VENTRICULAR INTERNAL DIMENSION IN DIASTOLE: 5.31 CM (ref 3.5–6)
LEFT VENTRICULAR MASS: 151.73 G
LEFT VERTEBRAL DIAS: 8 CM/S
LEFT VERTEBRAL SYS: 36 CM/S
LV LATERAL E/E' RATIO: 14.4 M/S
LV SEPTAL E/E' RATIO: 14.4 M/S
MV PEAK A VEL: 0.8 M/S
MV PEAK E VEL: 0.72 M/S
MV STENOSIS PRESSURE HALF TIME: 68.89 MS
MV VALVE AREA P 1/2 METHOD: 3.19 CM2
OHS CV CAROTID RIGHT ICA EDV HIGHEST: 25
OHS CV CAROTID ULTRASOUND LEFT ICA/CCA RATIO: 2.18
OHS CV CAROTID ULTRASOUND RIGHT ICA/CCA RATIO: 4.07
OHS CV PV CAROTID LEFT HIGHEST CCA: 59
OHS CV PV CAROTID LEFT HIGHEST ICA: 107
OHS CV PV CAROTID RIGHT HIGHEST CCA: 68
OHS CV PV CAROTID RIGHT HIGHEST ICA: 179
OHS CV US CAROTID LEFT HIGHEST EDV: 20
PISA TR MAX VEL: 2.45 M/S
PULM VEIN S/D RATIO: 1.11
PV PEAK D VEL: 0.36 M/S
PV PEAK S VEL: 0.4 M/S
RA MAJOR: 4.73 CM
RA PRESSURE: 8 MMHG
RA WIDTH: 3.42 CM
RIGHT ARM DIASTOLIC BLOOD PRESSURE: 60 MMHG
RIGHT ARM SYSTOLIC BLOOD PRESSURE: 140 MMHG
RIGHT CBA DIAS: 5 CM/S
RIGHT CBA SYS: 26 CM/S
RIGHT CCA DIST DIAS: 5 CM/S
RIGHT CCA DIST SYS: 44 CM/S
RIGHT CCA MID DIAS: 7 CM/S
RIGHT CCA MID SYS: 57 CM/S
RIGHT CCA PROX DIAS: 9 CM/S
RIGHT CCA PROX SYS: 68 CM/S
RIGHT ECA DIAS: 10 CM/S
RIGHT ECA SYS: 73 CM/S
RIGHT ICA DIST DIAS: 14 CM/S
RIGHT ICA DIST SYS: 70 CM/S
RIGHT ICA MID DIAS: 21 CM/S
RIGHT ICA MID SYS: 137 CM/S
RIGHT ICA PROX DIAS: 25 CM/S
RIGHT ICA PROX SYS: 179 CM/S
RIGHT VENTRICULAR END-DIASTOLIC DIMENSION: 3.03 CM
RIGHT VERTEBRAL DIAS: 13 CM/S
RIGHT VERTEBRAL SYS: 86 CM/S
SINUS: 3.61 CM
STJ: 3.18 CM
TDI LATERAL: 0.05 M/S
TDI SEPTAL: 0.05 M/S
TDI: 0.05 M/S
TR MAX PG: 24 MMHG
TRICUSPID ANNULAR PLANE SYSTOLIC EXCURSION: 1.45 CM
TV REST PULMONARY ARTERY PRESSURE: 32 MMHG

## 2022-09-19 PROCEDURE — 93880 EXTRACRANIAL BILAT STUDY: CPT | Mod: 26,,, | Performed by: INTERNAL MEDICINE

## 2022-09-19 PROCEDURE — 93306 TTE W/DOPPLER COMPLETE: CPT | Mod: 26,,, | Performed by: INTERNAL MEDICINE

## 2022-09-19 PROCEDURE — 93306 TTE W/DOPPLER COMPLETE: CPT

## 2022-09-19 PROCEDURE — 93880 EXTRACRANIAL BILAT STUDY: CPT

## 2022-09-19 PROCEDURE — 93880 CV US DOPPLER CAROTID (CUPID ONLY): ICD-10-PCS | Mod: 26,,, | Performed by: INTERNAL MEDICINE

## 2022-09-19 PROCEDURE — 93306 ECHO (CUPID ONLY): ICD-10-PCS | Mod: 26,,, | Performed by: INTERNAL MEDICINE

## 2022-09-21 ENCOUNTER — PATIENT MESSAGE (OUTPATIENT)
Dept: CARDIOLOGY | Facility: CLINIC | Age: 77
End: 2022-09-21
Payer: MEDICARE

## 2022-09-21 ENCOUNTER — TELEPHONE (OUTPATIENT)
Dept: CARDIOLOGY | Facility: CLINIC | Age: 77
End: 2022-09-21
Payer: MEDICARE

## 2022-09-21 NOTE — TELEPHONE ENCOUNTER
----- Message from Nory May MD sent at 9/21/2022 12:10 PM CDT -----  Please contact the patient and let them know that the echocardiogram showed normal heart function and mild stenosis of the aortic valve and do not require any change in treatment. The carotid u/s showed some plaque without significant narrowing. He can proceed w the colonoscopy.

## 2022-10-16 DIAGNOSIS — K63.5 POLYP OF COLON, UNSPECIFIED PART OF COLON, UNSPECIFIED TYPE: Primary | ICD-10-CM

## 2022-10-29 DIAGNOSIS — E78.2 MIXED HYPERLIPIDEMIA: ICD-10-CM

## 2022-10-29 RX ORDER — ROSUVASTATIN CALCIUM 20 MG/1
TABLET, COATED ORAL
Qty: 90 TABLET | Refills: 2 | Status: SHIPPED | OUTPATIENT
Start: 2022-10-29 | End: 2023-07-09

## 2022-10-29 NOTE — TELEPHONE ENCOUNTER
No new care gaps identified.  [facility] Embedded Care Gaps. Reference number: [id number]. 10/29/2022   12:14:06 AM LUIS MIGUELT

## 2022-10-30 NOTE — TELEPHONE ENCOUNTER
Refill Decision Note   Cecil Pringle  is requesting a refill authorization.  Brief Assessment and Rationale for Refill:  Approve     Medication Therapy Plan:       Medication Reconciliation Completed: No   Comments:     No Care Gaps recommended.     Note composed:7:34 PM 10/29/2022

## 2022-12-09 DIAGNOSIS — E11.69 ONYCHOMYCOSIS OF MULTIPLE TOENAILS WITH TYPE 2 DIABETES MELLITUS: ICD-10-CM

## 2022-12-09 DIAGNOSIS — B35.1 ONYCHOMYCOSIS OF MULTIPLE TOENAILS WITH TYPE 2 DIABETES MELLITUS: ICD-10-CM

## 2022-12-09 NOTE — TELEPHONE ENCOUNTER
No new care gaps identified.  [facility] Embedded Care Gaps. Reference number: [id number]. 12/09/2022   10:24:34 AM CST

## 2022-12-10 RX ORDER — CALCIUM CITRATE/VITAMIN D3 200MG-6.25
TABLET ORAL
Qty: 200 STRIP | Refills: 3 | Status: SHIPPED | OUTPATIENT
Start: 2022-12-10 | End: 2023-12-27

## 2022-12-10 NOTE — TELEPHONE ENCOUNTER
Refill Decision Note   Cecil Pringle  is requesting a refill authorization.  Brief Assessment and Rationale for Refill:  Approve     Medication Therapy Plan:       Medication Reconciliation Completed: No   Comments:     No Care Gaps recommended.     Note composed:9:23 AM 12/10/2022

## 2022-12-13 ENCOUNTER — OFFICE VISIT (OUTPATIENT)
Dept: PODIATRY | Facility: CLINIC | Age: 77
End: 2022-12-13
Payer: MEDICARE

## 2022-12-13 VITALS
BODY MASS INDEX: 27.75 KG/M2 | HEART RATE: 55 BPM | HEIGHT: 75 IN | SYSTOLIC BLOOD PRESSURE: 149 MMHG | DIASTOLIC BLOOD PRESSURE: 74 MMHG

## 2022-12-13 DIAGNOSIS — E08.42 DIABETIC POLYNEUROPATHY ASSOCIATED WITH DIABETES MELLITUS DUE TO UNDERLYING CONDITION: ICD-10-CM

## 2022-12-13 DIAGNOSIS — I73.9 PAD (PERIPHERAL ARTERY DISEASE): ICD-10-CM

## 2022-12-13 DIAGNOSIS — B35.1 ONYCHOMYCOSIS OF MULTIPLE TOENAILS WITH TYPE 2 DIABETES MELLITUS AND PERIPHERAL ANGIOPATHY: Primary | ICD-10-CM

## 2022-12-13 DIAGNOSIS — E11.51 ONYCHOMYCOSIS OF MULTIPLE TOENAILS WITH TYPE 2 DIABETES MELLITUS AND PERIPHERAL ANGIOPATHY: Primary | ICD-10-CM

## 2022-12-13 DIAGNOSIS — E11.69 ONYCHOMYCOSIS OF MULTIPLE TOENAILS WITH TYPE 2 DIABETES MELLITUS AND PERIPHERAL ANGIOPATHY: Primary | ICD-10-CM

## 2022-12-13 DIAGNOSIS — L84 CORN OR CALLUS: ICD-10-CM

## 2022-12-13 DIAGNOSIS — E11.9 ENCOUNTER FOR DIABETIC FOOT EXAM: ICD-10-CM

## 2022-12-13 PROCEDURE — 1159F MED LIST DOCD IN RCRD: CPT | Mod: CPTII,S$GLB,, | Performed by: STUDENT IN AN ORGANIZED HEALTH CARE EDUCATION/TRAINING PROGRAM

## 2022-12-13 PROCEDURE — 99213 PR OFFICE/OUTPT VISIT, EST, LEVL III, 20-29 MIN: ICD-10-PCS | Mod: 25,S$GLB,, | Performed by: STUDENT IN AN ORGANIZED HEALTH CARE EDUCATION/TRAINING PROGRAM

## 2022-12-13 PROCEDURE — 3066F NEPHROPATHY DOC TX: CPT | Mod: CPTII,S$GLB,, | Performed by: STUDENT IN AN ORGANIZED HEALTH CARE EDUCATION/TRAINING PROGRAM

## 2022-12-13 PROCEDURE — 3077F PR MOST RECENT SYSTOLIC BLOOD PRESSURE >= 140 MM HG: ICD-10-PCS | Mod: CPTII,S$GLB,, | Performed by: STUDENT IN AN ORGANIZED HEALTH CARE EDUCATION/TRAINING PROGRAM

## 2022-12-13 PROCEDURE — 11056 ROUTINE FOOT CARE: ICD-10-PCS | Mod: Q9,S$GLB,, | Performed by: STUDENT IN AN ORGANIZED HEALTH CARE EDUCATION/TRAINING PROGRAM

## 2022-12-13 PROCEDURE — 1126F PR PAIN SEVERITY QUANTIFIED, NO PAIN PRESENT: ICD-10-PCS | Mod: CPTII,S$GLB,, | Performed by: STUDENT IN AN ORGANIZED HEALTH CARE EDUCATION/TRAINING PROGRAM

## 2022-12-13 PROCEDURE — 3060F PR POS MICROALBUMINURIA RESULT DOCUMENTED/REVIEW: ICD-10-PCS | Mod: CPTII,S$GLB,, | Performed by: STUDENT IN AN ORGANIZED HEALTH CARE EDUCATION/TRAINING PROGRAM

## 2022-12-13 PROCEDURE — 3077F SYST BP >= 140 MM HG: CPT | Mod: CPTII,S$GLB,, | Performed by: STUDENT IN AN ORGANIZED HEALTH CARE EDUCATION/TRAINING PROGRAM

## 2022-12-13 PROCEDURE — 3078F PR MOST RECENT DIASTOLIC BLOOD PRESSURE < 80 MM HG: ICD-10-PCS | Mod: CPTII,S$GLB,, | Performed by: STUDENT IN AN ORGANIZED HEALTH CARE EDUCATION/TRAINING PROGRAM

## 2022-12-13 PROCEDURE — 1159F PR MEDICATION LIST DOCUMENTED IN MEDICAL RECORD: ICD-10-PCS | Mod: CPTII,S$GLB,, | Performed by: STUDENT IN AN ORGANIZED HEALTH CARE EDUCATION/TRAINING PROGRAM

## 2022-12-13 PROCEDURE — 4010F PR ACE/ARB THEARPY RXD/TAKEN: ICD-10-PCS | Mod: CPTII,S$GLB,, | Performed by: STUDENT IN AN ORGANIZED HEALTH CARE EDUCATION/TRAINING PROGRAM

## 2022-12-13 PROCEDURE — 11056 PARNG/CUTG B9 HYPRKR LES 2-4: CPT | Mod: Q9,S$GLB,, | Performed by: STUDENT IN AN ORGANIZED HEALTH CARE EDUCATION/TRAINING PROGRAM

## 2022-12-13 PROCEDURE — 11721 ROUTINE FOOT CARE: ICD-10-PCS | Mod: 59,Q9,S$GLB, | Performed by: STUDENT IN AN ORGANIZED HEALTH CARE EDUCATION/TRAINING PROGRAM

## 2022-12-13 PROCEDURE — 3051F HG A1C>EQUAL 7.0%<8.0%: CPT | Mod: CPTII,S$GLB,, | Performed by: STUDENT IN AN ORGANIZED HEALTH CARE EDUCATION/TRAINING PROGRAM

## 2022-12-13 PROCEDURE — 3051F PR MOST RECENT HEMOGLOBIN A1C LEVEL 7.0 - < 8.0%: ICD-10-PCS | Mod: CPTII,S$GLB,, | Performed by: STUDENT IN AN ORGANIZED HEALTH CARE EDUCATION/TRAINING PROGRAM

## 2022-12-13 PROCEDURE — 4010F ACE/ARB THERAPY RXD/TAKEN: CPT | Mod: CPTII,S$GLB,, | Performed by: STUDENT IN AN ORGANIZED HEALTH CARE EDUCATION/TRAINING PROGRAM

## 2022-12-13 PROCEDURE — 3066F PR DOCUMENTATION OF TREATMENT FOR NEPHROPATHY: ICD-10-PCS | Mod: CPTII,S$GLB,, | Performed by: STUDENT IN AN ORGANIZED HEALTH CARE EDUCATION/TRAINING PROGRAM

## 2022-12-13 PROCEDURE — 99213 OFFICE O/P EST LOW 20 MIN: CPT | Mod: 25,S$GLB,, | Performed by: STUDENT IN AN ORGANIZED HEALTH CARE EDUCATION/TRAINING PROGRAM

## 2022-12-13 PROCEDURE — 3078F DIAST BP <80 MM HG: CPT | Mod: CPTII,S$GLB,, | Performed by: STUDENT IN AN ORGANIZED HEALTH CARE EDUCATION/TRAINING PROGRAM

## 2022-12-13 PROCEDURE — 11721 DEBRIDE NAIL 6 OR MORE: CPT | Mod: 59,Q9,S$GLB, | Performed by: STUDENT IN AN ORGANIZED HEALTH CARE EDUCATION/TRAINING PROGRAM

## 2022-12-13 PROCEDURE — 1126F AMNT PAIN NOTED NONE PRSNT: CPT | Mod: CPTII,S$GLB,, | Performed by: STUDENT IN AN ORGANIZED HEALTH CARE EDUCATION/TRAINING PROGRAM

## 2022-12-13 PROCEDURE — 3060F POS MICROALBUMINURIA REV: CPT | Mod: CPTII,S$GLB,, | Performed by: STUDENT IN AN ORGANIZED HEALTH CARE EDUCATION/TRAINING PROGRAM

## 2022-12-13 PROCEDURE — 99999 PR PBB SHADOW E&M-EST. PATIENT-LVL IV: ICD-10-PCS | Mod: PBBFAC,,, | Performed by: STUDENT IN AN ORGANIZED HEALTH CARE EDUCATION/TRAINING PROGRAM

## 2022-12-13 PROCEDURE — 99999 PR PBB SHADOW E&M-EST. PATIENT-LVL IV: CPT | Mod: PBBFAC,,, | Performed by: STUDENT IN AN ORGANIZED HEALTH CARE EDUCATION/TRAINING PROGRAM

## 2022-12-13 NOTE — PROGRESS NOTES
Subjective:      Patient ID: Cecil Pringle is a 77 y.o. male.    Chief Complaint: Diabetes Mellitus (PCP Dr. Jett, 6/22/22), Diabetic Foot Exam, and Routine Foot Care    Cecil is a 77 y.o. male who presents to the clinic upon referral from Dr. Hannah rock. provider found  for evaluation and treatment of diabetic feet. Cecil has a past medical history of Anxiety, Arthritis, Coronary artery disease, Depression, Diabetes mellitus, type 2, Diabetic neuropathy, Hyperlipidemia, Hypertension, Insomnia, and PAD (peripheral artery disease). Patient relates no major problem with feet,  has occasional burning/tinlging sensations to toes. Only complaints today consist of callus and elongated toe nails to rakel feet.  has not had new diabetic shoes in 7 years.  8/17/22: Seen today, relates to worsening neuropathy to his feet, here for RFC. Plan for diabetic foot exam during upcoming visit.      12/13/22: Pt seen today for routine foot care and annual diabetic foot exam. No new pedal complaints.  has been using Penlac.     PCP: Layo Jett MD    Date Last Seen by PCP: 12/16/21    Current shoe gear: Rx diabetic extra depth shoes and custom accommodative insoles    Hemoglobin A1C   Date Value Ref Range Status   06/22/2022 7.1 (H) 4.0 - 5.6 % Final     Comment:     ADA Screening Guidelines:  5.7-6.4%  Consistent with prediabetes  >or=6.5%  Consistent with diabetes    High levels of fetal hemoglobin interfere with the HbA1C  assay. Heterozygous hemoglobin variants (HbS, HgC, etc)do  not significantly interfere with this assay.   However, presence of multiple variants may affect accuracy.     04/11/2022 7.5 (H) 4.0 - 5.6 % Final     Comment:     ADA Screening Guidelines:  5.7-6.4%  Consistent with prediabetes  >or=6.5%  Consistent with diabetes    High levels of fetal hemoglobin interfere with the HbA1C  assay. Heterozygous hemoglobin variants (HbS, HgC, etc)do  not significantly interfere with this assay.    However, presence of multiple variants may affect accuracy.     12/09/2021 7.9 (H) 4.0 - 5.6 % Final     Comment:     ADA Screening Guidelines:  5.7-6.4%  Consistent with prediabetes  >or=6.5%  Consistent with diabetes    High levels of fetal hemoglobin interfere with the HbA1C  assay. Heterozygous hemoglobin variants (HbS, HgC, etc)do  not significantly interfere with this assay.   However, presence of multiple variants may affect accuracy.             Review of Systems   Constitutional: Negative for chills, decreased appetite, diaphoresis and fever.   Cardiovascular:  Negative for claudication and leg swelling.   Respiratory:  Negative for shortness of breath.    Skin:  Positive for dry skin and nail changes. Negative for color change, flushing, itching, poor wound healing, rash and suspicious lesions.   Musculoskeletal:  Negative for arthritis, back pain, falls, joint pain, joint swelling and myalgias.   Gastrointestinal:  Negative for nausea and vomiting.   Neurological:  Positive for numbness and paresthesias. Negative for loss of balance.         Objective:      Physical Exam  Vitals and nursing note reviewed.   Constitutional:       General: He is not in acute distress.     Appearance: He is well-developed. He is not diaphoretic.   Cardiovascular:      Pulses:           Dorsalis pedis pulses are 1+ on the right side and 1+ on the left side.        Posterior tibial pulses are 1+ on the right side and 1+ on the left side.      Comments: Skin temperature is within normal limits. Toes are cool to touch and feet are warm proximally. Hair growth is mildly diminished. Skin is mildly atrophic and with mild hyperpigmentation. Mild edema noted, varicosities noted rakel      Musculoskeletal:         General: Deformity present. No tenderness.      Right foot: Decreased range of motion. Deformity present.      Left foot: Decreased range of motion. Deformity present.      Comments: Strength is 5/5 to lower extremity muscle  groups, rakel    Pes cavus foot type, bilaterally    Feet:      Right foot:      Skin integrity: Dry skin present. No ulcer, blister, skin breakdown, erythema, warmth or callus.      Left foot:      Skin integrity: Dry skin present. No ulcer, blister, skin breakdown, erythema, warmth or callus.   Lymphadenopathy:      Comments: Negative lymphadenopathy bilateral popliteal fossa and tarsal tunnel.    Skin:     General: Skin is warm and dry.      Capillary Refill: Capillary refill takes less than 2 seconds.      Coloration: Skin is not pale.      Findings: No bruising, ecchymosis, erythema, laceration, lesion, petechiae or rash.      Comments: Skin is warm and dry bilaterally, no acute SOI noted, bilaterally, appears stable.     Nails 1-5 are thickened, discolored, dystrophic and with subungual debris, rakel    HPK noted medial 1st MTP and sub 5th MTP bilaterally    Neurological:      Mental Status: He is alert and oriented to person, place, and time.      Sensory: Sensory deficit present.      Comments: Vibratory sensation diminished, rakel             Assessment:       Encounter Diagnoses   Name Primary?    Onychomycosis of multiple toenails with type 2 diabetes mellitus and peripheral angiopathy Yes    Corn or callus     PAD (peripheral artery disease)     Diabetic polyneuropathy associated with diabetes mellitus due to underlying condition     Encounter for diabetic foot exam          Plan:       Cecil was seen today for diabetes mellitus, diabetic foot exam and routine foot care.    Diagnoses and all orders for this visit:    Onychomycosis of multiple toenails with type 2 diabetes mellitus and peripheral angiopathy  -     Routine Foot Care    [place] or callus  -     Routine Foot Care    PAD (peripheral artery disease)  -     Routine Foot Care    Diabetic polyneuropathy associated with diabetes mellitus due to underlying condition  -     Routine Foot Care    Encounter for diabetic foot exam      I counseled the patient  on his conditions, their implications and medical management.    - HPK and nails debrided, see procedure note    -Recommend follow up with PCP for diabetic neuropathy for consideration of Gabapentin for symptoms. Consider Neurology referral if needed in the future    - Shoe inspection. Diabetic Foot Education. Patient reminded of the importance of good nutrition and blood sugar control to help prevent podiatric complications of diabetes. Patient instructed on proper foot hygeine. We discussed wearing proper shoe gear, daily foot inspections, never walking without protective shoe gear, never putting sharp instruments to feet, routine podiatric nail visits every 2-3 months.      -Continue penlac for onychomycosis    -RTC in 2-3 months for routine diabetic foot care

## 2022-12-13 NOTE — PROCEDURES
"Routine Foot Care    Date/Time: 12/13/2022 8:15 AM  Performed by: Terri Starkey DPM  Authorized by: Terri Starkey DPM     Time out: Immediately prior to procedure a "time out" was called to verify the correct patient, procedure, equipment, support staff and site/side marked as required.    Consent Done?:  Yes (Verbal)  Hyperkeratotic Skin Lesions?: Yes    Number of trimmed lesions:  4  Location(s):  Left 1st Toe, Right 1st Toe, Left 1st Metatarsal Head and Right 1st Metatarsal Head    Nail Care Type:  Debride  Location(s): All  (Left 1st Toe, Left 3rd Toe, Left 2nd Toe, Left 4th Toe, Left 5th Toe, Right 1st Toe, Right 2nd Toe, Right 3rd Toe, Right 4th Toe and Right 5th Toe)  Patient tolerance:  Patient tolerated the procedure well with no immediate complications  "

## 2022-12-16 ENCOUNTER — PATIENT MESSAGE (OUTPATIENT)
Dept: ENDOCRINOLOGY | Facility: CLINIC | Age: 77
End: 2022-12-16
Payer: MEDICARE

## 2022-12-22 ENCOUNTER — LAB VISIT (OUTPATIENT)
Dept: LAB | Facility: HOSPITAL | Age: 77
End: 2022-12-22
Attending: INTERNAL MEDICINE
Payer: MEDICARE

## 2022-12-22 DIAGNOSIS — E11.22 TYPE 2 DIABETES MELLITUS WITH STAGE 3A CHRONIC KIDNEY DISEASE, WITHOUT LONG-TERM CURRENT USE OF INSULIN: ICD-10-CM

## 2022-12-22 DIAGNOSIS — N18.31 TYPE 2 DIABETES MELLITUS WITH STAGE 3A CHRONIC KIDNEY DISEASE, WITHOUT LONG-TERM CURRENT USE OF INSULIN: ICD-10-CM

## 2022-12-22 LAB
ALBUMIN SERPL BCP-MCNC: 4 G/DL (ref 3.5–5.2)
ALP SERPL-CCNC: 42 U/L (ref 55–135)
ALT SERPL W/O P-5'-P-CCNC: 16 U/L (ref 10–44)
ANION GAP SERPL CALC-SCNC: 8 MMOL/L (ref 8–16)
AST SERPL-CCNC: 14 U/L (ref 10–40)
BASOPHILS # BLD AUTO: 0.06 K/UL (ref 0–0.2)
BASOPHILS NFR BLD: 0.8 % (ref 0–1.9)
BILIRUB SERPL-MCNC: 0.4 MG/DL (ref 0.1–1)
BUN SERPL-MCNC: 22 MG/DL (ref 8–23)
CALCIUM SERPL-MCNC: 9.4 MG/DL (ref 8.7–10.5)
CHLORIDE SERPL-SCNC: 106 MMOL/L (ref 95–110)
CO2 SERPL-SCNC: 24 MMOL/L (ref 23–29)
CREAT SERPL-MCNC: 1.2 MG/DL (ref 0.5–1.4)
DIFFERENTIAL METHOD: ABNORMAL
EOSINOPHIL # BLD AUTO: 0.3 K/UL (ref 0–0.5)
EOSINOPHIL NFR BLD: 3.4 % (ref 0–8)
ERYTHROCYTE [DISTWIDTH] IN BLOOD BY AUTOMATED COUNT: 14.8 % (ref 11.5–14.5)
EST. GFR  (NO RACE VARIABLE): >60 ML/MIN/1.73 M^2
ESTIMATED AVG GLUCOSE: 154 MG/DL (ref 68–131)
GLUCOSE SERPL-MCNC: 92 MG/DL (ref 70–110)
HBA1C MFR BLD: 7 % (ref 4–5.6)
HCT VFR BLD AUTO: 41.7 % (ref 40–54)
HGB BLD-MCNC: 13.5 G/DL (ref 14–18)
IMM GRANULOCYTES # BLD AUTO: 0.05 K/UL (ref 0–0.04)
IMM GRANULOCYTES NFR BLD AUTO: 0.7 % (ref 0–0.5)
LYMPHOCYTES # BLD AUTO: 2.2 K/UL (ref 1–4.8)
LYMPHOCYTES NFR BLD: 29.3 % (ref 18–48)
MCH RBC QN AUTO: 28 PG (ref 27–31)
MCHC RBC AUTO-ENTMCNC: 32.4 G/DL (ref 32–36)
MCV RBC AUTO: 87 FL (ref 82–98)
MONOCYTES # BLD AUTO: 0.5 K/UL (ref 0.3–1)
MONOCYTES NFR BLD: 7.1 % (ref 4–15)
NEUTROPHILS # BLD AUTO: 4.5 K/UL (ref 1.8–7.7)
NEUTROPHILS NFR BLD: 58.7 % (ref 38–73)
NRBC BLD-RTO: 0 /100 WBC
PLATELET # BLD AUTO: 271 K/UL (ref 150–450)
PMV BLD AUTO: 10.9 FL (ref 9.2–12.9)
POTASSIUM SERPL-SCNC: 4.1 MMOL/L (ref 3.5–5.1)
PROT SERPL-MCNC: 7 G/DL (ref 6–8.4)
RBC # BLD AUTO: 4.82 M/UL (ref 4.6–6.2)
SODIUM SERPL-SCNC: 138 MMOL/L (ref 136–145)
WBC # BLD AUTO: 7.58 K/UL (ref 3.9–12.7)

## 2022-12-22 PROCEDURE — 85025 COMPLETE CBC W/AUTO DIFF WBC: CPT | Performed by: INTERNAL MEDICINE

## 2022-12-22 PROCEDURE — 80053 COMPREHEN METABOLIC PANEL: CPT | Performed by: INTERNAL MEDICINE

## 2022-12-22 PROCEDURE — 36415 COLL VENOUS BLD VENIPUNCTURE: CPT | Mod: PO | Performed by: INTERNAL MEDICINE

## 2022-12-22 PROCEDURE — 83036 HEMOGLOBIN GLYCOSYLATED A1C: CPT | Performed by: INTERNAL MEDICINE

## 2022-12-27 ENCOUNTER — PATIENT MESSAGE (OUTPATIENT)
Dept: ENDOCRINOLOGY | Facility: CLINIC | Age: 77
End: 2022-12-27
Payer: MEDICARE

## 2023-01-15 NOTE — TELEPHONE ENCOUNTER
No new care gaps identified.  [facility] Embedded Care Gaps. Reference number: [id number]. 1/14/2023   9:13:27 PM CST

## 2023-01-16 RX ORDER — METFORMIN HYDROCHLORIDE 1000 MG/1
1000 TABLET ORAL 2 TIMES DAILY
Qty: 180 TABLET | Refills: 0 | Status: SHIPPED | OUTPATIENT
Start: 2023-01-16 | End: 2023-04-14

## 2023-01-16 NOTE — TELEPHONE ENCOUNTER
Refill Routing Note   Medication(s) are not appropriate for processing by [facility] for the following reason(s):      - Drug-Disease Interaction (metFORMIN and Type 2 diabetes mellitus with stage 3a chronic kidney disease, without long-term current use of insulin)    [facility] action(s):  Defer Medication-related problems identified: Drug-disease interaction        Medication reconciliation completed: No     Appointments  past 12m or future 3m with PCP    Date Provider   Last Visit   6/22/2022 Layo Jett MD   Next Visit   Visit date not found Layo Jett MD   ED visits in past 90 days: 0        Note composed:12:08 PM 01/16/2023

## 2023-01-16 NOTE — TELEPHONE ENCOUNTER
Refill Decision Note   Cecil Pringle  is requesting a refill authorization.  Brief Assessment and Rationale for Refill:  Approve     Medication Therapy Plan:       Medication Reconciliation Completed: No   Comments:     No Care Gaps recommended.     Note composed:5:31 PM 01/16/2023

## 2023-02-07 DIAGNOSIS — Z00.00 ENCOUNTER FOR MEDICARE ANNUAL WELLNESS EXAM: ICD-10-CM

## 2023-02-09 ENCOUNTER — CLINICAL SUPPORT (OUTPATIENT)
Dept: ENDOSCOPY | Facility: HOSPITAL | Age: 78
End: 2023-02-09
Attending: INTERNAL MEDICINE
Payer: MEDICARE

## 2023-02-09 DIAGNOSIS — K63.5 POLYP OF COLON, UNSPECIFIED PART OF COLON, UNSPECIFIED TYPE: ICD-10-CM

## 2023-02-27 ENCOUNTER — PATIENT MESSAGE (OUTPATIENT)
Dept: ENDOCRINOLOGY | Facility: CLINIC | Age: 78
End: 2023-02-27
Payer: MEDICARE

## 2023-03-05 DIAGNOSIS — E78.2 MIXED HYPERLIPIDEMIA: Chronic | ICD-10-CM

## 2023-03-06 RX ORDER — OMEGA-3-ACID ETHYL ESTERS 1 G/1
CAPSULE, LIQUID FILLED ORAL
Qty: 360 CAPSULE | Refills: 3 | Status: SHIPPED | OUTPATIENT
Start: 2023-03-06 | End: 2023-09-19 | Stop reason: SDUPTHER

## 2023-03-22 ENCOUNTER — PATIENT MESSAGE (OUTPATIENT)
Dept: INTERNAL MEDICINE | Facility: CLINIC | Age: 78
End: 2023-03-22
Payer: MEDICARE

## 2023-04-06 ENCOUNTER — OFFICE VISIT (OUTPATIENT)
Dept: PODIATRY | Facility: CLINIC | Age: 78
End: 2023-04-06
Payer: MEDICARE

## 2023-04-06 VITALS
HEIGHT: 75 IN | BODY MASS INDEX: 27.75 KG/M2 | SYSTOLIC BLOOD PRESSURE: 156 MMHG | HEART RATE: 57 BPM | DIASTOLIC BLOOD PRESSURE: 61 MMHG

## 2023-04-06 DIAGNOSIS — E11.69 ONYCHOMYCOSIS OF MULTIPLE TOENAILS WITH TYPE 2 DIABETES MELLITUS AND PERIPHERAL ANGIOPATHY: ICD-10-CM

## 2023-04-06 DIAGNOSIS — E11.51 ONYCHOMYCOSIS OF MULTIPLE TOENAILS WITH TYPE 2 DIABETES MELLITUS AND PERIPHERAL ANGIOPATHY: ICD-10-CM

## 2023-04-06 DIAGNOSIS — E08.42 DIABETIC POLYNEUROPATHY ASSOCIATED WITH DIABETES MELLITUS DUE TO UNDERLYING CONDITION: ICD-10-CM

## 2023-04-06 DIAGNOSIS — L84 CORN OR CALLUS: Primary | ICD-10-CM

## 2023-04-06 DIAGNOSIS — B35.1 ONYCHOMYCOSIS OF MULTIPLE TOENAILS WITH TYPE 2 DIABETES MELLITUS AND PERIPHERAL ANGIOPATHY: ICD-10-CM

## 2023-04-06 DIAGNOSIS — I73.9 PAD (PERIPHERAL ARTERY DISEASE): ICD-10-CM

## 2023-04-06 PROCEDURE — 3078F DIAST BP <80 MM HG: CPT | Mod: CPTII,S$GLB,, | Performed by: STUDENT IN AN ORGANIZED HEALTH CARE EDUCATION/TRAINING PROGRAM

## 2023-04-06 PROCEDURE — 1159F PR MEDICATION LIST DOCUMENTED IN MEDICAL RECORD: ICD-10-PCS | Mod: CPTII,S$GLB,, | Performed by: STUDENT IN AN ORGANIZED HEALTH CARE EDUCATION/TRAINING PROGRAM

## 2023-04-06 PROCEDURE — 99999 PR PBB SHADOW E&M-EST. PATIENT-LVL IV: ICD-10-PCS | Mod: PBBFAC,,, | Performed by: STUDENT IN AN ORGANIZED HEALTH CARE EDUCATION/TRAINING PROGRAM

## 2023-04-06 PROCEDURE — 11056 PARNG/CUTG B9 HYPRKR LES 2-4: CPT | Mod: Q9,S$GLB,, | Performed by: STUDENT IN AN ORGANIZED HEALTH CARE EDUCATION/TRAINING PROGRAM

## 2023-04-06 PROCEDURE — 11721 ROUTINE FOOT CARE: ICD-10-PCS | Mod: 59,Q9,S$GLB, | Performed by: STUDENT IN AN ORGANIZED HEALTH CARE EDUCATION/TRAINING PROGRAM

## 2023-04-06 PROCEDURE — 3078F PR MOST RECENT DIASTOLIC BLOOD PRESSURE < 80 MM HG: ICD-10-PCS | Mod: CPTII,S$GLB,, | Performed by: STUDENT IN AN ORGANIZED HEALTH CARE EDUCATION/TRAINING PROGRAM

## 2023-04-06 PROCEDURE — 3077F SYST BP >= 140 MM HG: CPT | Mod: CPTII,S$GLB,, | Performed by: STUDENT IN AN ORGANIZED HEALTH CARE EDUCATION/TRAINING PROGRAM

## 2023-04-06 PROCEDURE — 99999 PR PBB SHADOW E&M-EST. PATIENT-LVL IV: CPT | Mod: PBBFAC,,, | Performed by: STUDENT IN AN ORGANIZED HEALTH CARE EDUCATION/TRAINING PROGRAM

## 2023-04-06 PROCEDURE — 11721 DEBRIDE NAIL 6 OR MORE: CPT | Mod: 59,Q9,S$GLB, | Performed by: STUDENT IN AN ORGANIZED HEALTH CARE EDUCATION/TRAINING PROGRAM

## 2023-04-06 PROCEDURE — 1126F PR PAIN SEVERITY QUANTIFIED, NO PAIN PRESENT: ICD-10-PCS | Mod: CPTII,S$GLB,, | Performed by: STUDENT IN AN ORGANIZED HEALTH CARE EDUCATION/TRAINING PROGRAM

## 2023-04-06 PROCEDURE — 1159F MED LIST DOCD IN RCRD: CPT | Mod: CPTII,S$GLB,, | Performed by: STUDENT IN AN ORGANIZED HEALTH CARE EDUCATION/TRAINING PROGRAM

## 2023-04-06 PROCEDURE — 99214 OFFICE O/P EST MOD 30 MIN: CPT | Mod: 25,S$GLB,, | Performed by: STUDENT IN AN ORGANIZED HEALTH CARE EDUCATION/TRAINING PROGRAM

## 2023-04-06 PROCEDURE — 11056 ROUTINE FOOT CARE: ICD-10-PCS | Mod: Q9,S$GLB,, | Performed by: STUDENT IN AN ORGANIZED HEALTH CARE EDUCATION/TRAINING PROGRAM

## 2023-04-06 PROCEDURE — 1126F AMNT PAIN NOTED NONE PRSNT: CPT | Mod: CPTII,S$GLB,, | Performed by: STUDENT IN AN ORGANIZED HEALTH CARE EDUCATION/TRAINING PROGRAM

## 2023-04-06 PROCEDURE — 99214 PR OFFICE/OUTPT VISIT, EST, LEVL IV, 30-39 MIN: ICD-10-PCS | Mod: 25,S$GLB,, | Performed by: STUDENT IN AN ORGANIZED HEALTH CARE EDUCATION/TRAINING PROGRAM

## 2023-04-06 PROCEDURE — 3077F PR MOST RECENT SYSTOLIC BLOOD PRESSURE >= 140 MM HG: ICD-10-PCS | Mod: CPTII,S$GLB,, | Performed by: STUDENT IN AN ORGANIZED HEALTH CARE EDUCATION/TRAINING PROGRAM

## 2023-04-06 NOTE — PROCEDURES
"Routine Foot Care    Date/Time: 4/6/2023 8:45 AM  Performed by: Terri Starkey DPM  Authorized by: Terri Starkey DPM     Time out: Immediately prior to procedure a "time out" was called to verify the correct patient, procedure, equipment, support staff and site/side marked as required.    Consent Done?:  Yes (Verbal)  Hyperkeratotic Skin Lesions?: Yes    Number of trimmed lesions:  4  Location(s):  Left 1st Toe, Right 1st Toe, Left 1st Metatarsal Head and Right 1st Metatarsal Head    Nail Care Type:  Debride  Location(s): All  (Left 1st Toe, Left 3rd Toe, Left 2nd Toe, Left 4th Toe, Left 5th Toe, Right 1st Toe, Right 2nd Toe, Right 3rd Toe, Right 4th Toe and Right 5th Toe)  Patient tolerance:  Patient tolerated the procedure well with no immediate complications  "

## 2023-04-06 NOTE — PROGRESS NOTES
Subjective:      Patient ID: Cecil Pringle is a 77 y.o. male.    Chief Complaint: Diabetes Mellitus (Layo Jett MD     05/15/2023), Callouses, and Nail Care    Cecil is a 77 y.o. male who presents to the clinic upon referral from Dr. Hannah rock. provider found  for evaluation and treatment of diabetic feet. Cecil has a past medical history of Anxiety, Arthritis, Coronary artery disease, Depression, Diabetes mellitus, type 2, Diabetic neuropathy, Hyperlipidemia, Hypertension, Insomnia, and PAD (peripheral artery disease). Patient relates no major problem with feet,  has occasional burning/tinlging sensations to toes. Only complaints today consist of callus and elongated toe nails to rakel feet.  has not had new diabetic shoes in 7 years.  8/17/22: Seen today, relates to worsening neuropathy to his feet, here for RFC. Plan for diabetic foot exam during upcoming visit.      12/13/22: Pt seen today for routine foot care and annual diabetic foot exam. No new pedal complaints.  has been using Penlac.     4/6/23: Seen today for routine foot care.  calluses are building up and he uses Eucerin a few times a week. No further pedal complaints.    PCP: Layo Jett MD    Date Last Seen by PCP: 12/16/21    Current shoe gear: Rx diabetic extra depth shoes and custom accommodative insoles    Hemoglobin A1C   Date Value Ref Range Status   12/22/2022 7.0 (H) 4.0 - 5.6 % Final     Comment:     ADA Screening Guidelines:  5.7-6.4%  Consistent with prediabetes  >or=6.5%  Consistent with diabetes    High levels of fetal hemoglobin interfere with the HbA1C  assay. Heterozygous hemoglobin variants (HbS, HgC, etc)do  not significantly interfere with this assay.   However, presence of multiple variants may affect accuracy.     06/22/2022 7.1 (H) 4.0 - 5.6 % Final     Comment:     ADA Screening Guidelines:  5.7-6.4%  Consistent with prediabetes  >or=6.5%  Consistent with diabetes    High levels of fetal  hemoglobin interfere with the HbA1C  assay. Heterozygous hemoglobin variants (HbS, HgC, etc)do  not significantly interfere with this assay.   However, presence of multiple variants may affect accuracy.     04/11/2022 7.5 (H) 4.0 - 5.6 % Final     Comment:     ADA Screening Guidelines:  5.7-6.4%  Consistent with prediabetes  >or=6.5%  Consistent with diabetes    High levels of fetal hemoglobin interfere with the HbA1C  assay. Heterozygous hemoglobin variants (HbS, HgC, etc)do  not significantly interfere with this assay.   However, presence of multiple variants may affect accuracy.             Review of Systems   Constitutional: Negative for chills, decreased appetite, diaphoresis and fever.   Cardiovascular:  Negative for claudication and leg swelling.   Respiratory:  Negative for shortness of breath.    Skin:  Positive for dry skin and nail changes. Negative for color change, flushing, itching, poor wound healing, rash and suspicious lesions.   Musculoskeletal:  Negative for arthritis, back pain, falls, joint pain, joint swelling and myalgias.   Gastrointestinal:  Negative for nausea and vomiting.   Neurological:  Positive for numbness and paresthesias. Negative for loss of balance.         Objective:      Physical Exam  Vitals and nursing note reviewed.   Constitutional:       General: He is not in acute distress.     Appearance: He is well-developed. He is not diaphoretic.   Cardiovascular:      Pulses:           Dorsalis pedis pulses are 1+ on the right side and 1+ on the left side.        Posterior tibial pulses are 1+ on the right side and 1+ on the left side.      Comments: Skin temperature is within normal limits. Toes are cool to touch and feet are warm proximally. Hair growth is mildly diminished. Skin is mildly atrophic and with mild hyperpigmentation. Mild edema noted, varicosities noted rakel      Musculoskeletal:         General: Deformity present. No tenderness.      Right foot: Decreased range of  motion. Deformity present.      Left foot: Decreased range of motion. Deformity present.      Comments: Strength is 5/5 to lower extremity muscle groups, rakel    Pes cavus foot type, bilaterally    Feet:      Right foot:      Skin integrity: Dry skin present. No ulcer, blister, skin breakdown, erythema, warmth or callus.      Left foot:      Skin integrity: Dry skin present. No ulcer, blister, skin breakdown, erythema, warmth or callus.   Lymphadenopathy:      Comments: Negative lymphadenopathy bilateral popliteal fossa and tarsal tunnel.    Skin:     General: Skin is warm and dry.      Capillary Refill: Capillary refill takes less than 2 seconds.      Coloration: Skin is not pale.      Findings: No bruising, ecchymosis, erythema, laceration, lesion, petechiae or rash.      Comments: Skin is warm and dry bilaterally, no acute SOI noted, bilaterally, appears stable.     Nails 1-5 are thickened, discolored, dystrophic and with subungual debris, rakel    HPK noted medial 1st MTP and sub 5th MTP bilaterally    Neurological:      Mental Status: He is alert and oriented to person, place, and time.      Sensory: Sensory deficit present.      Comments: Vibratory sensation diminished, rakel             Assessment:       Encounter Diagnoses   Name Primary?    Corn or callus Yes    Onychomycosis of multiple toenails with type 2 diabetes mellitus and peripheral angiopathy     PAD (peripheral artery disease)     Diabetic polyneuropathy associated with diabetes mellitus due to underlying condition          Plan:       Cecil was seen today for diabetes mellitus, callouses and nail care.    Diagnoses and all orders for this visit:    [place] or callus  -     Routine Foot Care    Onychomycosis of multiple toenails with type 2 diabetes mellitus and peripheral angiopathy  -     Routine Foot Care    PAD (peripheral artery disease)  -     Routine Foot Care    Diabetic polyneuropathy associated with diabetes mellitus due to underlying  condition      I counseled the patient on his conditions, their implications and medical management.    - HPK and nails debrided, see procedure note    -Recommend pumice stone and urea cream to soften callus at home. White socks with supportive tennis/orthopedic shoes at all times while ambulating    - Shoe inspection. Diabetic Foot Education. Patient reminded of the importance of good nutrition and blood sugar control to help prevent podiatric complications of diabetes. Patient instructed on proper foot hygeine. We discussed wearing proper shoe gear, daily foot inspections, never walking without protective shoe gear, never putting sharp instruments to feet, routine podiatric nail visits every 2-3 months.      -Continue penlac for onychomycosis    -RTC in 2-3 months for routine diabetic foot care

## 2023-04-14 DIAGNOSIS — N18.31 TYPE 2 DIABETES MELLITUS WITH STAGE 3A CHRONIC KIDNEY DISEASE, WITHOUT LONG-TERM CURRENT USE OF INSULIN: ICD-10-CM

## 2023-04-14 DIAGNOSIS — I10 ESSENTIAL HYPERTENSION: Primary | ICD-10-CM

## 2023-04-14 DIAGNOSIS — E11.22 TYPE 2 DIABETES MELLITUS WITH STAGE 3A CHRONIC KIDNEY DISEASE, WITHOUT LONG-TERM CURRENT USE OF INSULIN: ICD-10-CM

## 2023-04-14 DIAGNOSIS — E78.2 MIXED HYPERLIPIDEMIA: ICD-10-CM

## 2023-04-14 RX ORDER — METFORMIN HYDROCHLORIDE 1000 MG/1
TABLET ORAL
Qty: 180 TABLET | Refills: 0 | Status: SHIPPED | OUTPATIENT
Start: 2023-04-14 | End: 2023-05-15 | Stop reason: SDUPTHER

## 2023-04-14 NOTE — TELEPHONE ENCOUNTER
Provider Staff:  Action required for this patient     Please see care gap opportunities below in Care Due Message.    Thanks!  [facility]     Appointments      Date Provider   Last Visit   6/22/2022 Layo Jett MD   Next Visit   5/15/2023 Layo Jett MD     Refill Decision Note   Cecil Pringle  is requesting a refill authorization.  Brief Assessment and Rationale for Refill:  Approve     Medication Therapy Plan:         Pharmacist review requested: Yes   Extended chart review required: Yes   Comments:     Note composed:10:55 AM 04/14/2023             Appointments     Last Visit   6/22/2022 Layo Jett MD   Next Visit   5/15/2023 Layo Jett MD

## 2023-04-14 NOTE — TELEPHONE ENCOUNTER
Care Due:                  Date            Visit Type   Department     Provider  --------------------------------------------------------------------------------                                MYCHART                              FOLLOWUP/OF  [facility] INTERNAL  Last Visit: 06-      FICE VISIT   MEDICINE       [facility]                              ANNUAL                              CHECKUP/PHY  [facility] INTERNAL  Next Visit: 05-      [facility]       Layo HUFFMAN  [facility]                                                            Last  Test          Frequency    Reason                     Performed    Due Date  --------------------------------------------------------------------------------    HBA1C.......  6 months...  dapagliflozin, metFORMIN.  12- 06-    Lipid Panel.  12 months..  fenofibrate, rosuvastatin  06- 06-    [facility] Embedded Care Gaps. Reference number: [id number]. 4/14/2023   12:18:30 AM CDT

## 2023-04-14 NOTE — TELEPHONE ENCOUNTER
Left msg labs booked 1 week before seeing dr garcia.  Call us if need to change.   Also mailed appt slip.

## 2023-04-14 NOTE — TELEPHONE ENCOUNTER
Refill Routing Note   Medication(s) are not appropriate for processing by [facility] for the following reason(s):      Drug-disease interaction    [facility] action(s):  Defer Labs due     Medication Therapy Plan: Type 2 diabetes mellitus with stage 3a chronic kidney disease, without long-term current use of insulin  Medication reconciliation completed: No     Appointments  past 12m or future 3m with PCP    Date Provider   Last Visit   6/22/2022 Layo Jett MD   Next Visit   5/15/2023 Layo Jett MD   ED visits in past 90 days: 0        Note composed:9:31 AM 04/14/2023

## 2023-05-01 ENCOUNTER — PES CALL (OUTPATIENT)
Dept: ADMINISTRATIVE | Facility: CLINIC | Age: 78
End: 2023-05-01
Payer: MEDICARE

## 2023-05-08 ENCOUNTER — LAB VISIT (OUTPATIENT)
Dept: LAB | Facility: HOSPITAL | Age: 78
End: 2023-05-08
Attending: FAMILY MEDICINE
Payer: MEDICARE

## 2023-05-08 DIAGNOSIS — N18.31 TYPE 2 DIABETES MELLITUS WITH STAGE 3A CHRONIC KIDNEY DISEASE, WITHOUT LONG-TERM CURRENT USE OF INSULIN: ICD-10-CM

## 2023-05-08 DIAGNOSIS — E78.2 MIXED HYPERLIPIDEMIA: ICD-10-CM

## 2023-05-08 DIAGNOSIS — I10 ESSENTIAL HYPERTENSION: ICD-10-CM

## 2023-05-08 DIAGNOSIS — E11.22 TYPE 2 DIABETES MELLITUS WITH STAGE 3A CHRONIC KIDNEY DISEASE, WITHOUT LONG-TERM CURRENT USE OF INSULIN: ICD-10-CM

## 2023-05-08 LAB
ALBUMIN SERPL BCP-MCNC: 4 G/DL (ref 3.5–5.2)
ALP SERPL-CCNC: 42 U/L (ref 55–135)
ALT SERPL W/O P-5'-P-CCNC: 17 U/L (ref 10–44)
ANION GAP SERPL CALC-SCNC: 10 MMOL/L (ref 8–16)
AST SERPL-CCNC: 16 U/L (ref 10–40)
BASOPHILS # BLD AUTO: 0.05 K/UL (ref 0–0.2)
BASOPHILS NFR BLD: 0.7 % (ref 0–1.9)
BILIRUB SERPL-MCNC: 0.3 MG/DL (ref 0.1–1)
BUN SERPL-MCNC: 24 MG/DL (ref 8–23)
CALCIUM SERPL-MCNC: 9.4 MG/DL (ref 8.7–10.5)
CHLORIDE SERPL-SCNC: 106 MMOL/L (ref 95–110)
CHOLEST SERPL-MCNC: 108 MG/DL (ref 120–199)
CHOLEST/HDLC SERPL: 3.5 {RATIO} (ref 2–5)
CO2 SERPL-SCNC: 23 MMOL/L (ref 23–29)
CREAT SERPL-MCNC: 1.3 MG/DL (ref 0.5–1.4)
DIFFERENTIAL METHOD: ABNORMAL
EOSINOPHIL # BLD AUTO: 0.2 K/UL (ref 0–0.5)
EOSINOPHIL NFR BLD: 2.2 % (ref 0–8)
ERYTHROCYTE [DISTWIDTH] IN BLOOD BY AUTOMATED COUNT: 14.7 % (ref 11.5–14.5)
EST. GFR  (NO RACE VARIABLE): 57.3 ML/MIN/1.73 M^2
ESTIMATED AVG GLUCOSE: 171 MG/DL (ref 68–131)
GLUCOSE SERPL-MCNC: 149 MG/DL (ref 70–110)
HBA1C MFR BLD: 7.6 % (ref 4–5.6)
HCT VFR BLD AUTO: 41.4 % (ref 40–54)
HDLC SERPL-MCNC: 31 MG/DL (ref 40–75)
HDLC SERPL: 28.7 % (ref 20–50)
HGB BLD-MCNC: 13.4 G/DL (ref 14–18)
IMM GRANULOCYTES # BLD AUTO: 0.05 K/UL (ref 0–0.04)
IMM GRANULOCYTES NFR BLD AUTO: 0.7 % (ref 0–0.5)
LDLC SERPL CALC-MCNC: 20.4 MG/DL (ref 63–159)
LYMPHOCYTES # BLD AUTO: 2.3 K/UL (ref 1–4.8)
LYMPHOCYTES NFR BLD: 31.5 % (ref 18–48)
MCH RBC QN AUTO: 28 PG (ref 27–31)
MCHC RBC AUTO-ENTMCNC: 32.4 G/DL (ref 32–36)
MCV RBC AUTO: 87 FL (ref 82–98)
MONOCYTES # BLD AUTO: 0.5 K/UL (ref 0.3–1)
MONOCYTES NFR BLD: 6.2 % (ref 4–15)
NEUTROPHILS # BLD AUTO: 4.3 K/UL (ref 1.8–7.7)
NEUTROPHILS NFR BLD: 58.7 % (ref 38–73)
NONHDLC SERPL-MCNC: 77 MG/DL
NRBC BLD-RTO: 0 /100 WBC
PLATELET # BLD AUTO: 239 K/UL (ref 150–450)
PMV BLD AUTO: 10.8 FL (ref 9.2–12.9)
POTASSIUM SERPL-SCNC: 5.3 MMOL/L (ref 3.5–5.1)
PROT SERPL-MCNC: 6.8 G/DL (ref 6–8.4)
RBC # BLD AUTO: 4.78 M/UL (ref 4.6–6.2)
SODIUM SERPL-SCNC: 139 MMOL/L (ref 136–145)
TRIGL SERPL-MCNC: 283 MG/DL (ref 30–150)
WBC # BLD AUTO: 7.24 K/UL (ref 3.9–12.7)

## 2023-05-08 PROCEDURE — 36415 COLL VENOUS BLD VENIPUNCTURE: CPT | Mod: PO | Performed by: FAMILY MEDICINE

## 2023-05-08 PROCEDURE — 83036 HEMOGLOBIN GLYCOSYLATED A1C: CPT | Performed by: FAMILY MEDICINE

## 2023-05-08 PROCEDURE — 80061 LIPID PANEL: CPT | Performed by: FAMILY MEDICINE

## 2023-05-08 PROCEDURE — 85025 COMPLETE CBC W/AUTO DIFF WBC: CPT | Performed by: FAMILY MEDICINE

## 2023-05-08 PROCEDURE — 80053 COMPREHEN METABOLIC PANEL: CPT | Performed by: FAMILY MEDICINE

## 2023-05-11 ENCOUNTER — PATIENT MESSAGE (OUTPATIENT)
Dept: INTERNAL MEDICINE | Facility: CLINIC | Age: 78
End: 2023-05-11
Payer: MEDICARE

## 2023-05-15 ENCOUNTER — OFFICE VISIT (OUTPATIENT)
Dept: INTERNAL MEDICINE | Facility: CLINIC | Age: 78
End: 2023-05-15
Payer: MEDICARE

## 2023-05-15 VITALS
HEIGHT: 75 IN | HEART RATE: 60 BPM | SYSTOLIC BLOOD PRESSURE: 120 MMHG | DIASTOLIC BLOOD PRESSURE: 66 MMHG | RESPIRATION RATE: 16 BRPM | OXYGEN SATURATION: 98 % | TEMPERATURE: 97 F | BODY MASS INDEX: 27.93 KG/M2 | WEIGHT: 224.63 LBS

## 2023-05-15 DIAGNOSIS — N18.31 TYPE 2 DIABETES MELLITUS WITH STAGE 3A CHRONIC KIDNEY DISEASE, WITHOUT LONG-TERM CURRENT USE OF INSULIN: Chronic | ICD-10-CM

## 2023-05-15 DIAGNOSIS — E78.5 HYPERLIPIDEMIA ASSOCIATED WITH TYPE 2 DIABETES MELLITUS: ICD-10-CM

## 2023-05-15 DIAGNOSIS — I25.10 CORONARY ARTERY DISEASE INVOLVING NATIVE CORONARY ARTERY OF NATIVE HEART WITHOUT ANGINA PECTORIS: Chronic | ICD-10-CM

## 2023-05-15 DIAGNOSIS — Z00.00 PREVENTATIVE HEALTH CARE: Primary | ICD-10-CM

## 2023-05-15 DIAGNOSIS — E11.69 HYPERLIPIDEMIA ASSOCIATED WITH TYPE 2 DIABETES MELLITUS: ICD-10-CM

## 2023-05-15 DIAGNOSIS — B35.1 ONYCHOMYCOSIS OF MULTIPLE TOENAILS WITH TYPE 2 DIABETES MELLITUS AND PERIPHERAL ANGIOPATHY: ICD-10-CM

## 2023-05-15 DIAGNOSIS — F33.0 MILD EPISODE OF RECURRENT MAJOR DEPRESSIVE DISORDER: ICD-10-CM

## 2023-05-15 DIAGNOSIS — E11.65 UNCONTROLLED TYPE 2 DIABETES MELLITUS WITH HYPERGLYCEMIA: ICD-10-CM

## 2023-05-15 DIAGNOSIS — E11.51 ONYCHOMYCOSIS OF MULTIPLE TOENAILS WITH TYPE 2 DIABETES MELLITUS AND PERIPHERAL ANGIOPATHY: ICD-10-CM

## 2023-05-15 DIAGNOSIS — G47.09 OTHER INSOMNIA: ICD-10-CM

## 2023-05-15 DIAGNOSIS — Z86.010 PERSONAL HISTORY OF COLONIC POLYPS: ICD-10-CM

## 2023-05-15 DIAGNOSIS — I15.2 HYPERTENSION ASSOCIATED WITH TYPE 2 DIABETES MELLITUS: ICD-10-CM

## 2023-05-15 DIAGNOSIS — E11.69 ONYCHOMYCOSIS OF MULTIPLE TOENAILS WITH TYPE 2 DIABETES MELLITUS AND PERIPHERAL ANGIOPATHY: ICD-10-CM

## 2023-05-15 DIAGNOSIS — E11.59 HYPERTENSION ASSOCIATED WITH TYPE 2 DIABETES MELLITUS: ICD-10-CM

## 2023-05-15 DIAGNOSIS — E11.22 TYPE 2 DIABETES MELLITUS WITH STAGE 3A CHRONIC KIDNEY DISEASE, WITHOUT LONG-TERM CURRENT USE OF INSULIN: Chronic | ICD-10-CM

## 2023-05-15 DIAGNOSIS — I35.0 NONRHEUMATIC AORTIC VALVE STENOSIS: ICD-10-CM

## 2023-05-15 DIAGNOSIS — E11.42 ONYCHOMYCOSIS OF MULTIPLE TOENAILS WITH TYPE 2 DIABETES MELLITUS AND PERIPHERAL NEUROPATHY: ICD-10-CM

## 2023-05-15 DIAGNOSIS — I73.9 PAD (PERIPHERAL ARTERY DISEASE): ICD-10-CM

## 2023-05-15 DIAGNOSIS — B35.1 ONYCHOMYCOSIS OF MULTIPLE TOENAILS WITH TYPE 2 DIABETES MELLITUS AND PERIPHERAL NEUROPATHY: ICD-10-CM

## 2023-05-15 DIAGNOSIS — E11.69 ONYCHOMYCOSIS OF MULTIPLE TOENAILS WITH TYPE 2 DIABETES MELLITUS AND PERIPHERAL NEUROPATHY: ICD-10-CM

## 2023-05-15 DIAGNOSIS — I65.21 STENOSIS OF RIGHT CAROTID ARTERY: ICD-10-CM

## 2023-05-15 DIAGNOSIS — Z95.1 S/P CABG X 5: ICD-10-CM

## 2023-05-15 PROCEDURE — 3074F SYST BP LT 130 MM HG: CPT | Mod: CPTII,S$GLB,, | Performed by: FAMILY MEDICINE

## 2023-05-15 PROCEDURE — 1160F PR REVIEW ALL MEDS BY PRESCRIBER/CLIN PHARMACIST DOCUMENTED: ICD-10-PCS | Mod: CPTII,S$GLB,, | Performed by: FAMILY MEDICINE

## 2023-05-15 PROCEDURE — 99999 PR PBB SHADOW E&M-EST. PATIENT-LVL V: ICD-10-PCS | Mod: PBBFAC,,, | Performed by: FAMILY MEDICINE

## 2023-05-15 PROCEDURE — 3051F HG A1C>EQUAL 7.0%<8.0%: CPT | Mod: CPTII,S$GLB,, | Performed by: FAMILY MEDICINE

## 2023-05-15 PROCEDURE — 1159F MED LIST DOCD IN RCRD: CPT | Mod: CPTII,S$GLB,, | Performed by: FAMILY MEDICINE

## 2023-05-15 PROCEDURE — 3288F FALL RISK ASSESSMENT DOCD: CPT | Mod: CPTII,S$GLB,, | Performed by: FAMILY MEDICINE

## 2023-05-15 PROCEDURE — 1101F PR PT FALLS ASSESS DOC 0-1 FALLS W/OUT INJ PAST YR: ICD-10-PCS | Mod: CPTII,S$GLB,, | Performed by: FAMILY MEDICINE

## 2023-05-15 PROCEDURE — 3074F PR MOST RECENT SYSTOLIC BLOOD PRESSURE < 130 MM HG: ICD-10-PCS | Mod: CPTII,S$GLB,, | Performed by: FAMILY MEDICINE

## 2023-05-15 PROCEDURE — 3051F PR MOST RECENT HEMOGLOBIN A1C LEVEL 7.0 - < 8.0%: ICD-10-PCS | Mod: CPTII,S$GLB,, | Performed by: FAMILY MEDICINE

## 2023-05-15 PROCEDURE — 99999 PR PBB SHADOW E&M-EST. PATIENT-LVL V: CPT | Mod: PBBFAC,,, | Performed by: FAMILY MEDICINE

## 2023-05-15 PROCEDURE — 1126F AMNT PAIN NOTED NONE PRSNT: CPT | Mod: CPTII,S$GLB,, | Performed by: FAMILY MEDICINE

## 2023-05-15 PROCEDURE — 1126F PR PAIN SEVERITY QUANTIFIED, NO PAIN PRESENT: ICD-10-PCS | Mod: CPTII,S$GLB,, | Performed by: FAMILY MEDICINE

## 2023-05-15 PROCEDURE — 99397 PER PM REEVAL EST PAT 65+ YR: CPT | Mod: S$GLB,,, | Performed by: FAMILY MEDICINE

## 2023-05-15 PROCEDURE — 3078F PR MOST RECENT DIASTOLIC BLOOD PRESSURE < 80 MM HG: ICD-10-PCS | Mod: CPTII,S$GLB,, | Performed by: FAMILY MEDICINE

## 2023-05-15 PROCEDURE — 1101F PT FALLS ASSESS-DOCD LE1/YR: CPT | Mod: CPTII,S$GLB,, | Performed by: FAMILY MEDICINE

## 2023-05-15 PROCEDURE — 3078F DIAST BP <80 MM HG: CPT | Mod: CPTII,S$GLB,, | Performed by: FAMILY MEDICINE

## 2023-05-15 PROCEDURE — 99397 PR PREVENTIVE VISIT,EST,65 & OVER: ICD-10-PCS | Mod: S$GLB,,, | Performed by: FAMILY MEDICINE

## 2023-05-15 PROCEDURE — 1159F PR MEDICATION LIST DOCUMENTED IN MEDICAL RECORD: ICD-10-PCS | Mod: CPTII,S$GLB,, | Performed by: FAMILY MEDICINE

## 2023-05-15 PROCEDURE — 3288F PR FALLS RISK ASSESSMENT DOCUMENTED: ICD-10-PCS | Mod: CPTII,S$GLB,, | Performed by: FAMILY MEDICINE

## 2023-05-15 PROCEDURE — 1160F RVW MEDS BY RX/DR IN RCRD: CPT | Mod: CPTII,S$GLB,, | Performed by: FAMILY MEDICINE

## 2023-05-15 RX ORDER — TEMAZEPAM 30 MG/1
30 CAPSULE ORAL NIGHTLY PRN
Qty: 30 CAPSULE | Refills: 0 | Status: SHIPPED | OUTPATIENT
Start: 2023-05-15

## 2023-05-15 RX ORDER — METFORMIN HYDROCHLORIDE 1000 MG/1
1000 TABLET ORAL 2 TIMES DAILY
Qty: 180 TABLET | Refills: 3 | Status: SHIPPED | OUTPATIENT
Start: 2023-05-15

## 2023-05-15 RX ORDER — FLUOXETINE HYDROCHLORIDE 20 MG/1
20 CAPSULE ORAL DAILY
Qty: 90 CAPSULE | Refills: 3 | Status: SHIPPED | OUTPATIENT
Start: 2023-05-15

## 2023-05-15 RX ORDER — METOPROLOL TARTRATE 100 MG/1
50 TABLET ORAL 2 TIMES DAILY
Qty: 90 TABLET | Refills: 3 | Status: SHIPPED | OUTPATIENT
Start: 2023-05-15

## 2023-05-15 RX ORDER — FENOFIBRATE 160 MG/1
160 TABLET ORAL DAILY
Qty: 90 TABLET | Refills: 3 | Status: SHIPPED | OUTPATIENT
Start: 2023-05-15

## 2023-05-15 NOTE — PROGRESS NOTES
Subjective     Patient ID: Cecil Pringle is a 77 y.o. male.    Chief Complaint: Annual Exam    HPI 77-year-old male presents to clinic today for annual physical exam.  He continues to be followed by Cardiology secondary to coronary artery disease status post CABG in , 5 stent placement in , and repeat CABG in .  He also continues to be followed by Podiatry and Cardiology for peripheral artery disease.  He remains stable on aspirin 81 mg daily and Plavix 75 mg daily.  Hypertension remains well controlled on losartan 100 mg daily, nifedipine 60 mg daily, hydrochlorothiazide 12.5 mg daily, and metoprolol 50 mg b.i.d..  Hyperlipidemia remains well controlled on Crestor 20 mg daily and fenofibrate 160 mg daily with continued mildly elevated triglyceride levels.  He continues to be followed by Endocrinology for treatment of type 2 diabetes.  Recent A1c has increased to 7.6.  He remains on metformin 1000 mg b.i.d., glipizide 20 mg b.i.d., Trulicity 1.5 mg weekly, and Farxiga 10 mg weekly.  He reports lately his morning fasting glucose levels have been in the 150s.  He reports that diet wise he does eat sandwiches often but states that he uses whole wheat bread.  Depression remains stable on Prozac 20 mg daily.  He has a past surgical history of CABG in  and repeat CABG in , stent placement in , right hand surgery, and left foot surgery. He has a strong family history of heart disease. His mother  of asbestosis. Colonoscopy has been ordered.   Review of Systems   Constitutional:  Negative for appetite change, chills, fatigue and fever.   HENT:  Positive for mouth dryness. Negative for nasal congestion, ear pain, hearing loss, postnasal drip, rhinorrhea, sinus pressure/congestion, sore throat and tinnitus.    Eyes:  Negative for redness, itching and visual disturbance.   Respiratory:  Negative for cough, chest tightness and shortness of breath.    Cardiovascular:  Negative for chest pain and  palpitations.   Gastrointestinal:  Negative for abdominal pain, constipation, diarrhea, nausea and vomiting.   Genitourinary:  Negative for decreased urine volume, difficulty urinating, dysuria, frequency, hematuria and urgency.   Musculoskeletal:  Negative for back pain, myalgias, neck pain and neck stiffness.   Integumentary:  Negative for rash.   Neurological:  Negative for dizziness, light-headedness and headaches.   Psychiatric/Behavioral: Negative.          Objective     Physical Exam  Vitals and nursing note reviewed.   Constitutional:       General: He is not in acute distress.     Appearance: Normal appearance. He is well-developed. He is not diaphoretic.   HENT:      Head: Normocephalic and atraumatic.      Right Ear: External ear normal.      Left Ear: External ear normal.      Nose: Nose normal.      Mouth/Throat:      Pharynx: No oropharyngeal exudate.   Eyes:      General: No scleral icterus.        Right eye: No discharge.         Left eye: No discharge.      Conjunctiva/sclera: Conjunctivae normal.      Pupils: Pupils are equal, round, and reactive to light.   Neck:      Thyroid: No thyromegaly.      Vascular: No JVD.      Trachea: No tracheal deviation.   Cardiovascular:      Rate and Rhythm: Normal rate and regular rhythm.      Heart sounds: Normal heart sounds. No murmur heard.    No friction rub. No gallop.   Pulmonary:      Effort: Pulmonary effort is normal. No respiratory distress.      Breath sounds: Normal breath sounds. No stridor. No wheezing, rhonchi or rales.   Chest:      Chest wall: No tenderness.   Abdominal:      General: Bowel sounds are normal. There is no distension.      Palpations: Abdomen is soft. There is no mass.      Tenderness: There is no abdominal tenderness. There is no guarding or rebound.   Musculoskeletal:         General: No tenderness. Normal range of motion.      Cervical back: Normal range of motion and neck supple.   Lymphadenopathy:      Cervical: No cervical  adenopathy.   Skin:     General: Skin is warm and dry.      Coloration: Skin is not pale.      Findings: No erythema or rash.   Neurological:      Mental Status: He is alert and oriented to person, place, and time.   Psychiatric:         Mood and Affect: Mood normal.         Behavior: Behavior normal.         Thought Content: Thought content normal.         Judgment: Judgment normal.          Assessment and Plan     Problem List Items Addressed This Visit       Coronary artery disease involving native coronary artery of native heart without angina pectoris (Chronic)    Type 2 diabetes mellitus with stage 3a chronic kidney disease, without long-term current use of insulin (Chronic)    Relevant Medications    metFORMIN (GLUCOPHAGE) 1000 MG tablet    Hyperlipidemia associated with type 2 diabetes mellitus    Relevant Medications    metFORMIN (GLUCOPHAGE) 1000 MG tablet    Hypertension associated with type 2 diabetes mellitus    Relevant Medications    metFORMIN (GLUCOPHAGE) 1000 MG tablet    Mild episode of recurrent major depressive disorder    Nonrheumatic aortic valve stenosis    Onychomycosis of multiple toenails with type 2 diabetes mellitus and peripheral angiopathy    Relevant Medications    metFORMIN (GLUCOPHAGE) 1000 MG tablet    Onychomycosis of multiple toenails with type 2 diabetes mellitus and peripheral neuropathy    Relevant Medications    metFORMIN (GLUCOPHAGE) 1000 MG tablet    Other insomnia    PAD (peripheral artery disease)    S/P CABG x 5    Stenosis of right carotid artery    Uncontrolled type 2 diabetes mellitus with hyperglycemia    Relevant Medications    metFORMIN (GLUCOPHAGE) 1000 MG tablet     Other Visit Diagnoses       Preventative health care    -  Primary    Personal history of colonic polyps        Relevant Orders    Ambulatory referral/consult to Endo Procedure         1. Labs have been reviewed and are overall within normal limits except for a continued elevated A1c and  triglyceride levels.    2. Continue aspirin 81 mg daily, Plavix 75 mg daily, and continue follow-up with cardiology as scheduled.  Coronary artery disease status post CABG and stent placement remained stable.    3. Continue follow-up with cardiology and Podiatry as scheduled.  Peripheral artery disease remains stable.  Continue aspirin and Plavix as prescribed.    4. Aortic valve stenosis and carotid stenosis remained stable.  Continue follow-up with cardiology as scheduled.    5. Continue losartan 100 mg daily, metoprolol 50 mg b.i.d., nifedipine 60 mg daily, and hydrochlorothiazide 12.5 mg daily.  Hypertension is well controlled.  6. Encourage hydration.  Stage 3 chronic kidney disease remained stable.  7. Continue Crestor 20 mg daily and fenofibrate 160 mg daily.  Hyperlipidemia remains stable with a mildly elevated triglyceride level.  Encourage low-fat diet.    8. Continue Farxiga 10 mg daily, Trulicity 1.5 mg weekly, glipizide 20 mg b.i.d., and metformin 1000 mg b.i.d..  A1c is 7.6.  Continue follow-up with endocrinology as scheduled.  9. Continue follow-up with Podiatry as scheduled for continued treatment of onychomycosis, peripheral neuropathy, and peripheral angiopathy.  Patient remains stable.  10. Continue Prozac 20 mg daily.  Depression is stable.    11.  has been checked and the patient is compliant with medication.  Continue use of temazepam as prescribed.  Refill given.  12. Screening colonoscopy.    13. Return to clinic as needed or in 1 year for annual physical exam.

## 2023-05-17 ENCOUNTER — PATIENT MESSAGE (OUTPATIENT)
Dept: ENDOCRINOLOGY | Facility: CLINIC | Age: 78
End: 2023-05-17
Payer: MEDICARE

## 2023-05-17 DIAGNOSIS — E11.65 TYPE 2 DIABETES MELLITUS WITH HYPERGLYCEMIA, WITHOUT LONG-TERM CURRENT USE OF INSULIN: ICD-10-CM

## 2023-05-17 RX ORDER — DAPAGLIFLOZIN 10 MG/1
10 TABLET, FILM COATED ORAL DAILY
Qty: 90 TABLET | Refills: 3 | Status: SHIPPED | OUTPATIENT
Start: 2023-05-17

## 2023-06-06 ENCOUNTER — OFFICE VISIT (OUTPATIENT)
Dept: OPTOMETRY | Facility: CLINIC | Age: 78
End: 2023-06-06
Payer: COMMERCIAL

## 2023-06-06 DIAGNOSIS — Z13.5 GLAUCOMA SCREENING: ICD-10-CM

## 2023-06-06 DIAGNOSIS — E11.9 TYPE 2 DIABETES MELLITUS WITHOUT RETINOPATHY: Primary | ICD-10-CM

## 2023-06-06 DIAGNOSIS — H52.4 PRESBYOPIA: ICD-10-CM

## 2023-06-06 DIAGNOSIS — H25.13 NUCLEAR SCLEROSIS, BILATERAL: ICD-10-CM

## 2023-06-06 PROCEDURE — 92015 PR REFRACTION: ICD-10-PCS | Mod: S$GLB,,, | Performed by: OPTOMETRIST

## 2023-06-06 PROCEDURE — 92014 COMPRE OPH EXAM EST PT 1/>: CPT | Mod: S$GLB,,, | Performed by: OPTOMETRIST

## 2023-06-06 PROCEDURE — 92014 PR EYE EXAM, EST PATIENT,COMPREHESV: ICD-10-PCS | Mod: S$GLB,,, | Performed by: OPTOMETRIST

## 2023-06-06 PROCEDURE — 99999 PR PBB SHADOW E&M-EST. PATIENT-LVL IV: CPT | Mod: PBBFAC,,, | Performed by: OPTOMETRIST

## 2023-06-06 PROCEDURE — 99999 PR PBB SHADOW E&M-EST. PATIENT-LVL IV: ICD-10-PCS | Mod: PBBFAC,,, | Performed by: OPTOMETRIST

## 2023-06-06 PROCEDURE — 92015 DETERMINE REFRACTIVE STATE: CPT | Mod: S$GLB,,, | Performed by: OPTOMETRIST

## 2023-06-06 NOTE — PROGRESS NOTES
HPI    78 Y/o male is here for diabetic eye exam with no C/o about ocular health   Pt denies pain and discomfort   Occ floaters     Eye med: [facility]    Last edited by Juice Egan MA on 6/6/2023  8:33 AM.            Assessment /Plan     For exam results, see Encounter Report.    Type 2 diabetes mellitus without retinopathy    Nuclear sclerosis, bilateral    Glaucoma screening    Presbyopia        1. Cat OU--wrote new spex  2. DM- WITHOUT RETINOPATHY.  Advised yearly DFE    PLAN:    rtc 1 yr

## 2023-06-12 ENCOUNTER — PES CALL (OUTPATIENT)
Dept: ADMINISTRATIVE | Facility: CLINIC | Age: 78
End: 2023-06-12
Payer: MEDICARE

## 2023-06-15 DIAGNOSIS — J30.89 CHRONIC NONSEASONAL ALLERGIC RHINITIS DUE TO POLLEN: ICD-10-CM

## 2023-06-15 RX ORDER — HYDROXYZINE PAMOATE 25 MG/1
CAPSULE ORAL
Qty: 90 CAPSULE | Refills: 3 | Status: SHIPPED | OUTPATIENT
Start: 2023-06-15

## 2023-06-26 ENCOUNTER — PATIENT MESSAGE (OUTPATIENT)
Dept: ENDOCRINOLOGY | Facility: CLINIC | Age: 78
End: 2023-06-26
Payer: MEDICARE

## 2023-06-26 DIAGNOSIS — E11.65 TYPE 2 DIABETES MELLITUS WITH HYPERGLYCEMIA, WITHOUT LONG-TERM CURRENT USE OF INSULIN: Primary | ICD-10-CM

## 2023-07-08 DIAGNOSIS — E11.69 TYPE 2 DIABETES MELLITUS WITH OTHER SPECIFIED COMPLICATION, WITHOUT LONG-TERM CURRENT USE OF INSULIN: ICD-10-CM

## 2023-07-08 DIAGNOSIS — E78.2 MIXED HYPERLIPIDEMIA: ICD-10-CM

## 2023-07-09 RX ORDER — ROSUVASTATIN CALCIUM 20 MG/1
TABLET, COATED ORAL
Qty: 90 TABLET | Refills: 3 | Status: SHIPPED | OUTPATIENT
Start: 2023-07-09

## 2023-07-09 NOTE — TELEPHONE ENCOUNTER
No care due was identified.  [facility] Embedded Care Due Messages. Reference number: [id number].   7/09/2023 2:54:13 AM CDT

## 2023-07-09 NOTE — TELEPHONE ENCOUNTER
Refill Decision Note   Moisesbrian Yary  is requesting a refill authorization.  Brief Assessment and Rationale for Refill:  Approve     Medication Therapy Plan:         Comments:     Note composed:3:04 AM 07/09/2023

## 2023-07-10 RX ORDER — GLIPIZIDE 5 MG/1
10 TABLET ORAL 2 TIMES DAILY WITH MEALS
Qty: 360 TABLET | Refills: 3 | Status: SHIPPED | OUTPATIENT
Start: 2023-07-10 | End: 2024-07-09

## 2023-07-13 ENCOUNTER — PATIENT MESSAGE (OUTPATIENT)
Dept: INTERNAL MEDICINE | Facility: CLINIC | Age: 78
End: 2023-07-13
Payer: MEDICARE

## 2023-07-13 ENCOUNTER — PATIENT MESSAGE (OUTPATIENT)
Dept: ENDOCRINOLOGY | Facility: CLINIC | Age: 78
End: 2023-07-13
Payer: MEDICARE

## 2023-07-14 NOTE — TELEPHONE ENCOUNTER
No care due was identified.  Health [facility] Embedded Care Due Messages. Reference number: [id number].   7/14/2023 4:24:34 PM CDT

## 2023-07-15 RX ORDER — NIFEDIPINE 60 MG/1
TABLET, EXTENDED RELEASE ORAL
Qty: 90 TABLET | Refills: 3 | Status: SHIPPED | OUTPATIENT
Start: 2023-07-15

## 2023-07-15 RX ORDER — CLOPIDOGREL BISULFATE 75 MG/1
TABLET ORAL
Qty: 90 TABLET | Refills: 3 | Status: SHIPPED | OUTPATIENT
Start: 2023-07-15

## 2023-07-15 NOTE — TELEPHONE ENCOUNTER
Refill Routing Note   Medication(s) are not appropriate for processing by [facility] for the following reason(s):      Required labs abnormal    [facility] action(s):  Approve  Defer Care Due:  None identified            Appointments  past 12m or future 3m with PCP    Date Provider   Last Visit   5/15/2023 Layo Jett MD   Next Visit   Visit date not found Layo Jett MD   ED visits in past 90 days: 0        Note composed:9:23 AM 07/15/2023

## 2023-07-16 RX ORDER — HYDROCHLOROTHIAZIDE 12.5 MG/1
TABLET ORAL
Qty: 90 TABLET | Refills: 3 | Status: SHIPPED | OUTPATIENT
Start: 2023-07-16

## 2023-07-16 RX ORDER — LOSARTAN POTASSIUM 100 MG/1
TABLET ORAL
Qty: 90 TABLET | Refills: 3 | Status: SHIPPED | OUTPATIENT
Start: 2023-07-16

## 2023-07-17 ENCOUNTER — LAB VISIT (OUTPATIENT)
Dept: LAB | Facility: HOSPITAL | Age: 78
End: 2023-07-17
Attending: INTERNAL MEDICINE
Payer: MEDICARE

## 2023-07-17 DIAGNOSIS — E11.65 TYPE 2 DIABETES MELLITUS WITH HYPERGLYCEMIA, WITHOUT LONG-TERM CURRENT USE OF INSULIN: ICD-10-CM

## 2023-07-17 LAB
ALBUMIN SERPL BCP-MCNC: 4 G/DL (ref 3.5–5.2)
ALBUMIN/CREAT UR: 282 UG/MG (ref 0–30)
ALP SERPL-CCNC: 47 U/L (ref 55–135)
ALT SERPL W/O P-5'-P-CCNC: 17 U/L (ref 10–44)
ANION GAP SERPL CALC-SCNC: 10 MMOL/L (ref 8–16)
AST SERPL-CCNC: 16 U/L (ref 10–40)
BILIRUB SERPL-MCNC: 0.4 MG/DL (ref 0.1–1)
BUN SERPL-MCNC: 19 MG/DL (ref 8–23)
CALCIUM SERPL-MCNC: 9.6 MG/DL (ref 8.7–10.5)
CHLORIDE SERPL-SCNC: 104 MMOL/L (ref 95–110)
CO2 SERPL-SCNC: 25 MMOL/L (ref 23–29)
CREAT SERPL-MCNC: 1.2 MG/DL (ref 0.5–1.4)
CREAT UR-MCNC: 50 MG/DL (ref 23–375)
EST. GFR  (NO RACE VARIABLE): >60 ML/MIN/1.73 M^2
ESTIMATED AVG GLUCOSE: 166 MG/DL (ref 68–131)
GLUCOSE SERPL-MCNC: 164 MG/DL (ref 70–110)
HBA1C MFR BLD: 7.4 % (ref 4–5.6)
MICROALBUMIN UR DL<=1MG/L-MCNC: 141 UG/ML
POTASSIUM SERPL-SCNC: 4.2 MMOL/L (ref 3.5–5.1)
PROT SERPL-MCNC: 7.2 G/DL (ref 6–8.4)
SODIUM SERPL-SCNC: 139 MMOL/L (ref 136–145)

## 2023-07-17 PROCEDURE — 83036 HEMOGLOBIN GLYCOSYLATED A1C: CPT | Performed by: INTERNAL MEDICINE

## 2023-07-17 PROCEDURE — 80053 COMPREHEN METABOLIC PANEL: CPT | Performed by: INTERNAL MEDICINE

## 2023-07-17 PROCEDURE — 36415 COLL VENOUS BLD VENIPUNCTURE: CPT | Mod: PO | Performed by: INTERNAL MEDICINE

## 2023-07-17 PROCEDURE — 82570 ASSAY OF URINE CREATININE: CPT | Performed by: INTERNAL MEDICINE

## 2023-07-18 ENCOUNTER — OFFICE VISIT (OUTPATIENT)
Dept: ENDOCRINOLOGY | Facility: CLINIC | Age: 78
End: 2023-07-18
Payer: MEDICARE

## 2023-07-18 DIAGNOSIS — N13.8 PROSTATE HYPERPLASIA WITH URINARY OBSTRUCTION: ICD-10-CM

## 2023-07-18 DIAGNOSIS — I25.10 CORONARY ARTERY DISEASE INVOLVING NATIVE CORONARY ARTERY OF NATIVE HEART WITHOUT ANGINA PECTORIS: Chronic | ICD-10-CM

## 2023-07-18 DIAGNOSIS — N18.31 TYPE 2 DIABETES MELLITUS WITH STAGE 3A CHRONIC KIDNEY DISEASE, WITHOUT LONG-TERM CURRENT USE OF INSULIN: Chronic | ICD-10-CM

## 2023-07-18 DIAGNOSIS — I15.2 HYPERTENSION ASSOCIATED WITH TYPE 2 DIABETES MELLITUS: ICD-10-CM

## 2023-07-18 DIAGNOSIS — E11.69 HYPERLIPIDEMIA ASSOCIATED WITH TYPE 2 DIABETES MELLITUS: ICD-10-CM

## 2023-07-18 DIAGNOSIS — N53.19 EJACULATORY DISORDER: ICD-10-CM

## 2023-07-18 DIAGNOSIS — N53.19 ABNORMAL EJACULATION: ICD-10-CM

## 2023-07-18 DIAGNOSIS — E11.22 TYPE 2 DIABETES MELLITUS WITH STAGE 3A CHRONIC KIDNEY DISEASE, WITHOUT LONG-TERM CURRENT USE OF INSULIN: Chronic | ICD-10-CM

## 2023-07-18 DIAGNOSIS — E11.65 TYPE 2 DIABETES MELLITUS WITH HYPERGLYCEMIA, WITHOUT LONG-TERM CURRENT USE OF INSULIN: Primary | ICD-10-CM

## 2023-07-18 DIAGNOSIS — E78.5 HYPERLIPIDEMIA ASSOCIATED WITH TYPE 2 DIABETES MELLITUS: ICD-10-CM

## 2023-07-18 DIAGNOSIS — E11.59 HYPERTENSION ASSOCIATED WITH TYPE 2 DIABETES MELLITUS: ICD-10-CM

## 2023-07-18 DIAGNOSIS — N40.1 PROSTATE HYPERPLASIA WITH URINARY OBSTRUCTION: ICD-10-CM

## 2023-07-18 DIAGNOSIS — R39.198 URINARY STREAM SLOWING: ICD-10-CM

## 2023-07-18 PROCEDURE — 99215 PR OFFICE/OUTPT VISIT, EST, LEVL V, 40-54 MIN: ICD-10-PCS | Mod: 95,,, | Performed by: INTERNAL MEDICINE

## 2023-07-18 PROCEDURE — 3051F PR MOST RECENT HEMOGLOBIN A1C LEVEL 7.0 - < 8.0%: ICD-10-PCS | Mod: CPTII,95,, | Performed by: INTERNAL MEDICINE

## 2023-07-18 PROCEDURE — 99215 OFFICE O/P EST HI 40 MIN: CPT | Mod: 95,,, | Performed by: INTERNAL MEDICINE

## 2023-07-18 PROCEDURE — 3051F HG A1C>EQUAL 7.0%<8.0%: CPT | Mod: CPTII,95,, | Performed by: INTERNAL MEDICINE

## 2023-07-18 RX ORDER — DULAGLUTIDE 3 MG/.5ML
3 INJECTION, SOLUTION SUBCUTANEOUS
Qty: 16 PEN | Refills: 4 | Status: SHIPPED | OUTPATIENT
Start: 2023-07-18 | End: 2024-07-17

## 2023-07-18 NOTE — PROGRESS NOTES
FOLLOW-UP VISIT    Subjective:      Chief Complaint:  Follow-up for diabetes    HPI: Cecil Pringle is a 77 y.o. male who is here for a follow-up evaluation for diabetes    The patient's last visit with me was on 8/31/2022.      Past Medical History:   Diagnosis Date    Anxiety     Arthritis     Coronary artery disease     Depression     Diabetes mellitus, type 2     Diabetic neuropathy     Hyperlipidemia     Hypertension     Insomnia     PAD (peripheral artery disease)      With regards to the diabetes:    7/18/2023 (current visit):  BG running higher at bedtime, but overall about the same. He reports issues with weak urinary stream and dry ejaculation. He's not seen a urologist in the past. No side effects from the medications reported.        Checking BG: Average 130-140     Current diabetic medications include:   Metformin 1000 mg twice daily  Glipizide 10 mg BID  Farxiga (dapagliflozin) 10 mg daily  Trulicity 1.5 mg weekly       History of diabetes:  The patient was initially diagnosed with Type 2 diabetes mellitus:  Over 10 years ago.    Known diabetic complications: nephropathy (CKD Stage 3), peripheral neuropathy, cardiovascular disease and peripheral vascular disease     Cardiovascular risk factors: advanced age (older than 55 for men, 65 for women), diabetes mellitus, dyslipidemia, family history of premature cardiovascular disease, hypertension, male gender and microalbuminuria      Other medications tried:  Victoza (liraglutide) - did well with that in the past, but it was too expensive so he was not able to continue after the clinical trial  Januvia - stopped after initiating Trulicity  Ozempic - took for 2 years as part of a clinic trial before FDA approval. Did well with it.    Last visit with Diabetes Educator: Last Education Visit: Not Found      BP Readings from Last 3 Encounters:   05/15/23 120/66   04/06/23 (!) 156/61   12/13/22 (!) 149/74       Wt Readings from Last 10 Encounters:    05/15/23 101.9 kg (224 lb 10.4 oz)   09/19/22 100.7 kg (222 lb)   09/02/22 101.1 kg (222 lb 15.9 oz)   08/31/22 101.8 kg (224 lb 5.1 oz)   08/17/22 101.6 kg (224 lb)   06/22/22 101.7 kg (224 lb 3.3 oz)   12/16/21 102.3 kg (225 lb 8.5 oz)   11/18/21 103 kg (227 lb)   11/16/21 103 kg (227 lb)   09/27/21 103.1 kg (227 lb 4.7 oz)       Diabetes Management Status    Statin: Taking  ACE/ARB: Taking    Screening or Prevention Patient's value Goal Complete/Controlled?   HgA1C Testing and Control   Lab Results   Component Value Date    HGBA1C 7.4 (H) 07/17/2023      Annually/Less than 8% Yes   Lipid profile : 05/08/2023 Annually Yes   LDL control Lab Results   Component Value Date    LDLCALC 20.4 (L) 05/08/2023    Annually/Less than 100 mg/dl  Yes   Nephropathy screening Lab Results   Component Value Date    LABMICR 141.0 07/17/2023     Lab Results   Component Value Date    PROTEINUA 1+ (A) 06/22/2022     No results found for: UTPCR   Annually Yes   Blood pressure BP Readings from Last 1 Encounters:   05/15/23 120/66    Less than 140/90 Yes   Dilated retinal exam : 06/06/2023 Annually Yes   Foot exam   : 04/06/2023 Annually Yes            Low dose ASA?: Yes    Lab Results   Component Value Date    HGBA1C 7.4 (H) 07/17/2023    HGBA1C 7.6 (H) 05/08/2023    HGBA1C 7.0 (H) 12/22/2022    HGBA1C 7.1 (H) 06/22/2022    HGBA1C 7.5 (H) 04/11/2022    HGBA1C 7.9 (H) 12/09/2021    HGBA1C 8.3 (H) 11/03/2021    HGBA1C 7.6 (H) 07/29/2021    HGBA1C 7.5 (H) 05/28/2021    HGBA1C 7.4 (H) 03/30/2021         Reviewed past medical, family, social history and updated as appropriate.      Objective:       There were no vitals filed for this visit.      BP Readings from Last 5 Encounters:   05/15/23 120/66   04/06/23 (!) 156/61   12/13/22 (!) 149/74   09/19/22 (!) 140/70   09/02/22 (!) 150/70         Physical Exam      Wt Readings from Last 30 Encounters:   05/15/23 0725 101.9 kg (224 lb 10.4 oz)   09/19/22 1002 100.7 kg (222 lb)   09/02/22 0751  101.1 kg (222 lb 15.9 oz)   08/31/22 1329 101.8 kg (224 lb 5.1 oz)   08/17/22 0810 101.6 kg (224 lb)   06/22/22 0713 101.7 kg (224 lb 3.3 oz)   12/16/21 0729 102.3 kg (225 lb 8.5 oz)   11/18/21 0823 103 kg (227 lb)   11/16/21 0814 103 kg (227 lb)   09/27/21 1118 103.1 kg (227 lb 4.7 oz)   06/02/21 0719 103.7 kg (228 lb 9.9 oz)   04/06/21 0802 104.5 kg (230 lb 4.3 oz)   12/14/20 0807 103.8 kg (228 lb 13.4 oz)   09/14/20 0759 105.3 kg (232 lb 3.2 oz)   06/16/20 0914 105.7 kg (233 lb 0.4 oz)   06/13/20 0814 105.7 kg (233 lb 0.4 oz)   06/11/20 0822 105.7 kg (233 lb 0.4 oz)   01/13/20 1058 103 kg (227 lb)   01/09/20 0728 103.3 kg (227 lb 11.8 oz)   12/09/19 0729 101.9 kg (224 lb 10.4 oz)   11/04/19 0739 100.2 kg (220 lb 14.4 oz)   10/30/19 1205 103.4 kg (228 lb)   07/31/19 0733 103.5 kg (228 lb 2.8 oz)   06/14/19 0735 102.1 kg (225 lb)   05/10/19 0812 104.3 kg (230 lb)   05/01/19 0838 104.3 kg (230 lb)   04/25/19 0754 104.7 kg (230 lb 13.2 oz)   12/12/18 0818 99.8 kg (220 lb)   08/22/18 0904 105.4 kg (232 lb 7.6 oz)   06/26/18 0817 105.2 kg (232 lb)         Lab Results   Component Value Date    HGBA1C 7.4 (H) 07/17/2023     Lab Results   Component Value Date    CHOL 108 (L) 05/08/2023    HDL 31 (L) 05/08/2023    LDLCALC 20.4 (L) 05/08/2023    TRIG 283 (H) 05/08/2023    CHOLHDL 28.7 05/08/2023     Lab Results   Component Value Date     07/17/2023    K 4.2 07/17/2023     07/17/2023    CO2 25 07/17/2023     (H) 07/17/2023    BUN 19 07/17/2023    CREATININE 1.2 07/17/2023    CALCIUM 9.6 07/17/2023    PROT 7.2 07/17/2023    ALBUMIN 4.0 07/17/2023    BILITOT 0.4 07/17/2023    ALKPHOS 47 (L) 07/17/2023    AST 16 07/17/2023    ALT 17 07/17/2023    ANIONGAP 10 07/17/2023    ESTGFRAFRICA >60.0 06/22/2022    EGFRNONAA 53.8 (A) 06/22/2022    TSH 0.669 06/22/2022      Lab Results   Component Value Date    MICALBCREAT 282.0 (H) 07/17/2023       Assessment/Plan:       Type 2 diabetes mellitus with stage 3a chronic  kidney disease, without long-term current use of insulin  Reviewed goals of therapy are to get the best control we can without hypoglycemia.    Currently meeting glycemic target: no, but trending in the right direction.  We will repeat the A1c in 3-4 months to determine if we need to go up to 3.0 mg weekly.      Medication changes:   Increase:  Trulicity 1.5 > 3.0 mg weekly (gets through [email or web address])  Continue:    Metformin 1000 mg b.i.d.  Glipizide 10 mg BID with breakfast and supper  Farxiga (dapagliflozin) 10 mg daily      Advised frequent self blood glucose monitoring. Patient encouraged to document glucose results and bring them to every clinic visit. He was not able to get Dexcom 2/2 cost.    Hypoglycemia precautions discussed.     Close adherence to lifestyle changes recommended.      UTD on HM topics. See HPI    Lab Results   Component Value Date    HGBA1C 7.4 (H) 07/17/2023    HGBA1C 7.6 (H) 05/08/2023    HGBA1C 7.0 (H) 12/22/2022       Coronary artery disease involving native coronary artery of native heart without angina pectoris  Trulicity (dulaglutide) has been shown to decrease major adverse cardiac events in high risk patients.   Farxiga (dapagliflozin) has been shown to decrease major adverse cardiac events in high risk patients.      Hyperlipidemia associated with type 2 diabetes mellitus  See above.      Hypertension associated with type 2 diabetes mellitus  BP mostly controlled on the current regimen. He is monitoring his blood pressure regularly.    Urinary stream slowing  Most likely BPH. Will refer to urology.    Abnormal ejaculation  Refer to urology     The patient location is: home  The chief complaint leading to consultation is: diabetes    Visit type: audiovisual    Face to Face time with patient: 40 minutes of total time spent on the encounter, which includes face to face time and non-face to face time preparing to see the patient (eg, review of tests), Obtaining and/or reviewing  separately obtained history, Documenting clinical information in the electronic or other health record, Independently interpreting results (not separately reported) and communicating results to the patient/family/caregiver, or Care coordination (not separately reported).     Each patient to whom he or she provides medical services by telemedicine is:  (1) informed of the relationship between the physician and patient and the respective role of any other health care provider with respect to management of the patient; and (2) notified that he or she may decline to receive medical services by telemedicine and may withdraw from such care at any time.    Notes:     Follow up in about 6 months (around 1/18/2024).

## 2023-07-18 NOTE — ASSESSMENT & PLAN NOTE
Reviewed goals of therapy are to get the best control we can without hypoglycemia.    Currently meeting glycemic target: no, but trending in the right direction.  We will repeat the A1c in 3-4 months to determine if we need to go up to 3.0 mg weekly.      Medication changes:   Increase:  · Trulicity 1.5 > 3.0 mg weekly (gets through [facility])  Continue:     Metformin 1000 mg b.i.d.   Glipizide 10 mg BID with breakfast and supper   Farxiga (dapagliflozin) 10 mg daily      Advised frequent self blood glucose monitoring. Patient encouraged to document glucose results and bring them to every clinic visit. He was not able to get Dexcom 2/2 cost.    Hypoglycemia precautions discussed.     Close adherence to lifestyle changes recommended.      UTD on HM topics. See HPI    Lab Results   Component Value Date    HGBA1C 7.4 (H) 07/17/2023    HGBA1C 7.6 (H) 05/08/2023    HGBA1C 7.0 (H) 12/22/2022

## 2023-07-19 ENCOUNTER — TELEPHONE (OUTPATIENT)
Dept: PHARMACY | Facility: CLINIC | Age: 78
End: 2023-07-19
Payer: MEDICARE

## 2023-07-19 NOTE — TELEPHONE ENCOUNTER
I have reached out to Cecil Pringle to inform him  we need updated copies of his POI and signed OCCP/HIPAA authorization forms before we can submit his dose change to the [facility]   Cecil Pringle did not answer. I left a voicemail and mailed a letter/Authorization forms. I will follow up in 5 business days.

## 2023-07-19 NOTE — TELEPHONE ENCOUNTER
----- Message from Kishor Lockhart sent at 7/18/2023 10:36 AM CDT -----  Regarding: FW: Order for CECIL ALCAZAR,     Thank you for submitting a referral to the Pharmacy Patient Assistance Team. We have received your referral for Cecil Alcazar. This patient case has been assigned to LORETO MARTINEZ, who will review the case and contact the patient with assistance options. You may review progress in the patient's chart through Telephone Encounter notes from Pharmacy Patient Assistance.       Thank you,     Pharmacy Patient Assistance Team    ----- Message -----  From: Arturo Mccurdy MD  Sent: 7/18/2023   8:41 AM CDT  To: Pharmacy Patient Assistance Team  Subject: Order for CECIL ALCAZAR

## 2023-08-07 ENCOUNTER — OFFICE VISIT (OUTPATIENT)
Dept: UROLOGY | Facility: CLINIC | Age: 78
End: 2023-08-07
Payer: MEDICARE

## 2023-08-07 ENCOUNTER — TELEPHONE (OUTPATIENT)
Dept: INTERNAL MEDICINE | Facility: CLINIC | Age: 78
End: 2023-08-07
Payer: MEDICARE

## 2023-08-07 VITALS
HEART RATE: 57 BPM | WEIGHT: 226.19 LBS | HEIGHT: 75 IN | BODY MASS INDEX: 28.12 KG/M2 | DIASTOLIC BLOOD PRESSURE: 75 MMHG | SYSTOLIC BLOOD PRESSURE: 170 MMHG

## 2023-08-07 DIAGNOSIS — N53.19 EJACULATORY DISORDER: ICD-10-CM

## 2023-08-07 DIAGNOSIS — N13.8 PROSTATE HYPERPLASIA WITH URINARY OBSTRUCTION: ICD-10-CM

## 2023-08-07 DIAGNOSIS — N32.81 OAB (OVERACTIVE BLADDER): Primary | ICD-10-CM

## 2023-08-07 DIAGNOSIS — N40.1 PROSTATE HYPERPLASIA WITH URINARY OBSTRUCTION: ICD-10-CM

## 2023-08-07 PROCEDURE — 3077F SYST BP >= 140 MM HG: CPT | Mod: CPTII,S$GLB,, | Performed by: UROLOGY

## 2023-08-07 PROCEDURE — 1159F PR MEDICATION LIST DOCUMENTED IN MEDICAL RECORD: ICD-10-PCS | Mod: CPTII,S$GLB,, | Performed by: UROLOGY

## 2023-08-07 PROCEDURE — 51798 PR MEAS,POST-VOID RES,US,NON-IMAGING: ICD-10-PCS | Mod: S$GLB,,, | Performed by: UROLOGY

## 2023-08-07 PROCEDURE — 3078F DIAST BP <80 MM HG: CPT | Mod: CPTII,S$GLB,, | Performed by: UROLOGY

## 2023-08-07 PROCEDURE — 99204 PR OFFICE/OUTPT VISIT, NEW, LEVL IV, 45-59 MIN: ICD-10-PCS | Mod: S$GLB,,, | Performed by: UROLOGY

## 2023-08-07 PROCEDURE — 3288F PR FALLS RISK ASSESSMENT DOCUMENTED: ICD-10-PCS | Mod: CPTII,S$GLB,, | Performed by: UROLOGY

## 2023-08-07 PROCEDURE — 51798 US URINE CAPACITY MEASURE: CPT | Mod: S$GLB,,, | Performed by: UROLOGY

## 2023-08-07 PROCEDURE — 1159F MED LIST DOCD IN RCRD: CPT | Mod: CPTII,S$GLB,, | Performed by: UROLOGY

## 2023-08-07 PROCEDURE — 1100F PR PT FALLS ASSESS DOC 2+ FALLS/FALL W/INJURY/YR: ICD-10-PCS | Mod: CPTII,S$GLB,, | Performed by: UROLOGY

## 2023-08-07 PROCEDURE — 99999 PR PBB SHADOW E&M-EST. PATIENT-LVL V: ICD-10-PCS | Mod: PBBFAC,,, | Performed by: UROLOGY

## 2023-08-07 PROCEDURE — 99999 PR PBB SHADOW E&M-EST. PATIENT-LVL V: CPT | Mod: PBBFAC,,, | Performed by: UROLOGY

## 2023-08-07 PROCEDURE — 99204 OFFICE O/P NEW MOD 45 MIN: CPT | Mod: S$GLB,,, | Performed by: UROLOGY

## 2023-08-07 PROCEDURE — 3051F PR MOST RECENT HEMOGLOBIN A1C LEVEL 7.0 - < 8.0%: ICD-10-PCS | Mod: CPTII,S$GLB,, | Performed by: UROLOGY

## 2023-08-07 PROCEDURE — 3077F PR MOST RECENT SYSTOLIC BLOOD PRESSURE >= 140 MM HG: ICD-10-PCS | Mod: CPTII,S$GLB,, | Performed by: UROLOGY

## 2023-08-07 PROCEDURE — 1126F PR PAIN SEVERITY QUANTIFIED, NO PAIN PRESENT: ICD-10-PCS | Mod: CPTII,S$GLB,, | Performed by: UROLOGY

## 2023-08-07 PROCEDURE — 1126F AMNT PAIN NOTED NONE PRSNT: CPT | Mod: CPTII,S$GLB,, | Performed by: UROLOGY

## 2023-08-07 PROCEDURE — 1100F PTFALLS ASSESS-DOCD GE2>/YR: CPT | Mod: CPTII,S$GLB,, | Performed by: UROLOGY

## 2023-08-07 PROCEDURE — 3288F FALL RISK ASSESSMENT DOCD: CPT | Mod: CPTII,S$GLB,, | Performed by: UROLOGY

## 2023-08-07 PROCEDURE — 3078F PR MOST RECENT DIASTOLIC BLOOD PRESSURE < 80 MM HG: ICD-10-PCS | Mod: CPTII,S$GLB,, | Performed by: UROLOGY

## 2023-08-07 PROCEDURE — 3051F HG A1C>EQUAL 7.0%<8.0%: CPT | Mod: CPTII,S$GLB,, | Performed by: UROLOGY

## 2023-08-07 RX ORDER — OXYBUTYNIN CHLORIDE 10 MG/1
10 TABLET, EXTENDED RELEASE ORAL DAILY
Qty: 30 TABLET | Refills: 11 | Status: SHIPPED | OUTPATIENT
Start: 2023-08-07 | End: 2023-11-27 | Stop reason: SDUPTHER

## 2023-08-07 RX ORDER — TAMSULOSIN HYDROCHLORIDE 0.4 MG/1
0.4 CAPSULE ORAL NIGHTLY
Qty: 30 CAPSULE | Refills: 11 | Status: SHIPPED | OUTPATIENT
Start: 2023-08-07 | End: 2023-11-27 | Stop reason: SDUPTHER

## 2023-08-07 NOTE — TELEPHONE ENCOUNTER
----- Message from Layo Jett MD sent at 8/7/2023  1:05 PM CDT -----  Regarding: RE: clarify meds  Contact: HALI Brock [phone]  It is okay to fill Plavix as prescribed.  ----- Message -----  From: Yu Bennett MA  Sent: 8/7/2023  12:55 PM CDT  To: Layo Jett MD  Subject: clarify meds                                       ----- Message -----  From: Lidya Lopez  Sent: 8/7/2023  12:28 PM CDT  To: Johnie Vasques Staff    Pharmacy is calling to clarify an RX.  RX name:  clopidogreL (PLAVIX) 75 mg tablet  What do they need to clarify:  Just calling to confirm that it is okay to fill the following medication with t    clopidogreL (PLAVIX) 75 mg tablet  possible interaction with these 2 drugs.    Please call and advise.    Thank You

## 2023-08-07 NOTE — PATIENT INSTRUCTIONS
Lab Results   Component Value Date    PSA 0.62 06/22/2022    PSA 0.54 05/28/2021    PSA 0.48 04/18/2019    PSADIAG 0.58 12/09/2021

## 2023-08-07 NOTE — PROGRESS NOTES
CC: BPH and OAB    Cecil Pringle is a 77 y.o. man who is here for the evaluation of Advice Only (Pt states he is here for a consult w/Dr Apodaca. Was referred by Dr Mccurdy for Prostate hyperplasis w/Urinary obstruction.)    A new pt referred by his PCP, Layo Jett MD     Urination symptoms: Positive for frequency, urgency, nocturia x3 and incomplete emptying, intermittency, weak stream, straining, postvoid dribbling.  Denies flank pain, dysuria, hematuria. Not currently taking any medications for his urinary symptoms.     the patient is a former smoker who quit smoking in .  PSA in  was 0.54.     Does have issues achieving and erection, has tried Viagra. Not concerned about erectile function at this time.     Past Medical History:   Diagnosis Date    Anxiety     Arthritis     Coronary artery disease     Depression     Diabetes mellitus, type 2     Diabetic neuropathy     Hyperlipidemia     Hypertension     Insomnia     PAD (peripheral artery disease)      Past Surgical History:   Procedure Laterality Date    COLONOSCOPY N/A 2018    Procedure: COLONOSCOPY;  Surgeon: Jabari Pollock MD;  Location: [facility] ENDO;  Service: Endoscopy;  Laterality: N/A;  ok to hold Plavix x 5 days, pt requesting [place]-[facility]    COLONOSCOPY N/A 2019    Procedure: COLONOSCOPY/Golytely;  Surgeon: Joon Devi MD;  Location: [facility] ENDO;  Service: Endoscopy;  Laterality: N/A;    CORONARY ARTERY BYPASS GRAFT      CORONARY ARTERY BYPASS GRAFT      coronary stents  2013    x5    left foot surgery      Right hand surgery       Social History     Tobacco Use    Smoking status: Former     Current packs/day: 2.50     Average packs/day: 2.5 packs/day for 30.0 years (75.0 ttl pk-yrs)     Types: Cigarettes    Smokeless tobacco: Former   Substance Use Topics    Alcohol use: No    Drug use: No     Family History   Problem Relation Age of Onset    Heart disease Father          at the age of 56    Heart  disease Brother     Heart disease Paternal Aunt     Heart disease Paternal Uncle     Cancer Mother         mesothelioma,  at the age of 78    Heart disease Sister     Heart disease Maternal Aunt     Glaucoma Neg Hx     Macular degeneration Neg Hx     Retinal detachment Neg Hx     Strabismus Neg Hx     Amblyopia Neg Hx     Blindness Neg Hx     Cataracts Neg Hx      Allergy:  Review of patient's allergies indicates:  No Known Allergies  Outpatient Encounter Medications as of 2023   Medication Sig Dispense Refill    aspirin (ECOTRIN) 81 MG EC tablet Take 81 mg by mouth once daily.      cholecalciferol, vitamin D3, (VITAMIN D3) 25 mcg (1,000 unit) capsule Take 2 capsules (2,000 Units total) by mouth once daily.  0    ciclopirox (PENLAC) 8 % Soln APPLY TO AFFECTED AREA AT NIGHT 6.6 mL 11    clopidogreL (PLAVIX) 75 mg tablet TAKE 1 TABLET BY MOUTH EVERY DAY 90 tablet 3    co-enzyme Q-10 30 mg capsule Take 30 mg by mouth once daily.       dapagliflozin (FARXIGA) 10 mg tablet Take 1 tablet (10 mg total) by mouth once daily. 90 tablet 3    dulaglutide (TRULICITY) 3 mg/0.5 mL pen injector Inject 3 mg into the skin every 7 days. 16 pen 4    fenofibrate 160 MG Tab Take 1 tablet (160 mg total) by mouth once daily. 90 tablet 3    fish oil-omega-3 fatty acids 300-1,000 mg capsule Take 2 g by mouth 3 (three) times daily.      FLUoxetine 20 MG capsule Take 1 capsule (20 mg total) by mouth once daily. 90 capsule 3    glipiZIDE (GLUCOTROL) 5 MG tablet Take 2 tablets (10 mg total) by mouth 2 (two) times daily with meals. 360 tablet 3    hydroCHLOROthiazide (HYDRODIURIL) 12.5 MG Tab TAKE 1 TABLET BY MOUTH EVERY DAY 90 tablet 3    hydrOXYzine pamoate (VISTARIL) 25 MG Cap TAKE 1 CAPSULE BY MOUTH EVERY EVENING FOR ALLERGIES 90 capsule 3    lancets Misc To check BG 1 times daily, to use with insurance preferred meter 100 each 3    losartan (COZAAR) 100 MG tablet TAKE 1 TABLET BY MOUTH EVERY DAY 90 tablet 3    metFORMIN (GLUCOPHAGE)  1000 MG tablet Take 1 tablet (1,000 mg total) by mouth 2 (two) times daily. 180 tablet 3    metoprolol tartrate (LOPRESSOR) 100 MG tablet Take 0.5 tablets (50 mg total) by mouth 2 (two) times daily. 90 tablet 3    MULTIVIT-IRON-MIN-FOLIC ACID 3,500-18-0.4 UNIT-MG-MG ORAL CHEW Take 1 tablet by mouth once daily.      NIFEdipine (PROCARDIA-XL) 60 MG (OSM) 24 hr tablet TAKE 1 TABLET BY MOUTH EVERY DAY 90 tablet 3    omega-3 acid ethyl esters (LOVAZA) 1 gram capsule TAKE 2 CAPSULES BY MOUTH TWICE A  capsule 3    rosuvastatin (CRESTOR) 20 MG tablet TAKE 1 TABLET BY MOUTH EVERY DAY IN THE EVENING 90 tablet 3    SAW PALMETTO ORAL Take 1 capsule by mouth 2 (two) times daily.      temazepam (RESTORIL) 30 mg capsule Take 1 capsule (30 mg total) by mouth nightly as needed for Insomnia. 30 capsule 0    TRUE METRIX GLUCOSE TEST STRIP Strp USE 1 STRIP TO CHECK GLUCOSE TWO TIMES DAILY 200 strip 3    blood-glucose meter kit To check BG 1 times daily, to use with insurance preferred meter 1 each 0    oxybutynin (DITROPAN-XL) 10 MG 24 hr tablet Take 1 tablet (10 mg total) by mouth once daily. 30 tablet 11    tamsulosin (FLOMAX) 0.4 mg Cap Take 1 capsule (0.4 mg total) by mouth every evening. 30 capsule 11     No facility-administered encounter medications on file as of 8/7/2023.     Review of Systems   Review of Systems   Constitutional:  Negative for chills and fever.   Respiratory:  Negative for shortness of breath.    Gastrointestinal:  Negative for abdominal pain and vomiting.   Genitourinary:  Positive for frequency and urgency. Negative for dysuria, flank pain and hematuria.     Physical Exam     Vitals:    08/07/23 0854   BP: (!) 170/75   Pulse: (!) 57     Physical Exam  Constitutional:       General: He is not in acute distress.     Appearance: Normal appearance. He is well-developed. He is not diaphoretic.   HENT:      Head: Normocephalic and atraumatic.      Right Ear: External ear normal.      Left Ear: External ear  normal.      Nose: Nose normal.   Eyes:      Extraocular Movements: Extraocular movements intact.      Conjunctiva/sclera: Conjunctivae normal.      Pupils: Pupils are equal, round, and reactive to light.   Neck:      Thyroid: No thyromegaly.      Vascular: No JVD.      Trachea: No tracheal deviation.   Cardiovascular:      Rate and Rhythm: Normal rate and regular rhythm.      Heart sounds: Normal heart sounds. No murmur heard.     No friction rub. No gallop.   Pulmonary:      Effort: Pulmonary effort is normal. No respiratory distress.      Breath sounds: Normal breath sounds. No wheezing.   Chest:      Chest wall: No tenderness.   Abdominal:      General: Abdomen is flat. Bowel sounds are normal. There is no distension.      Palpations: Abdomen is soft. There is no mass.      Tenderness: There is no abdominal tenderness. There is no right CVA tenderness, left CVA tenderness, guarding or rebound.   Genitourinary:     Penis: Normal. No tenderness.       Rectum: Normal.      Comments: Prostate 25 grams with negative nodule or negative tenderness  No tenderness noted on his levator ani.  Able to contract and relax without any pain.    Musculoskeletal:         General: No tenderness or deformity. Normal range of motion.      Cervical back: Normal range of motion and neck supple.   Lymphadenopathy:      Cervical: No cervical adenopathy.   Skin:     General: Skin is warm and dry.      Coloration: Skin is not jaundiced.   Neurological:      General: No focal deficit present.      Mental Status: He is alert and oriented to person, place, and time.   Psychiatric:         Mood and Affect: Mood normal.         Behavior: Behavior normal.         Thought Content: Thought content normal.       Genitalia:  Scrotum: no rash or lesion  Normal symmetric epididymis without masses  Normal vas palpated  Normal size, symmetric testicles with no masses   Normal urethral meatus with no discharge  Normal circumcised penis with no lesion  "  Rectal:  Normal perineum and anus upon inspection.  Normal tone, no masses or tenderness;     LABS:  Lab Results   Component Value Date    PSA 0.62 06/22/2022    PSA 0.54 05/28/2021    PSA 0.48 04/18/2019    PSA 0.49 03/01/2018    PSA 0.78 01/30/2017    PSA 0.59 11/04/2015    PSADIAG 0.58 12/09/2021     Results for orders placed or performed in visit on 12/09/21   Prostate Specific Antigen, Diagnostic   Result Value Ref Range    PSA Diagnostic 0.58 0.00 - 4.00 ng/mL     Lab Results   Component Value Date    CREATININE 1.2 07/17/2023    CREATININE 1.3 05/08/2023    CREATININE 1.2 12/22/2022     No results found for this or any previous visit.  No results found for: "LABURIN"  Hemoglobin A1C   Date Value Ref Range Status   07/17/2023 7.4 (H) 4.0 - 5.6 % Final     Comment:     ADA Screening Guidelines:  5.7-6.4%  Consistent with prediabetes  >or=6.5%  Consistent with diabetes    High levels of fetal hemoglobin interfere with the HbA1C  assay. Heterozygous hemoglobin variants (HbS, HgC, etc)do  not significantly interfere with this assay.   However, presence of multiple variants may affect accuracy.     05/08/2023 7.6 (H) 4.0 - 5.6 % Final     Comment:     ADA Screening Guidelines:  5.7-6.4%  Consistent with prediabetes  >or=6.5%  Consistent with diabetes    High levels of fetal hemoglobin interfere with the HbA1C  assay. Heterozygous hemoglobin variants (HbS, HgC, etc)do  not significantly interfere with this assay.   However, presence of multiple variants may affect accuracy.         UA clear   PVR per bladder scan: 47 ml    Assessment and Plan:  Cecil was seen today for advice only.    Diagnoses and all orders for this visit:    OAB (overactive bladder)  -     oxybutynin (DITROPAN-XL) 10 MG 24 hr tablet; Take 1 tablet (10 mg total) by mouth once daily.    Prostate hyperplasia with urinary obstruction  -     Ambulatory referral/consult to Urology  -     tamsulosin (FLOMAX) 0.4 mg Cap; Take 1 capsule (0.4 mg total) " by mouth every evening.    Ejaculatory disorder  -     Ambulatory referral/consult to Urology    Start flomax and see how he does.  If his OAB symptoms not improving in 1 month, add oxybutynin xl 10 mg daily in addition to flomax.    May evaluate him with suds cysto if not improving in 3 months with the above medical management.    Follow-up:  Follow up in about 3 months (around 11/7/2023).

## 2023-08-08 NOTE — TELEPHONE ENCOUNTER
The signed and completed patient assistance application was received from the providers office and  has been submitted to [facility] for consideration of eligibility.

## 2023-08-18 NOTE — TELEPHONE ENCOUNTER
Secondary to the patient's uncontrolled diabetes, I have started the patient on Victoza.  I have ordered a repeat CMP, A1c, and fasting lipid panel in 3 months.  Please inform the patient.  Thank you.         No

## 2023-09-18 RX ORDER — METOPROLOL TARTRATE 100 MG/1
50 TABLET ORAL 2 TIMES DAILY
Qty: 90 TABLET | Refills: 3 | Status: CANCELLED | OUTPATIENT
Start: 2023-09-18

## 2023-09-19 DIAGNOSIS — E78.2 MIXED HYPERLIPIDEMIA: Chronic | ICD-10-CM

## 2023-09-19 RX ORDER — OMEGA-3-ACID ETHYL ESTERS 1 G/1
2 CAPSULE, LIQUID FILLED ORAL 2 TIMES DAILY
Qty: 360 CAPSULE | Refills: 3 | Status: SHIPPED | OUTPATIENT
Start: 2023-09-19

## 2023-09-19 RX ORDER — FLUOXETINE HYDROCHLORIDE 20 MG/1
20 CAPSULE ORAL DAILY
Qty: 90 CAPSULE | Refills: 3 | Status: CANCELLED | OUTPATIENT
Start: 2023-09-19

## 2023-09-19 NOTE — TELEPHONE ENCOUNTER
No care due was identified.  [facility] Embedded Care Due Messages. Reference number: [id number].   9/18/2023 9:15:24 PM CDT

## 2023-09-19 NOTE — TELEPHONE ENCOUNTER
FLUoxetine 20 MG capsule 90 capsule 3 5/15/2023 -- No   Sig - Route: Take 1 capsule (20 mg total) by mouth once daily. - Oral   Sent to pharmacy as: FLUoxetine 20 MG capsule   Class: Normal   Order: [id number]   Date/Time Signed: 5/15/2023 07:47       E-Prescribing Status: Receipt confirmed by pharmacy (5/15/2023  7:48 AM CDT)     Pharmacy    [id number] - [place] - [phone]

## 2023-09-19 NOTE — TELEPHONE ENCOUNTER
No care due was identified.  [facility] Embedded Care Due Messages. Reference number: [id number].   9/19/2023 1:11:12 PM CDT

## 2023-09-26 ENCOUNTER — TELEPHONE (OUTPATIENT)
Dept: ENDOSCOPY | Facility: HOSPITAL | Age: 78
End: 2023-09-26

## 2023-09-26 ENCOUNTER — TELEPHONE (OUTPATIENT)
Dept: GASTROENTEROLOGY | Facility: CLINIC | Age: 78
End: 2023-09-26
Payer: MEDICARE

## 2023-09-26 ENCOUNTER — CLINICAL SUPPORT (OUTPATIENT)
Dept: ENDOSCOPY | Facility: HOSPITAL | Age: 78
End: 2023-09-26
Attending: FAMILY MEDICINE
Payer: MEDICARE

## 2023-09-26 VITALS — HEIGHT: 75 IN | BODY MASS INDEX: 28.12 KG/M2 | WEIGHT: 226.19 LBS

## 2023-09-26 DIAGNOSIS — Z86.010 PERSONAL HISTORY OF COLONIC POLYPS: ICD-10-CM

## 2023-09-26 DIAGNOSIS — Z86.010 PERSONAL HISTORY OF COLONIC POLYPS: Primary | ICD-10-CM

## 2023-09-26 RX ORDER — SODIUM PICOSULFATE, MAGNESIUM OXIDE, AND ANHYDROUS CITRIC ACID 12; 3.5; 1 G/175ML; G/175ML; MG/175ML
1 LIQUID ORAL ONCE
Qty: 175 ML | Refills: 0 | Status: SHIPPED | OUTPATIENT
Start: 2023-09-26 | End: 2023-09-26

## 2023-09-26 NOTE — TELEPHONE ENCOUNTER
Patient scheduled for colonoscopy on 11/1/2023. Clearance required to discontinue taking Plavix prior to colonoscopy. Please advise.

## 2023-09-26 NOTE — TELEPHONE ENCOUNTER
PAT call to schedule patient. Patient requesting to be scheduled in [place]. Number to Endoscopy given to patient. Patient stated that he has been waiting for for a while to schedule his colonoscopy and would like to schedule asap. Pt. Information given to supervisor SEA Martins to send to [place] manager to reach out to patient.

## 2023-09-26 NOTE — TELEPHONE ENCOUNTER
Endoscopy Scheduling Questionnaire:         Are you taking any blood thinners? yes               If Yes  Have you been on them for longer than one year? yes    2. Have you been diagnosed with Diverticulitis in past three months?  no    3. Are you on dialysis or have Kidney Disease? no    4. Previous Colonoscopy?  yes         If yes Do you have a history of colon polyps?  yes    5. Family History of Colon Cancer: no         Relation: n/a       Age at Diagnosis: n/a      6. Are you a diabetic?  yes    7. Do you have a history of constipation?  no    8. Are you taking a GLP-1 Agonist/Adipex (weight loss drugs)? Yes Patient instructed to hold Trulicity 1 week prior to colonoscopy.  Dulaglutide (Trulicity) (weekly)  Exenatide extended release (Bydureon bcise) (weekly)  Exenatide (Byetta) (twice daily)  Semaglutide (Ozempic) (weekly)  Liraglutide (Victoza, Saxenda) (daily)  Lixisenatide (Adlyxin) (daily)  Semaglutide (Rybelsus) (taken by mouth once daily)    Hold GLP-1 agonists on the day of the procedure/surgery for patients who take the medication daily.    Hold GLP-1 agonists a week prior to the procedure/surgery for patients who take the medication weekly.    Procedure scheduled with Dr. Dsouza  on  11/1/2023    The prep being used is Clenpiq     The patient's prep instructions were sent via patient portal.      CLENPIQ Instructions    You are scheduled for a colonoscopy with Dr. Dsouza on 11/1/2023 at [facility] located at [address] [place] [address].  Check in at the admit desk, first floor of the hospital (which is the building on the left).     You will receive a call 2-3 days before your colonoscopy to tell you the time to arrive.  If you have not received a call by the day before your procedure, call the Endoscopy Lab at [phone].    To ensure that your test is accurate and complete, you MUST follow these instructions listed below.  If you have any questions, please call our office at  [phone].  Plan on being at the hospital for your procedure for 3-4 hours. Please contact the office at [phone] two days prior to procedure date if reschedule is needed.    1.  Follow a CLEAR LIQUID DIET for the entire day before your scheduled colonoscopy.  This means no solid food the entire day starting when you wake.  You may have as much of the clear liquids as you want throughout the day.   CLEAR LIQUID DIET:   - Avoid Red, Orange, Purple, and/or Blue food coloring   - NO DAIRY   - You can have:  Coffee with sugar (no creamer), tea, water, soda, apple or white grape juice, chicken or beef broth/bouillon (no meat, noodles, or veggies), green/yellow popsicles, green/yellow Jell-O, lemonade.    2.  AT 5 pm the evening before your colonoscopy, OPEN ONE (1) BOTTLE OF CLENPIQ AND DRINK THE ENTIRE BOTTLE.  DRINK FIVE (5) 8-OUNCE GLASSES OF WATER (40 OUNCES TOTAL) OVER THE NEXT FIVE (5) HOURS.     3.  The endoscopy department will call you 2 days before your colonoscopy to tell you the exact time to arrive, AND to tell you the exact time to drink the 2nd portion of your prep (which will be FIVE HOURS BEFORE YOUR ARRIVAL TIME).  At this time given to you, OPEN THE OTHER ONE (1) BOTTLE OF CLENPIQ AND DRINK THE ENTIRE BOTTLE.  DRINK THREE (3) 8-OUNCE GLASSES OF WATER (24 OUNCES TOTAL) OVER THE NEXT THREE (3) HOURS. Once this is complete, you can not have anything else by mouth!    4.  You must have someone with you to DRIVE YOU HOME since you will be receiving IV sedation for the colonoscopy.    5.  It is ok to take your heart, blood pressure, and seizure medications in the morning of your test with a SIP of water.  Hold other medications until after your procedure.  Do NOT have anything else to eat or drink the morning of your colonoscopy.  It is ok to brush your teeth.    6.  If you are on blood thinners THAT YOU HAVE BEEN INSTRUCTED TO HOLD BY YOUR DOCTOR FOR THIS PROCEDURE, then do NOT take this the morning  of your colonoscopy.  Do NOT stop these medications on your own, they must be approved to be held by your doctor.  Your colonoscopy can NOT be done if you are on these medications.  Examples of blood thinners include: Coumadin, Aggrenox, Plavix, Pradaxa, Reapro, Pletal, Xarelto, Ticagrelor, Brilinta, Eliquis, and high dose aspirin (325 mg).  You do not have to stop baby aspirin 81 mg.    7.  IF YOU ARE DIABETIC:  NO INSULIN OR ORAL MEDICATIONS THE MORNING OF THE COLONOSCOPY.  TAKE ONLY HALF THE DOSE OF YOUR INSULIN THE DAY BEFORE THE COLONOSCOPY.  DO NOT TAKE ANY ORAL DIABETIC MEDICATIONS THE DAY BEFORE THE COLONOSCOPY.  IF YOU ARE AN INSULIN DEPENDENT DIABETIC WITH UNSTABLE BLOOD SUGARS, NOTIFY YOUR PRIMARY CARE PHYSICIAN FOR INSTRUCTIONS.    8. Please hold any GLP-1 medications prior to the procedure: Dulaglutide Trulicity(hold week prior), Exenatide Byetta (hold the morning of procedure), Semaglutide Ozempic (hold week prior), Liraglutide Victoza, Saxenda(hold week prior), Lixisenatide Adlyxin (hold the morning of procedure), Semaglutide Rybelsus (hold the morning of procedure), Tirzepatide Mounjaro (hold week prior)

## 2023-09-26 NOTE — PLAN OF CARE
PAT call to schedule patient. Patient requesting to be scheduled in [place]. Number to Endoscopy given to patient. Patient stated that he has been waiting for for a while to schedule his colonoscopy and would like to schedule asap. Pt. Information given to supervisor SEA Martins to send to [place] manager to reach out to patient.

## 2023-09-27 NOTE — TELEPHONE ENCOUNTER
Cleared for colonoscopy and anesthesia. Hold Plavix for 5 days prior to procedure. Please resume after the procedure. Patient should continue low dose aspirin.

## 2023-10-26 ENCOUNTER — OFFICE VISIT (OUTPATIENT)
Dept: PODIATRY | Facility: CLINIC | Age: 78
End: 2023-10-26
Payer: MEDICARE

## 2023-10-26 VITALS
DIASTOLIC BLOOD PRESSURE: 62 MMHG | HEIGHT: 75 IN | SYSTOLIC BLOOD PRESSURE: 152 MMHG | HEART RATE: 58 BPM | BODY MASS INDEX: 28.27 KG/M2

## 2023-10-26 DIAGNOSIS — E08.42 DIABETIC POLYNEUROPATHY ASSOCIATED WITH DIABETES MELLITUS DUE TO UNDERLYING CONDITION: ICD-10-CM

## 2023-10-26 DIAGNOSIS — E11.51 ONYCHOMYCOSIS OF MULTIPLE TOENAILS WITH TYPE 2 DIABETES MELLITUS AND PERIPHERAL ANGIOPATHY: Primary | ICD-10-CM

## 2023-10-26 DIAGNOSIS — I73.9 PAD (PERIPHERAL ARTERY DISEASE): ICD-10-CM

## 2023-10-26 DIAGNOSIS — B35.1 ONYCHOMYCOSIS OF MULTIPLE TOENAILS WITH TYPE 2 DIABETES MELLITUS AND PERIPHERAL ANGIOPATHY: Primary | ICD-10-CM

## 2023-10-26 DIAGNOSIS — E11.69 ONYCHOMYCOSIS OF MULTIPLE TOENAILS WITH TYPE 2 DIABETES MELLITUS AND PERIPHERAL ANGIOPATHY: Primary | ICD-10-CM

## 2023-10-26 DIAGNOSIS — L84 CORN OR CALLUS: ICD-10-CM

## 2023-10-26 PROCEDURE — 99499 NO LOS: ICD-10-PCS | Mod: S$GLB,,, | Performed by: STUDENT IN AN ORGANIZED HEALTH CARE EDUCATION/TRAINING PROGRAM

## 2023-10-26 PROCEDURE — 1159F MED LIST DOCD IN RCRD: CPT | Mod: CPTII,S$GLB,, | Performed by: STUDENT IN AN ORGANIZED HEALTH CARE EDUCATION/TRAINING PROGRAM

## 2023-10-26 PROCEDURE — 3078F PR MOST RECENT DIASTOLIC BLOOD PRESSURE < 80 MM HG: ICD-10-PCS | Mod: CPTII,S$GLB,, | Performed by: STUDENT IN AN ORGANIZED HEALTH CARE EDUCATION/TRAINING PROGRAM

## 2023-10-26 PROCEDURE — 11721 DEBRIDE NAIL 6 OR MORE: CPT | Mod: Q9,59,S$GLB, | Performed by: STUDENT IN AN ORGANIZED HEALTH CARE EDUCATION/TRAINING PROGRAM

## 2023-10-26 PROCEDURE — 99999 PR PBB SHADOW E&M-EST. PATIENT-LVL IV: ICD-10-PCS | Mod: PBBFAC,,, | Performed by: STUDENT IN AN ORGANIZED HEALTH CARE EDUCATION/TRAINING PROGRAM

## 2023-10-26 PROCEDURE — 11056 ROUTINE FOOT CARE: ICD-10-PCS | Mod: Q9,S$GLB,, | Performed by: STUDENT IN AN ORGANIZED HEALTH CARE EDUCATION/TRAINING PROGRAM

## 2023-10-26 PROCEDURE — 1126F PR PAIN SEVERITY QUANTIFIED, NO PAIN PRESENT: ICD-10-PCS | Mod: CPTII,S$GLB,, | Performed by: STUDENT IN AN ORGANIZED HEALTH CARE EDUCATION/TRAINING PROGRAM

## 2023-10-26 PROCEDURE — 3077F SYST BP >= 140 MM HG: CPT | Mod: CPTII,S$GLB,, | Performed by: STUDENT IN AN ORGANIZED HEALTH CARE EDUCATION/TRAINING PROGRAM

## 2023-10-26 PROCEDURE — 1126F AMNT PAIN NOTED NONE PRSNT: CPT | Mod: CPTII,S$GLB,, | Performed by: STUDENT IN AN ORGANIZED HEALTH CARE EDUCATION/TRAINING PROGRAM

## 2023-10-26 PROCEDURE — 3051F HG A1C>EQUAL 7.0%<8.0%: CPT | Mod: CPTII,S$GLB,, | Performed by: STUDENT IN AN ORGANIZED HEALTH CARE EDUCATION/TRAINING PROGRAM

## 2023-10-26 PROCEDURE — 3077F PR MOST RECENT SYSTOLIC BLOOD PRESSURE >= 140 MM HG: ICD-10-PCS | Mod: CPTII,S$GLB,, | Performed by: STUDENT IN AN ORGANIZED HEALTH CARE EDUCATION/TRAINING PROGRAM

## 2023-10-26 PROCEDURE — 99999 PR PBB SHADOW E&M-EST. PATIENT-LVL IV: CPT | Mod: PBBFAC,,, | Performed by: STUDENT IN AN ORGANIZED HEALTH CARE EDUCATION/TRAINING PROGRAM

## 2023-10-26 PROCEDURE — 99499 UNLISTED E&M SERVICE: CPT | Mod: S$GLB,,, | Performed by: STUDENT IN AN ORGANIZED HEALTH CARE EDUCATION/TRAINING PROGRAM

## 2023-10-26 PROCEDURE — 11721 ROUTINE FOOT CARE: ICD-10-PCS | Mod: Q9,59,S$GLB, | Performed by: STUDENT IN AN ORGANIZED HEALTH CARE EDUCATION/TRAINING PROGRAM

## 2023-10-26 PROCEDURE — 3051F PR MOST RECENT HEMOGLOBIN A1C LEVEL 7.0 - < 8.0%: ICD-10-PCS | Mod: CPTII,S$GLB,, | Performed by: STUDENT IN AN ORGANIZED HEALTH CARE EDUCATION/TRAINING PROGRAM

## 2023-10-26 PROCEDURE — 1159F PR MEDICATION LIST DOCUMENTED IN MEDICAL RECORD: ICD-10-PCS | Mod: CPTII,S$GLB,, | Performed by: STUDENT IN AN ORGANIZED HEALTH CARE EDUCATION/TRAINING PROGRAM

## 2023-10-26 PROCEDURE — 3078F DIAST BP <80 MM HG: CPT | Mod: CPTII,S$GLB,, | Performed by: STUDENT IN AN ORGANIZED HEALTH CARE EDUCATION/TRAINING PROGRAM

## 2023-10-26 PROCEDURE — 11056 PARNG/CUTG B9 HYPRKR LES 2-4: CPT | Mod: Q9,S$GLB,, | Performed by: STUDENT IN AN ORGANIZED HEALTH CARE EDUCATION/TRAINING PROGRAM

## 2023-10-26 NOTE — PROGRESS NOTES
Subjective:      Patient ID: Cecil Pringle is a 77 y.o. male.    Chief Complaint: Diabetes Mellitus (PCP Dr. Jett, 5/15/23), Diabetic Foot Exam, Callouses, and Routine Foot Care    Cecil is a 77 y.o. male who presents to the clinic upon referral from Dr. Hannah rock. provider found  for evaluation and treatment of diabetic feet. Cecil has a past medical history of Anxiety, Arthritis, Coronary artery disease, Depression, Diabetes mellitus, type 2, Diabetic neuropathy, Hyperlipidemia, Hypertension, Insomnia, and PAD (peripheral artery disease). Patient relates no major problem with feet,  has occasional burning/tinlging sensations to toes. Only complaints today consist of callus and elongated toe nails to rakel feet.  has not had new diabetic shoes in 7 years.  8/17/22: Seen today, relates to worsening neuropathy to his feet, here for RFC. Plan for diabetic foot exam during upcoming visit.      12/13/22: Pt seen today for routine foot care and annual diabetic foot exam. No new pedal complaints.  has been using Penlac.     10/26/23: Seen today, relates he does not like using penlac, states it is difficult to use, inquiring about further treatment options. Here for RFC    PCP: Layo Jett MD    Date Last Seen by PCP: 12/16/21    Current shoe gear: Rx diabetic extra depth shoes and custom accommodative insoles    Hemoglobin A1C   Date Value Ref Range Status   07/17/2023 7.4 (H) 4.0 - 5.6 % Final     Comment:     ADA Screening Guidelines:  5.7-6.4%  Consistent with prediabetes  >or=6.5%  Consistent with diabetes    High levels of fetal hemoglobin interfere with the HbA1C  assay. Heterozygous hemoglobin variants (HbS, HgC, etc)do  not significantly interfere with this assay.   However, presence of multiple variants may affect accuracy.     05/08/2023 7.6 (H) 4.0 - 5.6 % Final     Comment:     ADA Screening Guidelines:  5.7-6.4%  Consistent with prediabetes  >or=6.5%  Consistent with  diabetes    High levels of fetal hemoglobin interfere with the HbA1C  assay. Heterozygous hemoglobin variants (HbS, HgC, etc)do  not significantly interfere with this assay.   However, presence of multiple variants may affect accuracy.     12/22/2022 7.0 (H) 4.0 - 5.6 % Final     Comment:     ADA Screening Guidelines:  5.7-6.4%  Consistent with prediabetes  >or=6.5%  Consistent with diabetes    High levels of fetal hemoglobin interfere with the HbA1C  assay. Heterozygous hemoglobin variants (HbS, HgC, etc)do  not significantly interfere with this assay.   However, presence of multiple variants may affect accuracy.             Review of Systems   Constitutional: Negative for chills, decreased appetite, diaphoresis and fever.   Cardiovascular:  Negative for claudication and leg swelling.   Respiratory:  Negative for shortness of breath.    Skin:  Positive for dry skin and nail changes. Negative for color change, flushing, itching, poor wound healing, rash and suspicious lesions.   Musculoskeletal:  Negative for arthritis, back pain, falls, joint pain, joint swelling and myalgias.   Gastrointestinal:  Negative for nausea and vomiting.   Neurological:  Positive for numbness and paresthesias. Negative for loss of balance.           Objective:      Physical Exam  Vitals and nursing note reviewed.   Constitutional:       General: He is not in acute distress.     Appearance: He is well-developed. He is not diaphoretic.   Cardiovascular:      Pulses:           Dorsalis pedis pulses are 1+ on the right side and 1+ on the left side.        Posterior tibial pulses are 1+ on the right side and 1+ on the left side.      Comments: Skin temperature is within normal limits. Toes are cool to touch and feet are warm proximally. Hair growth is mildly diminished. Skin is mildly atrophic and with mild hyperpigmentation. Mild edema noted, varicosities noted rakel      Musculoskeletal:         General: Deformity present. No tenderness.       Right foot: Decreased range of motion. Deformity present.      Left foot: Decreased range of motion. Deformity present.      Comments: Strength is 5/5 to lower extremity muscle groups, rakel    Pes cavus foot type, bilaterally    Feet:      Right foot:      Skin integrity: Dry skin present. No ulcer, blister, skin breakdown, erythema, warmth or callus.      Left foot:      Skin integrity: Dry skin present. No ulcer, blister, skin breakdown, erythema, warmth or callus.   Lymphadenopathy:      Comments: Negative lymphadenopathy bilateral popliteal fossa and tarsal tunnel.    Skin:     General: Skin is warm and dry.      Capillary Refill: Capillary refill takes less than 2 seconds.      Coloration: Skin is not pale.      Findings: No bruising, ecchymosis, erythema, laceration, lesion, petechiae or rash.      Comments: Skin is warm and dry bilaterally, no acute SOI noted, bilaterally, appears stable.     Nails 1-5 are thickened, discolored, dystrophic and with subungual debris, rakel    HPK noted medial 1st MTP and sub 5th MTP bilaterally    Neurological:      Mental Status: He is alert and oriented to person, place, and time.      Sensory: Sensory deficit present.      Comments: Vibratory sensation diminished, rakel               Assessment:       Encounter Diagnoses   Name Primary?    Onychomycosis of multiple toenails with type 2 diabetes mellitus and peripheral angiopathy Yes    Corn or callus     PAD (peripheral artery disease)     Diabetic polyneuropathy associated with diabetes mellitus due to underlying condition            Plan:       Cecil was seen today for diabetes mellitus, diabetic foot exam, callouses and routine foot care.    Diagnoses and all orders for this visit:    Onychomycosis of multiple toenails with type 2 diabetes mellitus and peripheral angiopathy  -     Routine Foot Care    Darwin or callus  -     Routine Foot Care    PAD (peripheral artery disease)    Diabetic polyneuropathy associated with  diabetes mellitus due to underlying condition  -     Routine Foot Care        I counseled the patient on his conditions, their implications and medical management.    - HPK and nails debrided, see procedure note    -Ok to D/C penlac, recommend Kerasal multipurpose nail repair for thickened toenails    -Recommend pumice stone and urea cream to soften callus at home. Lotion BID. White socks with supportive tennis/orthopedic shoes at all times while ambulating    -RTC in 2-3 months for routine diabetic foot care

## 2023-10-26 NOTE — PROCEDURES
"Routine Foot Care    Date/Time: 10/26/2023 8:15 AM    Performed by: Terri Starkey DPM  Authorized by: Terri Starkey DPM    Time out: Immediately prior to procedure a "time out" was called to verify the correct patient, procedure, equipment, support staff and site/side marked as required.    Consent Done?:  Yes (Verbal)  Hyperkeratotic Skin Lesions?: Yes    Number of trimmed lesions:  4  Location(s):  Left 1st Toe, Right 1st Toe, Left 1st Metatarsal Head and Right 1st Metatarsal Head    Nail Care Type:  Debride  Location(s): All  (Left 1st Toe, Left 3rd Toe, Left 2nd Toe, Left 4th Toe, Left 5th Toe, Right 1st Toe, Right 2nd Toe, Right 3rd Toe, Right 4th Toe and Right 5th Toe)  Patient tolerance:  Patient tolerated the procedure well with no immediate complications    "

## 2023-10-30 ENCOUNTER — TELEPHONE (OUTPATIENT)
Dept: ENDOSCOPY | Facility: HOSPITAL | Age: 78
End: 2023-10-30
Payer: MEDICARE

## 2023-10-30 NOTE — TELEPHONE ENCOUNTER
Spoke with patient about arrival time @ 1030.   Colon/Clenpiq    Prep instructions reviewed: the day before the procedure, follow a clear liquid diet all day, then start the first 1/2 of prep at 5pm and take 2nd 1/2 of prep @ 0530.  Pt must be completely NPO when prep completed @ 0730.              Medications: Do not take Insulin or oral diabetic medications the day of the procedure.  Last dose of Trulicity 10/20. Take as prescribed: heart, seizure and blood pressure medication in the morning with a sip of water (less than an ounce).  Plavix stopped.Take any breathing medications and bring inhalers to hospital with you Leave all valuables and jewelry at home.     Wear comfortable clothes to procedure to change into hospital gown You cannot drive for 24 hours after your procedure because you will receive sedation for your procedure to make you comfortable.  A ride must be provided at discharge.

## 2023-11-01 ENCOUNTER — HOSPITAL ENCOUNTER (OUTPATIENT)
Facility: HOSPITAL | Age: 78
Discharge: HOME | End: 2023-11-01
Attending: INTERNAL MEDICINE | Admitting: INTERNAL MEDICINE
Payer: MEDICARE

## 2023-11-01 ENCOUNTER — ANESTHESIA EVENT (OUTPATIENT)
Dept: ENDOSCOPY | Facility: HOSPITAL | Age: 78
End: 2023-11-01
Payer: MEDICARE

## 2023-11-01 ENCOUNTER — ANESTHESIA (OUTPATIENT)
Dept: ENDOSCOPY | Facility: HOSPITAL | Age: 78
End: 2023-11-01
Payer: MEDICARE

## 2023-11-01 VITALS
DIASTOLIC BLOOD PRESSURE: 53 MMHG | RESPIRATION RATE: 19 BRPM | BODY MASS INDEX: 27.35 KG/M2 | OXYGEN SATURATION: 96 % | HEIGHT: 75 IN | WEIGHT: 220 LBS | HEART RATE: 53 BPM | TEMPERATURE: 98 F | SYSTOLIC BLOOD PRESSURE: 119 MMHG

## 2023-11-01 LAB
GLUCOSE SERPL-MCNC: 108 MG/DL (ref 70–110)
POCT GLUCOSE: 108 MG/DL (ref 70–110)

## 2023-11-01 PROCEDURE — 45380 COLONOSCOPY AND BIOPSY: CPT | Mod: 59,PT,, | Performed by: INTERNAL MEDICINE

## 2023-11-01 PROCEDURE — 27201012 HC FORCEPS, HOT/COLD, DISP: Performed by: INTERNAL MEDICINE

## 2023-11-01 PROCEDURE — 25000003 PHARM REV CODE 250: Performed by: INTERNAL MEDICINE

## 2023-11-01 PROCEDURE — 37000008 HC ANESTHESIA 1ST 15 MINUTES: Performed by: INTERNAL MEDICINE

## 2023-11-01 PROCEDURE — 82962 GLUCOSE BLOOD TEST: CPT | Performed by: INTERNAL MEDICINE

## 2023-11-01 PROCEDURE — 88305 TISSUE EXAM BY PATHOLOGIST: ICD-10-PCS | Mod: 26,,, | Performed by: STUDENT IN AN ORGANIZED HEALTH CARE EDUCATION/TRAINING PROGRAM

## 2023-11-01 PROCEDURE — 45380 PR COLONOSCOPY,BIOPSY: ICD-10-PCS | Mod: 59,PT,, | Performed by: INTERNAL MEDICINE

## 2023-11-01 PROCEDURE — D9220A PRA ANESTHESIA: Mod: PT,ANES,, | Performed by: STUDENT IN AN ORGANIZED HEALTH CARE EDUCATION/TRAINING PROGRAM

## 2023-11-01 PROCEDURE — D9220A PRA ANESTHESIA: ICD-10-PCS | Mod: PT,ANES,, | Performed by: STUDENT IN AN ORGANIZED HEALTH CARE EDUCATION/TRAINING PROGRAM

## 2023-11-01 PROCEDURE — 25000003 PHARM REV CODE 250: Performed by: NURSE ANESTHETIST, CERTIFIED REGISTERED

## 2023-11-01 PROCEDURE — 45385 PR COLONOSCOPY,REMV LESN,SNARE: ICD-10-PCS | Mod: PT,,, | Performed by: INTERNAL MEDICINE

## 2023-11-01 PROCEDURE — 88305 TISSUE EXAM BY PATHOLOGIST: CPT | Mod: 26,,, | Performed by: STUDENT IN AN ORGANIZED HEALTH CARE EDUCATION/TRAINING PROGRAM

## 2023-11-01 PROCEDURE — D9220A PRA ANESTHESIA: Mod: PT,CRNA,, | Performed by: NURSE ANESTHETIST, CERTIFIED REGISTERED

## 2023-11-01 PROCEDURE — 45385 COLONOSCOPY W/LESION REMOVAL: CPT | Mod: PT,,, | Performed by: INTERNAL MEDICINE

## 2023-11-01 PROCEDURE — 88305 TISSUE EXAM BY PATHOLOGIST: CPT | Mod: 59 | Performed by: STUDENT IN AN ORGANIZED HEALTH CARE EDUCATION/TRAINING PROGRAM

## 2023-11-01 PROCEDURE — D9220A PRA ANESTHESIA: ICD-10-PCS | Mod: PT,CRNA,, | Performed by: NURSE ANESTHETIST, CERTIFIED REGISTERED

## 2023-11-01 PROCEDURE — 45380 COLONOSCOPY AND BIOPSY: CPT | Mod: 59,PT | Performed by: INTERNAL MEDICINE

## 2023-11-01 PROCEDURE — 45385 COLONOSCOPY W/LESION REMOVAL: CPT | Mod: PT | Performed by: INTERNAL MEDICINE

## 2023-11-01 PROCEDURE — 63600175 PHARM REV CODE 636 W HCPCS: Performed by: NURSE ANESTHETIST, CERTIFIED REGISTERED

## 2023-11-01 PROCEDURE — 27200997: Performed by: INTERNAL MEDICINE

## 2023-11-01 PROCEDURE — 37000009 HC ANESTHESIA EA ADD 15 MINS: Performed by: INTERNAL MEDICINE

## 2023-11-01 RX ORDER — LIDOCAINE HYDROCHLORIDE 20 MG/ML
INJECTION INTRAVENOUS
Status: DISCONTINUED | OUTPATIENT
Start: 2023-11-01 | End: 2023-11-01

## 2023-11-01 RX ORDER — PROPOFOL 10 MG/ML
VIAL (ML) INTRAVENOUS CONTINUOUS PRN
Status: DISCONTINUED | OUTPATIENT
Start: 2023-11-01 | End: 2023-11-01

## 2023-11-01 RX ORDER — PROPOFOL 10 MG/ML
VIAL (ML) INTRAVENOUS
Status: DISCONTINUED | OUTPATIENT
Start: 2023-11-01 | End: 2023-11-01

## 2023-11-01 RX ORDER — SODIUM CHLORIDE 9 MG/ML
INJECTION, SOLUTION INTRAVENOUS CONTINUOUS
Status: DISCONTINUED | OUTPATIENT
Start: 2023-11-01 | End: 2023-11-01 | Stop reason: HOSPADM

## 2023-11-01 RX ORDER — SODIUM CHLORIDE 0.9 % (FLUSH) 0.9 %
10 SYRINGE (ML) INJECTION
Status: DISCONTINUED | OUTPATIENT
Start: 2023-11-01 | End: 2023-11-01 | Stop reason: HOSPADM

## 2023-11-01 RX ADMIN — SODIUM CHLORIDE: 9 INJECTION, SOLUTION INTRAVENOUS at 11:11

## 2023-11-01 RX ADMIN — PROPOFOL 75 MG: 10 INJECTION, EMULSION INTRAVENOUS at 01:11

## 2023-11-01 RX ADMIN — LIDOCAINE HYDROCHLORIDE 100 MG: 20 INJECTION, SOLUTION INTRAVENOUS at 01:11

## 2023-11-01 RX ADMIN — PROPOFOL 150 MCG/KG/MIN: 10 INJECTION, EMULSION INTRAVENOUS at 01:11

## 2023-11-01 NOTE — [employer]ATION PATIENT INSTRUCTIONS
Discharge Summary/Instructions after an Endoscopic Procedure  Patient Name: Cecil Pringle  Patient MRN: [id number]  Patient YOB: 1945 Wednesday, November 1, 2023  Yoav Dsouza MD  Dear patient,  As a result of recent federal legislation (The Federal Cures Act), you may   receive lab or pathology results from your procedure in your [facility]   account before your physician is able to contact you. Your physician or   their representative will relay the results to you with their   recommendations at their soonest availability.  Thank you,  Your health is very important to us during the Covid Crisis. Following your   procedure today, you will receive a daily text for 2 weeks asking about   signs or symptoms of Covid 19.  Please respond to this text when you   receive it so we can follow up and keep you as safe as possible.   RESTRICTIONS:  During your procedure today, you received medications for sedation.  These   medications may affect your judgment, balance and coordination.  Therefore,   for 24 hours, you have the following restrictions:   - DO NOT drive a car, operate machinery, make legal/financial decisions,   sign important papers or drink alcohol.    ACTIVITY:  Today: no heavy lifting, straining or running due to procedural   sedation/anesthesia.  The following day: return to full activity including work.  DIET:  Eat and drink normally unless instructed otherwise.     TREATMENT FOR COMMON SIDE EFFECTS:  - Mild abdominal pain, nausea, belching, bloating or excessive gas:  rest,   eat lightly and use a heating pad.  - Sore Throat: treat with throat lozenges and/or gargle with warm salt   water.  - Because air was used during the procedure, expelling large amounts of air   from your rectum or belching is normal.  - If a bowel prep was taken, you may not have a bowel movement for 1-3 days.    This is normal.  SYMPTOMS TO WATCH FOR AND REPORT TO YOUR PHYSICIAN:  1. Abdominal pain or bloating,  other than gas cramps.  2. Chest pain.  3. Back pain.  4. Signs of infection such as: chills or fever occurring within 24 hours   after the procedure.  5. Rectal bleeding, which would show as bright red, maroon, or black stools.   (A tablespoon of blood from the rectum is not serious, especially if   hemorrhoids are present.)  6. Vomiting.  7. Weakness or dizziness.  GO DIRECTLY TO THE NEAREST EMERGENCY ROOM IF YOU HAVE ANY OF THE FOLLOWING:      Difficulty breathing              Chills and/or fever over 101 F   Persistent vomiting and/or vomiting blood   Severe abdominal pain   Severe chest pain   Black, tarry stools   Bleeding- more than one tablespoon   Any other symptom or condition that you feel may need urgent attention  Your doctor recommends these additional instructions:  If any biopsies were taken, your doctors clinic will contact you in 1 to 2   weeks with any results.  - Discharge patient to home.   - Patient has a contact number available for emergencies.  The signs and   symptoms of potential delayed complications were discussed with the   patient.  Return to normal activities tomorrow.  Written discharge   instructions were provided to the patient.   - Resume previous diet.   - Continue present medications.   - Await pathology results.   - Repeat colonoscopy in 3 years for surveillance.   - Avoid NSAIDs (ibuprofen, aleve, naproxen, BC / Goodys, aspirin etc) for 10   days; Aspirin is OK to continue if indicated for cardiovascular   protection.  - Resume Plavix (clopidogrel) at prior dose in 5 days.  For questions, problems or results please call your physician - Yoav Dsouza MD.  EMERGENCY PHONE NUMBER: [phone],  LAB RESULTS: [phone]  IF A COMPLICATION OR EMERGENCY SITUATION ARISES AND YOU ARE UNABLE TO REACH   YOUR PHYSICIAN - GO DIRECTLY TO THE EMERGENCY ROOM.  Yoav Dsouza MD  11/1/2023 1:43:52 PM  This report has been verified and signed electronically.  Dear patient,  As a  result of recent federal legislation (The Federal Cures Act), you may   receive lab or pathology results from your procedure in your [facility]   account before your physician is able to contact you. Your physician or   their representative will relay the results to you with their   recommendations at their soonest availability.  Thank you,  [employer]ATION

## 2023-11-01 NOTE — ANESTHESIA POSTPROCEDURE EVALUATION
Anesthesia Post Evaluation    Patient: Cecil Pringle    Procedure(s) Performed: Procedure(s) (LRB):  COLONOSCOPY (N/A)    Final Anesthesia Type: general      Patient location during evaluation: PACU  Patient participation: Yes- Able to Participate  Level of consciousness: awake  Post-procedure vital signs: reviewed and stable  Pain management: adequate  Airway patency: patent    PONV status at discharge: No PONV  Anesthetic complications: no      Cardiovascular status: blood pressure returned to baseline, stable and bradycardic  Respiratory status: unassisted  Hydration status: euvolemic  Follow-up not needed.          Vitals Value Taken Time   /53 11/01/23 1403   Temp 36.8 °C (98.3 °F) 11/01/23 1347   Pulse 53 11/01/23 1403   Resp 19 11/01/23 1403   SpO2 96 % 11/01/23 1403         Event Time   Out of Recovery 14:19:21         Pain/Randy Score: Randy Score: 9 (11/1/2023  1:49 PM)

## 2023-11-01 NOTE — TRANSFER OF CARE
"Anesthesia Transfer of Care Note    Patient: Cecil Pringle    Procedure(s) Performed: Procedure(s) (LRB):  COLONOSCOPY (N/A)    Patient location: GI    Anesthesia Type: general    Transport from OR: Transported from OR on room air with adequate spontaneous ventilation    Post pain: adequate analgesia    Post assessment: no apparent anesthetic complications and tolerated procedure well    Post vital signs: stable    Level of consciousness: sedated    Nausea/Vomiting: no nausea/vomiting    Complications: none    Transfer of care protocol was followed      Last vitals: Visit Vitals  BP (!) 144/65 (BP Location: Left arm, Patient Position: Lying)   Pulse (S) (!) 50   Temp 36.7 °C (98.1 °F) (Tympanic)   Resp 16   Ht 6' 3" (1.905 m)   Wt 99.8 kg (220 lb)   SpO2 98%   BMI 27.50 kg/m²     "

## 2023-11-01 NOTE — H&P
Short Stay Endoscopy History and Physical    PCP - Layo Jett MD    Procedure - Colonoscopy  ASA - per anesthesia  Mallampati - per anesthesia  History of Anesthesia problems - no  Family history Anesthesia problems - no   Plan of anesthesia - General    HPI:  This is a 77 y.o. male here for evaluation of : personal history of colon polyps      ROS:  Constitutional: No fevers, chills, No weight loss  CV: No chest pain  Pulm: No cough, No shortness of breath  GI: see HPI  Derm: No rash    Medical History:  has a past medical history of Anxiety, Arthritis, Coronary artery disease, Depression, Diabetes mellitus, type 2, Diabetic neuropathy, Hyperlipidemia, Hypertension, Insomnia, and PAD (peripheral artery disease).    Surgical History:  has a past surgical history that includes Coronary artery bypass graft (1990); Coronary artery bypass graft (2014); coronary stents (2013); Right hand surgery (1980); left foot surgery (1980); Colonoscopy (N/A, 12/12/2018); and Colonoscopy (N/A, 6/14/2019).    Family History: family history includes Cancer in his mother; Heart disease in his brother, father, maternal aunt, paternal aunt, paternal uncle, and sister.. Otherwise no colon cancer, inflammatory bowel disease, or GI malignancies.    Social History:  reports that he has quit smoking. His smoking use included cigarettes. He has a 75.0 pack-year smoking history. He has quit using smokeless tobacco. He reports that he does not drink alcohol and does not use drugs.    Review of patient's allergies indicates:  No Known Allergies    Medications:   Medications Prior to Admission   Medication Sig Dispense Refill Last Dose    aspirin (ECOTRIN) 81 MG EC tablet Take 81 mg by mouth once daily.   10/31/2023    cholecalciferol, vitamin D3, (VITAMIN D3) 25 mcg (1,000 unit) capsule Take 2 capsules (2,000 Units total) by mouth once daily.  0 11/1/2023    ciclopirox (PENLAC) 8 % Soln APPLY TO AFFECTED AREA AT NIGHT 6.6 mL 11  10/31/2023    clopidogreL (PLAVIX) 75 mg tablet TAKE 1 TABLET BY MOUTH EVERY DAY 90 tablet 3 10/31/2023    co-enzyme Q-10 30 mg capsule Take 30 mg by mouth once daily.    11/1/2023    dapagliflozin (FARXIGA) 10 mg tablet Take 1 tablet (10 mg total) by mouth once daily. 90 tablet 3 Past Week    fenofibrate 160 MG Tab Take 1 tablet (160 mg total) by mouth once daily. 90 tablet 3 11/1/2023    fish oil-omega-3 fatty acids 300-1,000 mg capsule Take 2 g by mouth 3 (three) times daily.   11/1/2023    FLUoxetine 20 MG capsule Take 1 capsule (20 mg total) by mouth once daily. 90 capsule 3 11/1/2023    glipiZIDE (GLUCOTROL) 5 MG tablet Take 2 tablets (10 mg total) by mouth 2 (two) times daily with meals. 360 tablet 3 11/1/2023    hydroCHLOROthiazide (HYDRODIURIL) 12.5 MG Tab TAKE 1 TABLET BY MOUTH EVERY DAY 90 tablet 3 11/1/2023    hydrOXYzine pamoate (VISTARIL) 25 MG Cap TAKE 1 CAPSULE BY MOUTH EVERY EVENING FOR ALLERGIES 90 capsule 3 10/31/2023    lancets Misc To check BG 1 times daily, to use with insurance preferred meter 100 each 3 11/1/2023    losartan (COZAAR) 100 MG tablet TAKE 1 TABLET BY MOUTH EVERY DAY 90 tablet 3 11/1/2023    metFORMIN (GLUCOPHAGE) 1000 MG tablet Take 1 tablet (1,000 mg total) by mouth 2 (two) times daily. 180 tablet 3 11/1/2023    metoprolol tartrate (LOPRESSOR) 100 MG tablet Take 0.5 tablets (50 mg total) by mouth 2 (two) times daily. 90 tablet 3 11/1/2023    MULTIVIT-IRON-MIN-FOLIC ACID 3,500-18-0.4 UNIT-MG-MG ORAL CHEW Take 1 tablet by mouth once daily.   11/1/2023    NIFEdipine (PROCARDIA-XL) 60 MG (OSM) 24 hr tablet TAKE 1 TABLET BY MOUTH EVERY DAY 90 tablet 3 11/1/2023    omega-3 acid ethyl esters (LOVAZA) 1 gram capsule Take 2 capsules (2 g total) by mouth 2 (two) times daily. 360 capsule 3 11/1/2023    rosuvastatin (CRESTOR) 20 MG tablet TAKE 1 TABLET BY MOUTH EVERY DAY IN THE EVENING 90 tablet 3 11/1/2023    SAW PALMETTO ORAL Take 1 capsule by mouth 2 (two) times daily.   11/1/2023     tamsulosin (FLOMAX) 0.4 mg Cap Take 1 capsule (0.4 mg total) by mouth every evening. 30 capsule 11 10/31/2023    temazepam (RESTORIL) 30 mg capsule Take 1 capsule (30 mg total) by mouth nightly as needed for Insomnia. 30 capsule 0 10/31/2023    blood-glucose meter kit To check BG 1 times daily, to use with insurance preferred meter 1 each 0     dulaglutide (TRULICITY) 3 mg/0.5 mL pen injector Inject 3 mg into the skin every 7 days. 16 pen 4 10/20/2023    oxybutynin (DITROPAN-XL) 10 MG 24 hr tablet Take 1 tablet (10 mg total) by mouth once daily. 30 tablet 11     TRUE METRIX GLUCOSE TEST STRIP Strp USE 1 STRIP TO CHECK GLUCOSE TWO TIMES DAILY 200 strip 3          Physical Exam:    Vital Signs:   Vitals:    11/01/23 1114   BP: (!) 144/65   Pulse: (S) (!) 50   Resp: 16   Temp: 98.1 °F (36.7 °C)       General Appearance: Well appearing in no acute distress  Eyes:    No scleral icterus  ENT: Neck supple, Lips, mucosa, and tongue normal; teeth and gums normal  Abdomen: Soft, non tender, non distended with positive bowel sounds. No hepatosplenomegaly, ascites, or mass.  Extremities: 2+ pulses, no clubbing, cyanosis or edema  Skin: No rash      Labs:  Lab Results   Component Value Date    WBC 7.24 05/08/2023    HGB 13.4 (L) 05/08/2023    HCT 41.4 05/08/2023     05/08/2023    CHOL 108 (L) 05/08/2023    TRIG 283 (H) 05/08/2023    HDL 31 (L) 05/08/2023    ALT 17 07/17/2023    AST 16 07/17/2023     07/17/2023    K 4.2 07/17/2023     07/17/2023    CREATININE 1.2 07/17/2023    BUN 19 07/17/2023    CO2 25 07/17/2023    TSH 0.669 06/22/2022    PSA 0.62 06/22/2022    HGBA1C 7.4 (H) 07/17/2023       I have explained the risks and benefits of endoscopy procedures to the patient including but not limited to bleeding, perforation, infection, and death.  The patient was asked if they understand and allowed to ask any further questions to their satisfaction.    Yoav Dsouza MD

## 2023-11-01 NOTE — ANESTHESIA PREPROCEDURE EVALUATION
[facility]  Anesthesia Pre-Operative Evaluation         Patient Name: Cecil Pringle  YOB: 1945  MRN: [id number]    SUBJECTIVE:     11/01/2023    Procedure(s) (LRB):  COLONOSCOPY (N/A)    Cecil Pringle is a 77 y.o. male here for Procedure(s) (LRB):  COLONOSCOPY (N/A)    Drips:     Patient Active Problem List   Diagnosis    Coronary artery disease involving native coronary artery of native heart without angina pectoris    Hypertension associated with type 2 diabetes mellitus    Uncontrolled type 2 diabetes mellitus with hyperglycemia    Hyperlipidemia associated with type 2 diabetes mellitus    Other insomnia    Mild episode of recurrent major depressive disorder    PAD (peripheral artery disease)    Type 2 diabetes mellitus with stage 3a chronic kidney disease, without long-term current use of insulin    S/P CABG x 5    Nonrheumatic aortic valve stenosis    Stenosis of right carotid artery    Onychomycosis of multiple toenails with type 2 diabetes mellitus and peripheral angiopathy    Onychomycosis of multiple toenails with type 2 diabetes mellitus and peripheral neuropathy    Colon polyp    Urinary stream slowing    Abnormal ejaculation       Review of patient's allergies indicates:  No Known Allergies    No current facility-administered medications on file prior to encounter.     Current Outpatient Medications on File Prior to Encounter   Medication Sig Dispense Refill    aspirin (ECOTRIN) 81 MG EC tablet Take 81 mg by mouth once daily.      blood-glucose meter kit To check BG 1 times daily, to use with insurance preferred meter 1 each 0    cholecalciferol, vitamin D3, (VITAMIN D3) 25 mcg (1,000 unit) capsule Take 2 capsules (2,000 Units total) by mouth once daily.  0    ciclopirox (PENLAC) 8 % Soln APPLY TO AFFECTED AREA AT NIGHT 6.6 mL 11    clopidogreL (PLAVIX) 75 mg tablet TAKE 1 TABLET BY MOUTH EVERY DAY 90 tablet 3    co-enzyme Q-10 30 mg capsule Take 30 mg by mouth once  daily.       dapagliflozin (FARXIGA) 10 mg tablet Take 1 tablet (10 mg total) by mouth once daily. 90 tablet 3    dulaglutide (TRULICITY) 3 mg/0.5 mL pen injector Inject 3 mg into the skin every 7 days. 16 pen 4    fenofibrate 160 MG Tab Take 1 tablet (160 mg total) by mouth once daily. 90 tablet 3    fish oil-omega-3 fatty acids 300-1,000 mg capsule Take 2 g by mouth 3 (three) times daily.      FLUoxetine 20 MG capsule Take 1 capsule (20 mg total) by mouth once daily. 90 capsule 3    glipiZIDE (GLUCOTROL) 5 MG tablet Take 2 tablets (10 mg total) by mouth 2 (two) times daily with meals. 360 tablet 3    hydroCHLOROthiazide (HYDRODIURIL) 12.5 MG Tab TAKE 1 TABLET BY MOUTH EVERY DAY 90 tablet 3    hydrOXYzine pamoate (VISTARIL) 25 MG Cap TAKE 1 CAPSULE BY MOUTH EVERY EVENING FOR ALLERGIES 90 capsule 3    lancets Misc To check BG 1 times daily, to use with insurance preferred meter 100 each 3    losartan (COZAAR) 100 MG tablet TAKE 1 TABLET BY MOUTH EVERY DAY 90 tablet 3    metFORMIN (GLUCOPHAGE) 1000 MG tablet Take 1 tablet (1,000 mg total) by mouth 2 (two) times daily. 180 tablet 3    metoprolol tartrate (LOPRESSOR) 100 MG tablet Take 0.5 tablets (50 mg total) by mouth 2 (two) times daily. 90 tablet 3    MULTIVIT-IRON-MIN-FOLIC ACID 3,500-18-0.4 UNIT-MG-MG ORAL CHEW Take 1 tablet by mouth once daily.      NIFEdipine (PROCARDIA-XL) 60 MG (OSM) 24 hr tablet TAKE 1 TABLET BY MOUTH EVERY DAY 90 tablet 3    omega-3 acid ethyl esters (LOVAZA) 1 gram capsule Take 2 capsules (2 g total) by mouth 2 (two) times daily. 360 capsule 3    oxybutynin (DITROPAN-XL) 10 MG 24 hr tablet Take 1 tablet (10 mg total) by mouth once daily. 30 tablet 11    rosuvastatin (CRESTOR) 20 MG tablet TAKE 1 TABLET BY MOUTH EVERY DAY IN THE EVENING 90 tablet 3    SAW PALMETTO ORAL Take 1 capsule by mouth 2 (two) times daily.      tamsulosin (FLOMAX) 0.4 mg Cap Take 1 capsule (0.4 mg total) by mouth every evening. 30 capsule 11    temazepam (RESTORIL)  30 mg capsule Take 1 capsule (30 mg total) by mouth nightly as needed for Insomnia. 30 capsule 0    TRUE METRIX GLUCOSE TEST STRIP Strp USE 1 STRIP TO CHECK GLUCOSE TWO TIMES DAILY 200 strip 3       Past Surgical History:   Procedure Laterality Date    COLONOSCOPY N/A 12/12/2018    Procedure: COLONOSCOPY;  Surgeon: Jabari Pollock MD;  Location: [facility] ENDO;  Service: Endoscopy;  Laterality: N/A;  ok to hold Plavix x 5 days, pt requesting [place]-MS    COLONOSCOPY N/A 6/14/2019    Procedure: COLONOSCOPY/Golytely;  Surgeon: Joon Devi MD;  Location: [facility] ENDO;  Service: Endoscopy;  Laterality: N/A;    CORONARY ARTERY BYPASS GRAFT  1990    CORONARY ARTERY BYPASS GRAFT  2014    coronary stents  2013    x5    left foot surgery  1980    Right hand surgery  1980       Social History     Socioeconomic History    Marital status:    Occupational History    Occupation: Retired    Tobacco Use    Smoking status: Former     Current packs/day: 2.50     Average packs/day: 2.5 packs/day for 30.0 years (75.0 ttl pk-yrs)     Types: Cigarettes    Smokeless tobacco: Former   Substance and Sexual Activity    Alcohol use: No    Drug use: No     Social Determinants of Health     Financial Resource Strain: Low Risk  (7/14/2023)    Overall Financial Resource Strain (CARDIA)     Difficulty of Paying Living Expenses: Not hard at all   Food Insecurity: No Food Insecurity (7/14/2023)    Hunger Vital Sign     Worried About Running Out of Food in the Last Year: Never true     Ran Out of Food in the Last Year: Never true   Transportation Needs: No Transportation Needs (7/14/2023)    PRAPARE - Transportation     Lack of Transportation (Medical): No     Lack of Transportation (Non-Medical): No   Physical Activity: Inactive (7/14/2023)    Exercise Vital Sign     Days of Exercise per Week: 0 days     Minutes of Exercise per Session: 0 min   Stress: Stress Concern Present (7/14/2023)    [employer] [place] [employer] - Occupational  Stress Questionnaire     Feeling of Stress : Rather much   Social Connections: Unknown (7/14/2023)    Social Connection and Isolation Panel [NHANES]     Frequency of Communication with Friends and Family: Once a week     Frequency of Social Gatherings with Friends and Family: Once a week     Active Member of Clubs or Organizations: No     Attends Club or Organization Meetings: Never     Marital Status:    Housing Stability: Low Risk  (7/14/2023)    Housing Stability Vital Sign     Unable to Pay for Housing in the Last Year: No     Number of Places Lived in the Last Year: 1     Unstable Housing in the Last Year: No         OBJECTIVE:     Vital Signs Range (Last 24H):  BP: ()/()   Arterial Line BP: ()/()     Significant Labs:  Lab Results   Component Value Date    WBC 7.24 05/08/2023    HGB 13.4 (L) 05/08/2023    HCT 41.4 05/08/2023     05/08/2023    CHOL 108 (L) 05/08/2023    TRIG 283 (H) 05/08/2023    HDL 31 (L) 05/08/2023    ALT 17 07/17/2023    AST 16 07/17/2023     07/17/2023    K 4.2 07/17/2023     07/17/2023    CREATININE 1.2 07/17/2023    BUN 19 07/17/2023    CO2 25 07/17/2023    TSH 0.669 06/22/2022    PSA 0.62 06/22/2022    HGBA1C 7.4 (H) 07/17/2023       Diagnostic Studies:    EKG:   Results for orders placed or performed in visit on 09/02/22   IN OFFICE EKG 12-LEAD (to Rachel)    Collection Time: 09/02/22  7:55 AM    Narrative    Test Reason : I25.10,    Vent. Rate : 056 BPM     Atrial Rate : 056 BPM     P-R Int : 200 ms          QRS Dur : 100 ms      QT Int : 446 ms       P-R-T Axes : 049 056 164 degrees     QTc Int : 430 ms    Baseline wander  Sinus bradycardia  LVH with repolarization abnormality  Abnormal ECG  When compared with ECG of 21-MAR-2018 07:57,  Nonspecific T wave abnormality has replaced inverted T waves in Inferior  leads    Confirmed by Ashvin Owen MD ([id number]) on 9/2/2022 5:13:50 PM    Referred By: POLLY ANNE           Confirmed By:Ashvin Owen MD       2D  ECHO:  TTE:  No results found for this or any previous visit.  Results for orders placed or performed during the hospital encounter of 09/19/22   Echo   Result Value Ref Range    Ascending aorta 3.94 cm    STJ 3.18 cm    AV mean gradient 12 mmHg    Ao peak mark 2.16 m/s    Ao VTI 50.86 cm    IVRT 131.30 msec    IVS 0.77 0.6 - 1.1 cm    LA size 3.97 cm    Left Atrium Major Axis 5.23 cm    Left Atrium Minor Axis 5.20 cm    LVIDd 5.31 3.5 - 6.0 cm    LVIDs 3.43 2.1 - 4.0 cm    LVOT diameter 2.20 cm    LVOT peak VTI 21.63 cm    Posterior Wall 0.84 0.6 - 1.1 cm    MV Peak A Mark 0.80 m/s    E wave deceleration time 237.57 msec    MV Peak E Mark 0.72 m/s    PV Peak D Mark 0.36 m/s    PV Peak S Mark 0.40 m/s    RA Major Axis 4.73 cm    RA Width 3.42 cm    RVDD 3.03 cm    Sinus 3.61 cm    TAPSE 1.45 cm    TR Max Mark 2.45 m/s    TDI LATERAL 0.05 m/s    TDI SEPTAL 0.05 m/s    LA WIDTH 4.10 cm    MV stenosis pressure 1/2 time 68.89 ms    LV Diastolic Volume 135.78 mL    LV Systolic Volume 48.62 mL    LVOT peak mark 0.89 m/s    LA volume (mod) 56.74 cm3    LV LATERAL E/E' RATIO 14.40 m/s    LV SEPTAL E/E' RATIO 14.40 m/s    FS 35 %    LA volume 72.15 cm3    LV mass 151.73 g    Left Ventricle Relative Wall Thickness 0.32 cm    AV valve area 1.62 cm2    AV Velocity Ratio 0.41     AV index (prosthetic) 0.43     MV valve area p 1/2 method 3.19 cm2    E/A ratio 0.90     Mean e' 0.05 m/s    Pulm vein S/D ratio 1.11     LVOT area 3.8 cm2    LVOT stroke volume 82.18 cm3    AV peak gradient 19 mmHg    E/E' ratio 14.40 m/s    Triscuspid Valve Regurgitation Peak Gradient 24 mmHg    BSA 2.31 m2    LV Systolic Volume Index 21.2 mL/m2    LV Diastolic Volume Index 59.29 mL/m2    LA Volume Index 31.5 mL/m2    LV Mass Index 66 g/m2    LA Volume Index (Mod) 24.8 mL/m2    Right Atrial Pressure (from IVC) 8 mmHg    EF 65 %    TV resting pulmonary artery pressure 32 mmHg    Narrative    · The left ventricle is normal in size with normal systolic  function.  · The estimated ejection fraction is 65%.  · Indeterminate left ventricular diastolic function.  · There is mild-to-moderate aortic valve stenosis.  · Aortic valve area is 1.62 cm2; peak velocity is 2.16 m/s; mean gradient   is 12 mmHg.  · The ascending aorta is mildly dilated.  · There is abnormal septal wall motion consistent with post-operative   status.  · Normal right ventricular size with mildly reduced right ventricular   systolic function.  · Mild aortic regurgitation.  · Intermediate central venous pressure (8 mmHg).  · The estimated PA systolic pressure is 32 mmHg.          Pre-op Assessment    I have reviewed the Patient Summary Reports.     I have reviewed the Nursing Notes. I have reviewed the NPO Status.   I have reviewed the Medications.     Review of Systems  Anesthesia Hx:  No problems with previous Anesthesia   History of prior surgery of interest to airway management or planning:            Denies Personal Hx of Anesthesia complications.                    Social:  Former Smoker       EENT/Dental:   Hard of hearing           Cardiovascular:     Hypertension   CAD   CABG/stent    Denies Angina.       Denies HELTON.   CABG in 1990 and 2014  Stents 2010    Denies ever having MI     Limited activity                          Pulmonary:  Pulmonary Normal   Denies COPD.  Denies Asthma.     Denies Sleep Apnea.                Renal/:  Chronic Renal Disease, CKD                Hepatic/GI:      Denies Liver Disease.            Neurological:  Denies TIA.  Denies CVA. Neuromuscular Disease,   Denies Seizures.                                Endocrine:  Diabetes Denies Hypothyroidism.  Denies Hyperthyroidism.             Physical Exam  General: Well nourished, Cooperative, Alert and Oriented    Airway:  Mallampati: II / II  Mouth Opening: Normal    Dental:  Caps / Implants  Permanent bridge       Anesthesia Plan  Type of Anesthesia, risks & benefits discussed:    Anesthesia Type: Gen Natural  Airway  Intra-op Monitoring Plan: Standard ASA Monitors  Post Op Pain Control Plan: multimodal analgesia  Induction:  IV  Informed Consent: Informed consent signed with the Patient and all parties understand the risks and agree with anesthesia plan.  All questions answered.   ASA Score: 3  Day of Surgery Review of History & Physical: H&P Update referred to the surgeon/provider.    Ready For Surgery From Anesthesia Perspective.     .

## 2023-11-06 LAB
FINAL PATHOLOGIC DIAGNOSIS: NORMAL
GROSS: NORMAL
Lab: NORMAL

## 2023-11-07 ENCOUNTER — HOSPITAL ENCOUNTER (OUTPATIENT)
Dept: CARDIOLOGY | Facility: HOSPITAL | Age: 78
Discharge: HOME | End: 2023-11-07
Attending: INTERNAL MEDICINE
Payer: MEDICARE

## 2023-11-07 VITALS
DIASTOLIC BLOOD PRESSURE: 80 MMHG | WEIGHT: 226 LBS | BODY MASS INDEX: 28.1 KG/M2 | HEART RATE: 52 BPM | SYSTOLIC BLOOD PRESSURE: 160 MMHG | HEIGHT: 75 IN

## 2023-11-07 DIAGNOSIS — I35.0 NONRHEUMATIC AORTIC VALVE STENOSIS: ICD-10-CM

## 2023-11-07 LAB
ASCENDING AORTA: 3.37 CM
AV INDEX (PROSTH): 0.38
AV MEAN GRADIENT: 15 MMHG
AV PEAK GRADIENT: 28 MMHG
AV REGURGITATION PRESSURE HALF TIME: 630 MS
AV VALVE AREA BY VELOCITY RATIO: 1.46 CM²
AV VALVE AREA: 1.44 CM²
AV VELOCITY RATIO: 0.38
BSA FOR ECHO PROCEDURE: 2.33 M2
CV ECHO LV RWT: 0.38 CM
DOP CALC AO PEAK VEL: 2.65 M/S
DOP CALC AO VTI: 70.9 CM
DOP CALC LVOT AREA: 3.8 CM2
DOP CALC LVOT DIAMETER: 2.2 CM
DOP CALC LVOT PEAK VEL: 1.02 M/S
DOP CALC LVOT STROKE VOLUME: 101.98 CM3
DOP CALC MV VTI: 46.32 CM
DOP CALC RVOT PEAK VEL: 0.98 M/S
DOP CALC RVOT VTI: 21.61 CM
DOP CALCLVOT PEAK VEL VTI: 26.84 CM
E WAVE DECELERATION TIME: 256.74 MSEC
E/A RATIO: 1.19
E/E' RATIO: 17.45 M/S
ECHO LV POSTERIOR WALL: 0.89 CM (ref 0.6–1.1)
EJECTION FRACTION: 65 %
FRACTIONAL SHORTENING: 30 % (ref 28–44)
INTERVENTRICULAR SEPTUM: 0.92 CM (ref 0.6–1.1)
IVRT: 105.61 MSEC
LA MAJOR: 6.65 CM
LA MINOR: 6.5 CM
LA WIDTH: 3.61 CM
LEFT ATRIUM SIZE: 4.09 CM
LEFT ATRIUM VOLUME INDEX MOD: 35.6 ML/M2
LEFT ATRIUM VOLUME INDEX: 35.7 ML/M2
LEFT ATRIUM VOLUME MOD: 82.24 CM3
LEFT ATRIUM VOLUME: 82.51 CM3
LEFT INTERNAL DIMENSION IN SYSTOLE: 3.25 CM (ref 2.1–4)
LEFT VENTRICLE DIASTOLIC VOLUME INDEX: 42.88 ML/M2
LEFT VENTRICLE DIASTOLIC VOLUME: 99.06 ML
LEFT VENTRICLE MASS INDEX: 61 G/M2
LEFT VENTRICLE SYSTOLIC VOLUME INDEX: 18.5 ML/M2
LEFT VENTRICLE SYSTOLIC VOLUME: 42.64 ML
LEFT VENTRICULAR INTERNAL DIMENSION IN DIASTOLE: 4.63 CM (ref 3.5–6)
LEFT VENTRICULAR MASS: 140.24 G
LV LATERAL E/E' RATIO: 16 M/S
LV SEPTAL E/E' RATIO: 19.2 M/S
MV A" WAVE DURATION": 11.99 MSEC
MV PEAK A VEL: 0.81 M/S
MV PEAK E VEL: 0.96 M/S
MV PEAK GRADIENT: 6 MMHG
MV STENOSIS PRESSURE HALF TIME: 74.45 MS
MV VALVE AREA BY CONTINUITY EQUATION: 2.2 CM2
MV VALVE AREA P 1/2 METHOD: 2.96 CM2
PISA TR MAX VEL: 2.63 M/S
PULM VEIN S/D RATIO: 1.02
PV MEAN GRADIENT: 2 MMHG
PV PEAK D VEL: 0.41 M/S
PV PEAK GRADIENT: 5 MMHG
PV PEAK S VEL: 0.42 M/S
PV PEAK VELOCITY: 1.08 M/S
RA MAJOR: 6.05 CM
RA PRESSURE ESTIMATED: 3 MMHG
RA WIDTH: 3.64 CM
RIGHT VENTRICULAR END-DIASTOLIC DIMENSION: 2.65 CM
RV TB RVSP: 6 MMHG
SINUS: 3.22 CM
STJ: 3.08 CM
TDI LATERAL: 0.06 M/S
TDI SEPTAL: 0.05 M/S
TDI: 0.06 M/S
TR MAX PG: 28 MMHG
TRICUSPID ANNULAR PLANE SYSTOLIC EXCURSION: 1.81 CM
TV REST PULMONARY ARTERY PRESSURE: 31 MMHG
Z-SCORE OF LEFT VENTRICULAR DIMENSION IN END DIASTOLE: -6.74
Z-SCORE OF LEFT VENTRICULAR DIMENSION IN END SYSTOLE: -4.12

## 2023-11-07 PROCEDURE — 93306 TTE W/DOPPLER COMPLETE: CPT | Mod: PO

## 2023-11-07 PROCEDURE — 93306 TTE W/DOPPLER COMPLETE: CPT | Mod: 26,,, | Performed by: INTERNAL MEDICINE

## 2023-11-07 PROCEDURE — 93306 ECHO (CUPID ONLY): ICD-10-PCS | Mod: 26,,, | Performed by: INTERNAL MEDICINE

## 2023-11-14 ENCOUNTER — OFFICE VISIT (OUTPATIENT)
Dept: CARDIOLOGY | Facility: CLINIC | Age: 78
End: 2023-11-14
Payer: MEDICARE

## 2023-11-14 VITALS
HEIGHT: 75 IN | WEIGHT: 227.31 LBS | HEART RATE: 56 BPM | DIASTOLIC BLOOD PRESSURE: 68 MMHG | BODY MASS INDEX: 28.26 KG/M2 | SYSTOLIC BLOOD PRESSURE: 136 MMHG

## 2023-11-14 DIAGNOSIS — I15.2 HYPERTENSION ASSOCIATED WITH TYPE 2 DIABETES MELLITUS: ICD-10-CM

## 2023-11-14 DIAGNOSIS — I65.21 STENOSIS OF RIGHT CAROTID ARTERY: ICD-10-CM

## 2023-11-14 DIAGNOSIS — E11.59 HYPERTENSION ASSOCIATED WITH TYPE 2 DIABETES MELLITUS: ICD-10-CM

## 2023-11-14 DIAGNOSIS — Z95.1 S/P CABG X 5: ICD-10-CM

## 2023-11-14 DIAGNOSIS — I35.0 NONRHEUMATIC AORTIC VALVE STENOSIS: ICD-10-CM

## 2023-11-14 DIAGNOSIS — E78.5 HYPERLIPIDEMIA ASSOCIATED WITH TYPE 2 DIABETES MELLITUS: ICD-10-CM

## 2023-11-14 DIAGNOSIS — I25.10 CORONARY ARTERY DISEASE INVOLVING NATIVE CORONARY ARTERY OF NATIVE HEART WITHOUT ANGINA PECTORIS: Primary | Chronic | ICD-10-CM

## 2023-11-14 DIAGNOSIS — R26.81 UNSTABLE GAIT: Primary | ICD-10-CM

## 2023-11-14 DIAGNOSIS — I49.3 PVCS (PREMATURE VENTRICULAR CONTRACTIONS): ICD-10-CM

## 2023-11-14 DIAGNOSIS — R26.9 GAIT ABNORMALITY: ICD-10-CM

## 2023-11-14 DIAGNOSIS — E11.69 HYPERLIPIDEMIA ASSOCIATED WITH TYPE 2 DIABETES MELLITUS: ICD-10-CM

## 2023-11-14 PROCEDURE — 1159F MED LIST DOCD IN RCRD: CPT | Mod: CPTII,S$GLB,, | Performed by: INTERNAL MEDICINE

## 2023-11-14 PROCEDURE — 3078F DIAST BP <80 MM HG: CPT | Mod: CPTII,S$GLB,, | Performed by: INTERNAL MEDICINE

## 2023-11-14 PROCEDURE — 3075F SYST BP GE 130 - 139MM HG: CPT | Mod: CPTII,S$GLB,, | Performed by: INTERNAL MEDICINE

## 2023-11-14 PROCEDURE — 93005 EKG 12-LEAD: ICD-10-PCS | Mod: S$GLB,,, | Performed by: INTERNAL MEDICINE

## 2023-11-14 PROCEDURE — 3078F PR MOST RECENT DIASTOLIC BLOOD PRESSURE < 80 MM HG: ICD-10-PCS | Mod: CPTII,S$GLB,, | Performed by: INTERNAL MEDICINE

## 2023-11-14 PROCEDURE — 93005 ELECTROCARDIOGRAM TRACING: CPT | Mod: S$GLB,,, | Performed by: INTERNAL MEDICINE

## 2023-11-14 PROCEDURE — 99214 OFFICE O/P EST MOD 30 MIN: CPT | Mod: S$GLB,,, | Performed by: INTERNAL MEDICINE

## 2023-11-14 PROCEDURE — 3075F PR MOST RECENT SYSTOLIC BLOOD PRESS GE 130-139MM HG: ICD-10-PCS | Mod: CPTII,S$GLB,, | Performed by: INTERNAL MEDICINE

## 2023-11-14 PROCEDURE — 93010 EKG 12-LEAD: ICD-10-PCS | Mod: S$GLB,,, | Performed by: INTERNAL MEDICINE

## 2023-11-14 PROCEDURE — 99214 PR OFFICE/OUTPT VISIT, EST, LEVL IV, 30-39 MIN: ICD-10-PCS | Mod: S$GLB,,, | Performed by: INTERNAL MEDICINE

## 2023-11-14 PROCEDURE — 1126F PR PAIN SEVERITY QUANTIFIED, NO PAIN PRESENT: ICD-10-PCS | Mod: CPTII,S$GLB,, | Performed by: INTERNAL MEDICINE

## 2023-11-14 PROCEDURE — 3051F HG A1C>EQUAL 7.0%<8.0%: CPT | Mod: CPTII,S$GLB,, | Performed by: INTERNAL MEDICINE

## 2023-11-14 PROCEDURE — 1160F RVW MEDS BY RX/DR IN RCRD: CPT | Mod: CPTII,S$GLB,, | Performed by: INTERNAL MEDICINE

## 2023-11-14 PROCEDURE — 99999 PR PBB SHADOW E&M-EST. PATIENT-LVL V: CPT | Mod: PBBFAC,,, | Performed by: INTERNAL MEDICINE

## 2023-11-14 PROCEDURE — 1126F AMNT PAIN NOTED NONE PRSNT: CPT | Mod: CPTII,S$GLB,, | Performed by: INTERNAL MEDICINE

## 2023-11-14 PROCEDURE — 93010 ELECTROCARDIOGRAM REPORT: CPT | Mod: S$GLB,,, | Performed by: INTERNAL MEDICINE

## 2023-11-14 PROCEDURE — 1159F PR MEDICATION LIST DOCUMENTED IN MEDICAL RECORD: ICD-10-PCS | Mod: CPTII,S$GLB,, | Performed by: INTERNAL MEDICINE

## 2023-11-14 PROCEDURE — 99999 PR PBB SHADOW E&M-EST. PATIENT-LVL V: ICD-10-PCS | Mod: PBBFAC,,, | Performed by: INTERNAL MEDICINE

## 2023-11-14 PROCEDURE — 3051F PR MOST RECENT HEMOGLOBIN A1C LEVEL 7.0 - < 8.0%: ICD-10-PCS | Mod: CPTII,S$GLB,, | Performed by: INTERNAL MEDICINE

## 2023-11-14 PROCEDURE — 1160F PR REVIEW ALL MEDS BY PRESCRIBER/CLIN PHARMACIST DOCUMENTED: ICD-10-PCS | Mod: CPTII,S$GLB,, | Performed by: INTERNAL MEDICINE

## 2023-11-14 NOTE — PATIENT INSTRUCTIONS
In rare cases the cholesterol meds can cause a muscle reaction known as rhabdomyolysis. If you have muscle soreness all over the body, you feel like you have the flu but there is no fever, it may be a reaction from your cholesterol meds. Call us in that situation.    If having surgery or hospitalized for any reason, or you are sick or dehydrated fenofibrate should be stopped for a few days.  Keep well hydrated at all times, particularly in the summer.    If your doctor is prescribing a new medication even if for a short time, always ask if it would interfere with the cholesterol meds that you are taking.

## 2023-11-14 NOTE — PROGRESS NOTES
Subjective:   Chief Complaint:  Cecil Pringle is a 77 y.o. male who presents for follow-up of Coronary Artery Disease      Problem List and HPI:   CAD  CABG in 1990s and 2014  HTN  Mixed hyperlipidemia  DM ~1995  PAD asymptomatic   Aortic stenosis - mild  Heart murmur since childhood  Tobacco use - quit 1990    He reports a pain on the left side of his neck. It occurs in the am, usually lasting 2 hours. It does not recur w exertion. He does not report angina or shortness of breath with exertion.     He does not report dysphagia.  Triglycerides have been as high 700 in the past.  On fenofibrate and rosuvastatin.      Review of patient's allergies indicates:  No Known Allergies     Current Outpatient Medications   Medication Sig    aspirin (ECOTRIN) 81 MG EC tablet Take 81 mg by mouth once daily.    blood-glucose meter kit To check BG 1 times daily, to use with insurance preferred meter    cholecalciferol, vitamin D3, (VITAMIN D3) 25 mcg (1,000 unit) capsule Take 2 capsules (2,000 Units total) by mouth once daily.    ciclopirox (PENLAC) 8 % Soln APPLY TO AFFECTED AREA AT NIGHT    clopidogreL (PLAVIX) 75 mg tablet TAKE 1 TABLET BY MOUTH EVERY DAY    co-enzyme Q-10 30 mg capsule Take 30 mg by mouth once daily.     dapagliflozin (FARXIGA) 10 mg tablet Take 1 tablet (10 mg total) by mouth once daily.    dulaglutide (TRULICITY) 3 mg/0.5 mL pen injector Inject 3 mg into the skin every 7 days.    fenofibrate 160 MG Tab Take 1 tablet (160 mg total) by mouth once daily.    fish oil-omega-3 fatty acids 300-1,000 mg capsule Take 2 g by mouth 3 (three) times daily.    FLUoxetine 20 MG capsule Take 1 capsule (20 mg total) by mouth once daily.    glipiZIDE (GLUCOTROL) 5 MG tablet Take 2 tablets (10 mg total) by mouth 2 (two) times daily with meals.    hydroCHLOROthiazide (HYDRODIURIL) 12.5 MG Tab TAKE 1 TABLET BY MOUTH EVERY DAY    hydrOXYzine pamoate (VISTARIL) 25 MG Cap TAKE 1 CAPSULE BY MOUTH EVERY EVENING FOR ALLERGIES  "   lancets Mis To check BG 1 times daily, to use with insurance preferred meter    losartan (COZAAR) 100 MG tablet TAKE 1 TABLET BY MOUTH EVERY DAY    metFORMIN (GLUCOPHAGE) 1000 MG tablet Take 1 tablet (1,000 mg total) by mouth 2 (two) times daily.    metoprolol tartrate (LOPRESSOR) 100 MG tablet Take 0.5 tablets (50 mg total) by mouth 2 (two) times daily.    MULTIVIT-IRON-MIN-FOLIC ACID 3,500-18-0.4 UNIT-MG-MG ORAL CHEW Take 1 tablet by mouth once daily.    NIFEdipine (PROCARDIA-XL) 60 MG (OSM) 24 hr tablet TAKE 1 TABLET BY MOUTH EVERY DAY    omega-3 acid ethyl esters (LOVAZA) 1 gram capsule Take 2 capsules (2 g total) by mouth 2 (two) times daily.    rosuvastatin (CRESTOR) 20 MG tablet TAKE 1 TABLET BY MOUTH EVERY DAY IN THE EVENING    SAW PALMETTO ORAL Take 1 capsule by mouth 2 (two) times daily.    tamsulosin (FLOMAX) 0.4 mg Cap Take 1 capsule (0.4 mg total) by mouth every evening.    temazepam (RESTORIL) 30 mg capsule Take 1 capsule (30 mg total) by mouth nightly as needed for Insomnia.    TRUE METRIX GLUCOSE TEST STRIP Strp USE 1 STRIP TO CHECK GLUCOSE TWO TIMES DAILY    oxybutynin (DITROPAN-XL) 10 MG 24 hr tablet Take 1 tablet (10 mg total) by mouth once daily. (Patient not taking: Reported on 11/14/2023)     No current facility-administered medications for this visit.       Social history:  Cecil RIVAS Terenceia  reports that he has quit smoking. His smoking use included cigarettes. He has a 75.0 pack-year smoking history. He has quit using smokeless tobacco. He reports that he does not drink alcohol and does not use drugs.      Objective:   /68   Pulse (!) 56   Ht 6' 3" (1.905 m)   Wt 103.1 kg (227 lb 4.7 oz)   BMI 28.41 kg/m²    Physical Exam  Constitutional:       Appearance: He is well-developed.      Comments:      HENT:      Head: Normocephalic and atraumatic.   Neck:      Vascular: Carotid bruit (Radiating from the precordium) present. No JVD.   Cardiovascular:      Rate and Rhythm: Normal " rate and regular rhythm.      Pulses:           Radial pulses are 2+ on the right side and 2+ on the left side.        Posterior tibial pulses are 1+ on the right side.      Heart sounds: S1 normal and S2 normal. Murmur heard.      Harsh midsystolic murmur is present with a grade of 2/6 at the upper right sternal border.      No gallop.   Pulmonary:      Effort: Pulmonary effort is normal.      Breath sounds: No wheezing or rales.   Chest:      Chest wall: There is no dullness to percussion.   Abdominal:      Palpations: Abdomen is soft. There is no hepatomegaly or splenomegaly.      Tenderness: There is no abdominal tenderness.   Musculoskeletal:      Right lower leg: No edema.      Left lower leg: No edema.   Skin:     General: Skin is warm and dry.      Findings: No bruising.      Nails: There is no clubbing.   Neurological:      Mental Status: He is alert and oriented to person, place, and time.      Gait: Gait abnormal.      Comments: Almost fell when he was trying to get on the examination table.   Psychiatric:         Speech: Speech normal.         Behavior: Behavior normal.         Thought Content: Thought content normal.         Judgment: Judgment normal.         Lipids:  Recent Labs   Lab 11/07/23  0856   LDL Cholesterol 23.6 L   HDL 33 L   Cholesterol 93 L      Renal:  Recent Labs   Lab 11/07/23  0856   Creatinine 1.2   Potassium 4.3   CO2 21 L   BUN 20     Liver:  Recent Labs   Lab 11/07/23  0856   AST 15   ALT 17     CBC:  Lab Results   Component Value Date    WBC 7.24 05/08/2023    HGB 13.4 (L) 05/08/2023    HCT 41.4 05/08/2023    MCV 87 05/08/2023     05/08/2023           Results for orders placed during the hospital encounter of 11/07/23    Echo    Interpretation Summary    Left Ventricle: The left ventricle is normal in size. Normal wall thickness. Normal wall motion. There is normal systolic function. Ejection fraction by visual approximation is 65%.    Right Ventricle: Normal right  ventricular cavity size. Wall thickness is normal. Right ventricle wall motion  is normal. Systolic function is normal.    Left Atrium: Left atrium is mildly dilated.    Aortic Valve: There is moderate stenosis. Aortic valve area by VTI is 1.44 cm². LVOT diameter is 2.2 cm. Aortic valve peak velocity is 2.65 m/s. Mean gradient is 15 mmHg. The dimensionless index is 0.38. There is mild aortic regurgitation.    Mitral Valve: There is mild regurgitation.    Tricuspid Valve: There is mild to moderate regurgitation.    Pulmonary Artery: The estimated pulmonary artery systolic pressure is 31 mmHg.    IVC/SVC: Normal venous pressure at 3 mmHg.     Reviewed ECG performed today.  He has PVCs.    Assessment and Plan:       ICD-10-CM ICD-9-CM   1. Coronary artery disease involving native coronary artery of native heart without angina pectoris  I25.10 414.01   2. Hyperlipidemia associated with type 2 diabetes mellitus  E11.69 250.80    E78.5 272.4   3. Nonrheumatic aortic valve stenosis  I35.0 424.1   4. Hypertension associated with type 2 diabetes mellitus  E11.59 250.80    I15.2 401.9   5. S/P CABG x 5  Z95.1 V45.81   6. PVCs (premature ventricular contractions)  I49.3 427.69   7. Stenosis of right carotid artery  I65.21 433.10   8. Gait abnormality  R26.9 781.2        CAD stable. But he has a faulty anginal warning system.    LDL is <55. Continue rosuvastatin and fenofibrate. Discussed risk of rhabdomyolysis and advised temporarily discontinuing the fibrate in situations where the risk increases.   Aortic stenosis is moderate.  Discussed indications for surgery and TAVR.  PVCs asymptomatic.  He has nonobstructive carotid artery disease. Bruit over carotid arteries is likely murmur radiating from the precordium.  Will obtain a carotid ultrasound in view of extensive arterial disease.  Will ask Dr. Li to refer him to physical therapy.    Orders placed during this encounter:     Coronary artery disease involving native  coronary artery of native heart without angina pectoris  -     EKG 12-lead  -     US Carotid Bilateral; Future; Expected date: 11/14/2023  -     CV Ultrasound Bilateral Doppler Carotid; Future  -     EKG 12-lead; Future; Expected date: 05/14/2024    Hyperlipidemia associated with type 2 diabetes mellitus  -     Lipid Panel; Future; Expected date: 05/14/2024    Nonrheumatic aortic valve stenosis    Hypertension associated with type 2 diabetes mellitus  -     Comprehensive Metabolic Panel; Future; Expected date: 05/14/2024    S/P CABG x 5    PVCs (premature ventricular contractions)  -     EKG 12-lead; Future; Expected date: 05/14/2024    Stenosis of right carotid artery  -     CV Ultrasound Bilateral Doppler Carotid; Future    Gait abnormality         Follow up in about 6 months (around 5/14/2024).

## 2023-11-27 ENCOUNTER — OFFICE VISIT (OUTPATIENT)
Dept: UROLOGY | Facility: CLINIC | Age: 78
End: 2023-11-27
Payer: MEDICARE

## 2023-11-27 VITALS
WEIGHT: 229.25 LBS | BODY MASS INDEX: 28.51 KG/M2 | HEART RATE: 54 BPM | HEIGHT: 75 IN | DIASTOLIC BLOOD PRESSURE: 70 MMHG | SYSTOLIC BLOOD PRESSURE: 142 MMHG

## 2023-11-27 DIAGNOSIS — N18.31 TYPE 2 DIABETES MELLITUS WITH STAGE 3A CHRONIC KIDNEY DISEASE, WITHOUT LONG-TERM CURRENT USE OF INSULIN: Chronic | ICD-10-CM

## 2023-11-27 DIAGNOSIS — N40.1 BPH WITH OBSTRUCTION/LOWER URINARY TRACT SYMPTOMS: Primary | ICD-10-CM

## 2023-11-27 DIAGNOSIS — N40.1 PROSTATE HYPERPLASIA WITH URINARY OBSTRUCTION: ICD-10-CM

## 2023-11-27 DIAGNOSIS — N32.81 OAB (OVERACTIVE BLADDER): ICD-10-CM

## 2023-11-27 DIAGNOSIS — N13.8 PROSTATE HYPERPLASIA WITH URINARY OBSTRUCTION: ICD-10-CM

## 2023-11-27 DIAGNOSIS — E11.22 TYPE 2 DIABETES MELLITUS WITH STAGE 3A CHRONIC KIDNEY DISEASE, WITHOUT LONG-TERM CURRENT USE OF INSULIN: Chronic | ICD-10-CM

## 2023-11-27 DIAGNOSIS — N13.8 BPH WITH OBSTRUCTION/LOWER URINARY TRACT SYMPTOMS: Primary | ICD-10-CM

## 2023-11-27 PROCEDURE — 1126F PR PAIN SEVERITY QUANTIFIED, NO PAIN PRESENT: ICD-10-PCS | Mod: CPTII,S$GLB,, | Performed by: UROLOGY

## 2023-11-27 PROCEDURE — 1159F PR MEDICATION LIST DOCUMENTED IN MEDICAL RECORD: ICD-10-PCS | Mod: CPTII,S$GLB,, | Performed by: UROLOGY

## 2023-11-27 PROCEDURE — 3078F PR MOST RECENT DIASTOLIC BLOOD PRESSURE < 80 MM HG: ICD-10-PCS | Mod: CPTII,S$GLB,, | Performed by: UROLOGY

## 2023-11-27 PROCEDURE — 99214 OFFICE O/P EST MOD 30 MIN: CPT | Mod: S$GLB,,, | Performed by: UROLOGY

## 2023-11-27 PROCEDURE — 3077F SYST BP >= 140 MM HG: CPT | Mod: CPTII,S$GLB,, | Performed by: UROLOGY

## 2023-11-27 PROCEDURE — 3078F DIAST BP <80 MM HG: CPT | Mod: CPTII,S$GLB,, | Performed by: UROLOGY

## 2023-11-27 PROCEDURE — 99214 PR OFFICE/OUTPT VISIT, EST, LEVL IV, 30-39 MIN: ICD-10-PCS | Mod: S$GLB,,, | Performed by: UROLOGY

## 2023-11-27 PROCEDURE — 1126F AMNT PAIN NOTED NONE PRSNT: CPT | Mod: CPTII,S$GLB,, | Performed by: UROLOGY

## 2023-11-27 PROCEDURE — 1101F PR PT FALLS ASSESS DOC 0-1 FALLS W/OUT INJ PAST YR: ICD-10-PCS | Mod: CPTII,S$GLB,, | Performed by: UROLOGY

## 2023-11-27 PROCEDURE — 3077F PR MOST RECENT SYSTOLIC BLOOD PRESSURE >= 140 MM HG: ICD-10-PCS | Mod: CPTII,S$GLB,, | Performed by: UROLOGY

## 2023-11-27 PROCEDURE — 99999 PR PBB SHADOW E&M-EST. PATIENT-LVL V: ICD-10-PCS | Mod: PBBFAC,,, | Performed by: UROLOGY

## 2023-11-27 PROCEDURE — 1159F MED LIST DOCD IN RCRD: CPT | Mod: CPTII,S$GLB,, | Performed by: UROLOGY

## 2023-11-27 PROCEDURE — 1101F PT FALLS ASSESS-DOCD LE1/YR: CPT | Mod: CPTII,S$GLB,, | Performed by: UROLOGY

## 2023-11-27 PROCEDURE — 3288F PR FALLS RISK ASSESSMENT DOCUMENTED: ICD-10-PCS | Mod: CPTII,S$GLB,, | Performed by: UROLOGY

## 2023-11-27 PROCEDURE — 99999 PR PBB SHADOW E&M-EST. PATIENT-LVL V: CPT | Mod: PBBFAC,,, | Performed by: UROLOGY

## 2023-11-27 PROCEDURE — 3288F FALL RISK ASSESSMENT DOCD: CPT | Mod: CPTII,S$GLB,, | Performed by: UROLOGY

## 2023-11-27 RX ORDER — TAMSULOSIN HYDROCHLORIDE 0.4 MG/1
0.4 CAPSULE ORAL NIGHTLY
Qty: 90 CAPSULE | Refills: 3 | Status: SHIPPED | OUTPATIENT
Start: 2023-11-27 | End: 2024-02-26 | Stop reason: SDUPTHER

## 2023-11-27 RX ORDER — OXYBUTYNIN CHLORIDE 10 MG/1
10 TABLET, EXTENDED RELEASE ORAL DAILY
Qty: 90 TABLET | Refills: 3 | Status: SHIPPED | OUTPATIENT
Start: 2023-11-27 | End: 2024-02-26 | Stop reason: SDUPTHER

## 2023-11-27 NOTE — PROGRESS NOTES
CC: BPH and OAB    Cecil Pringle is a 78 y.o. man who is here for the evaluation of Urinary Frequency (Rtc in 3 months for OAB-pt is on both oxybutynin and flomax with improvement, nocturia is x 1 )    A new pt referred by his PCP, Layo Jett MD     Urination symptoms: Positive for frequency, urgency, nocturia x3 and incomplete emptying, intermittency, weak stream, straining, postvoid dribbling.  Denies flank pain, dysuria, hematuria. Not currently taking any medications for his urinary symptoms.     the patient is a former smoker who quit smoking in 1990.  PSA in 2021 was 0.54.     Does have issues achieving and erection, has tried Viagra. Not concerned about erectile function at this time.     Past Medical History:   Diagnosis Date    Anxiety     Arthritis     Coronary artery disease     Depression     Diabetes mellitus, type 2     Diabetic neuropathy     Hyperlipidemia     Hypertension     Insomnia     PAD (peripheral artery disease)      Past Surgical History:   Procedure Laterality Date    COLONOSCOPY N/A 12/12/2018    Procedure: COLONOSCOPY;  Surgeon: Jabari Pollock MD;  Location: [facility] ENDO;  Service: Endoscopy;  Laterality: N/A;  ok to hold Plavix x 5 days, pt requesting Jaden-MS    COLONOSCOPY N/A 6/14/2019    Procedure: COLONOSCOPY/Golytely;  Surgeon: Joon Devi MD;  Location: [facility] ENDO;  Service: Endoscopy;  Laterality: N/A;    COLONOSCOPY N/A 11/1/2023    Procedure: COLONOSCOPY;  Surgeon: Yoav Dsouza MD;  Location: [facility] ENDO;  Service: Endoscopy;  Laterality: N/A;    CORONARY ARTERY BYPASS GRAFT  1990    CORONARY ARTERY BYPASS GRAFT  2014    coronary stents  2013    x5    left foot surgery  1980    Right hand surgery  1980     Social History     Tobacco Use    Smoking status: Former     Current packs/day: 2.50     Average packs/day: 2.5 packs/day for 30.0 years (75.0 ttl pk-yrs)     Types: Cigarettes    Smokeless tobacco: Former   Substance Use Topics    Alcohol use: No     Drug use: No     Family History   Problem Relation Age of Onset    Heart disease Father          at the age of 56    Heart disease Brother     Heart disease Paternal Aunt     Heart disease Paternal Uncle     Cancer Mother         mesothelioma,  at the age of 78    Heart disease Sister     Heart disease Maternal Aunt     Glaucoma Neg Hx     Macular degeneration Neg Hx     Retinal detachment Neg Hx     Strabismus Neg Hx     Amblyopia Neg Hx     Blindness Neg Hx     Cataracts Neg Hx      Allergy:  Review of patient's allergies indicates:  No Known Allergies  Outpatient Encounter Medications as of 2023   Medication Sig Dispense Refill    aspirin (ECOTRIN) 81 MG EC tablet Take 81 mg by mouth once daily.      blood-glucose meter kit To check BG 1 times daily, to use with insurance preferred meter 1 each 0    cholecalciferol, vitamin D3, (VITAMIN D3) 25 mcg (1,000 unit) capsule Take 2 capsules (2,000 Units total) by mouth once daily.  0    ciclopirox (PENLAC) 8 % Soln APPLY TO AFFECTED AREA AT NIGHT 6.6 mL 11    clopidogreL (PLAVIX) 75 mg tablet TAKE 1 TABLET BY MOUTH EVERY DAY 90 tablet 3    co-enzyme Q-10 30 mg capsule Take 30 mg by mouth once daily.       dapagliflozin (FARXIGA) 10 mg tablet Take 1 tablet (10 mg total) by mouth once daily. 90 tablet 3    dulaglutide (TRULICITY) 3 mg/0.5 mL pen injector Inject 3 mg into the skin every 7 days. 16 pen 4    fenofibrate 160 MG Tab Take 1 tablet (160 mg total) by mouth once daily. 90 tablet 3    fish oil-omega-3 fatty acids 300-1,000 mg capsule Take 2 g by mouth 3 (three) times daily.      FLUoxetine 20 MG capsule Take 1 capsule (20 mg total) by mouth once daily. 90 capsule 3    glipiZIDE (GLUCOTROL) 5 MG tablet Take 2 tablets (10 mg total) by mouth 2 (two) times daily with meals. 360 tablet 3    hydroCHLOROthiazide (HYDRODIURIL) 12.5 MG Tab TAKE 1 TABLET BY MOUTH EVERY DAY 90 tablet 3    hydrOXYzine pamoate (VISTARIL) 25 MG Cap TAKE 1 CAPSULE BY MOUTH EVERY  EVENING FOR ALLERGIES 90 capsule 3    lancets Misc To check BG 1 times daily, to use with insurance preferred meter 100 each 3    losartan (COZAAR) 100 MG tablet TAKE 1 TABLET BY MOUTH EVERY DAY 90 tablet 3    metFORMIN (GLUCOPHAGE) 1000 MG tablet Take 1 tablet (1,000 mg total) by mouth 2 (two) times daily. 180 tablet 3    metoprolol tartrate (LOPRESSOR) 100 MG tablet Take 0.5 tablets (50 mg total) by mouth 2 (two) times daily. 90 tablet 3    MULTIVIT-IRON-MIN-FOLIC ACID 3,500-18-0.4 UNIT-MG-MG ORAL CHEW Take 1 tablet by mouth once daily.      NIFEdipine (PROCARDIA-XL) 60 MG (OSM) 24 hr tablet TAKE 1 TABLET BY MOUTH EVERY DAY 90 tablet 3    omega-3 acid ethyl esters (LOVAZA) 1 gram capsule Take 2 capsules (2 g total) by mouth 2 (two) times daily. 360 capsule 3    oxybutynin (DITROPAN-XL) 10 MG 24 hr tablet Take 1 tablet (10 mg total) by mouth once daily. (Patient not taking: Reported on 11/14/2023) 30 tablet 11    rosuvastatin (CRESTOR) 20 MG tablet TAKE 1 TABLET BY MOUTH EVERY DAY IN THE EVENING 90 tablet 3    SAW PALMETTO ORAL Take 1 capsule by mouth 2 (two) times daily.      tamsulosin (FLOMAX) 0.4 mg Cap Take 1 capsule (0.4 mg total) by mouth every evening. 30 capsule 11    temazepam (RESTORIL) 30 mg capsule Take 1 capsule (30 mg total) by mouth nightly as needed for Insomnia. 30 capsule 0    TRUE METRIX GLUCOSE TEST STRIP Strp USE 1 STRIP TO CHECK GLUCOSE TWO TIMES DAILY 200 strip 3     No facility-administered encounter medications on file as of 11/27/2023.     Review of Systems   Review of Systems   Constitutional:  Negative for chills and fever.   Respiratory:  Negative for shortness of breath.    Gastrointestinal:  Negative for abdominal pain and vomiting.   Genitourinary:  Positive for frequency and urgency. Negative for dysuria, flank pain and hematuria.     Physical Exam     Vitals:    11/27/23 0946   BP: (!) 142/70   Pulse: (!) 54     Physical Exam  Constitutional:       General: He is not in acute  distress.     Appearance: Normal appearance. He is well-developed. He is not diaphoretic.   HENT:      Head: Normocephalic and atraumatic.      Right Ear: External ear normal.      Left Ear: External ear normal.      Nose: Nose normal.   Eyes:      Extraocular Movements: Extraocular movements intact.      Conjunctiva/sclera: Conjunctivae normal.      Pupils: Pupils are equal, round, and reactive to light.   Neck:      Thyroid: No thyromegaly.      Vascular: No JVD.      Trachea: No tracheal deviation.   Cardiovascular:      Rate and Rhythm: Normal rate and regular rhythm.      Heart sounds: Normal heart sounds. No murmur heard.     No friction rub. No gallop.   Pulmonary:      Effort: Pulmonary effort is normal. No respiratory distress.      Breath sounds: Normal breath sounds. No wheezing.   Chest:      Chest wall: No tenderness.   Abdominal:      General: Abdomen is flat. Bowel sounds are normal. There is no distension.      Palpations: Abdomen is soft. There is no mass.      Tenderness: There is no abdominal tenderness. There is no right CVA tenderness, left CVA tenderness, guarding or rebound.   Genitourinary:     Penis: Normal. No tenderness.       Rectum: Normal.      Comments: Prostate 25 grams with negative nodule or negative tenderness  No tenderness noted on his levator ani.  Able to contract and relax without any pain.    Musculoskeletal:         General: No tenderness or deformity. Normal range of motion.      Cervical back: Normal range of motion and neck supple.   Lymphadenopathy:      Cervical: No cervical adenopathy.   Skin:     General: Skin is warm and dry.      Coloration: Skin is not jaundiced.   Neurological:      General: No focal deficit present.      Mental Status: He is alert and oriented to person, place, and time.   Psychiatric:         Mood and Affect: Mood normal.         Behavior: Behavior normal.         Thought Content: Thought content normal.         LABS:  Lab Results   Component  "Value Date    PSA 0.62 06/22/2022    PSA 0.54 05/28/2021    PSA 0.48 04/18/2019    PSA 0.49 03/01/2018    PSA 0.78 01/30/2017    PSA 0.59 11/04/2015    PSADIAG 0.58 12/09/2021     Results for orders placed or performed in visit on 12/09/21   Prostate Specific Antigen, Diagnostic   Result Value Ref Range    PSA Diagnostic 0.58 0.00 - 4.00 ng/mL     Lab Results   Component Value Date    CREATININE 1.2 11/07/2023    CREATININE 1.2 07/17/2023    CREATININE 1.3 05/08/2023     No results found for this or any previous visit.  No results found for: "LABURIN"  Hemoglobin A1C   Date Value Ref Range Status   07/17/2023 7.4 (H) 4.0 - 5.6 % Final     Comment:     ADA Screening Guidelines:  5.7-6.4%  Consistent with prediabetes  >or=6.5%  Consistent with diabetes    High levels of fetal hemoglobin interfere with the HbA1C  assay. Heterozygous hemoglobin variants (HbS, HgC, etc)do  not significantly interfere with this assay.   However, presence of multiple variants may affect accuracy.     05/08/2023 7.6 (H) 4.0 - 5.6 % Final     Comment:     ADA Screening Guidelines:  5.7-6.4%  Consistent with prediabetes  >or=6.5%  Consistent with diabetes    High levels of fetal hemoglobin interfere with the HbA1C  assay. Heterozygous hemoglobin variants (HbS, HgC, etc)do  not significantly interfere with this assay.   However, presence of multiple variants may affect accuracy.         UA clear   PVR per bladder scan: 47 ml    Assessment and Plan:  Cecil was seen today for urinary frequency.    Diagnoses and all orders for this visit:    BPH with obstruction/lower urinary tract symptoms    OAB (overactive bladder)    Type 2 diabetes mellitus with stage 3a chronic kidney disease, without long-term current use of insulin      He reports that taking flomax and oxybutynin xl definitely improved his LUTS.  However it is not still perfect.  He is interested in continuing medical therapy and hold off Urodynamic study and cysto.    May evaluate him " with suds cysto if not improving in 3 months with the above medical management, especially considering he has a diabetic problem.    I spent 25 minutes with the patient of which more than half was spent in direct consultation with the patient in regards to our treatment and plan.       Follow-up:  No follow-ups on file.

## 2023-11-28 ENCOUNTER — HOSPITAL ENCOUNTER (OUTPATIENT)
Dept: CARDIOLOGY | Facility: HOSPITAL | Age: 78
Discharge: HOME | End: 2023-11-28
Attending: INTERNAL MEDICINE
Payer: MEDICARE

## 2023-11-28 DIAGNOSIS — I65.21 STENOSIS OF RIGHT CAROTID ARTERY: ICD-10-CM

## 2023-11-28 DIAGNOSIS — I25.10 CORONARY ARTERY DISEASE INVOLVING NATIVE CORONARY ARTERY OF NATIVE HEART WITHOUT ANGINA PECTORIS: Chronic | ICD-10-CM

## 2023-11-28 LAB
LEFT ARM DIASTOLIC BLOOD PRESSURE: 50 MMHG
LEFT ARM SYSTOLIC BLOOD PRESSURE: 110 MMHG
LEFT CBA DIAS: 7 CM/S
LEFT CBA SYS: 56 CM/S
LEFT CCA DIST DIAS: 8 CM/S
LEFT CCA DIST SYS: 65 CM/S
LEFT CCA MID DIAS: 9 CM/S
LEFT CCA MID SYS: 70 CM/S
LEFT CCA PROX DIAS: 8 CM/S
LEFT CCA PROX SYS: 95 CM/S
LEFT ECA DIAS: 9 CM/S
LEFT ECA SYS: 150 CM/S
LEFT ICA DIST DIAS: 12 CM/S
LEFT ICA DIST SYS: 127 CM/S
LEFT ICA MID DIAS: 15 CM/S
LEFT ICA MID SYS: 128 CM/S
LEFT ICA PROX DIAS: 14 CM/S
LEFT ICA PROX SYS: 116 CM/S
LEFT VERTEBRAL DIAS: 6 CM/S
LEFT VERTEBRAL SYS: 31 CM/S
OHS CV CAROTID RIGHT ICA EDV HIGHEST: 26
OHS CV CAROTID ULTRASOUND LEFT ICA/CCA RATIO: 1.97
OHS CV CAROTID ULTRASOUND RIGHT ICA/CCA RATIO: 3.39
OHS CV PV CAROTID LEFT HIGHEST CCA: 95
OHS CV PV CAROTID LEFT HIGHEST ICA: 128
OHS CV PV CAROTID RIGHT HIGHEST CCA: 88
OHS CV PV CAROTID RIGHT HIGHEST ICA: 200
OHS CV US CAROTID LEFT HIGHEST EDV: 15
RIGHT ARM DIASTOLIC BLOOD PRESSURE: 50 MMHG
RIGHT ARM SYSTOLIC BLOOD PRESSURE: 120 MMHG
RIGHT CBA DIAS: 6 CM/S
RIGHT CBA SYS: 46 CM/S
RIGHT CCA DIST DIAS: 0 CM/S
RIGHT CCA DIST SYS: 59 CM/S
RIGHT CCA MID DIAS: 0 CM/S
RIGHT CCA MID SYS: 70 CM/S
RIGHT CCA PROX DIAS: 8 CM/S
RIGHT CCA PROX SYS: 88 CM/S
RIGHT ECA DIAS: 5 CM/S
RIGHT ECA SYS: 87 CM/S
RIGHT ICA DIST DIAS: 9 CM/S
RIGHT ICA DIST SYS: 70 CM/S
RIGHT ICA MID DIAS: 19 CM/S
RIGHT ICA MID SYS: 200 CM/S
RIGHT ICA PROX DIAS: 26 CM/S
RIGHT ICA PROX SYS: 199 CM/S
RIGHT VERTEBRAL DIAS: 10 CM/S
RIGHT VERTEBRAL SYS: 81 CM/S

## 2023-11-28 PROCEDURE — 93880 EXTRACRANIAL BILAT STUDY: CPT | Mod: PO

## 2023-11-28 PROCEDURE — 93880 EXTRACRANIAL BILAT STUDY: CPT | Mod: 26,,, | Performed by: INTERNAL MEDICINE

## 2023-11-28 PROCEDURE — 93880 CV US DOPPLER CAROTID (CUPID ONLY): ICD-10-PCS | Mod: 26,,, | Performed by: INTERNAL MEDICINE

## 2023-12-04 ENCOUNTER — PATIENT MESSAGE (OUTPATIENT)
Dept: ENDOCRINOLOGY | Facility: CLINIC | Age: 78
End: 2023-12-04
Payer: MEDICARE

## 2023-12-07 ENCOUNTER — CLINICAL SUPPORT (OUTPATIENT)
Dept: REHABILITATION | Facility: HOSPITAL | Age: 78
End: 2023-12-07
Payer: MEDICARE

## 2023-12-07 DIAGNOSIS — R26.81 UNSTABLE GAIT: ICD-10-CM

## 2023-12-07 PROCEDURE — 97162 PT EVAL MOD COMPLEX 30 MIN: CPT | Mod: PN

## 2023-12-07 PROCEDURE — 97110 THERAPEUTIC EXERCISES: CPT | Mod: PN

## 2023-12-07 NOTE — PLAN OF CARE
[facility]  Physical Therapy Initial Evaluation    Name: Cecil Pringle  Clinic Number: [id number]    Therapy Diagnosis:   Encounter Diagnosis   Name Primary?    Unstable gait         Physician: Layo Jett MD    Physician Orders: PT Eval and Treat   Medical Diagnosis from Referral: R26.81 (ICD-10-CM) - Unstable gait   Evaluation Date: 12/7/2023  Authorization Period Expiration: 11/13/2024  Plan of Care Expiration: 1/19/2024  Visit # / Visits authorized: 1/1   FOTO: 1/5    Precautions: Standard and CAD ; PAD; diabetes      Time In: 8:05 am  Time Out: 9:00 am  Total Appointment Time (timed & untimed codes): 55 minutes (1 MCE) (1 TE)    SUBJECTIVE     Date of onset: > 1 year     History of current condition - Cecil reports: he's been having unsteady gait getting progressively worse over the last year or two. 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 Patient reports having an excessive amount of earwax, so much that he had to discontinue using his hearing aids. 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 Patient reports no falls this year, but he did have a fall about a year ago when he tripped over his dog. Worsening level of function of his right knee since the fall. Attributes balance deficits occurring when he has transitional movements such as going from sitting to standing or when bending over to pick something up.     Falls: 1 fall about a year ago     Imaging: see EMR    Prior Therapy: no  Social History: lives with family   Occupation: retired   Prior Level of Function: ambulating without an AD; independent   Current Level of Function: independent without AD     Pain:  Current 0/10, worst 4/10, best 0/10   Location: left knee  Description: aching x 1 week   Aggravating Factors: increased walking/activity   Easing Factors: see above     Patients goals:   Patient would like to improve his balance and overall level of function.       Medical History:   Past Medical History:   Diagnosis Date    Anxiety     Arthritis     Coronary artery disease     Depression     Diabetes mellitus, type 2     Diabetic neuropathy     Hyperlipidemia     Hypertension     Insomnia     PAD (peripheral artery disease)        Surgical History:   Cecil Pringle  has a past surgical history that includes Coronary artery bypass graft (1990); Coronary artery bypass graft (2014); coronary stents (2013); Right hand surgery (1980); left foot surgery (1980); Colonoscopy (N/A, 12/12/2018); Colonoscopy (N/A, 6/14/2019); and Colonoscopy (N/A, 11/1/2023).    Medications:   Cecil has a current medication list which includes the following prescription(s): aspirin, blood-glucose meter, cholecalciferol (vitamin d3), ciclopirox, clopidogrel, co-enzyme q-10, dapagliflozin propanediol, trulicity, fenofibrate, fish oil-omega-3 fatty acids, fluoxetine, glipizide, hydrochlorothiazide, hydroxyzine pamoate, lancets, losartan, metformin, metoprolol tartrate, multivit-iron-min-folic acid, nifedipine, omega-3 acid ethyl esters, oxybutynin, rosuvastatin, saw palmetto, tamsulosin, temazepam, and true metrix glucose test strip.    Allergies:   Review of patient's allergies indicates:  No Known Allergies       OBJECTIVE     Observation: unsteady gait upon standing  Palpation: no tenderness to palpation     AROM:  right knee: (0-0-123 degrees)  left knee: (0-0-115 degrees)    L/E Strength w/ MicroFET Muscle Doris Dynamometer Right Left Pain/Dysfunction with Movement   (approx 4 sec hold w/ max contraction)   Hip Flexion  5/5  5/5    Hip Abduction  4/5  4/5    Quadriceps  23.4 kg   20.3 kg     Hamstrings  17.8 kg   13.3 kg   Left hamstring dhara horse      Outcome Measures:  Ctsib:    1) Firm surface, EO: loss of balance with recovery; swaying   2) Firm surface, EC: loss of balance x2 with recovery; swaying    3) Foam, EO: swaying    4) Foam, EC: swaying with loss of balance and therapist  "contact guard to avoid falling     TU.2 seconds without AD   30 second sit-to-stand test (without U/E support): 7x without upper extremity support       Limitation/Restriction for FOTO Knee Survey    Therapist reviewed FOTO scores for Cecil Pringle on 2023.   FOTO documents entered into [employer] - see Media section.    Limitation Score: 50%  Predicted Limitation Score: 42%         TREATMENT     Total Treatment time (time-based codes) separate from Evaluation: 15 minutes      Cecil received the treatments listed below:      Cecil received therapeutic exercises to develop strength, endurance, ROM, flexibility and posture for 15 minutes including:   Heel slides: 5"x10  Quad sets: 5"x10  Bridges: 10x           PATIENT EDUCATION AND HOME EXERCISES     Education provided:   - home exercise program     Written Home Exercises Provided: yes. Exercises were reviewed and Cecil was able to demonstrate them prior to the end of the session.  Cecil demonstrated good  understanding of the education provided. See EMR under Patient Instructions for exercises provided during therapy sessions.    ASSESSMENT     Cecil is a 78 y.o. male referred to outpatient Physical Therapy with a medical diagnosis of unstable gait. Following initial evaluation, it was determined patient is better fit for the neuro physical therapy team and he has been transferred to their care. Objective measures displaying hip weakness > quadriceps/hamstring weakness and balance deficits. Patient has neuropathy affecting proprioception, hearing difficulties affecting vestibular system, and visual deficits affects each aspect of balance and gait. Patient would benefit from skilled physical therapy focusing on balance, proprioception, gait training, and gross lower extremity strength and conditioning.     Patient prognosis is Good.   Patient will benefit from skilled outpatient Physical Therapy to address the deficits stated above and in the chart " below, provide patient /family education, and to maximize patientt's level of independence.     Plan of care discussed with patient: Yes  Patient's spiritual, cultural and educational needs considered and patient is agreeable to the plan of care and goals as stated below:     Anticipated Barriers for therapy: age; current level of function    Medical Necessity is demonstrated by the following  History  Co-morbidities and personal factors that may impact the plan of care Co-morbidities:   advanced age, CAD, diabetes, and HTN    Personal Factors:   age     high   Examination  Body Structures and Functions, activity limitations and participation restrictions that may impact the plan of care Body Regions:   lower extremities    Body Systems:    gross symmetry  ROM  strength  gross coordinated movement  balance  gait  transfers  transitions  motor control  motor learning    Participation Restrictions:   Community ambulation     Activity limitations:   Learning and applying knowledge  no deficits    General Tasks and Commands  no deficits    Communication  no deficits    Mobility  lifting and carrying objects  walking    Self care  looking after one's health    Domestic Life  shopping  cooking  doing house work (cleaning house, washing dishes, laundry)  assisting others    Interactions/Relationships  no deficits    Life Areas  no deficits    Community and Social Life  no deficits         high   Clinical Presentation evolving clinical presentation with changing clinical characteristics moderate   Decision Making/ Complexity Score: moderate     Goals:  Short Term Goals: 4 weeks   Patient will be compliant with HEP in order to maximize PT benefits  Patient will complete TUG in </= 17 seconds with least restrictive assistive device in order to reduce risk for falls and improve safety with functional mobility  Patient will score >/= 37/56 on Neri Balance Assessment in order to reduce risk for falls and improve postural  control  Patient will score </= 22 seconds on Five Time Sit to  order to improve bilateral lower extremity endurance and muscular power for transfers      Long Term Goals: 8 weeks   Patient will score >/= 42% on FOTO survey in order to improve self-perception of functional mobility deficits  Patient will improve bilateral lower extremity MMT grades by >/=1/2 grade in order to improve strength for ADL completion  Patient will complete TUG in </= 13 seconds with least restrictive assistive device in order to reduce risk for falls and improve safety with functional mobility  Patient will score >/= 41/56 on Neri Balance Assessment in order to reduce risk for falls and improve postural control  Patient will score </= 17 seconds on Five Time Sit to  order to improve bilateral lower extremity endurance and muscular power for transfers   Patient will begin some form of home/community fitness in order to sustain progress gained in PT    Plan     Plan of care Certification: 12/7/2023 to 1/19/2024.    Outpatient Physical Therapy 2 times weekly for 6 weeks to include the following interventions: Gait Training, Manual Therapy, Moist Heat/ Ice, Neuromuscular Re-ed, Orthotic Management and Training, Patient Education, Therapeutic Activites and Therapeutic Exercise, ASTYM, Kinesiotape    Morgan Orr, PT, DPT

## 2023-12-14 ENCOUNTER — CLINICAL SUPPORT (OUTPATIENT)
Dept: REHABILITATION | Facility: HOSPITAL | Age: 78
End: 2023-12-14
Payer: MEDICARE

## 2023-12-14 DIAGNOSIS — R26.89 IMBALANCE: Primary | ICD-10-CM

## 2023-12-14 PROCEDURE — 97110 THERAPEUTIC EXERCISES: CPT | Mod: PN

## 2023-12-14 PROCEDURE — 97112 NEUROMUSCULAR REEDUCATION: CPT | Mod: PN

## 2023-12-14 NOTE — PROGRESS NOTES
[facility]   Physical Therapy Treatment Note      Name: Cecil Pringle  Clinic Number: [id number]    Therapy Diagnosis:   Encounter Diagnosis   Name Primary?    Imbalance Yes     Physician: Layo Jett MD    Visit Date: 12/14/2023    Physician Orders: PT Eval and Treat   Medical Diagnosis from Referral: R26.81 (ICD-10-CM) - Unstable gait   Evaluation Date: 12/7/2023  Authorization Period Expiration: 11/13/2024  Plan of Care Expiration: 1/19/2024  Visit # / Visits authorized: 1/20 + eval  FOTO: 1/3     Precautions: Standard and CAD ; PAD; diabetes       Time In: 8:00 am  Time Out: 8:45 am  Total Appointment Time (timed & untimed codes): 45 minutes (2NMR + 1 TE)    PTA Visit #: 0/5     Subjective     Patient reports: Notes his episodes of imbalance occur when he first stands up or when he is walking. Confirms only one true fall (tripped over dog) in the last couple of years where he injured his left knee. Feels that since his fall, his exercise has been limited.  He  will be  compliant with home exercise program (received 12/14/2023).  Response to previous treatment: Last session was initial evaluation  Functional change: None yet    Pain: 0/10  Location: left knee      Objective      Objective Measures updated at progress report unless specified.     Head Impulse Test: + bilaterally (L>R) for corrective saccade and dizziness    Gait Analysis: Excess out-toe/increased hip ER throughout cycle bilaterally; slightly widened base of support throughout; decreased trunk rotation; decreased arm swing amplitude L>R; decreased left heel strike especially when accelerating    Functional Gait Assessment:     1. Gait on level surface =  3   (3) Normal: less than 5.5 sec, no A.D., no imbalance, normal gait pattern, deviates <6in   (2) Mild impairment: 7-5.6 sec, uses A.D., mild gait deviations, or deviates 6-10 in   (1) Moderate impairment: > 7 sec, slow speed, imbalance, deviates 10-15  in.   (0) Severe impairment: needs assist, deviates >15 in, reach/touch wall  2. Change in Gait Speed = 2   (3) Normal: smooth change w/o loss of balance or gait deviation, deviates < 6 in, significant difference between speeds   (2) Mild impairment: changes speed, but demonstrates mild gait deviations, deviates 6-10 in, OR no deviations but unable to significantly speed, OR uses A.D.   (1) Moderate impairment: minor changes to speed, OR changes speed w/ significant deviations, deviates 10-15 in, OR  Changes speed , but loses balance & recovers   (0) Severe impairment: cannot change speed, deviates >15 in, or loses balance & needs assist  3. Gait with horizontal head turns  = 2   (3) Normal: no change in gait, deviates <6 in   (2) Mild impairment: slight change in speed, deviates 6-10 in, OR uses A.D.   (1) Moderate impairment: moderate change in speed, deviates 10-15 in   (0) Severe impairment: severe disruption of gait, deviates >15in  4. Gait with vertical head turns = 2   (3) Normal: no change in gait, deviates <6 in   (2) Mild impairment: slight change in speed, deviates 6-10 in OR uses A.D.   (1) Moderate impairment: moderate change in speed, deviates 10-15 in   (0) Severe impairment: severe disruption of gait, deviates >15 in  5. Gait with pivot turns = 3   (3) Normal: performs safely in 3 sec, no LOB   (2) Mild impairment: performs in >3 sec & no LOB, OR turns safely & requires several steps to regain LOB   (1) Moderate impairment: turns slow, OR requires several small steps for balance following turn & stop   (0) Severe impairment: cannot turn safely, needs assist  6. Step over obstacle = 2   (3) Normal: steps over 2 stacked boxes w/o change in speed or LOB   (2) Mild impairment: able to step over 1 box w/o change in speed or LOB   (1) Moderate impairment: steps over 1 box but must slow down, may require VC   (0) Severe impairment: cannot perform w/o assist  7. Gait with Narrow ZACH = 0   (3) Normal: 10  "steps no staggering   (2) Mild impairment: 7-9 steps   (1) Moderate impairment: 4-7 steps   (0) Severe impairment: < 4 steps or cannot perform w/o assist  8. Gait with eyes closed = 1   (3) Normal: < 7 sec, no A.D., no LOB, normal gait pattern, deviates <6 in   (2) Mild impairment: 7.1-9 sec, mild gait deviations, deviates 6-10 in   (1) Moderate impairment: > 9 sec, abnormal pattern, LOB, deviates 10-15 in   (0) Severe impairment: cannot perform w/o assist, LOB, deviates >15in  9. Ambulating Backwards = 2   (3) Normal: no A.D., no LOB, normal gait pattern, deviates <6in   (2) Mild impairment: uses A.D., slower speed, mild gait deviations, deviates 6-10 in   (1) Moderate impairment: slow speed, abnormal gait pattern, LOB, deviates 10-15 in   (0) Severe impairment: severe gait deviations or LOB, deviates >15in  10. Steps = 2   (3) Normal: alternating feet, no rail   (2) Mild Impairment: alternating feet, uses rail   (1) Moderate impairment: step-to, uses rail   (0) Severe impairment: cannot perform safely    Score 19/30     Cutoffs:   <22/30 fall risk in older adults  <18/30 fall risk in Parkinsons    MDC/MCID:  Stroke = 4.2 points (MDC)  Vestibular = 6 points (MDC)  Geriatric = 4 points (MCID)  Parkinson's = 4 points (MDC)      Treatment     Cecil received the treatments listed below:      therapeutic exercises to develop strength and endurance for 15 minutes including:  - standing hip abduction and extension 2x15B with yellow theraband (HEP review)  - standing heel-toe raises 2x15 (HEP review)  - runner's gastroc stretch 2x30" (HEP review)    neuromuscular re-education activities to improve: Balance, Coordination, Kinesthetic, Sense, and Proprioception for 30 minutes. The following activities were included:  - FGA and Head Impulse Test (see above)  - modified single leg stance on 6" step + red med ball chest press 2x15 each leg leading   - narrow base of support on Airex + horizontal head turns 2x30"  - normal " "base of support on Airex + eyes closed 2x30"    therapeutic activities to improve functional performance for 00 minutes, including:  NOT PERFORMED TODAY    Patient Education and Home Exercises       Education provided:   - PT plan of care  - HEP instruction  - Role of balance PT    Written Home Exercises Provided: yes. Exercises were reviewed and Cecil was able to demonstrate them prior to the end of the session.  Cecil demonstrated good  understanding of the education provided. See Electronic Medical Record under Patient Instructions for exercises provided during therapy sessions    Assessment     Patient was agreeable to continued assessment from evaluation and FGA confirms risk for falls. Also with mild bilateral VOR deficits which may also be contributing to feelings of imbalance, especially with transitional activities. He was receptive to education on benefits of strengthening HEP to maximize PT potential in balance PT. He was appropriately challenged by balance intervention today and requires close supervision for initiation of training today.     Cecil Is progressing well towards his goals.   Patient prognosis is Good.     Patient will continue to benefit from skilled outpatient physical therapy to address the deficits listed in the problem list box on initial evaluation, provide pt/family education and to maximize pt's level of independence in the home and community environment.     Patient's spiritual, cultural and educational needs considered and pt agreeable to plan of care and goals.     Anticipated barriers to physical therapy: age; current level of function     Goals:     Short Term Goals: 4 weeks   Patient will be compliant with HEP in order to maximize PT benefits (progressing, not met)  Patient will complete TUG in </= 17 seconds with least restrictive assistive device in order to reduce risk for falls and improve safety with functional mobility (progressing, not met)  Patient will score >/= " 37/56 on Neri Balance Assessment in order to reduce risk for falls and improve postural control (progressing, not met)  Patient will score </= 22 seconds on Five Time Sit to  order to improve bilateral lower extremity endurance and muscular power for transfers (progressing, not met)     Long Term Goals: 8 weeks   Patient will score >/= 42% on FOTO survey in order to improve self-perception of functional mobility deficits (progressing, not met)  Patient will improve bilateral lower extremity MMT grades by >/=1/2 grade in order to improve strength for ADL completion (progressing, not met)  Patient will complete TUG in </= 13 seconds with least restrictive assistive device in order to reduce risk for falls and improve safety with functional mobility (progressing, not met)  Patient will score >/= 41/56 on Neri Balance Assessment in order to reduce risk for falls and improve postural control (progressing, not met)  Patient will score </= 17 seconds on Five Time Sit to  order to improve bilateral lower extremity endurance and muscular power for transfers (progressing, not met)  Patient will begin some form of home/community fitness in order to sustain progress gained in PT (progressing, not met)    Plan     Continue moderate level balance intervention     HERMES JEFF, PT

## 2023-12-17 NOTE — PROGRESS NOTES
"[facility]   Physical Therapy Treatment Note      Name: Cecil Prignle  Clinic Number: [id number]    Therapy Diagnosis:   Encounter Diagnosis   Name Primary?    Imbalance Yes       Physician: Layo Jett MD    Visit Date: 12/18/2023    Physician Orders: PT Eval and Treat   Medical Diagnosis from Referral: R26.81 (ICD-10-CM) - Unstable gait   Evaluation Date: 12/7/2023  Authorization Period Expiration: 11/13/2024  Plan of Care Expiration: 1/19/2024  Visit # / Visits authorized: 2/4 + eval  FOTO: 1/3     Precautions: Standard and CAD ; PAD; diabetes       Time In: 7:15 am  Time Out: 8:00 am  Total Appointment Time (timed & untimed codes): 45 minutes (1 NMR + 1 TE + 1 TA)    PTA Visit #: 0/5     Subjective     Patient reports: No major complaints today. Some soreness in bilateral hips over the weekend from HEP instruction which he was not able to complete sicne last visit.  He  was not  compliant with home exercise program (received 12/14/2023).  Response to previous treatment: No adverse response noted  Functional change: None yet    Pain: 0/10  Location: left knee      Objective      Objective Measures updated at progress report unless specified.     Treatment     Cecil received the treatments listed below:      therapeutic exercises to develop strength and endurance for 8 minutes including:  - Stepper bike x 8 minutes; level 4 sprints     neuromuscular re-education activities to improve: Balance, Coordination, Kinesthetic, Sense, and Proprioception for 22 minutes. The following activities were included:  - modified single leg stance on 6" step + red med ball chest press 2x15 each leg leading   - reciprocal step taps to 6" step with occasional upper extremity support 3x30"  - narrow base of support on Airex + horizontal head turns 2x30"  - normal base of support on Airex + eyes closed 2x30"  - tandem walking with frequent touchdown support x 6 lengths x 10 feet each " "    therapeutic activities to improve functional performance for 15 minutes, including:  - forward reciprocal step ups to 4" step without upper extremity support x 12B  - lateral 4" emely step overs with occasional touchdown support x 6 lengths x 10 feet each  - lateral yellow theraband stepping x 6 lengths x 10 feet each without upper extremity support    Patient Education and Home Exercises       Education provided:   - PT plan of care  - HEP instruction  - Role of balance PT    Written Home Exercises Provided: yes. Exercises were reviewed and Cecil was able to demonstrate them prior to the end of the session.  Cecil demonstrated good  understanding of the education provided. See Electronic Medical Record under Patient Instructions for exercises provided during therapy sessions    Assessment     Cecil arrived to session without major complaint and agreeable to PT. Tends to be challenged by activities and positions that bias right lower extremity. single leg stance today. From a timing standpoint, his postural corrections are occasionally delayed which necessitates close supervision and at a couple of points today, contact guard assist. He would benefit from continued balance progressions to decrease risk for falls.    Cecil Is progressing well towards his goals.   Patient prognosis is Good.     Patient will continue to benefit from skilled outpatient physical therapy to address the deficits listed in the problem list box on initial evaluation, provide pt/family education and to maximize pt's level of independence in the home and community environment.     Patient's spiritual, cultural and educational needs considered and pt agreeable to plan of care and goals.     Anticipated barriers to physical therapy: age; current level of function     Goals:     Short Term Goals: 4 weeks   Patient will be compliant with HEP in order to maximize PT benefits (progressing, not met)  Patient will complete TUG in </= 17 " seconds with least restrictive assistive device in order to reduce risk for falls and improve safety with functional mobility (progressing, not met)  Patient will score >/= 37/56 on Neri Balance Assessment in order to reduce risk for falls and improve postural control (progressing, not met)  Patient will score </= 22 seconds on Five Time Sit to  order to improve bilateral lower extremity endurance and muscular power for transfers (progressing, not met)     Long Term Goals: 8 weeks   Patient will score >/= 42% on FOTO survey in order to improve self-perception of functional mobility deficits (progressing, not met)  Patient will improve bilateral lower extremity MMT grades by >/=1/2 grade in order to improve strength for ADL completion (progressing, not met)  Patient will complete TUG in </= 13 seconds with least restrictive assistive device in order to reduce risk for falls and improve safety with functional mobility (progressing, not met)  Patient will score >/= 41/56 on Neri Balance Assessment in order to reduce risk for falls and improve postural control (progressing, not met)  Patient will score </= 17 seconds on Five Time Sit to  order to improve bilateral lower extremity endurance and muscular power for transfers (progressing, not met)  Patient will begin some form of home/community fitness in order to sustain progress gained in PT (progressing, not met)    Plan     Continue moderate level balance intervention; monitor HEP compliance     HERMES JEFF, PT

## 2023-12-18 ENCOUNTER — CLINICAL SUPPORT (OUTPATIENT)
Dept: REHABILITATION | Facility: HOSPITAL | Age: 78
End: 2023-12-18
Payer: MEDICARE

## 2023-12-18 DIAGNOSIS — R26.89 IMBALANCE: Primary | ICD-10-CM

## 2023-12-18 PROCEDURE — 97112 NEUROMUSCULAR REEDUCATION: CPT | Mod: PN

## 2023-12-18 PROCEDURE — 97110 THERAPEUTIC EXERCISES: CPT | Mod: PN

## 2023-12-18 PROCEDURE — 97530 THERAPEUTIC ACTIVITIES: CPT | Mod: PN

## 2023-12-21 ENCOUNTER — CLINICAL SUPPORT (OUTPATIENT)
Dept: REHABILITATION | Facility: HOSPITAL | Age: 78
End: 2023-12-21
Payer: MEDICARE

## 2023-12-21 DIAGNOSIS — R26.89 IMBALANCE: Primary | ICD-10-CM

## 2023-12-21 PROCEDURE — 97530 THERAPEUTIC ACTIVITIES: CPT | Mod: PN,CQ

## 2023-12-21 PROCEDURE — 97110 THERAPEUTIC EXERCISES: CPT | Mod: PN,CQ

## 2023-12-21 PROCEDURE — 97112 NEUROMUSCULAR REEDUCATION: CPT | Mod: PN,CQ

## 2023-12-21 NOTE — PROGRESS NOTES
"[facility]   Physical Therapy Treatment Note      Name: Cecil Pringle  Clinic Number: [id number]    Therapy Diagnosis:   Encounter Diagnosis   Name Primary?    Imbalance Yes       Physician: Layo Jett MD    Visit Date: 12/21/2023    Physician Orders: PT Eval and Treat   Medical Diagnosis from Referral: R26.81 (ICD-10-CM) - Unstable gait   Evaluation Date: 12/7/2023  Authorization Period Expiration: 11/13/2024  Plan of Care Expiration: 1/19/2024  Visit # / Visits authorized: 3/4   FOTO: 1/3     Precautions: Standard and CAD ; PAD; diabetes       Time In: 8:00 am  Time Out: 8:45 am  Total Appointment Time (timed & untimed codes): 45 minutes (1 NMR + 1 TE + 1 TA)    PTA Visit #: 1/5     Subjective     Patient reports: No complaints at this time.  He  was not  compliant with home exercise program (received 12/14/2023).  Response to previous treatment: No adverse response noted  Functional change: None yet    Pain: 0/10  Location: left knee      Objective      Objective Measures updated at progress report unless specified.     Treatment     Cecil received the treatments listed below:      therapeutic exercises to develop strength and endurance for 8 minutes including:  - Stepper bike x 8 minutes; level 4 sprints     neuromuscular re-education activities to improve: Balance, Coordination, Kinesthetic, Sense, and Proprioception for 22 minutes. The following activities were included:  - modified single leg stance on 6" step + red med ball chest press 2x15 each leg leading   - reciprocal step taps to 6" step with occasional upper extremity support 3x30"  - narrow base of support on Airex + horizontal head turns 2x30"  - normal base of support on Airex + eyes closed 2x30"  - tandem walking with frequent touchdown support x 6 lengths x 10 feet each     therapeutic activities to improve functional performance for 15 minutes, including:  - forward reciprocal step ups to 4" step without " "upper extremity support x 12B  - lateral 4" emely step overs with occasional touchdown support x 6 lengths x 10 feet each  - lateral yellow theraband stepping x 6 lengths x 10 feet each without upper extremity support  - step and weight shifts in all directions   - step up on soft fitter board: 2x10 reciprocal    Patient Education and Home Exercises       Education provided:   - PT plan of care  - HEP instruction  - Role of balance PT    Written Home Exercises Provided: yes. Exercises were reviewed and Cecil was able to demonstrate them prior to the end of the session.  Cecil demonstrated good  understanding of the education provided. See Electronic Medical Record under Patient Instructions for exercises provided during therapy sessions    Assessment     Cecil arrived to session without any complaint of pain and ready to participate in treatment. Session focused on moderate balance training with patient requiring some sort of touchdown support for majority of dynamic balance.  Multiple posterior losses of balance with step ups on soft fitter board so weight shifting and stepping activity was reviewed in open gym without support with good response. He would benefit from continued balance progressions to decrease risk for falls.    Cecil Is progressing well towards his goals.   Patient prognosis is Good.     Patient will continue to benefit from skilled outpatient physical therapy to address the deficits listed in the problem list box on initial evaluation, provide pt/family education and to maximize pt's level of independence in the home and community environment.     Patient's spiritual, cultural and educational needs considered and pt agreeable to plan of care and goals.     Anticipated barriers to physical therapy: age; current level of function     Goals:     Short Term Goals: 4 weeks   Patient will be compliant with HEP in order to maximize PT benefits (progressing, not met)  Patient will complete TUG " in </= 17 seconds with least restrictive assistive device in order to reduce risk for falls and improve safety with functional mobility (progressing, not met)  Patient will score >/= 37/56 on Neri Balance Assessment in order to reduce risk for falls and improve postural control (progressing, not met)  Patient will score </= 22 seconds on Five Time Sit to  order to improve bilateral lower extremity endurance and muscular power for transfers (progressing, not met)     Long Term Goals: 8 weeks   Patient will score >/= 42% on FOTO survey in order to improve self-perception of functional mobility deficits (progressing, not met)  Patient will improve bilateral lower extremity MMT grades by >/=1/2 grade in order to improve strength for ADL completion (progressing, not met)  Patient will complete TUG in </= 13 seconds with least restrictive assistive device in order to reduce risk for falls and improve safety with functional mobility (progressing, not met)  Patient will score >/= 41/56 on Neri Balance Assessment in order to reduce risk for falls and improve postural control (progressing, not met)  Patient will score </= 17 seconds on Five Time Sit to  order to improve bilateral lower extremity endurance and muscular power for transfers (progressing, not met)  Patient will begin some form of home/community fitness in order to sustain progress gained in PT (progressing, not met)    Plan     Continue moderate level balance intervention; monitor HEP compliance     Brandie Mason, PTA

## 2024-01-02 ENCOUNTER — CLINICAL SUPPORT (OUTPATIENT)
Dept: REHABILITATION | Facility: HOSPITAL | Age: 79
End: 2024-01-02
Payer: MEDICARE

## 2024-01-02 DIAGNOSIS — R26.89 IMBALANCE: Primary | ICD-10-CM

## 2024-01-02 PROCEDURE — 97110 THERAPEUTIC EXERCISES: CPT | Mod: PN

## 2024-01-02 PROCEDURE — 97530 THERAPEUTIC ACTIVITIES: CPT | Mod: PN

## 2024-01-02 PROCEDURE — 97112 NEUROMUSCULAR REEDUCATION: CPT | Mod: PN

## 2024-01-02 NOTE — PROGRESS NOTES
"[facility]   Physical Therapy Treatment Note      Name: Cecil Pringle  Clinic Number: [id number]    Therapy Diagnosis:   Encounter Diagnosis   Name Primary?    Imbalance Yes     Physician: Layo Jett MD    Visit Date: 1/2/2024    Physician Orders: PT Eval and Treat   Medical Diagnosis from Referral: R26.81 (ICD-10-CM) - Unstable gait   Evaluation Date: 12/7/2023  Authorization Period Expiration: 11/13/2024  Progress Note Due: 1/7/2024  Plan of Care Expiration: 1/19/2024  Visit # / Visits authorized: 1/6 (+3 visits in 2023)   FOTO: 1/3     Precautions: Standard and CAD ; PAD; diabetes       Time In: 7:58 am  Time Out: 8:42 am  Total Appointment Time (timed & untimed codes): 44 minutes (1 NMR + 1 TE + 1 TA)    PTA Visit #: 0/5     Subjective     Patient reports: Has been very busy over the holidays and hasn't completed his home exercises.  He  was not  compliant with home exercise program (received 12/14/2023).  Response to previous treatment: No adverse response noted  Functional change: None yet    Pain: 0/10  Location: left knee      Objective      Objective Measures updated at progress report unless specified.     Treatment     Cecil received the treatments listed below:      therapeutic exercises to develop strength and endurance for 15 minutes including:  - Stepper bike x 8 minutes; level 4.4 sprints   - Standing hip abduction and extension with red theraband; stance leg on green foam disc 2x12B each direction    neuromuscular re-education activities to improve: Balance, Coordination, Kinesthetic, Sense, and Proprioception for 14 minutes. The following activities were included:  - modified single leg stance on 6" step + red med ball chest press 2x15B each leg leading   - narrow base of support on Airex + horizontal head turns 3x30"  - normal base of support on Airex + eyes closed 2x30"  - semi-tandem stance 2x30" each leg leading    Not performed today  - reciprocal step " "taps to 6" step with occasional upper extremity support 3x30"  - tandem walking with frequent touchdown support x 6 lengths x 10 feet each     therapeutic activities to improve functional performance for 15 minutes, including:  - forward reciprocal step ups to 4" step without upper extremity support x 10B  - forward cross over steps with touchdown support x 6 lengths x 10 feet each  - single emely step overs 9" 2x45" without upper extremity support    Not performed today  - lateral 4" emely step overs with occasional touchdown support x 6 lengths x 10 feet each  - lateral yellow theraband stepping x 6 lengths x 10 feet each without upper extremity support  - step and weight shifts in all directions   - step up on soft fitter board: 2x10 reciprocal    Patient Education and Home Exercises       Education provided:   - PT plan of care  - HEP instruction  - Role of balance PT    Written Home Exercises Provided: yes. Exercises were reviewed and Cecil was able to demonstrate them prior to the end of the session.  Cecil demonstrated good  understanding of the education provided. See Electronic Medical Record under Patient Instructions for exercises provided during therapy sessions    Assessment     Cecil arrived to session without any new complaints but still with reports of low compliance with HEP. He was receptive to benefits of HEP in maximizing PT benefit. Improved anticipatory postural control with lateral movements but still challenged by movements in anterior-posterior plane. Occasional contact guard assist required with eyes closed on foam due to continued impairments in sensory integration. He would benefit from continued PT services to achieve functional goals.    Cecil Is progressing well towards his goals.   Patient prognosis is Good.     Patient will continue to benefit from skilled outpatient physical therapy to address the deficits listed in the problem list box on initial evaluation, provide " pt/family education and to maximize pt's level of independence in the home and community environment.     Patient's spiritual, cultural and educational needs considered and pt agreeable to plan of care and goals.     Anticipated barriers to physical therapy: age; current level of function     Goals:     Short Term Goals: 4 weeks   Patient will be compliant with HEP in order to maximize PT benefits (progressing, not met)  Patient will complete TUG in </= 17 seconds with least restrictive assistive device in order to reduce risk for falls and improve safety with functional mobility (progressing, not met)  Patient will score >/= 37/56 on Neri Balance Assessment in order to reduce risk for falls and improve postural control (progressing, not met)  Patient will score </= 22 seconds on Five Time Sit to  order to improve bilateral lower extremity endurance and muscular power for transfers (progressing, not met)     Long Term Goals: 8 weeks   Patient will score >/= 42% on FOTO survey in order to improve self-perception of functional mobility deficits (progressing, not met)  Patient will improve bilateral lower extremity MMT grades by >/=1/2 grade in order to improve strength for ADL completion (progressing, not met)  Patient will complete TUG in </= 13 seconds with least restrictive assistive device in order to reduce risk for falls and improve safety with functional mobility (progressing, not met)  Patient will score >/= 41/56 on Neri Balance Assessment in order to reduce risk for falls and improve postural control (progressing, not met)  Patient will score </= 17 seconds on Five Time Sit to  order to improve bilateral lower extremity endurance and muscular power for transfers (progressing, not met)  Patient will begin some form of home/community fitness in order to sustain progress gained in PT (progressing, not met)    Plan     Continue moderate level balance intervention; monitor HEP compliance      HERMES JEFF, PT

## 2024-01-04 ENCOUNTER — CLINICAL SUPPORT (OUTPATIENT)
Dept: REHABILITATION | Facility: HOSPITAL | Age: 79
End: 2024-01-04
Payer: MEDICARE

## 2024-01-04 DIAGNOSIS — R26.89 IMBALANCE: Primary | ICD-10-CM

## 2024-01-04 PROCEDURE — 97530 THERAPEUTIC ACTIVITIES: CPT | Mod: PN

## 2024-01-04 PROCEDURE — 97110 THERAPEUTIC EXERCISES: CPT | Mod: PN

## 2024-01-04 PROCEDURE — 97112 NEUROMUSCULAR REEDUCATION: CPT | Mod: PN

## 2024-01-04 NOTE — PROGRESS NOTES
"[facility]   Physical Therapy Treatment Note      Name: Cecil Pringle  Clinic Number: [id number]    Therapy Diagnosis:   Encounter Diagnosis   Name Primary?    Imbalance Yes     Physician: Layo Jett MD    Visit Date: 1/4/2024    Physician Orders: PT Eval and Treat   Medical Diagnosis from Referral: R26.81 (ICD-10-CM) - Unstable gait   Evaluation Date: 12/7/2023  Authorization Period Expiration: 11/13/2024  Progress Note Due: 1/7/2024  Plan of Care Expiration: 1/19/2024  Visit # / Visits authorized: 1/6 (+3 visits in 2023)   FOTO: 1/3     Precautions: Standard and CAD ; PAD; diabetes       Time In: 8:03 am  Time Out: 8:45 am  Total Appointment Time (timed & untimed codes): 42 minutes (1 NMR + 1 TE + 1 TA)    PTA Visit #: 0/5     Subjective     Patient reports: His New Year's resolution is to keep up with his exercises and lose 20 pounds. He has been completing HEP after discussion with PT last visit.  He  was  compliant with home exercise program (received 12/14/2023).  Response to previous treatment: No adverse response noted  Functional change: HEP compliance    Pain: 0/10  Location: left knee      Objective      Objective Measures updated at progress report unless specified.     Treatment     Cecil received the treatments listed below:      therapeutic exercises to develop strength and endurance for 12 minutes including:  - Recumbent bike x 6 minutes; level 3 sprints   - Standing calf raises 2x15    neuromuscular re-education activities to improve: Balance, Coordination, Kinesthetic, Sense, and Proprioception for 15 minutes. The following activities were included:  - modified single leg stance on 6" step + red med ball chest press 2x15B each leg leading   - narrow base of support on Airex + horizontal head turns 3x30"  - normal base of support on Airex + eyes closed 2x30"  - semi-tandem stance 2x30" each leg leading  - high knee hip flexion marches 6 lengths x 10 feet " "each without upper extremity support    Not performed today  - reciprocal step taps to 6" step with occasional upper extremity support 3x30"  - tandem walking with frequent touchdown support x 6 lengths x 10 feet each     therapeutic activities to improve functional performance for 15 minutes, including:  - lateral 4" step up and overs 2x60" no upper extremity support  - retro walking 6 lengths x 10 feet each without upper extremity support with cues for hip extension and step symmetry  - Lateral resistance steps with red theraband x 6 lengths x 10 feet each without upper extremity support  - Sit to stands from elevated mat 2x10    Not performed today  - forward reciprocal step ups to 4" step without upper extremity support x 10B  - forward cross over steps with touchdown support x 6 lengths x 10 feet each  - single emely step overs 9" 2x45" without upper extremity support  - lateral 4" emely step overs with occasional touchdown support x 6 lengths x 10 feet each  - lateral yellow theraband stepping x 6 lengths x 10 feet each without upper extremity support  - step and weight shifts in all directions   - step up on soft fitter board: 2x10 reciprocal    Patient Education and Home Exercises       Education provided:   - PT plan of care  - HEP instruction  - Role of balance PT    Written Home Exercises Provided: yes. Exercises were reviewed and Cecil was able to demonstrate them prior to the end of the session.  Cecil demonstrated good  understanding of the education provided. See Electronic Medical Record under Patient Instructions for exercises provided during therapy sessions    Assessment     Cecil arrived to session agreeable to PT and able to accomplish greater volume of activity today. Occasional verbal cuing required to improve righting reactions as patient often delays hip and ankle strategies necessitating frequent need for reaching strategy. He is still moderately challenged by small base of support " activity. He would benefit from continued PT services to achieve functional goals, especially now that HEP compliance/home carryover has been better established.    Cecil Is progressing well towards his goals.   Patient prognosis is Good.     Patient will continue to benefit from skilled outpatient physical therapy to address the deficits listed in the problem list box on initial evaluation, provide pt/family education and to maximize pt's level of independence in the home and community environment.     Patient's spiritual, cultural and educational needs considered and pt agreeable to plan of care and goals.     Anticipated barriers to physical therapy: age; current level of function     Goals:     Short Term Goals: 4 weeks   Patient will be compliant with HEP in order to maximize PT benefits (progressing, not met)  Patient will complete TUG in </= 17 seconds with least restrictive assistive device in order to reduce risk for falls and improve safety with functional mobility (progressing, not met)  Patient will score >/= 37/56 on Neri Balance Assessment in order to reduce risk for falls and improve postural control (progressing, not met)  Patient will score </= 22 seconds on Five Time Sit to  order to improve bilateral lower extremity endurance and muscular power for transfers (progressing, not met)     Long Term Goals: 8 weeks   Patient will score >/= 42% on FOTO survey in order to improve self-perception of functional mobility deficits (progressing, not met)  Patient will improve bilateral lower extremity MMT grades by >/=1/2 grade in order to improve strength for ADL completion (progressing, not met)  Patient will complete TUG in </= 13 seconds with least restrictive assistive device in order to reduce risk for falls and improve safety with functional mobility (progressing, not met)  Patient will score >/= 41/56 on Neri Balance Assessment in order to reduce risk for falls and improve postural control  (progressing, not met)  Patient will score </= 17 seconds on Five Time Sit to  order to improve bilateral lower extremity endurance and muscular power for transfers (progressing, not met)  Patient will begin some form of home/community fitness in order to sustain progress gained in PT (progressing, not met)    Plan     Continue moderate level balance intervention; monitor HEP compliance     HERMES JEFF, PT

## 2024-01-08 ENCOUNTER — CLINICAL SUPPORT (OUTPATIENT)
Dept: REHABILITATION | Facility: HOSPITAL | Age: 79
End: 2024-01-08
Payer: MEDICARE

## 2024-01-08 DIAGNOSIS — R26.89 IMBALANCE: Primary | ICD-10-CM

## 2024-01-08 PROCEDURE — 97112 NEUROMUSCULAR REEDUCATION: CPT | Mod: PN,CQ

## 2024-01-08 PROCEDURE — 97530 THERAPEUTIC ACTIVITIES: CPT | Mod: PN,CQ

## 2024-01-08 PROCEDURE — 97110 THERAPEUTIC EXERCISES: CPT | Mod: PN,CQ

## 2024-01-08 NOTE — PROGRESS NOTES
"[facility]   Physical Therapy Treatment Note      Name: Cecil Pringle  Clinic Number: [id number]    Therapy Diagnosis:   Encounter Diagnosis   Name Primary?    Imbalance Yes     Physician: Layo Jett MD    Visit Date: 1/8/2024    Physician Orders: PT Eval and Treat   Medical Diagnosis from Referral: R26.81 (ICD-10-CM) - Unstable gait   Evaluation Date: 12/7/2023  Authorization Period Expiration: 11/13/2024  Progress Note Due: 1/7/2024  Plan of Care Expiration: 1/19/2024  Visit # / Visits authorized: 3/6 (+3 visits in 2023)   FOTO: 1/3     Precautions: Standard and CAD ; PAD; diabetes       Time In: 8:05 am  Time Out: 8:45 am  Total Appointment Time (timed & untimed codes): 40 minutes (1 NMR + 1 TE + 1 TA)    PTA Visit #: 1/5     Subjective     Patient reports: that he hates waiting and does not like being late to start treatment.  He  was  compliant with home exercise program (received 12/14/2023).  Response to previous treatment: No adverse response noted  Functional change: HEP compliance    Pain: 0/10  Location: left knee      Objective      Objective Measures updated at progress report unless specified.     Treatment     Cecil received the treatments listed below:      therapeutic exercises to develop strength and endurance for 10 minutes including:  - Recumbent bike x 6 minutes; level 3 sprints   - Standing calf raises 2x15    neuromuscular re-education activities to improve: Balance, Coordination, Kinesthetic, Sense, and Proprioception for 15 minutes. The following activities were included:  - modified single leg stance on 6" step + red med ball chest press 2x15B each leg leading   - narrow base of support on Airex + horizontal head turns 3x30"  - normal base of support on Airex + eyes closed 2x30"  - normal base of support on airex beam with red med ball 2# behind back passes x10 ea direction  - semi-tandem stance 2x30" each leg leading  - high knee hip flexion " "marches 6 lengths x 10 feet each without upper extremity support  - weight shift and step in all direction x10 ea  - beep board: static balance 4x20"                      with 2# med ball chest press x10                 - step up on soft fitter board: 2x10 with ea leg leading    Not performed today  - reciprocal step taps to 6" step with occasional upper extremity support 3x30"  - tandem walking with frequent touchdown support x 6 lengths x 10 feet each     therapeutic activities to improve functional performance for 15 minutes, including:  - lateral 4" step up and overs 2x60" no upper extremity support  - retro walking 6 lengths x 10 feet each without upper extremity support with cues for hip extension and step symmetry  - Lateral resistance steps with red theraband x 6 lengths x 10 feet each without upper extremity support  - Sit to stands from elevated mat 2x10    Not performed today  - forward reciprocal step ups to 4" step without upper extremity support x 10B  - forward cross over steps with touchdown support x 6 lengths x 10 feet each  - single emely step overs 9" 2x45" without upper extremity support  - lateral 4" emely step overs with occasional touchdown support x 6 lengths x 10 feet each    Patient Education and Home Exercises       Education provided:   - PT plan of care  - HEP instruction  - Role of balance PT    Written Home Exercises Provided: yes. Exercises were reviewed and Cecil was able to demonstrate them prior to the end of the session.  Cecil demonstrated good  understanding of the education provided. See Electronic Medical Record under Patient Instructions for exercises provided during therapy sessions    Assessment     Cecil arrived to session without any complaints and was agreeable to treatment.  Session continues to focus primarily on improving righting reactions with static balance activity.  Multiple losses of balance posteriorly which required assistance from PTA for recovery " and other times utilized reaching strategy.  Lengthy review of weight shifting with beep board use for reinforcement with some improvement observed.  He would benefit from continued PT services to achieve functional goals with continued compliance with HEP completion.    Cecil Is progressing well towards his goals.   Patient prognosis is Good.     Patient will continue to benefit from skilled outpatient physical therapy to address the deficits listed in the problem list box on initial evaluation, provide pt/family education and to maximize pt's level of independence in the home and community environment.     Patient's spiritual, cultural and educational needs considered and pt agreeable to plan of care and goals.     Anticipated barriers to physical therapy: age; current level of function     Goals:     Short Term Goals: 4 weeks   Patient will be compliant with HEP in order to maximize PT benefits (progressing, not met)  Patient will complete TUG in </= 17 seconds with least restrictive assistive device in order to reduce risk for falls and improve safety with functional mobility (progressing, not met)  Patient will score >/= 37/56 on Neri Balance Assessment in order to reduce risk for falls and improve postural control (progressing, not met)  Patient will score </= 22 seconds on Five Time Sit to  order to improve bilateral lower extremity endurance and muscular power for transfers (progressing, not met)     Long Term Goals: 8 weeks   Patient will score >/= 42% on FOTO survey in order to improve self-perception of functional mobility deficits (progressing, not met)  Patient will improve bilateral lower extremity MMT grades by >/=1/2 grade in order to improve strength for ADL completion (progressing, not met)  Patient will complete TUG in </= 13 seconds with least restrictive assistive device in order to reduce risk for falls and improve safety with functional mobility (progressing, not met)  Patient will  score >/= 41/56 on Neri Balance Assessment in order to reduce risk for falls and improve postural control (progressing, not met)  Patient will score </= 17 seconds on Five Time Sit to  order to improve bilateral lower extremity endurance and muscular power for transfers (progressing, not met)  Patient will begin some form of home/community fitness in order to sustain progress gained in PT (progressing, not met)    Plan     Continue moderate level balance intervention; monitor HEP compliance     Brandie Mason, PTA

## 2024-01-11 ENCOUNTER — CLINICAL SUPPORT (OUTPATIENT)
Dept: REHABILITATION | Facility: HOSPITAL | Age: 79
End: 2024-01-11
Payer: MEDICARE

## 2024-01-11 DIAGNOSIS — R26.89 IMBALANCE: Primary | ICD-10-CM

## 2024-01-11 PROCEDURE — 97110 THERAPEUTIC EXERCISES: CPT | Mod: PN,CQ

## 2024-01-11 PROCEDURE — 97530 THERAPEUTIC ACTIVITIES: CPT | Mod: PN,CQ

## 2024-01-11 PROCEDURE — 97112 NEUROMUSCULAR REEDUCATION: CPT | Mod: PN,CQ

## 2024-01-11 NOTE — PROGRESS NOTES
"[facility]   Physical Therapy Treatment Note      Name: Cecil Pringle  Clinic Number: [id number]    Therapy Diagnosis:   Encounter Diagnosis   Name Primary?    Imbalance Yes     Physician: Layo Jett MD    Visit Date: 2024    Physician Orders: PT Eval and Treat   Medical Diagnosis from Referral: R26.81 (ICD-10-CM) - Unstable gait   Evaluation Date: 2023  Authorization Period Expiration: 2024  Progress Note Due: 2024  Plan of Care Expiration: 2024  Visit # / Visits authorized:  (+3 visits in )   FOTO: 1/3     Precautions: Standard and CAD ; PAD; diabetes       Time In: 8:00 am  Time Out: 8:45 am  Total Appointment Time (timed & untimed codes): 45 minutes (1 NMR + 1 TE + 1 TA)    PTA Visit #:      Subjective     Patient reports: that he felt very sore after last session but in a good way  He  was  compliant with home exercise program (received 2023).  Response to previous treatment: 24 hour soreness  Functional change: HEP compliance    Pain: 0/10  Location: left knee      Objective      Objective Measures updated at progress report unless specified.     Treatment     Cecil received the treatments listed below:      therapeutic exercises to develop strength and endurance for 20 minutes including:  - Recumbent bike x 6 minutes; level 3 sprints   - Standing calf raises 2x15  - Cybex hamstrin plates engaged 2x15  - Cybex leg press: 6 plates engaged 2x15   - Cybex knee extension: 30# engaged 2x15    neuromuscular re-education activities to improve: Balance, Coordination, Kinesthetic, Sense, and Proprioception for 15 minutes. The following activities were included:  - modified single leg stance on 6" step + red med ball chest press 2x15B each leg leading   - narrow base of support on Airex + horizontal head turns 3x30"  - normal base of support on Airex + eyes closed 2x30"  - normal base of support on airex beam with red med ball 2# " "behind back passes x10 ea direction  - semi-tandem stance 2x30" each leg leading  - high knee hip flexion marches 6 lengths8 x 10 feet each without upper extremity support  - weight shift and step in all direction x10 ea on soft gym mat  - beep board: static balance 4x20"                      with 2# med ball chest press x10                 - step up on soft fitter board: 2x10 with ea leg leading    Not performed today  - reciprocal step taps to 6" step with occasional upper extremity support 3x30"  - tandem walking with frequent touchdown support x 6 lengths x 10 feet each     therapeutic activities to improve functional performance for 10 minutes, including:  - lateral 4" step up and overs 2x60" no upper extremity support  - retro walking 6 lengths x 10 feet each without upper extremity support with cues for hip extension and step symmetry  - Lateral resistance steps with red theraband x 6 lengths x 10 feet each without upper extremity support  - Sit to stands from elevated mat 2x10    Not performed today  - forward reciprocal step ups to 4" step without upper extremity support x 10B  - forward cross over steps with touchdown support x 6 lengths x 10 feet each  - single emely step overs 9" 2x45" without upper extremity support  - lateral 4" emley step overs with occasional touchdown support x 6 lengths x 10 feet each    Patient Education and Home Exercises       Education provided:   - PT plan of care  - HEP instruction  - Role of balance PT    Written Home Exercises Provided: yes. Exercises were reviewed and Cecil was able to demonstrate them prior to the end of the session.  Cecil demonstrated good  understanding of the education provided. See Electronic Medical Record under Patient Instructions for exercises provided during therapy sessions    Assessment     Cecil arrived to session without any complaints and was agreeable to treatment.  Session continues to focus primarily on improving righting " reactions with static balance activity.  Patient has a tendency to wait until he is about to fall before reaching out to bar for recovery.  Heavy verbal cuing to recognize moments of instability and to attempt corrections prior to loss of balance.  Good response to cuing with immediate improvement observed.  Improvement is short lived though with lack of carryover between exercises without additional reinforcement provided.  Still challenged with weight shifting and beep board exercise.  He would benefit from continued PT services to achieve functional goals with continued compliance with HEP completion.    Cecil Is progressing well towards his goals.   Patient prognosis is Good.     Patient will continue to benefit from skilled outpatient physical therapy to address the deficits listed in the problem list box on initial evaluation, provide pt/family education and to maximize pt's level of independence in the home and community environment.     Patient's spiritual, cultural and educational needs considered and pt agreeable to plan of care and goals.     Anticipated barriers to physical therapy: age; current level of function     Goals:     Short Term Goals: 4 weeks   Patient will be compliant with HEP in order to maximize PT benefits (progressing, not met)  Patient will complete TUG in </= 17 seconds with least restrictive assistive device in order to reduce risk for falls and improve safety with functional mobility (progressing, not met)  Patient will score >/= 37/56 on Neri Balance Assessment in order to reduce risk for falls and improve postural control (progressing, not met)  Patient will score </= 22 seconds on Five Time Sit to  order to improve bilateral lower extremity endurance and muscular power for transfers (progressing, not met)     Long Term Goals: 8 weeks   Patient will score >/= 42% on FOTO survey in order to improve self-perception of functional mobility deficits (progressing, not  met)  Patient will improve bilateral lower extremity MMT grades by >/=1/2 grade in order to improve strength for ADL completion (progressing, not met)  Patient will complete TUG in </= 13 seconds with least restrictive assistive device in order to reduce risk for falls and improve safety with functional mobility (progressing, not met)  Patient will score >/= 41/56 on Neri Balance Assessment in order to reduce risk for falls and improve postural control (progressing, not met)  Patient will score </= 17 seconds on Five Time Sit to  order to improve bilateral lower extremity endurance and muscular power for transfers (progressing, not met)  Patient will begin some form of home/community fitness in order to sustain progress gained in PT (progressing, not met)    Plan     Continue moderate level balance intervention; monitor HEP compliance     Brandie Mason, PTA

## 2024-01-18 ENCOUNTER — LAB VISIT (OUTPATIENT)
Dept: LAB | Facility: HOSPITAL | Age: 79
End: 2024-01-18
Attending: INTERNAL MEDICINE
Payer: MEDICARE

## 2024-01-18 DIAGNOSIS — E11.65 TYPE 2 DIABETES MELLITUS WITH HYPERGLYCEMIA, WITHOUT LONG-TERM CURRENT USE OF INSULIN: ICD-10-CM

## 2024-01-18 LAB
ALBUMIN SERPL BCP-MCNC: 4.2 G/DL (ref 3.5–5.2)
ALP SERPL-CCNC: 51 U/L (ref 55–135)
ALT SERPL W/O P-5'-P-CCNC: 20 U/L (ref 10–44)
ANION GAP SERPL CALC-SCNC: 7 MMOL/L (ref 8–16)
AST SERPL-CCNC: 17 U/L (ref 10–40)
BASOPHILS # BLD AUTO: 0.05 K/UL (ref 0–0.2)
BASOPHILS NFR BLD: 0.6 % (ref 0–1.9)
BILIRUB SERPL-MCNC: 0.3 MG/DL (ref 0.1–1)
BUN SERPL-MCNC: 23 MG/DL (ref 8–23)
CALCIUM SERPL-MCNC: 10.2 MG/DL (ref 8.7–10.5)
CHLORIDE SERPL-SCNC: 106 MMOL/L (ref 95–110)
CO2 SERPL-SCNC: 26 MMOL/L (ref 23–29)
CREAT SERPL-MCNC: 1.2 MG/DL (ref 0.5–1.4)
DIFFERENTIAL METHOD BLD: ABNORMAL
EOSINOPHIL # BLD AUTO: 0.2 K/UL (ref 0–0.5)
EOSINOPHIL NFR BLD: 2.1 % (ref 0–8)
ERYTHROCYTE [DISTWIDTH] IN BLOOD BY AUTOMATED COUNT: 15.2 % (ref 11.5–14.5)
EST. GFR  (NO RACE VARIABLE): >60 ML/MIN/1.73 M^2
ESTIMATED AVG GLUCOSE: 183 MG/DL (ref 68–131)
GLUCOSE SERPL-MCNC: 213 MG/DL (ref 70–110)
HBA1C MFR BLD: 8 % (ref 4–5.6)
HCT VFR BLD AUTO: 43.7 % (ref 40–54)
HGB BLD-MCNC: 14.1 G/DL (ref 14–18)
IMM GRANULOCYTES # BLD AUTO: 0.08 K/UL (ref 0–0.04)
IMM GRANULOCYTES NFR BLD AUTO: 1 % (ref 0–0.5)
LYMPHOCYTES # BLD AUTO: 2.2 K/UL (ref 1–4.8)
LYMPHOCYTES NFR BLD: 28.3 % (ref 18–48)
MCH RBC QN AUTO: 28.4 PG (ref 27–31)
MCHC RBC AUTO-ENTMCNC: 32.3 G/DL (ref 32–36)
MCV RBC AUTO: 88 FL (ref 82–98)
MONOCYTES # BLD AUTO: 0.5 K/UL (ref 0.3–1)
MONOCYTES NFR BLD: 6.4 % (ref 4–15)
NEUTROPHILS # BLD AUTO: 4.8 K/UL (ref 1.8–7.7)
NEUTROPHILS NFR BLD: 61.6 % (ref 38–73)
NRBC BLD-RTO: 0 /100 WBC
PLATELET # BLD AUTO: 265 K/UL (ref 150–450)
PMV BLD AUTO: 11.5 FL (ref 9.2–12.9)
POTASSIUM SERPL-SCNC: 4.5 MMOL/L (ref 3.5–5.1)
PROT SERPL-MCNC: 7.5 G/DL (ref 6–8.4)
RBC # BLD AUTO: 4.97 M/UL (ref 4.6–6.2)
SODIUM SERPL-SCNC: 139 MMOL/L (ref 136–145)
WBC # BLD AUTO: 7.71 K/UL (ref 3.9–12.7)

## 2024-01-18 PROCEDURE — 80053 COMPREHEN METABOLIC PANEL: CPT | Performed by: INTERNAL MEDICINE

## 2024-01-18 PROCEDURE — 85025 COMPLETE CBC W/AUTO DIFF WBC: CPT | Performed by: INTERNAL MEDICINE

## 2024-01-18 PROCEDURE — 36415 COLL VENOUS BLD VENIPUNCTURE: CPT | Mod: PO | Performed by: INTERNAL MEDICINE

## 2024-01-18 PROCEDURE — 83036 HEMOGLOBIN GLYCOSYLATED A1C: CPT | Performed by: INTERNAL MEDICINE

## 2024-01-19 ENCOUNTER — CLINICAL SUPPORT (OUTPATIENT)
Dept: REHABILITATION | Facility: HOSPITAL | Age: 79
End: 2024-01-19
Payer: MEDICARE

## 2024-01-19 DIAGNOSIS — R26.89 IMBALANCE: Primary | ICD-10-CM

## 2024-01-19 PROCEDURE — 97110 THERAPEUTIC EXERCISES: CPT | Mod: PN

## 2024-01-19 PROCEDURE — 97530 THERAPEUTIC ACTIVITIES: CPT | Mod: PN

## 2024-01-19 NOTE — PROGRESS NOTES
[facility]   Physical Therapy Treatment Note      Name: Cecil Pringle  Clinic Number: [id number]    Therapy Diagnosis:   Encounter Diagnosis   Name Primary?    Imbalance Yes     Physician: Layo Jett MD    Visit Date: 1/19/2024    Physician Orders: PT Eval and Treat   Medical Diagnosis from Referral: R26.81 (ICD-10-CM) - Unstable gait   Evaluation Date: 12/7/2023  Authorization Period Expiration: 11/13/2024  Progress Note Due: 2/19/2023  Plan of Care Expiration: 2/16/2024  Visit # / Visits authorized: 5/6 (+3 visits in 2023)   FOTO: 2/3     Precautions: Standard and CAD ; PAD; diabetes; hard of hearing      Time In: 8:00 am  Time Out: 8:45 am  Total Appointment Time (timed & untimed codes): 45 minutes (1 TE + 2 TA)    PTA Visit #: 0/5     Subjective     Patient reports: See updated plan of care  He  was  compliant with home exercise program (received 12/14/2023).  Response to previous treatment: no adverse response  Functional change: improving postural control and balance confidence    Pain: 0/10  Location: left knee      Objective      Objective Measures updated at progress report unless specified.     Timed Up and Go: 9.5 seconds without assistive device    Five Time Sit to Stand: 18.0 seconds    FOTO NOC-musculo-skeletal disorder: 60%    HENDERSON  BALANCE ASSESSMENT TOOL  1. Sitting to Standing   4 - able to stand without using hands and stabilize independently  2. Standing Unsupported   4 - able to stand safely 2 minutes without hold  3. Sitting Unsupported   4 - able to sit safely and securely 2 minutes  4. Standing to Sitting   4 - sits safely with minimal use of hands  5. Pivot Transfer   4 - able to trnasfer safely with minor use of hands  6. Standing with Eyes Closed   2 - able to stand 3 seconds  7. Standing with Feet Together   4 - able to place feet together independently and stand 1 minute safely  8. Reaching Forward with Outstretched Arm   4 - can reach forward  confidently 25 cm/10 inches  9. Retrieving Object from Floor   4 - able to  slipper safely and easily  10. Turning to Look Behind   3 - looks behind one side only, other side less weight shift  11. Turning 360 Degrees   4 - able to turn 360 in seconds or less  12. Placing Alternate Foot on Step   2 - able to complete 4 steps without aid with supervision  13. Standing with One Foot in Front   3 - able to place foot ahead of other independently and hold 30 seconds  14. Standing on One Foot   1 - tries to lift leg and unable to hold 3 seconds but remains standing independently    TOTAL SCORE: 47  Maximum: 56    Score interpretation:   0-20 = wheelchair bound  21-40 = walking with assistance  41-56 = independent    Fall risk cutoff scores:   Elderly and history of falls: <51/56   Elderly and no history of falls: <42/56   CVA: <45/56    MDC:  Parkinson's = 5 points  Acute CVA = 6.9 points  Chronic CVA = 4.7 points  Pulmonary Disease = 5.9 points  Progressive Dementia = 1.9 points    Functional Gait Assessment:     1. Gait on level surface =  3   (3) Normal: less than 5.5 sec, no A.D., no imbalance, normal gait pattern, deviates <6in   (2) Mild impairment: 7-5.6 sec, uses A.D., mild gait deviations, or deviates 6-10 in   (1) Moderate impairment: > 7 sec, slow speed, imbalance, deviates 10-15 in.   (0) Severe impairment: needs assist, deviates >15 in, reach/touch wall  2. Change in Gait Speed = 3   (3) Normal: smooth change w/o loss of balance or gait deviation, deviates < 6 in, significant difference between speeds   (2) Mild impairment: changes speed, but demonstrates mild gait deviations, deviates 6-10 in, OR no deviations but unable to significantly speed, OR uses A.D.   (1) Moderate impairment: minor changes to speed, OR changes speed w/ significant deviations, deviates 10-15 in, OR  Changes speed , but loses balance & recovers   (0) Severe impairment: cannot change speed, deviates >15 in, or loses balance &  needs assist  3. Gait with horizontal head turns  = 2   (3) Normal: no change in gait, deviates <6 in   (2) Mild impairment: slight change in speed, deviates 6-10 in, OR uses A.D.   (1) Moderate impairment: moderate change in speed, deviates 10-15 in   (0) Severe impairment: severe disruption of gait, deviates >15in  4. Gait with vertical head turns = 2   (3) Normal: no change in gait, deviates <6 in   (2) Mild impairment: slight change in speed, deviates 6-10 in OR uses A.D.   (1) Moderate impairment: moderate change in speed, deviates 10-15 in   (0) Severe impairment: severe disruption of gait, deviates >15 in  5. Gait with pivot turns = 3   (3) Normal: performs safely in 3 sec, no LOB   (2) Mild impairment: performs in >3 sec & no LOB, OR turns safely & requires several steps to regain LOB   (1) Moderate impairment: turns slow, OR requires several small steps for balance following turn & stop   (0) Severe impairment: cannot turn safely, needs assist  6. Step over obstacle = 3   (3) Normal: steps over 2 stacked boxes w/o change in speed or LOB   (2) Mild impairment: able to step over 1 box w/o change in speed or LOB   (1) Moderate impairment: steps over 1 box but must slow down, may require VC   (0) Severe impairment: cannot perform w/o assist  7. Gait with Narrow ZACH = 1   (3) Normal: 10 steps no staggering   (2) Mild impairment: 7-9 steps   (1) Moderate impairment: 4-7 steps   (0) Severe impairment: < 4 steps or cannot perform w/o assist  8. Gait with eyes closed = 0   (3) Normal: < 7 sec, no A.D., no LOB, normal gait pattern, deviates <6 in   (2) Mild impairment: 7.1-9 sec, mild gait deviations, deviates 6-10 in   (1) Moderate impairment: > 9 sec, abnormal pattern, LOB, deviates 10-15 in   (0) Severe impairment: cannot perform w/o assist, LOB, deviates >15in  9. Ambulating Backwards = 2   (3) Normal: no A.D., no LOB, normal gait pattern, deviates <6in   (2) Mild impairment: uses A.D., slower speed, mild gait  "deviations, deviates 6-10 in   (1) Moderate impairment: slow speed, abnormal gait pattern, LOB, deviates 10-15 in   (0) Severe impairment: severe gait deviations or LOB, deviates >15in  10. Steps = 2   (3) Normal: alternating feet, no rail   (2) Mild Impairment: alternating feet, uses rail   (1) Moderate impairment: step-to, uses rail   (0) Severe impairment: cannot perform safely    Score 21/30     Cutoffs:   <22/30 fall risk in older adults  <18/30 fall risk in Parkinsons    MDC/MCID:  Stroke = 4.2 points (MDC)  Vestibular = 6 points (MDC)  Geriatric = 4 points (MCID)  Parkinson's = 4 points (MDC)    Treatment     Cecil received the treatments listed below:      therapeutic exercises to develop strength and endurance for 15 minutes including:  - Cybex hamstrin plates engaged 3x10  - Cybex knee extension: 30# engaged 2x15    Not performed today:  - Cybex leg press: 6 plates engaged 2x15     neuromuscular re-education activities to improve: Balance, Coordination, Kinesthetic, Sense, and Proprioception for 00 minutes. The following activities were included:    Not performed today  - modified single leg stance on 6" step + red med ball chest press 2x15B each leg leading   - narrow base of support on Airex + horizontal head turns 3x30"  - normal base of support on Airex + eyes closed 2x30"  - normal base of support on airex beam with red med ball 2# behind back passes x10 ea direction  - semi-tandem stance 2x30" each leg leading  - high knee hip flexion marches 6 lengths8 x 10 feet each without upper extremity support  - weight shift and step in all direction x10 ea on soft gym mat  - beep board: static balance 4x20"                      with 2# med ball chest press x10                 - step up on soft fitter board: 2x10 with ea leg leading    therapeutic activities to improve functional performance for 30 minutes, including:  - Functional Gait Assessment, Neri Balance Assessment, FOTO, Five Time Sit to Stand, " "and Timed Up and Go (see above)    Not performed today  - lateral 4" step up and overs 2x60" no upper extremity support  - retro walking 6 lengths x 10 feet each without upper extremity support with cues for hip extension and step symmetry  - Lateral resistance steps with red theraband x 6 lengths x 10 feet each without upper extremity support  - Sit to stands from elevated mat 2x10  - forward reciprocal step ups to 4" step without upper extremity support x 10B  - forward cross over steps with touchdown support x 6 lengths x 10 feet each  - single emely step overs 9" 2x45" without upper extremity support  - lateral 4" emely step overs with occasional touchdown support x 6 lengths x 10 feet each    Patient Education and Home Exercises       Education provided:   - PT plan of care  - HEP instruction  - Role of balance PT    Written Home Exercises Provided: yes. Exercises were reviewed and Cecil was able to demonstrate them prior to the end of the session.  Cecil demonstrated good  understanding of the education provided. See Electronic Medical Record under Patient Instructions for exercises provided during therapy sessions    Assessment     See updated plan of care    Cecil Is progressing well towards his goals.   Patient prognosis is Good.     Patient will continue to benefit from skilled outpatient physical therapy to address the deficits listed in the problem list box on initial evaluation, provide pt/family education and to maximize pt's level of independence in the home and community environment.     Patient's spiritual, cultural and educational needs considered and pt agreeable to plan of care and goals.     Anticipated barriers to physical therapy: age; current level of function     Goals:     Short Term Goals: 4 weeks   Patient will be compliant with HEP in order to maximize PT benefits (met)  Patient will complete TUG in </= 17 seconds with least restrictive assistive device in order to reduce risk " for falls and improve safety with functional mobility (met)  Patient will score >/= 37/56 on Neri Balance Assessment in order to reduce risk for falls and improve postural control (met)  Patient will score </= 22 seconds on Five Time Sit to  order to improve bilateral lower extremity endurance and muscular power for transfers (met)    New Short Term Goal: 4 additional weeks  Patient will score >/=23/30 on FGA in order to decrease risk for falls with community mobility (progressing, not met)     Long Term Goals: 8 weeks   Patient will score >/= 42% on FOTO survey in order to improve self-perception of functional mobility deficits (met)  Patient will improve bilateral lower extremity MMT grades by >/=1/2 grade in order to improve strength for ADL completion (progressing, not met)  Patient will complete TUG in </= 13 seconds with least restrictive assistive device in order to reduce risk for falls and improve safety with functional mobility (met)  Patient will score >/= 41/56 on Neri Balance Assessment in order to reduce risk for falls and improve postural control (met)  Patient will score </= 17 seconds on Five Time Sit to  order to improve bilateral lower extremity endurance and muscular power for transfers (progressing, not met)  Patient will begin some form of home/community fitness in order to sustain progress gained in PT (progressing, not met)    Plan     Continue moderate level balance intervention; see updated plan of care    HERMES JEFF, PT

## 2024-01-19 NOTE — PLAN OF CARE
"[facility]  Physical Therapy Plan of Care Note     Name: Cecil Pringle  Clinic Number: [id number]    Therapy Diagnosis:   Encounter Diagnosis   Name Primary?    Imbalance Yes     Physician: Layo Jett MD    Visit Date: 1/19/2024    Physician Orders: PT Eval and Treat   Medical Diagnosis from Referral: R26.81 (ICD-10-CM) - Unstable gait   Evaluation Date: 12/7/2023  Authorization Period Expiration: 11/13/2024  Progress Note Due: 2/19/2023  Plan of Care Expiration: 2/16/2024  Visit # / Visits authorized: 5/6 (+3 visits in 2023)   FOTO: 2/3    Precautions: Standard and CAD ; PAD; diabetes     Functional Level Prior to Evaluation: independent but subjective imbalance    SUBJECTIVE     Update: Easier to get up and down from low surfaces since starting PT. Notes "big improvement" in general mobility/balance. Wife has been noticing that his balance is improving as well. Agreeable to 4 additional PT visits over the next month now that he is exercising more at home.    OBJECTIVE     Update:     Timed Up and Go: 9.5 seconds without assistive device     Five Time Sit to Stand: 18.0 seconds     FOTO NOC-musculo-skeletal disorder: 60%     HENDERSON  BALANCE ASSESSMENT TOOL  1. Sitting to Standing              4 - able to stand without using hands and stabilize independently  2. Standing Unsupported              4 - able to stand safely 2 minutes without hold  3. Sitting Unsupported              4 - able to sit safely and securely 2 minutes  4. Standing to Sitting              4 - sits safely with minimal use of hands  5. Pivot Transfer              4 - able to trnasfer safely with minor use of hands  6. Standing with Eyes Closed              2 - able to stand 3 seconds  7. Standing with Feet Together              4 - able to place feet together independently and stand 1 minute safely  8. Reaching Forward with Outstretched Arm              4 - can reach forward confidently 25 cm/10 inches  9. " Retrieving Object from Floor              4 - able to  slipper safely and easily  10. Turning to Look Behind              3 - looks behind one side only, other side less weight shift  11. Turning 360 Degrees              4 - able to turn 360 in seconds or less  12. Placing Alternate Foot on Step              2 - able to complete 4 steps without aid with supervision  13. Standing with One Foot in Front              3 - able to place foot ahead of other independently and hold 30 seconds  14. Standing on One Foot              1 - tries to lift leg and unable to hold 3 seconds but remains standing independently     TOTAL SCORE: 47  Maximum: 56     Score interpretation:   0-20 = wheelchair bound  21-40 = walking with assistance  41-56 = independent     Fall risk cutoff scores:   Elderly and history of falls: <51/56   Elderly and no history of falls: <42/56   CVA: <45/56     MDC:  Parkinson's = 5 points  Acute CVA = 6.9 points  Chronic CVA = 4.7 points  Pulmonary Disease = 5.9 points  Progressive Dementia = 1.9 points     Functional Gait Assessment:      1. Gait on level surface =  3              (3) Normal: less than 5.5 sec, no A.D., no imbalance, normal gait pattern, deviates <6in              (2) Mild impairment: 7-5.6 sec, uses A.D., mild gait deviations, or deviates 6-10 in              (1) Moderate impairment: > 7 sec, slow speed, imbalance, deviates 10-15 in.              (0) Severe impairment: needs assist, deviates >15 in, reach/touch wall  2. Change in Gait Speed = 3              (3) Normal: smooth change w/o loss of balance or gait deviation, deviates < 6 in, significant difference between speeds              (2) Mild impairment: changes speed, but demonstrates mild gait deviations, deviates 6-10 in, OR no deviations but unable to significantly speed, OR uses A.D.              (1) Moderate impairment: minor changes to speed, OR changes speed w/ significant deviations, deviates 10-15 in, OR  Changes  speed , but loses balance & recovers              (0) Severe impairment: cannot change speed, deviates >15 in, or loses balance & needs assist  3. Gait with horizontal head turns  = 2              (3) Normal: no change in gait, deviates <6 in              (2) Mild impairment: slight change in speed, deviates 6-10 in, OR uses A.D.              (1) Moderate impairment: moderate change in speed, deviates 10-15 in              (0) Severe impairment: severe disruption of gait, deviates >15in  4. Gait with vertical head turns = 2              (3) Normal: no change in gait, deviates <6 in              (2) Mild impairment: slight change in speed, deviates 6-10 in OR uses A.D.              (1) Moderate impairment: moderate change in speed, deviates 10-15 in              (0) Severe impairment: severe disruption of gait, deviates >15 in  5. Gait with pivot turns = 3              (3) Normal: performs safely in 3 sec, no LOB              (2) Mild impairment: performs in >3 sec & no LOB, OR turns safely & requires several steps to regain LOB              (1) Moderate impairment: turns slow, OR requires several small steps for balance following turn & stop              (0) Severe impairment: cannot turn safely, needs assist  6. Step over obstacle = 3              (3) Normal: steps over 2 stacked boxes w/o change in speed or LOB              (2) Mild impairment: able to step over 1 box w/o change in speed or LOB              (1) Moderate impairment: steps over 1 box but must slow down, may require VC              (0) Severe impairment: cannot perform w/o assist  7. Gait with Narrow ZACH = 1              (3) Normal: 10 steps no staggering              (2) Mild impairment: 7-9 steps              (1) Moderate impairment: 4-7 steps              (0) Severe impairment: < 4 steps or cannot perform w/o assist  8. Gait with eyes closed = 0              (3) Normal: < 7 sec, no A.D., no LOB, normal gait pattern, deviates <6 in              (2)  Mild impairment: 7.1-9 sec, mild gait deviations, deviates 6-10 in              (1) Moderate impairment: > 9 sec, abnormal pattern, LOB, deviates 10-15 in              (0) Severe impairment: cannot perform w/o assist, LOB, deviates >15in  9. Ambulating Backwards = 2              (3) Normal: no A.D., no LOB, normal gait pattern, deviates <6in              (2) Mild impairment: uses A.D., slower speed, mild gait deviations, deviates 6-10 in              (1) Moderate impairment: slow speed, abnormal gait pattern, LOB, deviates 10-15 in              (0) Severe impairment: severe gait deviations or LOB, deviates >15in  10. Steps = 2              (3) Normal: alternating feet, no rail              (2) Mild Impairment: alternating feet, uses rail              (1) Moderate impairment: step-to, uses rail              (0) Severe impairment: cannot perform safely     Score 21/30      Cutoffs:   <22/30 fall risk in older adults  <18/30 fall risk in Parkinsons     MDC/MCID:  Stroke = 4.2 points (MDC)  Vestibular = 6 points (MDC)  Geriatric = 4 points (MCID)  Parkinson's = 4 points (MDC)    ASSESSMENT     Update: Cecil has progressed well thus far in physical therapy, especially as he has become more compliant with home exercise program. He still most challenged in terms of postural control in conditions with eyes closed and reduced base of support. He has achieved several goals as of this date, indicating decreased risk for falls and improved bilateral lower extremity muscular power. He would benefit from continued PT services to improve fall risk reduction further with community mobility.    Previous Short Term Goals Status:   4/4 met  New Short Term Goal: 4 additional weeks  Patient will score >/=23/30 on FGA in order to decrease risk for falls with community mobility (progressing, not met)  Long Term Goal Status: 3/6   Reasons for Recertification of Therapy:   pending expiration of current plan of care    PLAN     Updated  Certification Period: 1/19/2024 to 2/16/2024    Recommended Treatment Plan: 1 times per week for 4 weeks:  Gait Training, Manual Therapy, Moist Heat/ Ice, Neuromuscular Re-ed, Patient Education, Self Care, Therapeutic Activities, Therapeutic Exercise, and Modalities PRN  Other Recommendations: N/A    HERMES JEFF, PT

## 2024-01-23 ENCOUNTER — CLINICAL SUPPORT (OUTPATIENT)
Dept: REHABILITATION | Facility: HOSPITAL | Age: 79
End: 2024-01-23
Payer: MEDICARE

## 2024-01-23 DIAGNOSIS — R26.89 IMBALANCE: Primary | ICD-10-CM

## 2024-01-23 PROCEDURE — 97112 NEUROMUSCULAR REEDUCATION: CPT | Mod: PN

## 2024-01-23 PROCEDURE — 97110 THERAPEUTIC EXERCISES: CPT | Mod: PN

## 2024-01-23 NOTE — PROGRESS NOTES
[facility]   Physical Therapy Treatment Note      Name: Cecil Pringle  Clinic Number: [id number]    Therapy Diagnosis:   Encounter Diagnosis   Name Primary?    Imbalance Yes       Physician: Layo Jett MD    Visit Date: 2024    Physician Orders: PT Eval and Treat   Medical Diagnosis from Referral: R26.81 (ICD-10-CM) - Unstable gait   Evaluation Date: 2023  Authorization Period Expiration: 2024  Progress Note Due: 2023  Plan of Care Expiration: 2024  Visit # / Visits authorized:  (+3 visits in )   FOTO: 2/3     Precautions: Standard and CAD ; PAD; diabetes; hard of hearing      Time In: 0930  Time Out: 1015  Total Billable Time: 45 minutes (1 TE + 2 NR)    PTA Visit #: 0/5     Subjective     Patient reports: Denies any pain or medical changes.   He  was  compliant with home exercise program (received 2023).  Response to previous treatment: no adverse response  Functional change: improving postural control and balance confidence    Pain: 0/10  Location: left knee      Objective      Objective Measures updated at progress report unless specified.       Treatment     Cecil received the treatments listed below:      therapeutic exercises to develop strength and endurance for 20 minutes including:  - Cybex hamstrin plates +6lb removable plate engaged 3x10  - Cybex knee extension: 35# engaged 3x15  - Cybex leg press: 6 plates engaged 3x15   Heel cord stretch 2 x 30s bilateral on incline   Hamstring stretch 2 x 30s bilateral seated      neuromuscular re-education activities to improve: Balance, Coordination, Kinesthetic, Sense, and Proprioception for 25 minutes. The following activities were included:     In parallel bars, all SBA:   Tandem walking x 3 laps  Retro walking x 3 laps  3s marches x 3 laps  Airex: 3 x 30s each: static, eyes closed, vertical and horizontal head turns   -got dizzy with vertical head turns, corrected with giving  cues for visual targets/focusing.   Static tandem stance 3x30s bilateral leading leg   Split stance trials 3 x 30s with leading leg on theraband rocker board     Seated rest breaks provided as needed due to fatigue/dizziness.    Patient Education and Home Exercises       Education provided:   - PT plan of care  - HEP instruction  - Role of balance PT    Written Home Exercises Provided: yes. Exercises were reviewed and Cecil was able to demonstrate them prior to the end of the session.  Cecil demonstrated good  understanding of the education provided. See Electronic Medical Record under Patient Instructions for exercises provided during therapy sessions    Assessment     Mr. Jacob did very well with today's session. He reported at end of session that he thinks therapy is working and his balance is definitely getting better. Resistance/reps increased with strength machines today and continued dynamic balance training. Continue plan of care (POC).     Cecil Is progressing well towards his goals.   Patient prognosis is Good.     Patient will continue to benefit from skilled outpatient physical therapy to address the deficits listed in the problem list box on initial evaluation, provide pt/family education and to maximize pt's level of independence in the home and community environment.     Patient's spiritual, cultural and educational needs considered and pt agreeable to plan of care and goals.     Anticipated barriers to physical therapy: age; current level of function     Goals:     Short Term Goals: 4 weeks   Patient will be compliant with HEP in order to maximize PT benefits (met)  Patient will complete TUG in </= 17 seconds with least restrictive assistive device in order to reduce risk for falls and improve safety with functional mobility (met)  Patient will score >/= 37/56 on Neri Balance Assessment in order to reduce risk for falls and improve postural control (met)  Patient will score </= 22 seconds on  Five Time Sit to  order to improve bilateral lower extremity endurance and muscular power for transfers (met)    New Short Term Goal: 4 additional weeks  Patient will score >/=23/30 on FGA in order to decrease risk for falls with community mobility (progressing, not met)     Long Term Goals: 8 weeks   Patient will score >/= 42% on FOTO survey in order to improve self-perception of functional mobility deficits (met)  Patient will improve bilateral lower extremity MMT grades by >/=1/2 grade in order to improve strength for ADL completion (progressing, not met)  Patient will complete TUG in </= 13 seconds with least restrictive assistive device in order to reduce risk for falls and improve safety with functional mobility (met)  Patient will score >/= 41/56 on Neri Balance Assessment in order to reduce risk for falls and improve postural control (met)  Patient will score </= 17 seconds on Five Time Sit to  order to improve bilateral lower extremity endurance and muscular power for transfers (progressing, not met)  Patient will begin some form of home/community fitness in order to sustain progress gained in PT (progressing, not met)    Plan     Continue moderate level balance intervention     Marah Mayes, PT

## 2024-01-30 ENCOUNTER — CLINICAL SUPPORT (OUTPATIENT)
Dept: REHABILITATION | Facility: HOSPITAL | Age: 79
End: 2024-01-30
Payer: MEDICARE

## 2024-01-30 DIAGNOSIS — R26.89 IMBALANCE: Primary | ICD-10-CM

## 2024-01-30 PROCEDURE — 97110 THERAPEUTIC EXERCISES: CPT | Mod: PN

## 2024-01-30 PROCEDURE — 97112 NEUROMUSCULAR REEDUCATION: CPT | Mod: PN

## 2024-01-30 NOTE — PROGRESS NOTES
"[facility]   Physical Therapy Treatment Note      Name: Cecil Pringle  Clinic Number: [id number]    Therapy Diagnosis:   Encounter Diagnosis   Name Primary?    Imbalance Yes     Physician: Layo Jett MD    Visit Date: 2024    Physician Orders: PT Eval and Treat   Medical Diagnosis from Referral: R26.81 (ICD-10-CM) - Unstable gait   Evaluation Date: 2023  Authorization Period Expiration: 2024  Progress Note Due: 2023  Plan of Care Expiration: 2024  Visit # / Visits authorized:  (+3 visits in )   FOTO: 2/3     Precautions: Standard and CAD ; PAD; diabetes; hard of hearing      Time In: 0932  Time Out: 1015  Total Billable Time: 43 minutes (2TE + 1NMR)    PTA Visit #: 0/5     Subjective     Patient reports: No new complaints today. Agreeable to PT.  He  was  compliant with home exercise program (received 2023).  Response to previous treatment: no adverse response  Functional change: improving postural control and balance confidence    Pain: 0/10  Location: left knee      Objective      Objective Measures updated at progress report unless specified.     Treatment     Cecil received the treatments listed below:      therapeutic exercises to develop strength and endurance for 25 minutes including:  - Stepper bike x 8 minutes level 4 sprints with bilateral upper extremity / bilateral lower extremity use  - Cybex hamstrin plates +6lb removable plate engaged 3x10  - Cybex knee extension: 35# engaged 3x15  - Hamstring stretch at stairs 2x45" each leg  - Gastroc stretch 2x45"   - Calf raises 2x20    Not performed today:  - Cybex leg press: 6 plates engaged 3x15     neuromuscular re-education activities to improve: Balance, Coordination, Kinesthetic, Sense, and Proprioception for 18 minutes. The following activities were included:     In parallel bars, all SBA:   Tandem walking x 3 laps  Retro walking x 3 laps  High knee marches x 3 " laps  Airex: 2 x 30s each: static, eyes closed, vertical and horizontal head turns  Static tandem stance 2x30s bilateral leading leg     Not performed today:  Split stance trials 3 x 30s with leading leg on theraband rocker board       Patient Education and Home Exercises       Education provided:   - PT plan of care  - HEP instruction  - Role of balance PT    Written Home Exercises Provided: yes. Exercises were reviewed and Cecil was able to demonstrate them prior to the end of the session.  Cecil demonstrated good  understanding of the education provided. See Electronic Medical Record under Patient Instructions for exercises provided during therapy sessions    Assessment     Cecil tolerated session within appropriate fatigue parameters. Improving sensory integration skills noted with eyes closed activity. Moderate-high levels of quad fatigue noted with Cybex knee extension activity, but he is demonstrating improving eccentric control. He would benefit from continued PT services to achieve remaining goals.    Cecil Is progressing well towards his goals.   Patient prognosis is Good.     Patient will continue to benefit from skilled outpatient physical therapy to address the deficits listed in the problem list box on initial evaluation, provide pt/family education and to maximize pt's level of independence in the home and community environment.     Patient's spiritual, cultural and educational needs considered and pt agreeable to plan of care and goals.     Anticipated barriers to physical therapy: age; current level of function     Goals:     Short Term Goals: 4 weeks   Patient will be compliant with HEP in order to maximize PT benefits (met)  Patient will complete TUG in </= 17 seconds with least restrictive assistive device in order to reduce risk for falls and improve safety with functional mobility (met)  Patient will score >/= 37/56 on Neri Balance Assessment in order to reduce risk for falls and  improve postural control (met)  Patient will score </= 22 seconds on Five Time Sit to  order to improve bilateral lower extremity endurance and muscular power for transfers (met)    New Short Term Goal: 4 additional weeks  Patient will score >/=23/30 on FGA in order to decrease risk for falls with community mobility (progressing, not met)     Long Term Goals: 8 weeks   Patient will score >/= 42% on FOTO survey in order to improve self-perception of functional mobility deficits (met)  Patient will improve bilateral lower extremity MMT grades by >/=1/2 grade in order to improve strength for ADL completion (progressing, not met)  Patient will complete TUG in </= 13 seconds with least restrictive assistive device in order to reduce risk for falls and improve safety with functional mobility (met)  Patient will score >/= 41/56 on Neri Balance Assessment in order to reduce risk for falls and improve postural control (met)  Patient will score </= 17 seconds on Five Time Sit to  order to improve bilateral lower extremity endurance and muscular power for transfers (progressing, not met)  Patient will begin some form of home/community fitness in order to sustain progress gained in PT (progressing, not met)    Plan     Continue moderate level balance intervention and bilateral lower extremity strengthening    HERMES JEFF, PT

## 2024-01-31 ENCOUNTER — OFFICE VISIT (OUTPATIENT)
Dept: URGENT CARE | Facility: CLINIC | Age: 79
End: 2024-01-31
Payer: MEDICARE

## 2024-01-31 VITALS
DIASTOLIC BLOOD PRESSURE: 61 MMHG | BODY MASS INDEX: 28.47 KG/M2 | TEMPERATURE: 98 F | RESPIRATION RATE: 18 BRPM | WEIGHT: 229 LBS | SYSTOLIC BLOOD PRESSURE: 175 MMHG | OXYGEN SATURATION: 98 % | HEIGHT: 75 IN | HEART RATE: 73 BPM

## 2024-01-31 DIAGNOSIS — R04.0 EPISTAXIS: Primary | ICD-10-CM

## 2024-01-31 DIAGNOSIS — R03.0 ELEVATED BLOOD PRESSURE READING: ICD-10-CM

## 2024-01-31 PROCEDURE — 99204 OFFICE O/P NEW MOD 45 MIN: CPT | Mod: S$GLB,,,

## 2024-01-31 NOTE — PROGRESS NOTES
"Subjective:      Patient ID: Cecil Pringle is a 78 y.o. male.    Vitals:  height is 6' 3" (1.905 m) and weight is 103.9 kg (229 lb). His oral temperature is 98.3 °F (36.8 °C). His blood pressure is 175/61 (abnormal) and his pulse is 73. His respiration is 18 and oxygen saturation is 98%.     Chief Complaint: Epistaxis    Pt is complaining of nosebleed for over four hours that started today. He said this happened to him about two years ago and they ended up having to have surgery on it. Pt is on blood thinners.     Epistaxis   The bleeding has been from the left nare. This is a new problem. The current episode started today. He has experienced no nasal trauma. The problem occurs constantly. The bleeding is associated with nothing. He has tried nothing for the symptoms. There is no history of allergies, a bleeding disorder, colds, frequent nosebleeds or sinus problems.       HENT:  Positive for nosebleeds. Negative for congestion.    Eyes:  Negative for vision loss, double vision and blurred vision.   Neurological:  Negative for headaches.   Hematologic/Lymphatic: Negative for history of bleeding disorder.      Objective:     Physical Exam   Constitutional: He is oriented to person, place, and time. He appears well-developed. He is cooperative.  Non-toxic appearance. He does not appear ill. No distress.      Comments:Patient sits comfortably in exam chair. Answers questions in complete sentences. Does not show any signs of distress or discoloration.        HENT:   Head: Normocephalic and atraumatic.   Ears:   Right Ear: Hearing, tympanic membrane, external ear and ear canal normal. impacted cerumen  Left Ear: Hearing, tympanic membrane, external ear and ear canal normal. impacted cerumen  Nose: No mucosal edema, rhinorrhea, nasal deformity or congestion. Epistaxis is observed. Right sinus exhibits no maxillary sinus tenderness and no frontal sinus tenderness. Left sinus exhibits no maxillary sinus tenderness and " no frontal sinus tenderness.       Mouth/Throat: Uvula is midline, oropharynx is clear and moist and mucous membranes are normal. No trismus in the jaw. Normal dentition. No uvula swelling. No oropharyngeal exudate, posterior oropharyngeal edema or posterior oropharyngeal erythema.   Eyes: Conjunctivae and lids are normal. No scleral icterus.   Neck: Trachea normal and phonation normal. Neck supple. No edema present. No erythema present. No neck rigidity present.   Pulmonary/Chest: Effort normal. No respiratory distress.   Abdominal: Normal appearance.   Musculoskeletal: Normal range of motion.         General: No deformity. Normal range of motion.   Neurological: He is alert and oriented to person, place, and time. He exhibits normal muscle tone. Coordination normal.   Skin: Skin is warm, dry, intact, not diaphoretic and not pale.   Psychiatric: His speech is normal and behavior is normal. Judgment and thought content normal.   Nursing note and vitals reviewed.      Assessment:     1. Epistaxis    2. Elevated blood pressure reading        Plan:     I do not see a source of bleeding from the anterior Kiesselbach's plexus. I tried using afrin to the nare and then had him sit for 15 minutes. Bleeding finally resolved. Educated patient on blood clotting and informed him that him sucking in through his nose to clear his posterior nasopharynx was causing the clot to come off and bleeding to begin again. Advise patient stop sucking in through the nose. ENT referral placed. Patient to follow up with them ASAP. Strict ED precautions if bleeding restarts and is uncontrolled.   Spoke with Dr. Gomez who agrees with plan.     Epistaxis  -     Ambulatory referral/consult to ENT    Elevated blood pressure reading                Patient Instructions   - DO NOT SUCK IN THROUGH THE NOSE.   - If your nose starts bleeding again, pinch your nose and lean forward for 15 minutes. If bleeding continues after this, go to the emergency  room.     A referral for ENT has been placed. Call [phone] to schedule an appointment. Make sure to follow up in 1-2 weeks or sooner if symptoms continue or worsen.     - Rest.    - Drink plenty of fluids.      - You must understand that you have received an Urgent Care treatment only and that you may be released before all of your medical problems are known or treated.   - You, the patient, will arrange for follow up care as instructed.   - If your condition worsens or fails to improve we recommend that you receive another evaluation at the ER immediately or contact your PCP to discuss your concerns or return here.   - Follow up with your PCP or specialty clinic as directed in the next 1-2 weeks if not improved or as needed.  You can call [phone] to schedule an appointment with the appropriate provider.    If your symptoms do not improve or worsen, go to the emergency room immediately.

## 2024-02-01 NOTE — PATIENT INSTRUCTIONS
- DO NOT SUCK IN THROUGH THE NOSE.   - If your nose starts bleeding again, pinch your nose and lean forward for 15 minutes. If bleeding continues after this, go to the emergency room.     A referral for ENT has been placed. Call [phone] to schedule an appointment. Make sure to follow up in 1-2 weeks or sooner if symptoms continue or worsen.     - Rest.    - Drink plenty of fluids.      - You must understand that you have received an Urgent Care treatment only and that you may be released before all of your medical problems are known or treated.   - You, the patient, will arrange for follow up care as instructed.   - If your condition worsens or fails to improve we recommend that you receive another evaluation at the ER immediately or contact your PCP to discuss your concerns or return here.   - Follow up with your PCP or specialty clinic as directed in the next 1-2 weeks if not improved or as needed.  You can call [phone] to schedule an appointment with the appropriate provider.    If your symptoms do not improve or worsen, go to the emergency room immediately.

## 2024-02-06 ENCOUNTER — CLINICAL SUPPORT (OUTPATIENT)
Dept: REHABILITATION | Facility: HOSPITAL | Age: 79
End: 2024-02-06
Payer: MEDICARE

## 2024-02-06 DIAGNOSIS — R26.89 IMBALANCE: Primary | ICD-10-CM

## 2024-02-06 PROCEDURE — 97530 THERAPEUTIC ACTIVITIES: CPT | Mod: PN

## 2024-02-06 PROCEDURE — 97110 THERAPEUTIC EXERCISES: CPT | Mod: PN

## 2024-02-06 NOTE — PROGRESS NOTES
[facility]   Physical Therapy Treatment Note / Discharge Summary     Name: Cecil Pringle  Clinic Number: [id number]    Therapy Diagnosis:   Encounter Diagnosis   Name Primary?    Imbalance Yes     Physician: Layo Jett MD    Visit Date: 2/6/2024    Physician Orders: PT Eval and Treat   Medical Diagnosis from Referral: R26.81 (ICD-10-CM) - Unstable gait   Evaluation Date: 12/7/2023  Authorization Period Expiration: 11/13/2024  Progress Note Due: 2/19/2023  Plan of Care Expiration: 2/16/2024  Visit # / Visits authorized: 7/16 (+3 visits in 2023)   FOTO: 2/3     Precautions: Standard and CAD ; PAD; diabetes; hard of hearing      Time In: 0805  Time Out: 0845  Total Billable Time: 40 minutes (2TA + 1TE)    PTA Visit #: 0/5     Subjective     Patient reports: Agreeable to discharge today. Pleased with improvements in balance.  He  was  compliant with home exercise program (received 12/14/2023).  Response to previous treatment: no adverse response  Functional change: improving postural control and balance confidence    Pain: 0/10  Location: left knee      Objective      Objective Measures updated at progress report unless specified.     Five Time Sit to Stand: 18.4 seconds with bilateral upper extremity assist    FOTO NOC-musculo-skeletal disorder: 68%    Functional Gait Assessment:     1. Gait on level surface =  3   (3) Normal: less than 5.5 sec, no A.D., no imbalance, normal gait pattern, deviates <6in   (2) Mild impairment: 7-5.6 sec, uses A.D., mild gait deviations, or deviates 6-10 in   (1) Moderate impairment: > 7 sec, slow speed, imbalance, deviates 10-15 in.   (0) Severe impairment: needs assist, deviates >15 in, reach/touch wall  2. Change in Gait Speed = 3   (3) Normal: smooth change w/o loss of balance or gait deviation, deviates < 6 in, significant difference between speeds   (2) Mild impairment: changes speed, but demonstrates mild gait deviations, deviates 6-10 in, OR  no deviations but unable to significantly speed, OR uses A.D.   (1) Moderate impairment: minor changes to speed, OR changes speed w/ significant deviations, deviates 10-15 in, OR  Changes speed , but loses balance & recovers   (0) Severe impairment: cannot change speed, deviates >15 in, or loses balance & needs assist  3. Gait with horizontal head turns  = 1   (3) Normal: no change in gait, deviates <6 in   (2) Mild impairment: slight change in speed, deviates 6-10 in, OR uses A.D.   (1) Moderate impairment: moderate change in speed, deviates 10-15 in   (0) Severe impairment: severe disruption of gait, deviates >15in  4. Gait with vertical head turns = 2   (3) Normal: no change in gait, deviates <6 in   (2) Mild impairment: slight change in speed, deviates 6-10 in OR uses A.D.   (1) Moderate impairment: moderate change in speed, deviates 10-15 in   (0) Severe impairment: severe disruption of gait, deviates >15 in  5. Gait with pivot turns = 3   (3) Normal: performs safely in 3 sec, no LOB   (2) Mild impairment: performs in >3 sec & no LOB, OR turns safely & requires several steps to regain LOB   (1) Moderate impairment: turns slow, OR requires several small steps for balance following turn & stop   (0) Severe impairment: cannot turn safely, needs assist  6. Step over obstacle = 2   (3) Normal: steps over 2 stacked boxes w/o change in speed or LOB   (2) Mild impairment: able to step over 1 box w/o change in speed or LOB   (1) Moderate impairment: steps over 1 box but must slow down, may require VC   (0) Severe impairment: cannot perform w/o assist  7. Gait with Narrow ZACH = 1   (3) Normal: 10 steps no staggering   (2) Mild impairment: 7-9 steps   (1) Moderate impairment: 4-7 steps   (0) Severe impairment: < 4 steps or cannot perform w/o assist  8. Gait with eyes closed = 2   (3) Normal: < 7 sec, no A.D., no LOB, normal gait pattern, deviates <6 in   (2) Mild impairment: 7.1-9 sec, mild gait deviations, deviates 6-10  in   (1) Moderate impairment: > 9 sec, abnormal pattern, LOB, deviates 10-15 in   (0) Severe impairment: cannot perform w/o assist, LOB, deviates >15in  9. Ambulating Backwards = 2   (3) Normal: no A.D., no LOB, normal gait pattern, deviates <6in   (2) Mild impairment: uses A.D., slower speed, mild gait deviations, deviates 6-10 in   (1) Moderate impairment: slow speed, abnormal gait pattern, LOB, deviates 10-15 in   (0) Severe impairment: severe gait deviations or LOB, deviates >15in  10. Steps = 2   (3) Normal: alternating feet, no rail   (2) Mild Impairment: alternating feet, uses rail   (1) Moderate impairment: step-to, uses rail   (0) Severe impairment: cannot perform safely    Score 21/30     Cutoffs:   <22/30 fall risk in older adults  <18/30 fall risk in Parkinsons    MDC/MCID:  Stroke = 4.2 points (MDC)  Vestibular = 6 points (MDC)  Geriatric = 4 points (MCID)  Parkinson's = 4 points (MDC)      Treatment     Cecil received the treatments listed below:      therapeutic exercises to develop strength and endurance for 17 minutes including:  - Stepper bike x 8 minutes level 4 sprints with bilateral upper extremity / bilateral lower extremity use  - Cybex leg press: 6 plates engaged 2x15     therapeutic activity to improve functional performance for 23 minutes including:  - Review of ACSM/CDC recommendations for weekly exercise with examples of exercise domains provided  - Functional Gait Assessment, FOTO survey, Five Time Sit to Stand (see above)    Patient Education and Home Exercises       Education provided:   - PT plan of care  - HEP instruction  - Role of balance PT    Written Home Exercises Provided: yes. Exercises were reviewed and Cecil was able to demonstrate them prior to the end of the session.  Cecil demonstrated good  understanding of the education provided. See Electronic Medical Record under Patient Instructions for exercises provided during therapy sessions    Assessment     Cecil  arrived to session ready for discharge and pleased with improvements made in terms of balance/postural control. Per goal achievement, he has shown steady progress in terms of fall risk, independence with physical activity, and self-perception of functional mobility. He is appropriate for discharge today due to good progress toward goals coupled with capacity for safe and independent home exercise. He understands he can contact PT with any questions/concerns post discharge.    Cecil Is progressing well towards his goals.   Patient prognosis is Good.     Patient's spiritual, cultural and educational needs considered and pt agreeable to plan of care and goals.     Anticipated barriers to physical therapy: age; current level of function     Goals:     Short Term Goals: 4 weeks   Patient will be compliant with HEP in order to maximize PT benefits (met)  Patient will complete TUG in </= 17 seconds with least restrictive assistive device in order to reduce risk for falls and improve safety with functional mobility (met)  Patient will score >/= 37/56 on Neri Balance Assessment in order to reduce risk for falls and improve postural control (met)  Patient will score </= 22 seconds on Five Time Sit to  order to improve bilateral lower extremity endurance and muscular power for transfers (met)    New Short Term Goal: 4 additional weeks  Patient will score >/=23/30 on FGA in order to decrease risk for falls with community mobility (progressing, not met)     Long Term Goals: 8 weeks   Patient will score >/= 42% on FOTO survey in order to improve self-perception of functional mobility deficits (met)  Patient will improve bilateral lower extremity MMT grades by >/=1/2 grade in order to improve strength for ADL completion (out of time for assessment)  Patient will complete TUG in </= 13 seconds with least restrictive assistive device in order to reduce risk for falls and improve safety with functional mobility  (met)  Patient will score >/= 41/56 on Neri Balance Assessment in order to reduce risk for falls and improve postural control (met)  Patient will score </= 17 seconds on Five Time Sit to  order to improve bilateral lower extremity endurance and muscular power for transfers (progressing, not met)  Patient will begin some form of home/community fitness in order to sustain progress gained in PT (met)    Plan     Discharge PT    HERMES JEFF, PT

## 2024-02-14 NOTE — PROGRESS NOTES
"  Subjective:     Cecil Pringle is a 78 y.o. male with T2DM, CAD (on DAPT), PAD who was referred to me by Ana Munoz in consultation for nosebleeds.    Patient reports severe L sided nosebleed 2 years ago which required surgical cauterization for a "deep bleeder" in [place].  Patient reports no further epistaxis until about 2 weeks ago.  Patient denies any bleeding from his nose in the last week.       He has previously had nasal cauterization.  The bleeding has required packing for control.  The last episode was 7 days ago, and is not currently active.  There is a prior history of nasal surgery.  There is not a prior history of nasal trauma.  There is not a history of environmental allergies.  He does not currently use nasal cannula.  He does not currently use a nasal spray.  There is not a family history to suggest a clotting disorder.  He does currently take a blood-thinning agent ASA+Plavix (for several years)    Patient reports daily sniffling of his nose with sneezing episodes but denies any significant rhinorrhea, watery/itchy eyes, nasal obstruction.     Past Medical History  He has a past medical history of Anxiety, Arthritis, Coronary artery disease, Depression, Diabetes mellitus, type 2, Diabetic neuropathy, Hyperlipidemia, Hypertension, Insomnia, and PAD (peripheral artery disease).    Past Surgical History  He has a past surgical history that includes Coronary artery bypass graft (1990); Coronary artery bypass graft (2014); coronary stents (2013); Right hand surgery (1980); left foot surgery (1980); Colonoscopy (N/A, 12/12/2018); Colonoscopy (N/A, 6/14/2019); and Colonoscopy (N/A, 11/1/2023).    Family History  His family history includes Cancer in his mother; Heart disease in his brother, father, maternal aunt, paternal aunt, paternal uncle, and sister.    Social History  He reports that he has quit smoking. His smoking use included cigarettes. He has a 75.0 pack-year smoking history. He " has quit using smokeless tobacco. He reports that he does not drink alcohol and does not use drugs.    Allergies  He has No Known Allergies.    Medications  He has a current medication list which includes the following prescription(s): aspirin, blood-glucose meter, cholecalciferol (vitamin d3), ciclopirox, clopidogrel, co-enzyme q-10, dapagliflozin propanediol, trulicity, fenofibrate, fish oil-omega-3 fatty acids, fluoxetine, glipizide, hydrochlorothiazide, hydroxyzine pamoate, lancets, losartan, metformin, metoprolol tartrate, multivit-iron-min-folic acid, nifedipine, omega-3 acid ethyl esters, oxybutynin, rosuvastatin, saw palmetto, tamsulosin, temazepam, and true metrix glucose test strip.    Review of Systems     Constitutional: Negative for appetite change, chills, fatigue, fever and unexpected weight loss.      HENT: Positive for hearing loss, nosebleeds, postnasal drip, sinus infection and sinus pressure.      Eyes:  Negative for change in eyesight, eye drainage, eye itching and photophobia.     Respiratory:  Positive for snoring.      Cardiovascular:  Positive for irregular heartbeat.     Gastrointestinal:  Positive for vomiting.     Musc: Positive for neck pain.     Skin: Negative for rash.     Allergy: Negative for food allergies and seasonal allergies.     Endocrine: Negative for cold intolerance and heat intolerance.      Neurological: Negative for dizziness, headaches, light-headedness, seizures and tremors.      Hematologic: Positive for bruises/bleeds easily.     Psychiatric: Positive for sleep disturbance.              Objective:     BP (!) 167/64 (BP Location: Right arm, Patient Position: Sitting, BP Method: Large (Automatic))   Pulse (!) 55   Resp 18   Wt 104 kg (229 lb 4.5 oz)   BMI 28.66 kg/m²      Constitutional:   He appears well-developed and well-nourished. He is active. Normal speech.      Head:  Normocephalic and atraumatic.     Nose:  Mucosal edema and septal deviation (leftward, mild,  "anterior) present. No rhinorrhea, nose lacerations or sinus tenderness. No epistaxis. Turbinate hypertrophy.  Turbinates normal and no turbinate masses.      Pulmonary/Chest:   Effort normal.     Psychiatric:   He has a normal mood and affect. His speech is normal and behavior is normal.       Procedure    None    Data Reviewed    Hemoglobin (g/dL)   Date Value   01/18/2024 14.1     Hematocrit (%)   Date Value   01/18/2024 43.7     Platelets (K/uL)   Date Value   01/18/2024 265     No results found for: "LABPROT"  No results found for: "APTT"  No results found for: "INR"    Assessment and Plan:     1. Recurrent epistaxis  2. Hypertrophy of inferior nasal turbinate  3. Chronic rhinitis  4. CAD   - no evidence of epistaxis focus on exam today but also has not had any bleeding in over a week.  Shared decision making with patient today and deferred nasal endoscopy.   - Will consider nasal endoscopy at next visit if bleeding returns   - condition exacerbated by CAD treatment (DAPT)  - Use aquaphor or vaseline ointment at least once daily in nasal vestibule  - Nasal saline spray/Ayr gel to nose at least twice daily  - Consider room humidifier  - Had a lengthy conversation with the patient regarding the etiology of epistaxis and management strategies.    - Patient provided detailed instructions for the management and prevention of nosebleeds and written instructions were given in their AVS.    - He was also advised about when to seek emergency care.        He will Follow up in about 3 weeks (around 3/7/2024) for re-evaluate post treatment.   I had a discussion with the patient regarding his condition and the further workup and management options.    All questions were answered, and the patient is in agreement with the above.   "

## 2024-02-15 ENCOUNTER — OFFICE VISIT (OUTPATIENT)
Dept: OTOLARYNGOLOGY | Facility: CLINIC | Age: 79
End: 2024-02-15
Payer: MEDICARE

## 2024-02-15 VITALS
WEIGHT: 229.25 LBS | SYSTOLIC BLOOD PRESSURE: 167 MMHG | HEART RATE: 55 BPM | BODY MASS INDEX: 28.66 KG/M2 | DIASTOLIC BLOOD PRESSURE: 64 MMHG | RESPIRATION RATE: 18 BRPM

## 2024-02-15 DIAGNOSIS — J31.0 CHRONIC RHINITIS: ICD-10-CM

## 2024-02-15 DIAGNOSIS — J34.3 HYPERTROPHY OF INFERIOR NASAL TURBINATE: ICD-10-CM

## 2024-02-15 DIAGNOSIS — I25.10 CORONARY ARTERY DISEASE INVOLVING NATIVE CORONARY ARTERY OF NATIVE HEART WITHOUT ANGINA PECTORIS: Chronic | ICD-10-CM

## 2024-02-15 DIAGNOSIS — R04.0 RECURRENT EPISTAXIS: Primary | ICD-10-CM

## 2024-02-15 PROCEDURE — 99214 OFFICE O/P EST MOD 30 MIN: CPT | Mod: S$GLB,,, | Performed by: PHYSICIAN ASSISTANT

## 2024-02-15 PROCEDURE — 3078F DIAST BP <80 MM HG: CPT | Mod: CPTII,S$GLB,, | Performed by: PHYSICIAN ASSISTANT

## 2024-02-15 PROCEDURE — 3288F FALL RISK ASSESSMENT DOCD: CPT | Mod: CPTII,S$GLB,, | Performed by: PHYSICIAN ASSISTANT

## 2024-02-15 PROCEDURE — 3077F SYST BP >= 140 MM HG: CPT | Mod: CPTII,S$GLB,, | Performed by: PHYSICIAN ASSISTANT

## 2024-02-15 PROCEDURE — 3072F LOW RISK FOR RETINOPATHY: CPT | Mod: CPTII,S$GLB,, | Performed by: PHYSICIAN ASSISTANT

## 2024-02-15 PROCEDURE — 1159F MED LIST DOCD IN RCRD: CPT | Mod: CPTII,S$GLB,, | Performed by: PHYSICIAN ASSISTANT

## 2024-02-15 PROCEDURE — 1126F AMNT PAIN NOTED NONE PRSNT: CPT | Mod: CPTII,S$GLB,, | Performed by: PHYSICIAN ASSISTANT

## 2024-02-15 PROCEDURE — 99999 PR PBB SHADOW E&M-EST. PATIENT-LVL IV: CPT | Mod: PBBFAC,,, | Performed by: PHYSICIAN ASSISTANT

## 2024-02-15 PROCEDURE — 1101F PT FALLS ASSESS-DOCD LE1/YR: CPT | Mod: CPTII,S$GLB,, | Performed by: PHYSICIAN ASSISTANT

## 2024-02-15 NOTE — PATIENT INSTRUCTIONS
Nasal Saline Spray ([place], [place], [employer])  I want you to use a nasal saline spray (available over the counter) before applying your nasal spray medications.  This spray can help wash away mucus and crusting and allow for better absorption of the medications.  Please use the nasal saline spray about 15 minutes before applying your medicated nasal sprays.     Do not blow nose for 1 week.  Sneeze with mouth open.  Use nasal saline twice daily (at the least) to keep mucous membranes hydrated.   USE aquaphor or vaseline ointment at least once daily in your nostril as demonstrated in clinic today.  Do not take ibuprofen or drink alcohol for 1 week. Do NOT stop scheduled blood thinners unless directed by your provider.   If bleeding recurs, use oxymetazoline (Afrin) spray in the nostril and pinch your nose and lean forward.  If bleeding persists after holding your nose for 10 minutes, call for follow-up appointment.  Go to ER if bleeding:  - persists for greater than 15 minutes (despite above measures)  - severe bleeding or lightheadedness  - bleeding episodes become frequent    Other measures to reduce recurrence:  - Avoid heavy lifting and straining for at 2 least weeks  - Do not pick your nose or put anything into it. These actions may dislodge any blood clots.  - For the next few days, elevate your head on several pillows when lying down.  - Do not use a straw to drink as the sucking motion may cause bleeding.

## 2024-02-26 ENCOUNTER — OFFICE VISIT (OUTPATIENT)
Dept: UROLOGY | Facility: CLINIC | Age: 79
End: 2024-02-26
Payer: MEDICARE

## 2024-02-26 VITALS
WEIGHT: 227.5 LBS | DIASTOLIC BLOOD PRESSURE: 68 MMHG | HEART RATE: 62 BPM | SYSTOLIC BLOOD PRESSURE: 181 MMHG | BODY MASS INDEX: 28.44 KG/M2

## 2024-02-26 DIAGNOSIS — N13.8 BPH WITH OBSTRUCTION/LOWER URINARY TRACT SYMPTOMS: Primary | ICD-10-CM

## 2024-02-26 DIAGNOSIS — N40.1 PROSTATE HYPERPLASIA WITH URINARY OBSTRUCTION: ICD-10-CM

## 2024-02-26 DIAGNOSIS — N32.81 OAB (OVERACTIVE BLADDER): ICD-10-CM

## 2024-02-26 DIAGNOSIS — N13.8 PROSTATE HYPERPLASIA WITH URINARY OBSTRUCTION: ICD-10-CM

## 2024-02-26 DIAGNOSIS — N40.1 BPH WITH OBSTRUCTION/LOWER URINARY TRACT SYMPTOMS: Primary | ICD-10-CM

## 2024-02-26 PROCEDURE — 3072F LOW RISK FOR RETINOPATHY: CPT | Mod: CPTII,S$GLB,, | Performed by: UROLOGY

## 2024-02-26 PROCEDURE — 3077F SYST BP >= 140 MM HG: CPT | Mod: CPTII,S$GLB,, | Performed by: UROLOGY

## 2024-02-26 PROCEDURE — 1126F AMNT PAIN NOTED NONE PRSNT: CPT | Mod: CPTII,S$GLB,, | Performed by: UROLOGY

## 2024-02-26 PROCEDURE — 3078F DIAST BP <80 MM HG: CPT | Mod: CPTII,S$GLB,, | Performed by: UROLOGY

## 2024-02-26 PROCEDURE — 1159F MED LIST DOCD IN RCRD: CPT | Mod: CPTII,S$GLB,, | Performed by: UROLOGY

## 2024-02-26 PROCEDURE — 99999 PR PBB SHADOW E&M-EST. PATIENT-LVL IV: CPT | Mod: PBBFAC,,, | Performed by: UROLOGY

## 2024-02-26 PROCEDURE — 3288F FALL RISK ASSESSMENT DOCD: CPT | Mod: CPTII,S$GLB,, | Performed by: UROLOGY

## 2024-02-26 PROCEDURE — 99214 OFFICE O/P EST MOD 30 MIN: CPT | Mod: S$GLB,,, | Performed by: UROLOGY

## 2024-02-26 PROCEDURE — 1101F PT FALLS ASSESS-DOCD LE1/YR: CPT | Mod: CPTII,S$GLB,, | Performed by: UROLOGY

## 2024-02-26 RX ORDER — OXYBUTYNIN CHLORIDE 10 MG/1
10 TABLET, EXTENDED RELEASE ORAL DAILY
Qty: 90 TABLET | Refills: 3 | Status: SHIPPED | OUTPATIENT
Start: 2024-02-26 | End: 2025-02-25

## 2024-02-26 RX ORDER — TAMSULOSIN HYDROCHLORIDE 0.4 MG/1
0.4 CAPSULE ORAL NIGHTLY
Qty: 90 CAPSULE | Refills: 3 | Status: SHIPPED | OUTPATIENT
Start: 2024-02-26 | End: 2025-02-25

## 2024-02-26 NOTE — PROGRESS NOTES
CC: BPH and OAB    Cecil Pringle is a 78 y.o. man who is here for the evaluation of Follow-up    His PCP, Layo Jett MD     Urination symptoms: Positive for frequency, urgency, nocturia x3 and incomplete emptying, intermittency, weak stream, straining, postvoid dribbling.  Denies flank pain, dysuria, hematuria. Not currently taking any medications for his urinary symptoms.     the patient is a former smoker who quit smoking in 1990.  PSA in 2021 was 0.54.     Does have issues achieving and erection, has tried Viagra. Not concerned about erectile function at this time.     Past Medical History:   Diagnosis Date    Anxiety     Arthritis     Bleeding disorder     Cataract     Coronary artery disease     Depression     Diabetes mellitus, type 2     Diabetic neuropathy     Hearing loss     Complete loss on the. Left side.    Hyperlipidemia     Hypertension     Insomnia     PAD (peripheral artery disease)     Sleep apnea      Past Surgical History:   Procedure Laterality Date    CARDIAC SURGERY  Bypass ibypass in 1990, 5 stints in 2013, bypass in 2014.%%    COLONOSCOPY N/A 12/12/2018    Procedure: COLONOSCOPY;  Surgeon: Jabari Pollock MD;  Location: [facility] ENDO;  Service: Endoscopy;  Laterality: N/A;  ok to hold Plavix x 5 days, pt requesting Jaden-MS    COLONOSCOPY N/A 06/14/2019    Procedure: COLONOSCOPY/Golytely;  Surgeon: Joon Devi MD;  Location: [facility] ENDO;  Service: Endoscopy;  Laterality: N/A;    COLONOSCOPY N/A 11/01/2023    Procedure: COLONOSCOPY;  Surgeon: Yoav Dsouza MD;  Location: [facility] ENDO;  Service: Endoscopy;  Laterality: N/A;    CORONARY ARTERY BYPASS GRAFT  1990    CORONARY ARTERY BYPASS GRAFT  2014    coronary stents  2013    x5    left foot surgery  1980    Right hand surgery  1980    SINUS SURGERY      TONSILLECTOMY       Social History     Tobacco Use    Smoking status: Former     Current packs/day: 0.00     Average packs/day: 2.5 packs/day for 56.0 years (140.0 ttl pk-yrs)      Types: Cigarettes     Start date: 1964     Quit date: 1990     Years since quittin.1    Smokeless tobacco: Former   Substance Use Topics    Alcohol use: No    Drug use: No     Family History   Problem Relation Age of Onset    Heart disease Father          at the age of 56    Cancer Father         Heart disease    Heart disease Brother     Heart disease Paternal Aunt     Heart disease Paternal Uncle     Cancer Mother         mesothelioma,  at the age of 78    Diabetes Mother     Heart disease Sister     Heart disease Maternal Aunt     Diabetes Brother         Heart disease    Glaucoma Neg Hx     Macular degeneration Neg Hx     Retinal detachment Neg Hx     Strabismus Neg Hx     Amblyopia Neg Hx     Blindness Neg Hx     Cataracts Neg Hx      Allergy:  Review of patient's allergies indicates:  No Known Allergies  Outpatient Encounter Medications as of 2024   Medication Sig Dispense Refill    aspirin (ECOTRIN) 81 MG EC tablet Take 81 mg by mouth once daily.      blood-glucose meter kit To check BG 1 times daily, to use with insurance preferred meter 1 each 0    cholecalciferol, vitamin D3, (VITAMIN D3) 25 mcg (1,000 unit) capsule Take 2 capsules (2,000 Units total) by mouth once daily.  0    clopidogreL (PLAVIX) 75 mg tablet TAKE 1 TABLET BY MOUTH EVERY DAY 90 tablet 3    co-enzyme Q-10 30 mg capsule Take 30 mg by mouth once daily.       dapagliflozin (FARXIGA) 10 mg tablet Take 1 tablet (10 mg total) by mouth once daily. 90 tablet 3    dulaglutide (TRULICITY) 3 mg/0.5 mL pen injector Inject 3 mg into the skin every 7 days. 16 pen 4    fenofibrate 160 MG Tab Take 1 tablet (160 mg total) by mouth once daily. 90 tablet 3    fish oil-omega-3 fatty acids 300-1,000 mg capsule Take 2 g by mouth 3 (three) times daily.      FLUoxetine 20 MG capsule Take 1 capsule (20 mg total) by mouth once daily. 90 capsule 3    glipiZIDE (GLUCOTROL) 5 MG tablet Take 2 tablets (10 mg total) by mouth 2 (two) times  daily with meals. 360 tablet 3    hydroCHLOROthiazide (HYDRODIURIL) 12.5 MG Tab TAKE 1 TABLET BY MOUTH EVERY DAY 90 tablet 3    hydrOXYzine pamoate (VISTARIL) 25 MG Cap TAKE 1 CAPSULE BY MOUTH EVERY EVENING FOR ALLERGIES 90 capsule 3    lancets Misc To check BG 1 times daily, to use with insurance preferred meter 100 each 3    losartan (COZAAR) 100 MG tablet TAKE 1 TABLET BY MOUTH EVERY DAY 90 tablet 3    metFORMIN (GLUCOPHAGE) 1000 MG tablet Take 1 tablet (1,000 mg total) by mouth 2 (two) times daily. 180 tablet 3    metoprolol tartrate (LOPRESSOR) 100 MG tablet Take 0.5 tablets (50 mg total) by mouth 2 (two) times daily. 90 tablet 3    MULTIVIT-IRON-MIN-FOLIC ACID 3,500-18-0.4 UNIT-MG-MG ORAL CHEW Take 1 tablet by mouth once daily.      NIFEdipine (PROCARDIA-XL) 60 MG (OSM) 24 hr tablet TAKE 1 TABLET BY MOUTH EVERY DAY 90 tablet 3    omega-3 acid ethyl esters (LOVAZA) 1 gram capsule Take 2 capsules (2 g total) by mouth 2 (two) times daily. 360 capsule 3    oxybutynin (DITROPAN-XL) 10 MG 24 hr tablet Take 1 tablet (10 mg total) by mouth once daily. 90 tablet 3    rosuvastatin (CRESTOR) 20 MG tablet TAKE 1 TABLET BY MOUTH EVERY DAY IN THE EVENING 90 tablet 3    SAW PALMETTO ORAL Take 1 capsule by mouth 2 (two) times daily.      tamsulosin (FLOMAX) 0.4 mg Cap Take 1 capsule (0.4 mg total) by mouth every evening. 90 capsule 3    temazepam (RESTORIL) 30 mg capsule Take 1 capsule (30 mg total) by mouth nightly as needed for Insomnia. 30 capsule 0    TRUE METRIX GLUCOSE TEST STRIP Strp USE 1 STRIP TO CHECK GLUCOSE TWO TIMES DAILY 200 strip 3    [DISCONTINUED] ciclopirox (PENLAC) 8 % Soln APPLY TO AFFECTED AREA AT NIGHT 6.6 mL 11    [DISCONTINUED] oxybutynin (DITROPAN-XL) 10 MG 24 hr tablet Take 1 tablet (10 mg total) by mouth once daily. 90 tablet 3    [DISCONTINUED] tamsulosin (FLOMAX) 0.4 mg Cap Take 1 capsule (0.4 mg total) by mouth every evening. 90 capsule 3     No facility-administered encounter medications on file  as of 2/26/2024.     Review of Systems   Review of Systems   Constitutional:  Negative for chills and fever.   Respiratory:  Negative for shortness of breath.    Gastrointestinal:  Negative for abdominal pain and vomiting.   Genitourinary:  Positive for frequency and urgency. Negative for dysuria, flank pain and hematuria.     Physical Exam     Vitals:    02/26/24 0934   BP: (!) 181/68   Pulse: 62     Physical Exam  Constitutional:       General: He is not in acute distress.     Appearance: Normal appearance. He is well-developed. He is not diaphoretic.   HENT:      Head: Normocephalic and atraumatic.      Right Ear: External ear normal.      Left Ear: External ear normal.      Nose: Nose normal.   Eyes:      Extraocular Movements: Extraocular movements intact.      Conjunctiva/sclera: Conjunctivae normal.      Pupils: Pupils are equal, round, and reactive to light.   Neck:      Thyroid: No thyromegaly.      Vascular: No JVD.      Trachea: No tracheal deviation.   Cardiovascular:      Rate and Rhythm: Normal rate and regular rhythm.      Heart sounds: Normal heart sounds. No murmur heard.     No friction rub. No gallop.   Pulmonary:      Effort: Pulmonary effort is normal. No respiratory distress.      Breath sounds: Normal breath sounds. No wheezing.   Chest:      Chest wall: No tenderness.   Abdominal:      General: Abdomen is flat. Bowel sounds are normal. There is no distension.      Palpations: Abdomen is soft. There is no mass.      Tenderness: There is no abdominal tenderness. There is no right CVA tenderness, left CVA tenderness, guarding or rebound.   Genitourinary:     Penis: Normal. No tenderness.       Rectum: Normal.      Comments: Prostate 25 grams with negative nodule or negative tenderness  No tenderness noted on his levator ani.  Able to contract and relax without any pain.    Musculoskeletal:         General: No tenderness or deformity. Normal range of motion.      Cervical back: Normal range of  "motion and neck supple.   Lymphadenopathy:      Cervical: No cervical adenopathy.   Skin:     General: Skin is warm and dry.      Coloration: Skin is not jaundiced.   Neurological:      General: No focal deficit present.      Mental Status: He is alert and oriented to person, place, and time.   Psychiatric:         Mood and Affect: Mood normal.         Behavior: Behavior normal.         Thought Content: Thought content normal.         LABS:  Lab Results   Component Value Date    PSA 0.62 06/22/2022    PSA 0.54 05/28/2021    PSA 0.48 04/18/2019    PSA 0.49 03/01/2018    PSA 0.78 01/30/2017    PSA 0.59 11/04/2015    PSADIAG 0.58 12/09/2021     Results for orders placed or performed in visit on 12/09/21   Prostate Specific Antigen, Diagnostic   Result Value Ref Range    PSA Diagnostic 0.58 0.00 - 4.00 ng/mL     Lab Results   Component Value Date    CREATININE 1.2 01/18/2024    CREATININE 1.2 11/07/2023    CREATININE 1.2 07/17/2023     No results found for this or any previous visit.  No results found for: "LABURIN"  Hemoglobin A1C   Date Value Ref Range Status   01/18/2024 8.0 (H) 4.0 - 5.6 % Final     Comment:     ADA Screening Guidelines:  5.7-6.4%  Consistent with prediabetes  >or=6.5%  Consistent with diabetes    High levels of fetal hemoglobin interfere with the HbA1C  assay. Heterozygous hemoglobin variants (HbS, HgC, etc)do  not significantly interfere with this assay.   However, presence of multiple variants may affect accuracy.     07/17/2023 7.4 (H) 4.0 - 5.6 % Final     Comment:     ADA Screening Guidelines:  5.7-6.4%  Consistent with prediabetes  >or=6.5%  Consistent with diabetes    High levels of fetal hemoglobin interfere with the HbA1C  assay. Heterozygous hemoglobin variants (HbS, HgC, etc)do  not significantly interfere with this assay.   However, presence of multiple variants may affect accuracy.         UA clear   PVR per bladder scan: 47 ml    Assessment and Plan:  Cecil was seen today for " follow-up.    Diagnoses and all orders for this visit:    BPH with obstruction/lower urinary tract symptoms    OAB (overactive bladder)  -     oxybutynin (DITROPAN-XL) 10 MG 24 hr tablet; Take 1 tablet (10 mg total) by mouth once daily.    Prostate hyperplasia with urinary obstruction  -     tamsulosin (FLOMAX) 0.4 mg Cap; Take 1 capsule (0.4 mg total) by mouth every evening.        He reports that taking flomax and oxybutynin xl definitely improved his LUTS.  I informed the pt that he needs to have a better diabetic control, which may even improve his LUTS.  90 days refills given.  May have to evaluate him with SUDS cysto if he needs more help on his LUTS and OAB.  Nocturia is down to 1 x form 4 x a night.  Options of TURP or [facility] explained and their brochures given.    I spent 25 minutes with the patient of which more than half was spent in direct consultation with the patient in regards to our treatment and plan.       Follow-up:  Follow up in about 1 year (around 2/26/2025).

## 2024-02-27 ENCOUNTER — PATIENT MESSAGE (OUTPATIENT)
Dept: UROLOGY | Facility: CLINIC | Age: 79
End: 2024-02-27
Payer: MEDICARE

## 2024-03-05 NOTE — PROGRESS NOTES
"  Subjective:      Cecil is a 78 y.o. male who comes for follow-up of nosebleeds.  His last visit with me was on 2/15/2024.      No further nosebleeds.  Patient has been compliant with saline spray and intranasal ointment.      Per last office visit 2/15/2024 :  Patient reports severe L sided nosebleed 2 years ago which required surgical cauterization for a "deep bleeder" in [place].  Patient reports no further epistaxis until about 2 weeks ago.  Patient denies any bleeding from his nose in the last week.        He has previously had nasal cauterization.  The bleeding has required packing for control.  The last episode was 7 days ago, and is not currently active.  There is a prior history of nasal surgery.  There is not a prior history of nasal trauma.  There is not a history of environmental allergies.  He does not currently use nasal cannula.  He does not currently use a nasal spray.  There is not a family history to suggest a clotting disorder.  He does currently take a blood-thinning agent ASA+Plavix (for several years)     Patient reports daily sniffling of his nose with sneezing episodes but denies any significant rhinorrhea, watery/itchy eyes, nasal obstruction.     1. Recurrent epistaxis  2. Hypertrophy of inferior nasal turbinate  3. Chronic rhinitis  4. CAD              - no evidence of epistaxis focus on exam today but also has not had any bleeding in over a week.  Shared decision making with patient today and deferred nasal endoscopy.              - Will consider nasal endoscopy at next visit if bleeding returns              - condition exacerbated by CAD treatment (DAPT)  - Use aquaphor or vaseline ointment at least once daily in nasal vestibule  - Nasal saline spray/Ayr gel to nose at least twice daily  - Consider room humidifier...       The patient's medications, allergies, past medical, surgical, social and family histories were reviewed and updated as appropriate.    A detailed review of " "systems was obtained with pertinent positives as per the above HPI, and otherwise negative.        Objective:     BP (!) 168/68 (BP Location: Left arm, Patient Position: Sitting, BP Method: Large (Automatic))   Pulse (!) 49   Wt 102.5 kg (225 lb 15.5 oz)   BMI 28.24 kg/m²        Constitutional:   He appears well-developed and well-nourished. He is active. Normal speech.      Head:  Normocephalic and atraumatic.     Nose:  Septal deviation (leftward) present. No mucosal edema or rhinorrhea. No epistaxis. Turbinates normal, no turbinate masses and no turbinate hypertrophy.      Pulmonary/Chest:   Effort normal.     Psychiatric:   He has a normal mood and affect. His speech is normal and behavior is normal.        Procedure    None    Data Reviewed    WBC (K/uL)   Date Value   01/18/2024 7.71     Eosinophil % (%)   Date Value   01/18/2024 2.1     Eos # (K/uL)   Date Value   01/18/2024 0.2     Platelets (K/uL)   Date Value   01/18/2024 265     Glucose (mg/dL)   Date Value   01/18/2024 213 (H)     No results found for: "IGE"    Assessment:     1. Recurrent epistaxis    2. Long term current use of antithrombotics/antiplatelets    3. Hypertrophy of inferior nasal turbinate    4. Chronic rhinitis    5. Coronary artery disease involving native coronary artery of native heart without angina pectoris         Plan:     No evidence of epistaxis focus on exam; no further bleeding  Continue saline spray and ointment as preventative to bleeding    He will follow up with me as needed  I had a discussion with the patient regarding his condition and the further workup and management options.    All questions were answered, and the patient is in agreement with the above.     Disclaimer:  This note may have been prepared utilizing voice recognition software which may result in occasional typographical errors in the text such as sound alike words.   If further clarification is needed, please contact the ENT department of [facility].

## 2024-03-06 ENCOUNTER — OFFICE VISIT (OUTPATIENT)
Dept: OTOLARYNGOLOGY | Facility: CLINIC | Age: 79
End: 2024-03-06
Payer: MEDICARE

## 2024-03-06 VITALS
SYSTOLIC BLOOD PRESSURE: 168 MMHG | BODY MASS INDEX: 28.24 KG/M2 | WEIGHT: 226 LBS | HEART RATE: 49 BPM | DIASTOLIC BLOOD PRESSURE: 68 MMHG

## 2024-03-06 DIAGNOSIS — Z79.02 LONG TERM CURRENT USE OF ANTITHROMBOTICS/ANTIPLATELETS: ICD-10-CM

## 2024-03-06 DIAGNOSIS — J34.3 HYPERTROPHY OF INFERIOR NASAL TURBINATE: ICD-10-CM

## 2024-03-06 DIAGNOSIS — J31.0 CHRONIC RHINITIS: ICD-10-CM

## 2024-03-06 DIAGNOSIS — R04.0 RECURRENT EPISTAXIS: Primary | ICD-10-CM

## 2024-03-06 DIAGNOSIS — I25.10 CORONARY ARTERY DISEASE INVOLVING NATIVE CORONARY ARTERY OF NATIVE HEART WITHOUT ANGINA PECTORIS: ICD-10-CM

## 2024-03-06 PROCEDURE — 3077F SYST BP >= 140 MM HG: CPT | Mod: CPTII,S$GLB,, | Performed by: PHYSICIAN ASSISTANT

## 2024-03-06 PROCEDURE — 1126F AMNT PAIN NOTED NONE PRSNT: CPT | Mod: CPTII,S$GLB,, | Performed by: PHYSICIAN ASSISTANT

## 2024-03-06 PROCEDURE — 1101F PT FALLS ASSESS-DOCD LE1/YR: CPT | Mod: CPTII,S$GLB,, | Performed by: PHYSICIAN ASSISTANT

## 2024-03-06 PROCEDURE — 3288F FALL RISK ASSESSMENT DOCD: CPT | Mod: CPTII,S$GLB,, | Performed by: PHYSICIAN ASSISTANT

## 2024-03-06 PROCEDURE — 3078F DIAST BP <80 MM HG: CPT | Mod: CPTII,S$GLB,, | Performed by: PHYSICIAN ASSISTANT

## 2024-03-06 PROCEDURE — 99999 PR PBB SHADOW E&M-EST. PATIENT-LVL III: CPT | Mod: PBBFAC,,, | Performed by: PHYSICIAN ASSISTANT

## 2024-03-06 PROCEDURE — 99213 OFFICE O/P EST LOW 20 MIN: CPT | Mod: S$GLB,,, | Performed by: PHYSICIAN ASSISTANT

## 2024-03-06 PROCEDURE — 3072F LOW RISK FOR RETINOPATHY: CPT | Mod: CPTII,S$GLB,, | Performed by: PHYSICIAN ASSISTANT

## 2024-03-06 PROCEDURE — 1159F MED LIST DOCD IN RCRD: CPT | Mod: CPTII,S$GLB,, | Performed by: PHYSICIAN ASSISTANT

## 2024-03-12 ENCOUNTER — PATIENT MESSAGE (OUTPATIENT)
Dept: UROLOGY | Facility: CLINIC | Age: 79
End: 2024-03-12
Payer: MEDICARE

## 2024-03-14 ENCOUNTER — PATIENT MESSAGE (OUTPATIENT)
Dept: UROLOGY | Facility: CLINIC | Age: 79
End: 2024-03-14
Payer: MEDICARE

## 2024-05-12 NOTE — TELEPHONE ENCOUNTER
Care Due:                  Date            Visit Type   Department     Provider  --------------------------------------------------------------------------------                                EP -                              PRIMARY      [facility] INTERNAL  Last Visit: 05-      CARE ([facility])   MEDICINE       Layo Jett                              MYCHART                              ANNUAL                              CHECKUP/PHY  [facility] INTERNAL  Next Visit: 05-      [facility]       Layo Jett                                                            Last  Test          Frequency    Reason                     Performed    Due Date  --------------------------------------------------------------------------------    HBA1C.......  6 months...  metFORMIN................  01- 07-    [facility] Embedded Care Due Messages. Reference number: [id number].   5/12/2024 1:33:46 PM CDT

## 2024-05-13 ENCOUNTER — PATIENT MESSAGE (OUTPATIENT)
Dept: ENDOCRINOLOGY | Facility: CLINIC | Age: 79
End: 2024-05-13
Payer: MEDICARE

## 2024-05-13 RX ORDER — TEMAZEPAM 30 MG/1
30 CAPSULE ORAL NIGHTLY PRN
Qty: 30 CAPSULE | Refills: 0 | Status: SHIPPED | OUTPATIENT
Start: 2024-05-13

## 2024-05-14 ENCOUNTER — TELEPHONE (OUTPATIENT)
Dept: ENDOCRINOLOGY | Facility: CLINIC | Age: 79
End: 2024-05-14
Payer: MEDICARE

## 2024-05-14 DIAGNOSIS — E11.65 TYPE 2 DIABETES MELLITUS WITH HYPERGLYCEMIA, WITHOUT LONG-TERM CURRENT USE OF INSULIN: ICD-10-CM

## 2024-05-14 RX ORDER — DULAGLUTIDE 3 MG/.5ML
3 INJECTION, SOLUTION SUBCUTANEOUS
Qty: 16 PEN | Refills: 4 | Status: SHIPPED | OUTPATIENT
Start: 2024-05-14 | End: 2025-05-14

## 2024-05-23 RX ORDER — FENOFIBRATE 160 MG/1
160 TABLET ORAL
Qty: 90 TABLET | Refills: 0 | Status: SHIPPED | OUTPATIENT
Start: 2024-05-23 | End: 2024-06-10 | Stop reason: SDUPTHER

## 2024-05-23 NOTE — TELEPHONE ENCOUNTER
No care due was identified.  [facility] Embedded Care Due Messages. Reference number: [id number].   5/23/2024 12:12:39 AM CDT

## 2024-05-23 NOTE — TELEPHONE ENCOUNTER
Refill Routing Note   Medication(s) are not appropriate for processing by [facility] for the following reason(s):        Drug-disease interaction  Drug-Disease: fenofibrate and Type 2 diabetes mellitus with stage 3a chronic kidney disease, without long-term current use of insulin    [facility] action(s):  Defer               Appointments  past 12m or future 3m with PCP    Date Provider   Last Visit   5/15/2023 Layo Jett MD   Next Visit   6/10/2024 Layo Jett MD   ED visits in past 90 days: 0        Note composed:6:33 AM 05/23/2024

## 2024-06-10 ENCOUNTER — OFFICE VISIT (OUTPATIENT)
Dept: INTERNAL MEDICINE | Facility: CLINIC | Age: 79
End: 2024-06-10
Payer: MEDICARE

## 2024-06-10 VITALS
DIASTOLIC BLOOD PRESSURE: 60 MMHG | SYSTOLIC BLOOD PRESSURE: 138 MMHG | HEIGHT: 75 IN | WEIGHT: 222.69 LBS | RESPIRATION RATE: 17 BRPM | BODY MASS INDEX: 27.69 KG/M2 | TEMPERATURE: 98 F | HEART RATE: 65 BPM | OXYGEN SATURATION: 96 %

## 2024-06-10 DIAGNOSIS — E11.42 ONYCHOMYCOSIS OF MULTIPLE TOENAILS WITH TYPE 2 DIABETES MELLITUS AND PERIPHERAL NEUROPATHY: ICD-10-CM

## 2024-06-10 DIAGNOSIS — B35.1 ONYCHOMYCOSIS OF MULTIPLE TOENAILS WITH TYPE 2 DIABETES MELLITUS AND PERIPHERAL NEUROPATHY: ICD-10-CM

## 2024-06-10 DIAGNOSIS — G47.33 OBSTRUCTIVE SLEEP APNEA SYNDROME: ICD-10-CM

## 2024-06-10 DIAGNOSIS — B35.1 ONYCHOMYCOSIS OF MULTIPLE TOENAILS WITH TYPE 2 DIABETES MELLITUS AND PERIPHERAL ANGIOPATHY: ICD-10-CM

## 2024-06-10 DIAGNOSIS — E11.69 HYPERLIPIDEMIA ASSOCIATED WITH TYPE 2 DIABETES MELLITUS: ICD-10-CM

## 2024-06-10 DIAGNOSIS — N18.31 TYPE 2 DIABETES MELLITUS WITH STAGE 3A CHRONIC KIDNEY DISEASE, WITHOUT LONG-TERM CURRENT USE OF INSULIN: Chronic | ICD-10-CM

## 2024-06-10 DIAGNOSIS — E11.59 HYPERTENSION ASSOCIATED WITH TYPE 2 DIABETES MELLITUS: ICD-10-CM

## 2024-06-10 DIAGNOSIS — I35.0 NONRHEUMATIC AORTIC VALVE STENOSIS: ICD-10-CM

## 2024-06-10 DIAGNOSIS — I73.9 PAD (PERIPHERAL ARTERY DISEASE): ICD-10-CM

## 2024-06-10 DIAGNOSIS — N32.81 OAB (OVERACTIVE BLADDER): ICD-10-CM

## 2024-06-10 DIAGNOSIS — F33.0 MILD EPISODE OF RECURRENT MAJOR DEPRESSIVE DISORDER: ICD-10-CM

## 2024-06-10 DIAGNOSIS — E11.22 TYPE 2 DIABETES MELLITUS WITH STAGE 3A CHRONIC KIDNEY DISEASE, WITHOUT LONG-TERM CURRENT USE OF INSULIN: Chronic | ICD-10-CM

## 2024-06-10 DIAGNOSIS — G47.09 OTHER INSOMNIA: ICD-10-CM

## 2024-06-10 DIAGNOSIS — I25.10 CORONARY ARTERY DISEASE INVOLVING NATIVE CORONARY ARTERY OF NATIVE HEART WITHOUT ANGINA PECTORIS: Chronic | ICD-10-CM

## 2024-06-10 DIAGNOSIS — I15.2 HYPERTENSION ASSOCIATED WITH TYPE 2 DIABETES MELLITUS: ICD-10-CM

## 2024-06-10 DIAGNOSIS — E11.69 ONYCHOMYCOSIS OF MULTIPLE TOENAILS WITH TYPE 2 DIABETES MELLITUS AND PERIPHERAL ANGIOPATHY: ICD-10-CM

## 2024-06-10 DIAGNOSIS — E78.5 HYPERLIPIDEMIA ASSOCIATED WITH TYPE 2 DIABETES MELLITUS: ICD-10-CM

## 2024-06-10 DIAGNOSIS — Z95.1 S/P CABG X 5: ICD-10-CM

## 2024-06-10 DIAGNOSIS — E11.51 ONYCHOMYCOSIS OF MULTIPLE TOENAILS WITH TYPE 2 DIABETES MELLITUS AND PERIPHERAL ANGIOPATHY: ICD-10-CM

## 2024-06-10 DIAGNOSIS — J30.89 CHRONIC NONSEASONAL ALLERGIC RHINITIS DUE TO POLLEN: ICD-10-CM

## 2024-06-10 DIAGNOSIS — Z00.00 PREVENTATIVE HEALTH CARE: Primary | ICD-10-CM

## 2024-06-10 DIAGNOSIS — I65.21 STENOSIS OF RIGHT CAROTID ARTERY: ICD-10-CM

## 2024-06-10 DIAGNOSIS — E11.69 ONYCHOMYCOSIS OF MULTIPLE TOENAILS WITH TYPE 2 DIABETES MELLITUS AND PERIPHERAL NEUROPATHY: ICD-10-CM

## 2024-06-10 PROCEDURE — 3072F LOW RISK FOR RETINOPATHY: CPT | Mod: HCNC,CPTII,S$GLB, | Performed by: FAMILY MEDICINE

## 2024-06-10 PROCEDURE — 1101F PT FALLS ASSESS-DOCD LE1/YR: CPT | Mod: HCNC,CPTII,S$GLB, | Performed by: FAMILY MEDICINE

## 2024-06-10 PROCEDURE — 1160F RVW MEDS BY RX/DR IN RCRD: CPT | Mod: HCNC,CPTII,S$GLB, | Performed by: FAMILY MEDICINE

## 2024-06-10 PROCEDURE — 3078F DIAST BP <80 MM HG: CPT | Mod: HCNC,CPTII,S$GLB, | Performed by: FAMILY MEDICINE

## 2024-06-10 PROCEDURE — 99397 PER PM REEVAL EST PAT 65+ YR: CPT | Mod: HCNC,S$GLB,, | Performed by: FAMILY MEDICINE

## 2024-06-10 PROCEDURE — 3288F FALL RISK ASSESSMENT DOCD: CPT | Mod: HCNC,CPTII,S$GLB, | Performed by: FAMILY MEDICINE

## 2024-06-10 PROCEDURE — 1126F AMNT PAIN NOTED NONE PRSNT: CPT | Mod: HCNC,CPTII,S$GLB, | Performed by: FAMILY MEDICINE

## 2024-06-10 PROCEDURE — 99999 PR PBB SHADOW E&M-EST. PATIENT-LVL V: CPT | Mod: PBBFAC,HCNC,, | Performed by: FAMILY MEDICINE

## 2024-06-10 PROCEDURE — 1159F MED LIST DOCD IN RCRD: CPT | Mod: HCNC,CPTII,S$GLB, | Performed by: FAMILY MEDICINE

## 2024-06-10 PROCEDURE — 3075F SYST BP GE 130 - 139MM HG: CPT | Mod: HCNC,CPTII,S$GLB, | Performed by: FAMILY MEDICINE

## 2024-06-10 RX ORDER — CLOPIDOGREL BISULFATE 75 MG/1
75 TABLET ORAL DAILY
Qty: 90 TABLET | Refills: 3 | Status: SHIPPED | OUTPATIENT
Start: 2024-06-10

## 2024-06-10 RX ORDER — HYDROCHLOROTHIAZIDE 12.5 MG/1
12.5 TABLET ORAL DAILY
Qty: 90 TABLET | Refills: 3 | Status: SHIPPED | OUTPATIENT
Start: 2024-06-10

## 2024-06-10 RX ORDER — FLUOXETINE HYDROCHLORIDE 20 MG/1
20 CAPSULE ORAL DAILY
Qty: 90 CAPSULE | Refills: 3 | Status: SHIPPED | OUTPATIENT
Start: 2024-06-10

## 2024-06-10 RX ORDER — ROSUVASTATIN CALCIUM 20 MG/1
20 TABLET, COATED ORAL NIGHTLY
Qty: 90 TABLET | Refills: 3 | Status: SHIPPED | OUTPATIENT
Start: 2024-06-10

## 2024-06-10 RX ORDER — NIFEDIPINE 60 MG/1
60 TABLET, EXTENDED RELEASE ORAL DAILY
Qty: 90 TABLET | Refills: 3 | Status: SHIPPED | OUTPATIENT
Start: 2024-06-10

## 2024-06-10 RX ORDER — FENOFIBRATE 160 MG/1
160 TABLET ORAL DAILY
Qty: 90 TABLET | Refills: 3 | Status: SHIPPED | OUTPATIENT
Start: 2024-06-10

## 2024-06-10 RX ORDER — LOSARTAN POTASSIUM 100 MG/1
100 TABLET ORAL DAILY
Qty: 90 TABLET | Refills: 3 | Status: SHIPPED | OUTPATIENT
Start: 2024-06-10

## 2024-06-10 RX ORDER — METFORMIN HYDROCHLORIDE 1000 MG/1
1000 TABLET ORAL 2 TIMES DAILY
Qty: 180 TABLET | Refills: 3 | Status: SHIPPED | OUTPATIENT
Start: 2024-06-10

## 2024-06-10 RX ORDER — HYDROXYZINE PAMOATE 25 MG/1
CAPSULE ORAL
Qty: 90 CAPSULE | Refills: 3 | Status: SHIPPED | OUTPATIENT
Start: 2024-06-10

## 2024-06-10 RX ORDER — METOPROLOL TARTRATE 100 MG/1
50 TABLET ORAL 2 TIMES DAILY
Qty: 90 TABLET | Refills: 3 | Status: SHIPPED | OUTPATIENT
Start: 2024-06-10

## 2024-06-10 NOTE — PROGRESS NOTES
Subjective     Patient ID: Cecil Pringle is a 78 y.o. male.    Chief Complaint: Annual Exam    HPI 78-year-old male presents to clinic today for annual physical exam.  He continues to be followed by Cardiology secondary to coronary artery disease status post CABG in , five stent placement in , and repeat CABG in .  He also continues to be followed by both Podiatry and Cardiology secondary to peripheral arterial disease.  He remains stable on aspirin 81 mg daily and Plavix 75 mg daily.  Hypertension remains well controlled on losartan 100 mg daily, nifedipine 60 mg daily, hydrochlorothiazide 12.5 mg daily, and metoprolol 50 mg b.i.d..  Hyperlipidemia stable on Crestor 20 mg daily and fenofibrate 160 mg daily.  He continues to be followed by Endocrinology for treatment of type 2 diabetes with current hemoglobin A1c of 8.0.  He is currently on metformin 1000 mg b.i.d., glipizide 10 mg b.i.d., Trulicity 3 mg weekly, and Farxiga 10 mg daily.  Depression remains stable on Prozac 20 mg daily.  He continues to use temazepam as needed for insomnia but continues to note difficulty sleeping and frequent snoring.  I have recommended sleep medicine referral for further evaluation.  He continues to be followed by urology secondary to BPH which is stable on Flomax 0.4 mg weekly.  He has a past surgical history of CABG in  and repeat CABG in , stent placement in , right hand surgery, and left foot surgery. He has a strong family history of heart disease. His mother  of asbestosis.   Review of Systems   Constitutional:  Negative for appetite change, chills, fatigue and fever.   HENT:  Positive for nasal congestion. Negative for ear pain, hearing loss, postnasal drip, rhinorrhea, sinus pressure/congestion, sore throat and tinnitus.    Eyes:  Negative for redness, itching and visual disturbance.   Respiratory:  Negative for cough, chest tightness and shortness of breath.    Cardiovascular:  Negative  for chest pain and palpitations.   Gastrointestinal:  Negative for abdominal pain, constipation, diarrhea, nausea and vomiting.   Genitourinary:  Negative for decreased urine volume, difficulty urinating, dysuria, frequency, hematuria and urgency.   Musculoskeletal:  Negative for back pain, myalgias, neck pain and neck stiffness.   Integumentary:  Negative for rash.   Neurological:  Negative for dizziness, light-headedness and headaches.   Psychiatric/Behavioral:  Positive for sleep disturbance.           Objective     Physical Exam  Vitals and nursing note reviewed.   Constitutional:       General: He is not in acute distress.     Appearance: Normal appearance. He is well-developed. He is not diaphoretic.   HENT:      Head: Normocephalic and atraumatic.      Right Ear: External ear normal.      Left Ear: External ear normal.      Nose: Nose normal.      Mouth/Throat:      Pharynx: No oropharyngeal exudate.   Eyes:      General: No scleral icterus.        Right eye: No discharge.         Left eye: No discharge.      Conjunctiva/sclera: Conjunctivae normal.      Pupils: Pupils are equal, round, and reactive to light.   Neck:      Thyroid: No thyromegaly.      Vascular: No JVD.      Trachea: No tracheal deviation.   Cardiovascular:      Rate and Rhythm: Normal rate and regular rhythm.      Heart sounds: Normal heart sounds. No murmur heard.     No friction rub. No gallop.   Pulmonary:      Effort: Pulmonary effort is normal. No respiratory distress.      Breath sounds: Normal breath sounds. No stridor. No wheezing, rhonchi or rales.   Chest:      Chest wall: No tenderness.   Abdominal:      General: Bowel sounds are normal. There is no distension.      Palpations: Abdomen is soft. There is no mass.      Tenderness: There is no abdominal tenderness. There is no guarding or rebound.   Musculoskeletal:         General: No tenderness. Normal range of motion.      Cervical back: Normal range of motion and neck supple.    Lymphadenopathy:      Cervical: No cervical adenopathy.   Skin:     General: Skin is warm and dry.      Coloration: Skin is not pale.      Findings: No erythema or rash.   Neurological:      Mental Status: He is alert and oriented to person, place, and time.   Psychiatric:         Mood and Affect: Mood normal.         Behavior: Behavior normal.         Thought Content: Thought content normal.         Judgment: Judgment normal.            Assessment and Plan     1. Preventative health care  -     CBC Auto Differential; Future; Expected date: 06/10/2024  -     Comprehensive Metabolic Panel; Future; Expected date: 06/10/2024  -     Lipid Panel; Future; Expected date: 06/10/2024  -     T4, Free; Future; Expected date: 06/10/2024  -     TSH; Future; Expected date: 06/10/2024  -     Hemoglobin A1C; Future; Expected date: 06/10/2024  -     Microalbumin/Creatinine Ratio, Urine; Future; Expected date: 06/10/2024    2. Coronary artery disease involving native coronary artery of native heart without angina pectoris  Overview:  He underwent CABG x 5 in the 1990s. In 2011 he had 5 stents and in 2014 he underwent a second CABG x 4 at [facility].  He does not recall having angina prior to any of the coronary interventions.     Orders:  -     CBC Auto Differential; Future; Expected date: 06/10/2024  -     Comprehensive Metabolic Panel; Future; Expected date: 06/10/2024  -     Lipid Panel; Future; Expected date: 06/10/2024  -     T4, Free; Future; Expected date: 06/10/2024  -     TSH; Future; Expected date: 06/10/2024  -     Hemoglobin A1C; Future; Expected date: 06/10/2024  -     Microalbumin/Creatinine Ratio, Urine; Future; Expected date: 06/10/2024    3. S/P CABG x 5  Overview:  CABG x 5 1990s and CABG x 4 2014.     Orders:  -     CBC Auto Differential; Future; Expected date: 06/10/2024  -     Comprehensive Metabolic Panel; Future; Expected date: 06/10/2024  -     Lipid Panel; Future; Expected date: 06/10/2024  -      T4, Free; Future; Expected date: 06/10/2024  -     TSH; Future; Expected date: 06/10/2024  -     Hemoglobin A1C; Future; Expected date: 06/10/2024  -     Microalbumin/Creatinine Ratio, Urine; Future; Expected date: 06/10/2024    4. Nonrheumatic aortic valve stenosis  Overview:  Febrile illness during childhood followed by development of a murmur. He has aortic stenosis but does not have any other valve involvement. I doubt if it is rheumatic.       Orders:  -     CBC Auto Differential; Future; Expected date: 06/10/2024  -     Comprehensive Metabolic Panel; Future; Expected date: 06/10/2024  -     Lipid Panel; Future; Expected date: 06/10/2024  -     T4, Free; Future; Expected date: 06/10/2024  -     TSH; Future; Expected date: 06/10/2024  -     Hemoglobin A1C; Future; Expected date: 06/10/2024  -     Microalbumin/Creatinine Ratio, Urine; Future; Expected date: 06/10/2024    5. Stenosis of right carotid artery  -     CBC Auto Differential; Future; Expected date: 06/10/2024  -     Comprehensive Metabolic Panel; Future; Expected date: 06/10/2024  -     Lipid Panel; Future; Expected date: 06/10/2024  -     T4, Free; Future; Expected date: 06/10/2024  -     TSH; Future; Expected date: 06/10/2024  -     Hemoglobin A1C; Future; Expected date: 06/10/2024  -     Microalbumin/Creatinine Ratio, Urine; Future; Expected date: 06/10/2024    6. Hypertension associated with type 2 diabetes mellitus  -     CBC Auto Differential; Future; Expected date: 06/10/2024  -     Comprehensive Metabolic Panel; Future; Expected date: 06/10/2024  -     Lipid Panel; Future; Expected date: 06/10/2024  -     T4, Free; Future; Expected date: 06/10/2024  -     TSH; Future; Expected date: 06/10/2024  -     Hemoglobin A1C; Future; Expected date: 06/10/2024  -     Microalbumin/Creatinine Ratio, Urine; Future; Expected date: 06/10/2024    7. Hyperlipidemia associated with type 2 diabetes mellitus  -     CBC Auto Differential; Future; Expected date:  06/10/2024  -     Comprehensive Metabolic Panel; Future; Expected date: 06/10/2024  -     Lipid Panel; Future; Expected date: 06/10/2024  -     T4, Free; Future; Expected date: 06/10/2024  -     TSH; Future; Expected date: 06/10/2024  -     Hemoglobin A1C; Future; Expected date: 06/10/2024  -     Microalbumin/Creatinine Ratio, Urine; Future; Expected date: 06/10/2024  -     rosuvastatin (CRESTOR) 20 MG tablet; Take 1 tablet (20 mg total) by mouth every evening.  Dispense: 90 tablet; Refill: 3    8. PAD (peripheral artery disease)  -     CBC Auto Differential; Future; Expected date: 06/10/2024  -     Comprehensive Metabolic Panel; Future; Expected date: 06/10/2024  -     Lipid Panel; Future; Expected date: 06/10/2024  -     T4, Free; Future; Expected date: 06/10/2024  -     TSH; Future; Expected date: 06/10/2024  -     Hemoglobin A1C; Future; Expected date: 06/10/2024  -     Microalbumin/Creatinine Ratio, Urine; Future; Expected date: 06/10/2024    9. Type 2 diabetes mellitus with stage 3a chronic kidney disease, without long-term current use of insulin  -     CBC Auto Differential; Future; Expected date: 06/10/2024  -     Comprehensive Metabolic Panel; Future; Expected date: 06/10/2024  -     Lipid Panel; Future; Expected date: 06/10/2024  -     T4, Free; Future; Expected date: 06/10/2024  -     TSH; Future; Expected date: 06/10/2024  -     Hemoglobin A1C; Future; Expected date: 06/10/2024  -     Microalbumin/Creatinine Ratio, Urine; Future; Expected date: 06/10/2024    10. Onychomycosis of multiple toenails with type 2 diabetes mellitus and peripheral neuropathy  -     CBC Auto Differential; Future; Expected date: 06/10/2024  -     Comprehensive Metabolic Panel; Future; Expected date: 06/10/2024  -     Lipid Panel; Future; Expected date: 06/10/2024  -     T4, Free; Future; Expected date: 06/10/2024  -     TSH; Future; Expected date: 06/10/2024  -     Hemoglobin A1C; Future; Expected date: 06/10/2024  -      Microalbumin/Creatinine Ratio, Urine; Future; Expected date: 06/10/2024    11. Onychomycosis of multiple toenails with type 2 diabetes mellitus and peripheral angiopathy  -     CBC Auto Differential; Future; Expected date: 06/10/2024  -     Comprehensive Metabolic Panel; Future; Expected date: 06/10/2024  -     Lipid Panel; Future; Expected date: 06/10/2024  -     T4, Free; Future; Expected date: 06/10/2024  -     TSH; Future; Expected date: 06/10/2024  -     Hemoglobin A1C; Future; Expected date: 06/10/2024  -     Microalbumin/Creatinine Ratio, Urine; Future; Expected date: 06/10/2024    12. Mild episode of recurrent major depressive disorder  -     CBC Auto Differential; Future; Expected date: 06/10/2024  -     Comprehensive Metabolic Panel; Future; Expected date: 06/10/2024  -     Lipid Panel; Future; Expected date: 06/10/2024  -     T4, Free; Future; Expected date: 06/10/2024  -     TSH; Future; Expected date: 06/10/2024  -     Hemoglobin A1C; Future; Expected date: 06/10/2024  -     Microalbumin/Creatinine Ratio, Urine; Future; Expected date: 06/10/2024    13. Other insomnia  -     Ambulatory referral/consult to Sleep Disorders; Future; Expected date: 06/17/2024  -     CBC Auto Differential; Future; Expected date: 06/10/2024  -     Comprehensive Metabolic Panel; Future; Expected date: 06/10/2024  -     Lipid Panel; Future; Expected date: 06/10/2024  -     T4, Free; Future; Expected date: 06/10/2024  -     TSH; Future; Expected date: 06/10/2024  -     Hemoglobin A1C; Future; Expected date: 06/10/2024  -     Microalbumin/Creatinine Ratio, Urine; Future; Expected date: 06/10/2024    14. Obstructive sleep apnea syndrome  -     Ambulatory referral/consult to Sleep Disorders; Future; Expected date: 06/17/2024  -     CBC Auto Differential; Future; Expected date: 06/10/2024  -     Comprehensive Metabolic Panel; Future; Expected date: 06/10/2024  -     Lipid Panel; Future; Expected date: 06/10/2024  -     T4, Free;  Future; Expected date: 06/10/2024  -     TSH; Future; Expected date: 06/10/2024  -     Hemoglobin A1C; Future; Expected date: 06/10/2024  -     Microalbumin/Creatinine Ratio, Urine; Future; Expected date: 06/10/2024    15. OAB (overactive bladder)  -     CBC Auto Differential; Future; Expected date: 06/10/2024  -     Comprehensive Metabolic Panel; Future; Expected date: 06/10/2024  -     Lipid Panel; Future; Expected date: 06/10/2024  -     T4, Free; Future; Expected date: 06/10/2024  -     TSH; Future; Expected date: 06/10/2024  -     Hemoglobin A1C; Future; Expected date: 06/10/2024  -     Microalbumin/Creatinine Ratio, Urine; Future; Expected date: 06/10/2024    16. Chronic nonseasonal allergic rhinitis due to pollen  -     hydrOXYzine pamoate (VISTARIL) 25 MG Cap; TAKE 1 CAPSULE BY MOUTH EVERY EVENING FOR ALLERGIES  Dispense: 90 capsule; Refill: 3    Other orders  -     clopidogreL (PLAVIX) 75 mg tablet; Take 1 tablet (75 mg total) by mouth once daily.  Dispense: 90 tablet; Refill: 3  -     fenofibrate 160 MG Tab; Take 1 tablet (160 mg total) by mouth once daily.  Dispense: 90 tablet; Refill: 3  -     FLUoxetine 20 MG capsule; Take 1 capsule (20 mg total) by mouth once daily.  Dispense: 90 capsule; Refill: 3  -     hydroCHLOROthiazide (HYDRODIURIL) 12.5 MG Tab; Take 1 tablet (12.5 mg total) by mouth once daily.  Dispense: 90 tablet; Refill: 3  -     losartan (COZAAR) 100 MG tablet; Take 1 tablet (100 mg total) by mouth once daily.  Dispense: 90 tablet; Refill: 3  -     metFORMIN (GLUCOPHAGE) 1000 MG tablet; Take 1 tablet (1,000 mg total) by mouth 2 (two) times daily.  Dispense: 180 tablet; Refill: 3  -     metoprolol tartrate (LOPRESSOR) 100 MG tablet; Take 0.5 tablets (50 mg total) by mouth 2 (two) times daily.  Dispense: 90 tablet; Refill: 3  -     NIFEdipine (PROCARDIA-XL) 60 MG (OSM) 24 hr tablet; Take 1 tablet (60 mg total) by mouth once daily.  Dispense: 90 tablet; Refill: 3        1. CBC, CMP, TSH, free  T4, fasting lipids, hemoglobin A1c, and urine microalbumin to creatinine ratio.  2. Continue aspirin 81 mg daily, Plavix 75 mg daily, fenofibrate 160 mg daily, fish oil 2 g t.i.d., hydrochlorothiazide 12.5 mg daily, losartan 100 mg daily, metoprolol 50 mg b.i.d., nifedipine 60 mg daily, and Crestor 20 mg nightly.  Coronary artery disease status post CABG, aortic valve stenosis, carotid artery stenosis, hypertension, hyperlipidemia, and peripheral arterial disease remained stable.  Continue follow-up with cardiology as scheduled.    3. Continue metformin 1000 mg b.i.d., glipizide 10 mg b.i.d., Trulicity 3 mg weekly, and Farxiga 10 mg daily.  Continue follow-up with endocrinology as scheduled.  Current hemoglobin A1c is 8.0.    4. Continue follow-up with Podiatry as scheduled for continued evaluation of onychomycosis, peripheral neuropathy, and peripheral angiopathy.  5. Continue Prozac 20 mg daily.  Depression is stable.    6. Continue use of temazepam as needed.  Refer to Sleep Medicine for further evaluation of insomnia and suspected sleep apnea.  7. Continue Flomax as prescribed and continue follow-up with urology as scheduled.  Overactive bladder is stable.  8. Continue use of hydroxyzine as needed.  Allergies are stable.    9. Return to clinic as needed or in 1 year for annual physical exam.               Follow up in about 1 year (around 6/10/2025), or if symptoms worsen or fail to improve, for Annual exam.

## 2024-06-13 ENCOUNTER — LAB VISIT (OUTPATIENT)
Dept: LAB | Facility: HOSPITAL | Age: 79
End: 2024-06-13
Attending: FAMILY MEDICINE
Payer: MEDICARE

## 2024-06-13 DIAGNOSIS — B35.1 ONYCHOMYCOSIS OF MULTIPLE TOENAILS WITH TYPE 2 DIABETES MELLITUS AND PERIPHERAL NEUROPATHY: ICD-10-CM

## 2024-06-13 DIAGNOSIS — I73.9 PAD (PERIPHERAL ARTERY DISEASE): ICD-10-CM

## 2024-06-13 DIAGNOSIS — G47.33 OBSTRUCTIVE SLEEP APNEA SYNDROME: ICD-10-CM

## 2024-06-13 DIAGNOSIS — N32.81 OAB (OVERACTIVE BLADDER): ICD-10-CM

## 2024-06-13 DIAGNOSIS — N18.31 TYPE 2 DIABETES MELLITUS WITH STAGE 3A CHRONIC KIDNEY DISEASE, WITHOUT LONG-TERM CURRENT USE OF INSULIN: Chronic | ICD-10-CM

## 2024-06-13 DIAGNOSIS — E78.5 HYPERLIPIDEMIA ASSOCIATED WITH TYPE 2 DIABETES MELLITUS: ICD-10-CM

## 2024-06-13 DIAGNOSIS — E11.69 HYPERLIPIDEMIA ASSOCIATED WITH TYPE 2 DIABETES MELLITUS: ICD-10-CM

## 2024-06-13 DIAGNOSIS — F33.0 MILD EPISODE OF RECURRENT MAJOR DEPRESSIVE DISORDER: ICD-10-CM

## 2024-06-13 DIAGNOSIS — B35.1 ONYCHOMYCOSIS OF MULTIPLE TOENAILS WITH TYPE 2 DIABETES MELLITUS AND PERIPHERAL ANGIOPATHY: ICD-10-CM

## 2024-06-13 DIAGNOSIS — E11.22 TYPE 2 DIABETES MELLITUS WITH STAGE 3A CHRONIC KIDNEY DISEASE, WITHOUT LONG-TERM CURRENT USE OF INSULIN: Chronic | ICD-10-CM

## 2024-06-13 DIAGNOSIS — I15.2 HYPERTENSION ASSOCIATED WITH TYPE 2 DIABETES MELLITUS: ICD-10-CM

## 2024-06-13 DIAGNOSIS — E11.51 ONYCHOMYCOSIS OF MULTIPLE TOENAILS WITH TYPE 2 DIABETES MELLITUS AND PERIPHERAL ANGIOPATHY: ICD-10-CM

## 2024-06-13 DIAGNOSIS — Z00.00 PREVENTATIVE HEALTH CARE: ICD-10-CM

## 2024-06-13 DIAGNOSIS — E11.59 HYPERTENSION ASSOCIATED WITH TYPE 2 DIABETES MELLITUS: ICD-10-CM

## 2024-06-13 DIAGNOSIS — I35.0 NONRHEUMATIC AORTIC VALVE STENOSIS: ICD-10-CM

## 2024-06-13 DIAGNOSIS — I25.10 CORONARY ARTERY DISEASE INVOLVING NATIVE CORONARY ARTERY OF NATIVE HEART WITHOUT ANGINA PECTORIS: Chronic | ICD-10-CM

## 2024-06-13 DIAGNOSIS — Z95.1 S/P CABG X 5: ICD-10-CM

## 2024-06-13 DIAGNOSIS — E11.42 ONYCHOMYCOSIS OF MULTIPLE TOENAILS WITH TYPE 2 DIABETES MELLITUS AND PERIPHERAL NEUROPATHY: ICD-10-CM

## 2024-06-13 DIAGNOSIS — I65.21 STENOSIS OF RIGHT CAROTID ARTERY: ICD-10-CM

## 2024-06-13 DIAGNOSIS — G47.09 OTHER INSOMNIA: ICD-10-CM

## 2024-06-13 DIAGNOSIS — E11.69 ONYCHOMYCOSIS OF MULTIPLE TOENAILS WITH TYPE 2 DIABETES MELLITUS AND PERIPHERAL NEUROPATHY: ICD-10-CM

## 2024-06-13 DIAGNOSIS — E11.69 ONYCHOMYCOSIS OF MULTIPLE TOENAILS WITH TYPE 2 DIABETES MELLITUS AND PERIPHERAL ANGIOPATHY: ICD-10-CM

## 2024-06-13 LAB
ALBUMIN SERPL BCP-MCNC: 4 G/DL (ref 3.5–5.2)
ALP SERPL-CCNC: 42 U/L (ref 55–135)
ALT SERPL W/O P-5'-P-CCNC: 20 U/L (ref 10–44)
ANION GAP SERPL CALC-SCNC: 11 MMOL/L (ref 8–16)
AST SERPL-CCNC: 16 U/L (ref 10–40)
BASOPHILS # BLD AUTO: 0.04 K/UL (ref 0–0.2)
BASOPHILS NFR BLD: 0.6 % (ref 0–1.9)
BILIRUB SERPL-MCNC: 0.4 MG/DL (ref 0.1–1)
BUN SERPL-MCNC: 24 MG/DL (ref 8–23)
CALCIUM SERPL-MCNC: 9.5 MG/DL (ref 8.7–10.5)
CHLORIDE SERPL-SCNC: 103 MMOL/L (ref 95–110)
CHOLEST SERPL-MCNC: 103 MG/DL (ref 120–199)
CHOLEST/HDLC SERPL: 3.6 {RATIO} (ref 2–5)
CO2 SERPL-SCNC: 22 MMOL/L (ref 23–29)
CREAT SERPL-MCNC: 1.2 MG/DL (ref 0.5–1.4)
DIFFERENTIAL METHOD BLD: ABNORMAL
EOSINOPHIL # BLD AUTO: 0.2 K/UL (ref 0–0.5)
EOSINOPHIL NFR BLD: 2.2 % (ref 0–8)
ERYTHROCYTE [DISTWIDTH] IN BLOOD BY AUTOMATED COUNT: 15 % (ref 11.5–14.5)
EST. GFR  (NO RACE VARIABLE): >60 ML/MIN/1.73 M^2
ESTIMATED AVG GLUCOSE: 183 MG/DL (ref 68–131)
GLUCOSE SERPL-MCNC: 191 MG/DL (ref 70–110)
HBA1C MFR BLD: 8 % (ref 4–5.6)
HCT VFR BLD AUTO: 42.6 % (ref 40–54)
HDLC SERPL-MCNC: 29 MG/DL (ref 40–75)
HDLC SERPL: 28.2 % (ref 20–50)
HGB BLD-MCNC: 14.1 G/DL (ref 14–18)
IMM GRANULOCYTES # BLD AUTO: 0.03 K/UL (ref 0–0.04)
IMM GRANULOCYTES NFR BLD AUTO: 0.4 % (ref 0–0.5)
LDLC SERPL CALC-MCNC: 26.2 MG/DL (ref 63–159)
LYMPHOCYTES # BLD AUTO: 2 K/UL (ref 1–4.8)
LYMPHOCYTES NFR BLD: 29.2 % (ref 18–48)
MCH RBC QN AUTO: 28.3 PG (ref 27–31)
MCHC RBC AUTO-ENTMCNC: 33.1 G/DL (ref 32–36)
MCV RBC AUTO: 85 FL (ref 82–98)
MONOCYTES # BLD AUTO: 0.4 K/UL (ref 0.3–1)
MONOCYTES NFR BLD: 6.4 % (ref 4–15)
NEUTROPHILS # BLD AUTO: 4.2 K/UL (ref 1.8–7.7)
NEUTROPHILS NFR BLD: 61.2 % (ref 38–73)
NONHDLC SERPL-MCNC: 74 MG/DL
NRBC BLD-RTO: 0 /100 WBC
PLATELET # BLD AUTO: 222 K/UL (ref 150–450)
PMV BLD AUTO: 11 FL (ref 9.2–12.9)
POTASSIUM SERPL-SCNC: 4.5 MMOL/L (ref 3.5–5.1)
PROT SERPL-MCNC: 6.9 G/DL (ref 6–8.4)
RBC # BLD AUTO: 4.99 M/UL (ref 4.6–6.2)
SODIUM SERPL-SCNC: 136 MMOL/L (ref 136–145)
T4 FREE SERPL-MCNC: 0.92 NG/DL (ref 0.71–1.51)
TRIGL SERPL-MCNC: 239 MG/DL (ref 30–150)
TSH SERPL DL<=0.005 MIU/L-ACNC: 1.06 UIU/ML (ref 0.4–4)
WBC # BLD AUTO: 6.91 K/UL (ref 3.9–12.7)

## 2024-06-13 PROCEDURE — 83036 HEMOGLOBIN GLYCOSYLATED A1C: CPT | Mod: HCNC | Performed by: FAMILY MEDICINE

## 2024-06-13 PROCEDURE — 36415 COLL VENOUS BLD VENIPUNCTURE: CPT | Mod: HCNC,PO | Performed by: INTERNAL MEDICINE

## 2024-06-13 PROCEDURE — 84443 ASSAY THYROID STIM HORMONE: CPT | Mod: HCNC | Performed by: FAMILY MEDICINE

## 2024-06-13 PROCEDURE — 80061 LIPID PANEL: CPT | Mod: HCNC | Performed by: INTERNAL MEDICINE

## 2024-06-13 PROCEDURE — 80053 COMPREHEN METABOLIC PANEL: CPT | Mod: HCNC | Performed by: INTERNAL MEDICINE

## 2024-06-13 PROCEDURE — 84439 ASSAY OF FREE THYROXINE: CPT | Mod: HCNC | Performed by: FAMILY MEDICINE

## 2024-06-13 PROCEDURE — 85025 COMPLETE CBC W/AUTO DIFF WBC: CPT | Mod: HCNC | Performed by: FAMILY MEDICINE

## 2024-06-20 ENCOUNTER — PATIENT MESSAGE (OUTPATIENT)
Dept: CARDIOLOGY | Facility: CLINIC | Age: 79
End: 2024-06-20

## 2024-06-20 ENCOUNTER — OFFICE VISIT (OUTPATIENT)
Dept: CARDIOLOGY | Facility: CLINIC | Age: 79
End: 2024-06-20
Payer: MEDICARE

## 2024-06-20 VITALS
HEART RATE: 55 BPM | HEIGHT: 75 IN | BODY MASS INDEX: 27.23 KG/M2 | WEIGHT: 219 LBS | SYSTOLIC BLOOD PRESSURE: 128 MMHG | DIASTOLIC BLOOD PRESSURE: 69 MMHG

## 2024-06-20 DIAGNOSIS — L81.9 DISCOLORATION OF SKIN OF TOE: ICD-10-CM

## 2024-06-20 DIAGNOSIS — I15.2 HYPERTENSION ASSOCIATED WITH TYPE 2 DIABETES MELLITUS: ICD-10-CM

## 2024-06-20 DIAGNOSIS — I73.9 PERIPHERAL VASCULAR DISEASE, UNSPECIFIED: ICD-10-CM

## 2024-06-20 DIAGNOSIS — Z79.02 LONG TERM CURRENT USE OF CLOPIDOGREL: ICD-10-CM

## 2024-06-20 DIAGNOSIS — E11.69 HYPERLIPIDEMIA ASSOCIATED WITH TYPE 2 DIABETES MELLITUS: ICD-10-CM

## 2024-06-20 DIAGNOSIS — E11.59 HYPERTENSION ASSOCIATED WITH TYPE 2 DIABETES MELLITUS: ICD-10-CM

## 2024-06-20 DIAGNOSIS — I49.3 PVCS (PREMATURE VENTRICULAR CONTRACTIONS): ICD-10-CM

## 2024-06-20 DIAGNOSIS — I35.0 NONRHEUMATIC AORTIC VALVE STENOSIS: ICD-10-CM

## 2024-06-20 DIAGNOSIS — I25.10 CORONARY ARTERY DISEASE INVOLVING NATIVE CORONARY ARTERY OF NATIVE HEART WITHOUT ANGINA PECTORIS: Primary | Chronic | ICD-10-CM

## 2024-06-20 DIAGNOSIS — E78.5 HYPERLIPIDEMIA ASSOCIATED WITH TYPE 2 DIABETES MELLITUS: ICD-10-CM

## 2024-06-20 PROCEDURE — 99214 OFFICE O/P EST MOD 30 MIN: CPT | Mod: 95,,, | Performed by: INTERNAL MEDICINE

## 2024-06-20 PROCEDURE — 3078F DIAST BP <80 MM HG: CPT | Mod: CPTII,95,, | Performed by: INTERNAL MEDICINE

## 2024-06-20 PROCEDURE — 3288F FALL RISK ASSESSMENT DOCD: CPT | Mod: CPTII,95,, | Performed by: INTERNAL MEDICINE

## 2024-06-20 PROCEDURE — 3074F SYST BP LT 130 MM HG: CPT | Mod: CPTII,95,, | Performed by: INTERNAL MEDICINE

## 2024-06-20 PROCEDURE — 1101F PT FALLS ASSESS-DOCD LE1/YR: CPT | Mod: CPTII,95,, | Performed by: INTERNAL MEDICINE

## 2024-06-20 PROCEDURE — 1126F AMNT PAIN NOTED NONE PRSNT: CPT | Mod: CPTII,95,, | Performed by: INTERNAL MEDICINE

## 2024-06-20 NOTE — PATIENT INSTRUCTIONS
Your triglycerides are better 239 vs 347 a few years ago. I know that they have been even higher in the past. They are usually linked to blood sugar more than fats, so everything you do for diabetes will affect the triglycerides.  Continue taking fenofibrate that is for triglycerides.  Also continue the fish oil 2 caps twice a day. Make sure they have a total of 900-1000 mg of EPA and  in each capsule.    In rare cases the cholesterol meds can cause a muscle reaction known as rhabdomyolysis. If you have muscle soreness all over the body, you feel like you have the flu but there is no fever, it may be a reaction from your cholesterol meds. Call us in that situation.    If having surgery or hospitalized for any reason, or you are sick or dehydrated you should stop taking fenofibrate for a few days.  Keep well hydrated at all times, particularly in the summer.    If your doctor is prescribing a new medication even if for a short time, always ask if it would interfere with the cholesterol meds that you are taking.

## 2024-06-20 NOTE — PROGRESS NOTES
The patient location is: Home, in [place].  The chief complaint leading to consultation is: Coronary Artery Disease    Visit type:TELE AUDIOVISUAL  Total time spent with patient: 15m 02s minutes.  Each patient to whom he or she provides medical services by telemedicine is:  (1) informed of the relationship between the physician and patient and the respective role of any other health care provider with respect to management of the patient; and (2) notified that he or she may decline to receive medical services by telemedicine and may withdraw from such care at any time.   Subjective:   Chief Complaint:  Cecil Pringle is a 78 y.o. male who presents for follow-up of Coronary Artery Disease      Problem List and HPI:   CAD  CABG in 1990s and 2014  HTN  Mixed hyperlipidemia  DM ~1995  PAD asymptomatic   Aortic stenosis - mild  Heart murmur since childhood  Tobacco use - quit 1990    Mr. Pringle was accompanied by his wife for most of today's visit. He is a bit hard of hearing and she helped repeat some of my questions.  He does not report angina or shortness of breath with exertion.     Triglycerides are 239 but have been as high 700 in the past.  On fenofibrate, rosuvastatin and fish oil.  Swelling in the feet a bit recently. Toes have been purplish while ambulating. He does not know if they return to normal color when he lays down.  On dual antiplatelet therapy. He does not report excessive bruising, nose bleeds, melena or bleeding from other sites.     Review of patient's allergies indicates:  No Known Allergies     Current Outpatient Medications   Medication Sig    aspirin (ECOTRIN) 81 MG EC tablet Take 81 mg by mouth once daily.    blood-glucose meter kit To check BG 1 times daily, to use with insurance preferred meter    cholecalciferol, vitamin D3, (VITAMIN D3) 25 mcg (1,000 unit) capsule Take 2 capsules (2,000 Units total) by mouth once daily.    clopidogreL (PLAVIX) 75 mg tablet Take 1 tablet (75 mg  total) by mouth once daily.    co-enzyme Q-10 30 mg capsule Take 30 mg by mouth once daily.     dapagliflozin propanediol (FARXIGA) 10 mg tablet Take 1 tablet (10 mg total) by mouth once daily.    dulaglutide (TRULICITY) 3 mg/0.5 mL pen injector Inject 3 mg into the skin every 7 days.    fenofibrate 160 MG Tab Take 1 tablet (160 mg total) by mouth once daily.    fish oil-omega-3 fatty acids 300-1,000 mg capsule Take 2 g by mouth 3 (three) times daily.    FLUoxetine 20 MG capsule Take 1 capsule (20 mg total) by mouth once daily.    glipiZIDE (GLUCOTROL) 5 MG tablet Take 2 tablets (10 mg total) by mouth 2 (two) times daily with meals.    hydroCHLOROthiazide (HYDRODIURIL) 12.5 MG Tab Take 1 tablet (12.5 mg total) by mouth once daily.    hydrOXYzine pamoate (VISTARIL) 25 MG Cap TAKE 1 CAPSULE BY MOUTH EVERY EVENING FOR ALLERGIES    lancets Misc To check BG 1 times daily, to use with insurance preferred meter    losartan (COZAAR) 100 MG tablet Take 1 tablet (100 mg total) by mouth once daily.    metFORMIN (GLUCOPHAGE) 1000 MG tablet Take 1 tablet (1,000 mg total) by mouth 2 (two) times daily.    metoprolol tartrate (LOPRESSOR) 100 MG tablet Take 0.5 tablets (50 mg total) by mouth 2 (two) times daily.    MULTIVIT-IRON-MIN-FOLIC ACID 3,500-18-0.4 UNIT-MG-MG ORAL CHEW Take 1 tablet by mouth once daily.    NIFEdipine (PROCARDIA-XL) 60 MG (OSM) 24 hr tablet Take 1 tablet (60 mg total) by mouth once daily.    omega-3 acid ethyl esters (LOVAZA) 1 gram capsule Take 2 capsules (2 g total) by mouth 2 (two) times daily.    oxybutynin (DITROPAN-XL) 10 MG 24 hr tablet Take 1 tablet (10 mg total) by mouth once daily.    rosuvastatin (CRESTOR) 20 MG tablet Take 1 tablet (20 mg total) by mouth every evening.    tamsulosin (FLOMAX) 0.4 mg Cap Take 1 capsule (0.4 mg total) by mouth every evening.    temazepam (RESTORIL) 30 mg capsule Take 1 capsule (30 mg total) by mouth nightly as needed for Insomnia.    TRUE METRIX GLUCOSE TEST STRIP  "Strp USE 1 STRIP TO CHECK GLUCOSE TWO TIMES DAILY     No current facility-administered medications for this visit.       Social history:  Cecil Pringle  reports that he quit smoking about 34 years ago. His smoking use included cigarettes. He started smoking about 60 years ago. He has a 140 pack-year smoking history. He has quit using smokeless tobacco. He reports that he does not drink alcohol and does not use drugs.      Objective:   /69   Pulse (!) 55   Ht 6' 3" (1.905 m)   Wt 99.3 kg (219 lb)   BMI 27.37 kg/m²    Physical Exam    Lipids:  Component      Latest Ref Rng 6/22/2022 5/8/2023 11/7/2023 6/13/2024   Cholesterol Total      120 - 199 mg/dL 127  108 (L)  93 (L)  103 (L)    Triglycerides      30 - 150 mg/dL 347 (H)  283 (H)  182 (H)  239 (H)    HDL      40 - 75 mg/dL 34 (L)  31 (L)  33 (L)  29 (L)    LDL Cholesterol      63.0 - 159.0 mg/dL 23.6 (L)  20.4 (L)  23.6 (L)  26.2 (L)    HDL/Cholesterol Ratio      20.0 - 50.0 % 26.8  28.7  35.5  28.2         Renal:  Recent Labs   Lab 06/13/24  0721   Creatinine 1.2   Potassium 4.5   CO2 22 L   BUN 24 H     Liver:  Recent Labs   Lab 06/13/24  0721   AST 16   ALT 20     CBC:  Lab Results   Component Value Date    WBC 6.91 06/13/2024    HGB 14.1 06/13/2024    HCT 42.6 06/13/2024    MCV 85 06/13/2024     06/13/2024         Results for orders placed during the hospital encounter of 11/07/23    Echo    Interpretation Summary    Left Ventricle: The left ventricle is normal in size. Normal wall thickness. Normal wall motion. There is normal systolic function. Ejection fraction by visual approximation is 65%.    Right Ventricle: Normal right ventricular cavity size. Wall thickness is normal. Right ventricle wall motion  is normal. Systolic function is normal.    Left Atrium: Left atrium is mildly dilated.    Aortic Valve: There is moderate stenosis. Aortic valve area by VTI is 1.44 cm². LVOT diameter is 2.2 cm. Aortic valve peak velocity is 2.65 m/s. " Mean gradient is 15 mmHg. The dimensionless index is 0.38. There is mild aortic regurgitation.    Mitral Valve: There is mild regurgitation.    Tricuspid Valve: There is mild to moderate regurgitation.    Pulmonary Artery: The estimated pulmonary artery systolic pressure is 31 mmHg.    IVC/SVC: Normal venous pressure at 3 mmHg.           Assessment and Plan:       ICD-10-CM ICD-9-CM   1. Coronary artery disease involving native coronary artery of native heart without angina pectoris  I25.10 414.01   2. PVCs (premature ventricular contractions)  I49.3 427.69   3. Discoloration of skin of toe  L81.9 709.00   4. Hyperlipidemia associated with type 2 diabetes mellitus  E11.69 250.80    E78.5 272.4   5. Hypertension associated with type 2 diabetes mellitus  E11.59 250.80    I15.2 401.9   6. Nonrheumatic aortic valve stenosis  I35.0 424.1        CAD stable. No angina. Continue same meds.    Discoloration of toes. Will obtain arterial u/s.  Calculated LDL <30, HDL is low but HDL/total chol is 28-36%. Continue  fenofibrate, rosuvastatin and fish oil. Discussed risk of rhabdomyolysis and advised temporarily discontinuing the fibrate in situations where the risk increases.    BP well controlled.  Moderate aortic stenosis; last echo 11/2023. Will repeat echo at next visit.      Orders placed during this encounter:     Coronary artery disease involving native coronary artery of native heart without angina pectoris  -     EKG 12-lead  -     EKG 12-lead; Future; Expected date: 12/20/2024    PVCs (premature ventricular contractions)  -     EKG 12-lead    Discoloration of skin of toe  -      Lower Extremity Arteries Bilateral; Future; Expected date: 06/20/2024    Hyperlipidemia associated with type 2 diabetes mellitus  -      Lower Extremity Arteries Bilateral; Future; Expected date: 06/20/2024  -     Lipid Panel; Future; Expected date: 12/20/2024    Hypertension associated with type 2 diabetes mellitus  -      Lower  Extremity Arteries Bilateral; Future; Expected date: 06/20/2024  -     Comprehensive Metabolic Panel; Future; Expected date: 12/20/2024    Nonrheumatic aortic valve stenosis  -     Echo; Future; Expected date: 12/20/2024       Face to Face time with patient: 15 minutes with a total of 25 minutes spent on the encounter, which includes non-face to face time preparing to see the patient, reviewing records and documenting clinical information in the electronic health record.     Follow up in about 6 months (around 12/20/2024) for In-Person.

## 2024-07-01 ENCOUNTER — HOSPITAL ENCOUNTER (OUTPATIENT)
Dept: RADIOLOGY | Facility: HOSPITAL | Age: 79
Discharge: HOME | End: 2024-07-01
Attending: INTERNAL MEDICINE
Payer: MEDICARE

## 2024-07-01 DIAGNOSIS — E78.5 HYPERLIPIDEMIA ASSOCIATED WITH TYPE 2 DIABETES MELLITUS: ICD-10-CM

## 2024-07-01 DIAGNOSIS — L81.9 DISCOLORATION OF SKIN OF TOE: ICD-10-CM

## 2024-07-01 DIAGNOSIS — E11.69 HYPERLIPIDEMIA ASSOCIATED WITH TYPE 2 DIABETES MELLITUS: ICD-10-CM

## 2024-07-01 DIAGNOSIS — E11.59 HYPERTENSION ASSOCIATED WITH TYPE 2 DIABETES MELLITUS: ICD-10-CM

## 2024-07-01 DIAGNOSIS — I15.2 HYPERTENSION ASSOCIATED WITH TYPE 2 DIABETES MELLITUS: ICD-10-CM

## 2024-07-01 PROCEDURE — 93925 LOWER EXTREMITY STUDY: CPT | Mod: 26,,, | Performed by: RADIOLOGY

## 2024-07-01 PROCEDURE — 93925 LOWER EXTREMITY STUDY: CPT | Mod: TC

## 2024-07-10 ENCOUNTER — OFFICE VISIT (OUTPATIENT)
Dept: OPTOMETRY | Facility: CLINIC | Age: 79
End: 2024-07-10
Payer: MEDICARE

## 2024-07-10 DIAGNOSIS — H25.13 NUCLEAR SCLEROSIS, BILATERAL: ICD-10-CM

## 2024-07-10 DIAGNOSIS — H52.4 PRESBYOPIA: ICD-10-CM

## 2024-07-10 DIAGNOSIS — E11.9 TYPE 2 DIABETES MELLITUS WITHOUT RETINOPATHY: Primary | ICD-10-CM

## 2024-07-10 DIAGNOSIS — Z13.5 GLAUCOMA SCREENING: ICD-10-CM

## 2024-07-10 PROCEDURE — 1159F MED LIST DOCD IN RCRD: CPT | Mod: CPTII,S$GLB,, | Performed by: OPTOMETRIST

## 2024-07-10 PROCEDURE — 1101F PT FALLS ASSESS-DOCD LE1/YR: CPT | Mod: CPTII,S$GLB,, | Performed by: OPTOMETRIST

## 2024-07-10 PROCEDURE — 1160F RVW MEDS BY RX/DR IN RCRD: CPT | Mod: CPTII,S$GLB,, | Performed by: OPTOMETRIST

## 2024-07-10 PROCEDURE — 1126F AMNT PAIN NOTED NONE PRSNT: CPT | Mod: CPTII,S$GLB,, | Performed by: OPTOMETRIST

## 2024-07-10 PROCEDURE — 99999 PR PBB SHADOW E&M-EST. PATIENT-LVL III: CPT | Mod: PBBFAC,,, | Performed by: OPTOMETRIST

## 2024-07-10 PROCEDURE — 92014 COMPRE OPH EXAM EST PT 1/>: CPT | Mod: S$GLB,,, | Performed by: OPTOMETRIST

## 2024-07-10 PROCEDURE — 3288F FALL RISK ASSESSMENT DOCD: CPT | Mod: CPTII,S$GLB,, | Performed by: OPTOMETRIST

## 2024-07-10 PROCEDURE — 2023F DILAT RTA XM W/O RTNOPTHY: CPT | Mod: CPTII,S$GLB,, | Performed by: OPTOMETRIST

## 2024-07-10 PROCEDURE — 92015 DETERMINE REFRACTIVE STATE: CPT | Mod: S$GLB,,, | Performed by: OPTOMETRIST

## 2024-08-01 ENCOUNTER — TELEPHONE (OUTPATIENT)
Dept: SLEEP MEDICINE | Facility: CLINIC | Age: 79
End: 2024-08-01
Payer: MEDICARE

## 2024-08-01 NOTE — TELEPHONE ENCOUNTER
Patient was scheduled for 08/29/2024, patient was moved to 10/17/2024 at 11:30 due     To the provider being out/pt confirmed

## 2024-09-12 ENCOUNTER — TELEPHONE (OUTPATIENT)
Dept: CARDIOLOGY | Facility: CLINIC | Age: 79
End: 2024-09-12
Payer: MEDICARE

## 2024-09-12 NOTE — TELEPHONE ENCOUNTER
----- Message from Nory May MD sent at 9/11/2024  4:59 PM CDT -----  Please let patient and or wife that I would like him to continue aspirin and Plavix but add Pletal 50 mg bid.  Avoid NSAIDs. He should go for a walk and let us know if he has to stop because of cramping or heaviness in the leg muscles.  Also added CTA of the lower extremities and blood test for Plavix to be done prior to visit in 12/2024

## 2024-09-13 ENCOUNTER — PATIENT MESSAGE (OUTPATIENT)
Dept: CARDIOLOGY | Facility: CLINIC | Age: 79
End: 2024-09-13
Payer: MEDICARE

## 2024-09-13 RX ORDER — CILOSTAZOL 50 MG/1
50 TABLET ORAL 2 TIMES DAILY
Qty: 60 TABLET | Refills: 11 | Status: SHIPPED | OUTPATIENT
Start: 2024-09-13

## 2024-09-13 RX ORDER — CILOSTAZOL 50 MG/1
50 TABLET ORAL 2 TIMES DAILY
COMMUNITY
End: 2024-09-13 | Stop reason: SDUPTHER

## 2024-09-25 DIAGNOSIS — E78.2 MIXED HYPERLIPIDEMIA: Chronic | ICD-10-CM

## 2024-09-26 RX ORDER — OMEGA-3-ACID ETHYL ESTERS 1 G/1
2 CAPSULE, LIQUID FILLED ORAL 2 TIMES DAILY
Qty: 360 CAPSULE | Refills: 3 | Status: SHIPPED | OUTPATIENT
Start: 2024-09-26

## 2024-10-24 ENCOUNTER — PATIENT MESSAGE (OUTPATIENT)
Dept: ENDOCRINOLOGY | Facility: CLINIC | Age: 79
End: 2024-10-24
Payer: MEDICARE

## 2024-10-24 DIAGNOSIS — E11.65 TYPE 2 DIABETES MELLITUS WITH HYPERGLYCEMIA, WITHOUT LONG-TERM CURRENT USE OF INSULIN: Primary | ICD-10-CM

## 2024-10-24 RX ORDER — DAPAGLIFLOZIN 10 MG/1
10 TABLET, FILM COATED ORAL DAILY
Qty: 120 TABLET | Refills: 3 | Status: SHIPPED | OUTPATIENT
Start: 2024-10-24

## 2024-11-01 ENCOUNTER — LAB VISIT (OUTPATIENT)
Dept: LAB | Facility: HOSPITAL | Age: 79
End: 2024-11-01
Attending: FAMILY MEDICINE
Payer: MEDICARE

## 2024-11-01 DIAGNOSIS — E11.65 TYPE 2 DIABETES MELLITUS WITH HYPERGLYCEMIA, WITHOUT LONG-TERM CURRENT USE OF INSULIN: ICD-10-CM

## 2024-11-01 LAB
ALBUMIN SERPL BCP-MCNC: 4 G/DL (ref 3.5–5.2)
ALP SERPL-CCNC: 41 U/L (ref 40–150)
ALT SERPL W/O P-5'-P-CCNC: 16 U/L (ref 10–44)
ANION GAP SERPL CALC-SCNC: 8 MMOL/L (ref 8–16)
AST SERPL-CCNC: 17 U/L (ref 10–40)
BILIRUB SERPL-MCNC: 0.3 MG/DL (ref 0.1–1)
BUN SERPL-MCNC: 23 MG/DL (ref 8–23)
CALCIUM SERPL-MCNC: 9.7 MG/DL (ref 8.7–10.5)
CHLORIDE SERPL-SCNC: 107 MMOL/L (ref 95–110)
CO2 SERPL-SCNC: 23 MMOL/L (ref 23–29)
CREAT SERPL-MCNC: 1.2 MG/DL (ref 0.5–1.4)
EST. GFR  (NO RACE VARIABLE): >60 ML/MIN/1.73 M^2
ESTIMATED AVG GLUCOSE: 151 MG/DL (ref 68–131)
GLUCOSE SERPL-MCNC: 131 MG/DL (ref 70–110)
HBA1C MFR BLD: 6.9 % (ref 4–5.6)
POTASSIUM SERPL-SCNC: 4.2 MMOL/L (ref 3.5–5.1)
PROT SERPL-MCNC: 7.2 G/DL (ref 6–8.4)
SODIUM SERPL-SCNC: 138 MMOL/L (ref 136–145)

## 2024-11-01 PROCEDURE — 80053 COMPREHEN METABOLIC PANEL: CPT | Mod: HCNC | Performed by: INTERNAL MEDICINE

## 2024-11-01 PROCEDURE — 36415 COLL VENOUS BLD VENIPUNCTURE: CPT | Mod: HCNC,PO | Performed by: INTERNAL MEDICINE

## 2024-11-01 PROCEDURE — 83036 HEMOGLOBIN GLYCOSYLATED A1C: CPT | Mod: HCNC | Performed by: INTERNAL MEDICINE

## 2024-12-27 ENCOUNTER — PATIENT MESSAGE (OUTPATIENT)
Dept: ENDOCRINOLOGY | Facility: CLINIC | Age: 79
End: 2024-12-27

## 2024-12-30 ENCOUNTER — TELEPHONE (OUTPATIENT)
Dept: PHARMACY | Facility: CLINIC | Age: 79
End: 2024-12-30

## 2024-12-30 NOTE — TELEPHONE ENCOUNTER
Cecil Pringle has been informed of [employer] and [facility] Patient Assistance Program re-enrollment process for Farxiga and Trulicity 3 mg. Patient must provided the following documentation to re-apply for 2025: Completed Medication Access Center Authorization Forms         Margarita Dubon  Pharmacy Patient Assistance

## 2024-12-30 NOTE — TELEPHONE ENCOUNTER
Hello,     I'm sorry, not sure what office he's referring to my department doesn't have an office patients can access @ [facility]. However, I'm happy start the re-enrollment process for him.

## 2025-01-10 ENCOUNTER — PATIENT MESSAGE (OUTPATIENT)
Dept: PHARMACY | Facility: CLINIC | Age: 80
End: 2025-01-10
Payer: MEDICARE

## 2025-01-15 ENCOUNTER — OFFICE VISIT (OUTPATIENT)
Dept: INTERNAL MEDICINE | Facility: CLINIC | Age: 80
End: 2025-01-15
Payer: MEDICARE

## 2025-01-15 VITALS
WEIGHT: 223.75 LBS | RESPIRATION RATE: 17 BRPM | BODY MASS INDEX: 27.82 KG/M2 | SYSTOLIC BLOOD PRESSURE: 130 MMHG | DIASTOLIC BLOOD PRESSURE: 62 MMHG | HEIGHT: 75 IN | HEART RATE: 67 BPM | OXYGEN SATURATION: 98 % | TEMPERATURE: 99 F

## 2025-01-15 DIAGNOSIS — E11.69 HYPERLIPIDEMIA ASSOCIATED WITH TYPE 2 DIABETES MELLITUS: ICD-10-CM

## 2025-01-15 DIAGNOSIS — I65.21 STENOSIS OF RIGHT CAROTID ARTERY: ICD-10-CM

## 2025-01-15 DIAGNOSIS — E11.42 ONYCHOMYCOSIS OF MULTIPLE TOENAILS WITH TYPE 2 DIABETES MELLITUS AND PERIPHERAL NEUROPATHY: ICD-10-CM

## 2025-01-15 DIAGNOSIS — Z00.00 PREVENTATIVE HEALTH CARE: Primary | ICD-10-CM

## 2025-01-15 DIAGNOSIS — B35.1 ONYCHOMYCOSIS OF MULTIPLE TOENAILS WITH TYPE 2 DIABETES MELLITUS AND PERIPHERAL ANGIOPATHY: ICD-10-CM

## 2025-01-15 DIAGNOSIS — H91.93 BILATERAL HEARING LOSS, UNSPECIFIED HEARING LOSS TYPE: ICD-10-CM

## 2025-01-15 DIAGNOSIS — N13.8 BPH WITH OBSTRUCTION/LOWER URINARY TRACT SYMPTOMS: ICD-10-CM

## 2025-01-15 DIAGNOSIS — E11.22 TYPE 2 DIABETES MELLITUS WITH STAGE 3A CHRONIC KIDNEY DISEASE, WITHOUT LONG-TERM CURRENT USE OF INSULIN: ICD-10-CM

## 2025-01-15 DIAGNOSIS — N18.31 TYPE 2 DIABETES MELLITUS WITH STAGE 3A CHRONIC KIDNEY DISEASE, WITHOUT LONG-TERM CURRENT USE OF INSULIN: ICD-10-CM

## 2025-01-15 DIAGNOSIS — G47.09 OTHER INSOMNIA: ICD-10-CM

## 2025-01-15 DIAGNOSIS — I15.2 HYPERTENSION ASSOCIATED WITH TYPE 2 DIABETES MELLITUS: ICD-10-CM

## 2025-01-15 DIAGNOSIS — E11.69 ONYCHOMYCOSIS OF MULTIPLE TOENAILS WITH TYPE 2 DIABETES MELLITUS AND PERIPHERAL NEUROPATHY: ICD-10-CM

## 2025-01-15 DIAGNOSIS — N32.81 OAB (OVERACTIVE BLADDER): ICD-10-CM

## 2025-01-15 DIAGNOSIS — G47.33 OBSTRUCTIVE SLEEP APNEA SYNDROME: ICD-10-CM

## 2025-01-15 DIAGNOSIS — B35.1 ONYCHOMYCOSIS OF MULTIPLE TOENAILS WITH TYPE 2 DIABETES MELLITUS AND PERIPHERAL NEUROPATHY: ICD-10-CM

## 2025-01-15 DIAGNOSIS — E11.69 ONYCHOMYCOSIS OF MULTIPLE TOENAILS WITH TYPE 2 DIABETES MELLITUS AND PERIPHERAL ANGIOPATHY: ICD-10-CM

## 2025-01-15 DIAGNOSIS — I73.9 PAD (PERIPHERAL ARTERY DISEASE): ICD-10-CM

## 2025-01-15 DIAGNOSIS — E11.59 HYPERTENSION ASSOCIATED WITH TYPE 2 DIABETES MELLITUS: ICD-10-CM

## 2025-01-15 DIAGNOSIS — I25.10 CORONARY ARTERY DISEASE INVOLVING NATIVE CORONARY ARTERY OF NATIVE HEART WITHOUT ANGINA PECTORIS: ICD-10-CM

## 2025-01-15 DIAGNOSIS — E78.5 HYPERLIPIDEMIA ASSOCIATED WITH TYPE 2 DIABETES MELLITUS: ICD-10-CM

## 2025-01-15 DIAGNOSIS — Z95.1 S/P CABG X 5: ICD-10-CM

## 2025-01-15 DIAGNOSIS — N40.1 BPH WITH OBSTRUCTION/LOWER URINARY TRACT SYMPTOMS: ICD-10-CM

## 2025-01-15 DIAGNOSIS — E11.51 ONYCHOMYCOSIS OF MULTIPLE TOENAILS WITH TYPE 2 DIABETES MELLITUS AND PERIPHERAL ANGIOPATHY: ICD-10-CM

## 2025-01-15 DIAGNOSIS — I35.0 NONRHEUMATIC AORTIC VALVE STENOSIS: ICD-10-CM

## 2025-01-15 DIAGNOSIS — F33.0 MILD EPISODE OF RECURRENT MAJOR DEPRESSIVE DISORDER: ICD-10-CM

## 2025-01-15 PROCEDURE — 3075F SYST BP GE 130 - 139MM HG: CPT | Mod: CPTII,S$GLB,, | Performed by: FAMILY MEDICINE

## 2025-01-15 PROCEDURE — 99397 PER PM REEVAL EST PAT 65+ YR: CPT | Mod: S$GLB,,, | Performed by: FAMILY MEDICINE

## 2025-01-15 PROCEDURE — 1101F PT FALLS ASSESS-DOCD LE1/YR: CPT | Mod: CPTII,S$GLB,, | Performed by: FAMILY MEDICINE

## 2025-01-15 PROCEDURE — 3288F FALL RISK ASSESSMENT DOCD: CPT | Mod: CPTII,S$GLB,, | Performed by: FAMILY MEDICINE

## 2025-01-15 PROCEDURE — 3072F LOW RISK FOR RETINOPATHY: CPT | Mod: CPTII,S$GLB,, | Performed by: FAMILY MEDICINE

## 2025-01-15 PROCEDURE — 99999 PR PBB SHADOW E&M-EST. PATIENT-LVL V: CPT | Mod: PBBFAC,,, | Performed by: FAMILY MEDICINE

## 2025-01-15 PROCEDURE — 3078F DIAST BP <80 MM HG: CPT | Mod: CPTII,S$GLB,, | Performed by: FAMILY MEDICINE

## 2025-01-15 PROCEDURE — 1160F RVW MEDS BY RX/DR IN RCRD: CPT | Mod: CPTII,S$GLB,, | Performed by: FAMILY MEDICINE

## 2025-01-15 PROCEDURE — 1159F MED LIST DOCD IN RCRD: CPT | Mod: CPTII,S$GLB,, | Performed by: FAMILY MEDICINE

## 2025-01-15 PROCEDURE — 1126F AMNT PAIN NOTED NONE PRSNT: CPT | Mod: CPTII,S$GLB,, | Performed by: FAMILY MEDICINE

## 2025-01-15 RX ORDER — METFORMIN HYDROCHLORIDE 1000 MG/1
1000 TABLET ORAL 2 TIMES DAILY
Qty: 180 TABLET | Refills: 3 | Status: SHIPPED | OUTPATIENT
Start: 2025-01-15

## 2025-01-15 NOTE — PROGRESS NOTES
Subjective     Patient ID: Cecil Pringle is a 79 y.o. male.    Chief Complaint: Annual Exam    HPI 79-year-old male presents to clinic today for annual physical exam.  He continues to be followed by Cardiology secondary to coronary artery disease status post CABG in , five stent placement in , and repeat CABG in . He also continues to be followed by both Podiatry and Cardiology secondary to peripheral arterial disease. He remains stable on aspirin 81 mg daily and Plavix 75 mg daily. Hypertension remains well controlled on losartan 100 mg daily, nifedipine 60 mg daily, hydrochlorothiazide 12.5 mg daily, and metoprolol 50 mg b.i.d.. Hyperlipidemia stable on Crestor 20 mg daily, fenofibrate 160 mg daily, and Lovaza 2 g b.i.d..  He continues to be followed by Endocrinology for treatment of type 2 diabetes which is currently well controlled on Farxiga 10 mg daily, Trulicity 3 mg once weekly, metformin 1000 mg b.i.d., and glipizide 10 mg b.i.d..  Hemoglobin A1c was last checked in November and was 6.9.  Depression remains stable on Prozac 20 mg daily. He continues to use temazepam as needed for insomnia with stability.  He continues to be followed by urology secondary to BPH which remains stable on Flomax 0.4 mg daily and oxybutynin 10 mg daily.  He has a past surgical history of CABG in  and repeat CABG in , stent placement in , right hand surgery, and left foot surgery. He has a strong family history of heart disease. His mother  of asbestosis.  He declines flu vaccine.  Review of Systems   Constitutional:  Negative for activity change, appetite change, chills, fatigue, fever and unexpected weight change.   HENT:  Positive for hearing loss and rhinorrhea. Negative for nasal congestion, ear pain, postnasal drip, sinus pressure/congestion, sore throat, tinnitus and trouble swallowing.    Eyes:  Positive for visual disturbance. Negative for discharge, redness and itching.   Respiratory:   Negative for cough, chest tightness, shortness of breath and wheezing.    Cardiovascular:  Negative for chest pain and palpitations.   Gastrointestinal:  Negative for abdominal pain, blood in stool, constipation, diarrhea, nausea and vomiting.   Endocrine: Negative for polydipsia and polyuria.   Genitourinary:  Positive for urgency. Negative for decreased urine volume, difficulty urinating, dysuria, frequency and hematuria.   Musculoskeletal:  Positive for neck pain. Negative for arthralgias, back pain, joint swelling, myalgias and neck stiffness.   Integumentary:  Negative for rash.   Neurological:  Negative for dizziness, weakness, light-headedness and headaches.   Psychiatric/Behavioral: Negative.  Negative for confusion and dysphoric mood.           Objective     Physical Exam  Vitals and nursing note reviewed.   Constitutional:       General: He is not in acute distress.     Appearance: Normal appearance. He is well-developed. He is not diaphoretic.   HENT:      Head: Normocephalic and atraumatic.      Right Ear: External ear normal. Decreased hearing noted.      Left Ear: External ear normal. Decreased hearing noted.      Nose: Nose normal.      Mouth/Throat:      Pharynx: No oropharyngeal exudate.   Eyes:      General: No scleral icterus.        Right eye: No discharge.         Left eye: No discharge.      Conjunctiva/sclera: Conjunctivae normal.      Pupils: Pupils are equal, round, and reactive to light.   Neck:      Thyroid: No thyromegaly.      Vascular: No JVD.      Trachea: No tracheal deviation.   Cardiovascular:      Rate and Rhythm: Normal rate and regular rhythm.      Heart sounds: Normal heart sounds. No murmur heard.     No friction rub. No gallop.   Pulmonary:      Effort: Pulmonary effort is normal. No respiratory distress.      Breath sounds: Normal breath sounds. No stridor. No wheezing, rhonchi or rales.   Chest:      Chest wall: No tenderness.   Abdominal:      General: Bowel sounds are  normal. There is no distension.      Palpations: Abdomen is soft. There is no mass.      Tenderness: There is no abdominal tenderness. There is no guarding or rebound.   Musculoskeletal:         General: No tenderness. Normal range of motion.      Cervical back: Normal range of motion and neck supple.   Lymphadenopathy:      Cervical: No cervical adenopathy.   Skin:     General: Skin is warm and dry.      Coloration: Skin is not pale.      Findings: No erythema or rash.   Neurological:      Mental Status: He is alert and oriented to person, place, and time.   Psychiatric:         Mood and Affect: Mood normal.         Behavior: Behavior normal.         Thought Content: Thought content normal.         Judgment: Judgment normal.            Assessment and Plan     1. Preventative health care  -     CBC Auto Differential; Future; Expected date: 01/15/2025  -     Comprehensive Metabolic Panel; Future; Expected date: 01/15/2025  -     Lipid Panel; Future; Expected date: 01/15/2025  -     T4, Free; Future; Expected date: 01/15/2025  -     TSH; Future; Expected date: 01/15/2025  -     Hemoglobin A1C; Future; Expected date: 01/15/2025  -     Microalbumin/Creatinine Ratio, Urine; Future; Expected date: 01/15/2025  -     Prostate Specific Antigen, Diagnostic; Future; Expected date: 01/15/2025    2. Coronary artery disease involving native coronary artery of native heart without angina pectoris  Overview:  He underwent CABG x 5 in the 1990s. In 2011 he had 5 stents and in 2014 he underwent a second CABG x 4 at [facility].  He does not recall having angina prior to any of the coronary interventions.     Orders:  -     CBC Auto Differential; Future; Expected date: 01/15/2025  -     Comprehensive Metabolic Panel; Future; Expected date: 01/15/2025  -     Lipid Panel; Future; Expected date: 01/15/2025  -     T4, Free; Future; Expected date: 01/15/2025  -     TSH; Future; Expected date: 01/15/2025  -     Hemoglobin A1C;  Future; Expected date: 01/15/2025  -     Microalbumin/Creatinine Ratio, Urine; Future; Expected date: 01/15/2025  -     Prostate Specific Antigen, Diagnostic; Future; Expected date: 01/15/2025    3. S/P CABG x 5  Overview:  CABG x 5 1990s and CABG x 4 2014.     Orders:  -     CBC Auto Differential; Future; Expected date: 01/15/2025  -     Comprehensive Metabolic Panel; Future; Expected date: 01/15/2025  -     Lipid Panel; Future; Expected date: 01/15/2025  -     T4, Free; Future; Expected date: 01/15/2025  -     TSH; Future; Expected date: 01/15/2025  -     Hemoglobin A1C; Future; Expected date: 01/15/2025  -     Microalbumin/Creatinine Ratio, Urine; Future; Expected date: 01/15/2025  -     Prostate Specific Antigen, Diagnostic; Future; Expected date: 01/15/2025    4. Nonrheumatic aortic valve stenosis  Overview:  Febrile illness during childhood followed by development of a murmur. He has aortic stenosis but does not have any other valve involvement. I doubt if it is rheumatic.       Orders:  -     CBC Auto Differential; Future; Expected date: 01/15/2025  -     Comprehensive Metabolic Panel; Future; Expected date: 01/15/2025  -     Lipid Panel; Future; Expected date: 01/15/2025  -     T4, Free; Future; Expected date: 01/15/2025  -     TSH; Future; Expected date: 01/15/2025  -     Hemoglobin A1C; Future; Expected date: 01/15/2025  -     Microalbumin/Creatinine Ratio, Urine; Future; Expected date: 01/15/2025  -     Prostate Specific Antigen, Diagnostic; Future; Expected date: 01/15/2025    5. Stenosis of right carotid artery  -     CBC Auto Differential; Future; Expected date: 01/15/2025  -     Comprehensive Metabolic Panel; Future; Expected date: 01/15/2025  -     Lipid Panel; Future; Expected date: 01/15/2025  -     T4, Free; Future; Expected date: 01/15/2025  -     TSH; Future; Expected date: 01/15/2025  -     Hemoglobin A1C; Future; Expected date: 01/15/2025  -     Microalbumin/Creatinine Ratio, Urine; Future;  Expected date: 01/15/2025  -     Prostate Specific Antigen, Diagnostic; Future; Expected date: 01/15/2025    6. PAD (peripheral artery disease)  -     CBC Auto Differential; Future; Expected date: 01/15/2025  -     Comprehensive Metabolic Panel; Future; Expected date: 01/15/2025  -     Lipid Panel; Future; Expected date: 01/15/2025  -     T4, Free; Future; Expected date: 01/15/2025  -     TSH; Future; Expected date: 01/15/2025  -     Hemoglobin A1C; Future; Expected date: 01/15/2025  -     Microalbumin/Creatinine Ratio, Urine; Future; Expected date: 01/15/2025  -     Prostate Specific Antigen, Diagnostic; Future; Expected date: 01/15/2025    7. Hypertension associated with type 2 diabetes mellitus  -     CBC Auto Differential; Future; Expected date: 01/15/2025  -     Comprehensive Metabolic Panel; Future; Expected date: 01/15/2025  -     Lipid Panel; Future; Expected date: 01/15/2025  -     T4, Free; Future; Expected date: 01/15/2025  -     TSH; Future; Expected date: 01/15/2025  -     Hemoglobin A1C; Future; Expected date: 01/15/2025  -     Microalbumin/Creatinine Ratio, Urine; Future; Expected date: 01/15/2025  -     Prostate Specific Antigen, Diagnostic; Future; Expected date: 01/15/2025    8. Hyperlipidemia associated with type 2 diabetes mellitus  -     CBC Auto Differential; Future; Expected date: 01/15/2025  -     Comprehensive Metabolic Panel; Future; Expected date: 01/15/2025  -     Lipid Panel; Future; Expected date: 01/15/2025  -     T4, Free; Future; Expected date: 01/15/2025  -     TSH; Future; Expected date: 01/15/2025  -     Hemoglobin A1C; Future; Expected date: 01/15/2025  -     Microalbumin/Creatinine Ratio, Urine; Future; Expected date: 01/15/2025  -     Prostate Specific Antigen, Diagnostic; Future; Expected date: 01/15/2025    9. Type 2 diabetes mellitus with stage 3a chronic kidney disease, without long-term current use of insulin  -     metFORMIN (GLUCOPHAGE) 1000 MG tablet; Take 1 tablet  (1,000 mg total) by mouth 2 (two) times daily.  Dispense: 180 tablet; Refill: 3  -     CBC Auto Differential; Future; Expected date: 01/15/2025  -     Comprehensive Metabolic Panel; Future; Expected date: 01/15/2025  -     Lipid Panel; Future; Expected date: 01/15/2025  -     T4, Free; Future; Expected date: 01/15/2025  -     TSH; Future; Expected date: 01/15/2025  -     Hemoglobin A1C; Future; Expected date: 01/15/2025  -     Microalbumin/Creatinine Ratio, Urine; Future; Expected date: 01/15/2025  -     Prostate Specific Antigen, Diagnostic; Future; Expected date: 01/15/2025    10. Onychomycosis of multiple toenails with type 2 diabetes mellitus and peripheral neuropathy  -     CBC Auto Differential; Future; Expected date: 01/15/2025  -     Comprehensive Metabolic Panel; Future; Expected date: 01/15/2025  -     Lipid Panel; Future; Expected date: 01/15/2025  -     T4, Free; Future; Expected date: 01/15/2025  -     TSH; Future; Expected date: 01/15/2025  -     Hemoglobin A1C; Future; Expected date: 01/15/2025  -     Microalbumin/Creatinine Ratio, Urine; Future; Expected date: 01/15/2025  -     Prostate Specific Antigen, Diagnostic; Future; Expected date: 01/15/2025    11. Onychomycosis of multiple toenails with type 2 diabetes mellitus and peripheral angiopathy  -     CBC Auto Differential; Future; Expected date: 01/15/2025  -     Comprehensive Metabolic Panel; Future; Expected date: 01/15/2025  -     Lipid Panel; Future; Expected date: 01/15/2025  -     T4, Free; Future; Expected date: 01/15/2025  -     TSH; Future; Expected date: 01/15/2025  -     Hemoglobin A1C; Future; Expected date: 01/15/2025  -     Microalbumin/Creatinine Ratio, Urine; Future; Expected date: 01/15/2025  -     Prostate Specific Antigen, Diagnostic; Future; Expected date: 01/15/2025    12. Mild episode of recurrent major depressive disorder  -     CBC Auto Differential; Future; Expected date: 01/15/2025  -     Comprehensive Metabolic Panel;  Future; Expected date: 01/15/2025  -     Lipid Panel; Future; Expected date: 01/15/2025  -     T4, Free; Future; Expected date: 01/15/2025  -     TSH; Future; Expected date: 01/15/2025  -     Hemoglobin A1C; Future; Expected date: 01/15/2025  -     Microalbumin/Creatinine Ratio, Urine; Future; Expected date: 01/15/2025  -     Prostate Specific Antigen, Diagnostic; Future; Expected date: 01/15/2025    13. Other insomnia  -     CBC Auto Differential; Future; Expected date: 01/15/2025  -     Comprehensive Metabolic Panel; Future; Expected date: 01/15/2025  -     Lipid Panel; Future; Expected date: 01/15/2025  -     T4, Free; Future; Expected date: 01/15/2025  -     TSH; Future; Expected date: 01/15/2025  -     Hemoglobin A1C; Future; Expected date: 01/15/2025  -     Microalbumin/Creatinine Ratio, Urine; Future; Expected date: 01/15/2025  -     Prostate Specific Antigen, Diagnostic; Future; Expected date: 01/15/2025    14. Obstructive sleep apnea syndrome  -     CBC Auto Differential; Future; Expected date: 01/15/2025  -     Comprehensive Metabolic Panel; Future; Expected date: 01/15/2025  -     Lipid Panel; Future; Expected date: 01/15/2025  -     T4, Free; Future; Expected date: 01/15/2025  -     TSH; Future; Expected date: 01/15/2025  -     Hemoglobin A1C; Future; Expected date: 01/15/2025  -     Microalbumin/Creatinine Ratio, Urine; Future; Expected date: 01/15/2025  -     Prostate Specific Antigen, Diagnostic; Future; Expected date: 01/15/2025    15. OAB (overactive bladder)  -     CBC Auto Differential; Future; Expected date: 01/15/2025  -     Comprehensive Metabolic Panel; Future; Expected date: 01/15/2025  -     Lipid Panel; Future; Expected date: 01/15/2025  -     T4, Free; Future; Expected date: 01/15/2025  -     TSH; Future; Expected date: 01/15/2025  -     Hemoglobin A1C; Future; Expected date: 01/15/2025  -     Microalbumin/Creatinine Ratio, Urine; Future; Expected date: 01/15/2025  -     Prostate Specific  Antigen, Diagnostic; Future; Expected date: 01/15/2025    16. BPH with obstruction/lower urinary tract symptoms  -     CBC Auto Differential; Future; Expected date: 01/15/2025  -     Comprehensive Metabolic Panel; Future; Expected date: 01/15/2025  -     Lipid Panel; Future; Expected date: 01/15/2025  -     T4, Free; Future; Expected date: 01/15/2025  -     TSH; Future; Expected date: 01/15/2025  -     Hemoglobin A1C; Future; Expected date: 01/15/2025  -     Microalbumin/Creatinine Ratio, Urine; Future; Expected date: 01/15/2025  -     Prostate Specific Antigen, Diagnostic; Future; Expected date: 01/15/2025    17. Bilateral hearing loss, unspecified hearing loss type  -     Ambulatory referral/consult to Audiology; Future; Expected date: 01/22/2025        1. CBC, CMP, TSH, free T4, fasting lipids, hemoglobin A1c, urine microalbumin to creatinine ratio, and PSA.    2. Continue aspirin 81 mg daily, Pletal 50 mg b.i.d., Plavix 75 mg daily, fenofibrate 160 mg daily, Lovaza 2 g b.i.d., hydrochlorothiazide 12.5 mg daily, losartan 100 mg daily, nifedipine 60 mg daily, and Crestor 20 mg nightly.  Coronary artery disease status post CABG, nonrheumatic aortic valve stenosis, carotid artery stenosis, peripheral arterial disease, hypertension, and hyperlipidemia remained stable.  Continue follow-up with cardiology as scheduled.    3. Continue metformin 1000 mg b.i.d., glipizide 10 mg b.i.d., Trulicity 3 mg weekly, and Farxiga 10 mg daily.  Diabetes is well controlled.  Continue follow-up with endocrinology as scheduled.    4. Continue follow-up with Podiatry as scheduled for continued monitoring and treatment of onychomycosis, peripheral neuropathy, and peripheral angiopathy of the bilateral feet.  5. Continue fluoxetine 20 mg daily depression is stable.    6. Continue temazepam as needed.  Insomnia stable.   has been checked and the patient is compliant with medication.    7. Encourage follow-up with Sleep Medicine for  further evaluation of suspected sleep apnea.  8. Continue Flomax 0.4 mg daily and oxybutynin 10 mg daily.  BPH and overactive bladder remained stable.  Continue follow-up with urology as scheduled.    9. Refer to audiology for further evaluation of hearing loss.  10. Return to clinic as needed or in 1 year for annual physical exam.             Follow up in about 1 year (around 1/15/2026), or if symptoms worsen or fail to improve, for Annual exam.

## 2025-01-16 ENCOUNTER — HOSPITAL ENCOUNTER (OUTPATIENT)
Dept: CARDIOLOGY | Facility: HOSPITAL | Age: 80
Discharge: HOME | End: 2025-01-16
Attending: INTERNAL MEDICINE
Payer: MEDICARE

## 2025-01-16 ENCOUNTER — TELEPHONE (OUTPATIENT)
Dept: OTOLARYNGOLOGY | Facility: CLINIC | Age: 80
End: 2025-01-16
Payer: MEDICARE

## 2025-01-16 VITALS
WEIGHT: 219 LBS | BODY MASS INDEX: 27.23 KG/M2 | HEIGHT: 75 IN | SYSTOLIC BLOOD PRESSURE: 180 MMHG | DIASTOLIC BLOOD PRESSURE: 70 MMHG | HEART RATE: 63 BPM

## 2025-01-16 DIAGNOSIS — I35.0 NONRHEUMATIC AORTIC VALVE STENOSIS: ICD-10-CM

## 2025-01-16 PROCEDURE — 93306 TTE W/DOPPLER COMPLETE: CPT | Mod: 26,,, | Performed by: INTERNAL MEDICINE

## 2025-01-16 PROCEDURE — 93306 TTE W/DOPPLER COMPLETE: CPT | Mod: PO

## 2025-01-16 NOTE — TELEPHONE ENCOUNTER
----- Message from Lisa sent at 1/16/2025  9:57 AM CST -----  Dr. Layo Jett has put in a referral for Consult to Audiology. Please assist in scheduling.     Bilateral hearing loss, unspecified hearing loss type [H91.93]    Thanks

## 2025-01-17 ENCOUNTER — PATIENT MESSAGE (OUTPATIENT)
Dept: PHARMACY | Facility: CLINIC | Age: 80
End: 2025-01-17
Payer: MEDICARE

## 2025-01-17 LAB
ASCENDING AORTA: 3.3 CM
AV AREA BY CONTINUOUS VTI: 1.8 CM2
AV INDEX (PROSTH): 0.48
AV LVOT MEAN GRADIENT: 3 MMHG
AV LVOT PEAK GRADIENT: 5 MMHG
AV MEAN GRADIENT: 17 MMHG
AV PEAK GRADIENT: 27 MMHG
AV VALVE AREA BY VELOCITY RATIO: 1.6 CM²
AV VALVE AREA: 1.8 CM2
AV VELOCITY RATIO: 0.42
BSA FOR ECHO PROCEDURE: 2.29 M2
CV ECHO LV RWT: 0.41 CM
DOP CALC AO PEAK VEL: 2.6 M/S
DOP CALC AO VTI: 57.4 CM
DOP CALC LVOT AREA: 3.8 CM2
DOP CALC LVOT DIAMETER: 2.2 CM
DOP CALC LVOT PEAK VEL: 1.1 M/S
DOP CALC LVOT STROKE VOLUME: 104.1 CM3
DOP CALCLVOT PEAK VEL VTI: 27.4 CM
E WAVE DECELERATION TIME: 231 MS
E/A RATIO: 0.8
E/E' RATIO: -11 M/S
ECHO EF ESTIMATED: 58 %
ECHO LV POSTERIOR WALL: 1 CM (ref 0.6–1.1)
EJECTION FRACTION: 60 %
FRACTIONAL SHORTENING: 30.6 % (ref 28–44)
INTERVENTRICULAR SEPTUM: 1 CM (ref 0.6–1.1)
LA MAJOR: 6.5 CM
LA MINOR: 6.5 CM
LA WIDTH: 3.9 CM
LEFT ATRIUM SIZE: 4.4 CM
LEFT ATRIUM VOLUME INDEX MOD: 34 ML/M2
LEFT ATRIUM VOLUME INDEX: 42 ML/M2
LEFT ATRIUM VOLUME MOD: 78 ML
LEFT ATRIUM VOLUME: 95 CM3
LEFT INTERNAL DIMENSION IN SYSTOLE: 3.4 CM (ref 2.1–4)
LEFT VENTRICLE DIASTOLIC VOLUME INDEX: 48.32 ML/M2
LEFT VENTRICLE DIASTOLIC VOLUME: 110.17 ML
LEFT VENTRICLE MASS INDEX: 77.2 G/M2
LEFT VENTRICLE SYSTOLIC VOLUME INDEX: 20.2 ML/M2
LEFT VENTRICLE SYSTOLIC VOLUME: 46.02 ML
LEFT VENTRICULAR INTERNAL DIMENSION IN DIASTOLE: 4.9 CM (ref 3.5–6)
LEFT VENTRICULAR MASS: 176 G
LV LATERAL E/E' RATIO: -10 M/S
LV SEPTAL E/E' RATIO: -11.7 M/S
MV A" WAVE DURATION": 131.3 MS
MV PEAK A VEL: 0.87 M/S
MV PEAK E VEL: 0.7 M/S
MV PEAK GRADIENT: 1 MMHG
MV VALVE AREA BY PLANIMETRY: 2.46 CM2
OHS CV RV/LV RATIO: 0.57 CM
PISA TR MAX VEL: 2.4 M/S
PULM VEIN A" WAVE DURATION": 131.3 MS
PULM VEIN S/D RATIO: 1.54
PULMONIC VEIN PEAK A VELOCITY: 0.3 M/S
PV PEAK D VEL: 0.26 M/S
PV PEAK S VEL: 0.4 M/S
RA MAJOR: 5.57 CM
RA PRESSURE ESTIMATED: 3 MMHG
RA WIDTH: 3.5 CM
RIGHT ATRIAL AREA: 15.5 CM2
RIGHT VENTRICLE DIASTOLIC BASEL DIMENSION: 2.8 CM
RV TB RVSP: 5 MMHG
SINUS: 3.15 CM
STJ: 2.98 CM
TDI LATERAL: -0.07 M/S
TDI SEPTAL: -0.06 M/S
TDI: -0.07 M/S
TR MAX PG: 23 MMHG
TRICUSPID ANNULAR PLANE SYSTOLIC EXCURSION: 2.53 CM
TV PEAK GRADIENT: 23 MMHG
TV REST PULMONARY ARTERY PRESSURE: 26 MMHG
Z-SCORE OF LEFT VENTRICULAR DIMENSION IN END DIASTOLE: -5.61
Z-SCORE OF LEFT VENTRICULAR DIMENSION IN END SYSTOLE: -3.35

## 2025-01-23 ENCOUNTER — OFFICE VISIT (OUTPATIENT)
Dept: CARDIOLOGY | Facility: CLINIC | Age: 80
End: 2025-01-23
Payer: MEDICARE

## 2025-01-23 DIAGNOSIS — I49.3 PVCS (PREMATURE VENTRICULAR CONTRACTIONS): ICD-10-CM

## 2025-01-23 DIAGNOSIS — I73.9 PERIPHERAL VASCULAR DISEASE, UNSPECIFIED: ICD-10-CM

## 2025-01-23 DIAGNOSIS — I35.0 NONRHEUMATIC AORTIC VALVE STENOSIS: ICD-10-CM

## 2025-01-23 DIAGNOSIS — I25.10 CORONARY ARTERY DISEASE INVOLVING NATIVE CORONARY ARTERY OF NATIVE HEART WITHOUT ANGINA PECTORIS: Primary | ICD-10-CM

## 2025-01-23 DIAGNOSIS — E11.69 HYPERLIPIDEMIA ASSOCIATED WITH TYPE 2 DIABETES MELLITUS: ICD-10-CM

## 2025-01-23 DIAGNOSIS — E11.59 HYPERTENSION ASSOCIATED WITH TYPE 2 DIABETES MELLITUS: ICD-10-CM

## 2025-01-23 DIAGNOSIS — I15.2 HYPERTENSION ASSOCIATED WITH TYPE 2 DIABETES MELLITUS: ICD-10-CM

## 2025-01-23 DIAGNOSIS — E78.5 HYPERLIPIDEMIA ASSOCIATED WITH TYPE 2 DIABETES MELLITUS: ICD-10-CM

## 2025-01-23 DIAGNOSIS — Z95.1 S/P CABG X 5: ICD-10-CM

## 2025-01-23 NOTE — PATIENT INSTRUCTIONS
"Claudication is discomfort in the leg muscles.  Skip metformin for 2 days before and after  Hydrate with an additional 32 oz (4 glasses) of water or clear liquids the day before and the day of the CT scan. Drink an additional 16 oz (2 glasses) after the CT.  You should be fasting for 4 hours before the CT scan (so no food or liquids such as coffee, smoothies, milk shakes or protein supplements) but you can continue to hydrate w clear liquids during that time.     The goal is for the majority (4/5 readings) of systolic BPs (SBPs - the top reading) to be less than 130 mm Hg and the diastolic BPs (DBPs bottom number) to be less than 85 mm Hg.  Sit down for 5 minutes before you check your BP. Place both feet on the ground and rest your arm on a table or arm rest. If you choose to use a wrist monitor, please verify that it is accurate. You should then keep your wrist level with your heart.  Always get 2 blood pressure readings.  If the 2 SBPs are within 10 mm Hg, write down the higher of the 2.  If the readings are more than 10 mm Hg apart, get a 3rd reading and throw out the outlier.  If you are taking BP medication, check BP before you take the medication. When you check during the day after your BP medication, wait at least 2 hours after taking the medication.  Let us or your PCP know if the SBP is consistently >140 mm Hg.  My favorite BP monitor is made by [employer]. I recommend the ones w idemamaoth capability so that the monitor can send readings directly  to your smart phone:    Silver Wireless Upper Arm Blood Pressure Monitor      Model: [id number]  The [employer] Silver Wireless Bluetooth® Upper Arm home blood pressure monitor will measure, store and let you review 80 readings for one user, and includes a pre-formed Comfit cuff (fits arms 9" to 17" in circumference).      ======================    7 Series® Wireless Upper Arm Blood Pressure Monitor      Model: [id number]    The [employer] 7 Series Wireless Bluetooth® Upper Arm home " blood pressure monitor has a horizontally designed dual-display monitor and stores up to 120 readings (60 readings per each of two users), and includes a pre-formed Easy-Wrap ComFit Cuff (fits arms 9 to 17 in circumference).

## 2025-01-23 NOTE — PROGRESS NOTES
The patient location is: Home, in [place].  The chief complaint leading to consultation is: No chief complaint on file.    Visit type:TELE AUDIOVISUAL  Total time spent with patient: 33m 08s minutes.  Each patient to whom he or she provides medical services by telemedicine is:  (1) informed of the relationship between the physician and patient and the respective role of any other health care provider with respect to management of the patient; and (2) notified that he or she may decline to receive medical services by telemedicine and may withdraw from such care at any time.     Subjective:   Chief Complaint:  Cecil Pringle is a 79 y.o. male who presents for follow-up of No chief complaint on file.      Problem List and HPI:   CAD  CABG in 1990s and 2014  HTN  Mixed hyperlipidemia  DM ~1995  PAD asymptomatic   Aortic stenosis - mild  Heart murmur since childhood  Tobacco use - quit 1990      Reports neuropathy in both feet. He also reports discomfort in both legs when he was ambulating.Takes cilostazol for claudication.  No angina or does not report shortness of breath but is not physically active.    Blood sugars have been much better.   BP was elevated in Dr. Jett' office      Review of patient's allergies indicates:  No Known Allergies     Current Outpatient Medications   Medication Sig    aspirin (ECOTRIN) 81 MG EC tablet Take 81 mg by mouth once daily.    blood-glucose meter kit To check BG 1 times daily, to use with insurance preferred meter    cholecalciferol, vitamin D3, (VITAMIN D3) 25 mcg (1,000 unit) capsule Take 2 capsules (2,000 Units total) by mouth once daily.    cilostazoL (PLETAL) 50 MG Tab Take 1 tablet (50 mg total) by mouth 2 (two) times daily.    clopidogreL (PLAVIX) 75 mg tablet Take 1 tablet (75 mg total) by mouth once daily.    co-enzyme Q-10 30 mg capsule Take 30 mg by mouth once daily.     dapagliflozin propanediol (FARXIGA) 10 mg tablet Take 1 tablet (10 mg total) by mouth once  daily.    dulaglutide (TRULICITY) 3 mg/0.5 mL pen injector Inject 3 mg into the skin every 7 days.    fenofibrate 160 MG Tab Take 1 tablet (160 mg total) by mouth once daily.    fish oil-omega-3 fatty acids 300-1,000 mg capsule Take 2 g by mouth 3 (three) times daily.    FLUoxetine 20 MG capsule Take 1 capsule (20 mg total) by mouth once daily.    glipiZIDE (GLUCOTROL) 5 MG tablet Take 2 tablets (10 mg total) by mouth 2 (two) times daily with meals.    hydroCHLOROthiazide (HYDRODIURIL) 12.5 MG Tab Take 1 tablet (12.5 mg total) by mouth once daily.    hydrOXYzine pamoate (VISTARIL) 25 MG Cap TAKE 1 CAPSULE BY MOUTH EVERY EVENING FOR ALLERGIES    lancets Misc To check BG 1 times daily, to use with insurance preferred meter    losartan (COZAAR) 100 MG tablet Take 1 tablet (100 mg total) by mouth once daily.    metFORMIN (GLUCOPHAGE) 1000 MG tablet Take 1 tablet (1,000 mg total) by mouth 2 (two) times daily.    metoprolol tartrate (LOPRESSOR) 100 MG tablet Take 0.5 tablets (50 mg total) by mouth 2 (two) times daily.    MULTIVIT-IRON-MIN-FOLIC ACID 3,500-18-0.4 UNIT-MG-MG ORAL CHEW Take 1 tablet by mouth once daily.    NIFEdipine (PROCARDIA-XL) 60 MG (OSM) 24 hr tablet Take 1 tablet (60 mg total) by mouth once daily.    omega-3 acid ethyl esters (LOVAZA) 1 gram capsule TAKE 2 CAPSULES BY MOUTH 2 TIMES DAILY.    oxybutynin (DITROPAN-XL) 10 MG 24 hr tablet Take 1 tablet (10 mg total) by mouth once daily.    rosuvastatin (CRESTOR) 20 MG tablet Take 1 tablet (20 mg total) by mouth every evening.    tamsulosin (FLOMAX) 0.4 mg Cap Take 1 capsule (0.4 mg total) by mouth every evening.    temazepam (RESTORIL) 30 mg capsule Take 1 capsule (30 mg total) by mouth nightly as needed for Insomnia.    TRUE METRIX GLUCOSE TEST STRIP Strp USE 1 STRIP TO CHECK GLUCOSE TWO TIMES DAILY     No current facility-administered medications for this visit.       Social history:  Cecil Pringle  reports that he quit smoking about 35 years ago.  His smoking use included cigarettes. He started smoking about 61 years ago. He has a 140 pack-year smoking history. He has never used smokeless tobacco. He reports that he does not drink alcohol and does not use drugs.      Objective:   There were no vitals taken for this visit.   Physical Exam    Lipids:  Recent Labs   Lab 01/16/25  0805   LDL Cholesterol 36.0 L   HDL 30 L   Cholesterol 119 L      Renal:  Recent Labs   Lab 01/16/25  0805   Creatinine 1.1   Potassium 4.3   CO2 23   BUN 20     Liver:  Recent Labs   Lab 01/16/25  0805   AST 20   ALT 21     CBC:  Lab Results   Component Value Date    WBC 6.91 06/13/2024    HGB 14.1 06/13/2024    HCT 42.6 06/13/2024    MCV 85 06/13/2024     06/13/2024           Results for orders placed during the hospital encounter of 01/16/25    Echo    Interpretation Summary    Left Ventricle: The left ventricle is normal in size. Normal wall thickness. There is normal systolic function. Ejection fraction is approximately 60%. There is normal diastolic function.    Right Ventricle: Normal right ventricular cavity size. Systolic function is normal.    Aortic Valve: There is mild stenosis. Aortic valve area by VTI is 1.8 cm2. Aortic valve peak velocity is 2.6 m/s. Highest velocity was obtained with an apical pedoff. Mean gradient is 17 mmHg. The dimensionless index is 0.48.    Mitral Valve: There is moderate mitral annular calcification present.    Tricuspid Valve: There is mild regurgitation.    Pulmonary Artery: The estimated pulmonary artery systolic pressure is 26 mmHg.    IVC/SVC: Normal venous pressure at 3 mmHg.          Assessment and Plan:       ICD-10-CM ICD-9-CM   1. Coronary artery disease involving native coronary artery of native heart without angina pectoris  I25.10 414.01   2. S/P CABG x 5  Z95.1 V45.81   3. Peripheral vascular disease, unspecified  I73.9 443.9   4. Nonrheumatic aortic valve stenosis  I35.0 424.1   5. PVCs (premature ventricular contractions)   I49.3 427.69   6. Hyperlipidemia associated with type 2 diabetes mellitus  E11.69 250.80    E78.5 272.4   7. Hypertension associated with type 2 diabetes mellitus  E11.59 250.80    I15.2 401.9        Verify accuracy of home BP. Check BPs at home. Review in 1-2 mo.  Reschedule CTA. See instructions. Continue cilostazol. Explained that the neuropathy is separate from claudication.  Message Dr. Mccurdy re [facility] Med for DM  RTC PRN and in 1 yr    Orders placed during this encounter:     Coronary artery disease involving native coronary artery of native heart without angina pectoris  -     EKG 12-lead    S/P CABG x 5    Peripheral vascular disease, unspecified    Nonrheumatic aortic valve stenosis    PVCs (premature ventricular contractions)    Hyperlipidemia associated with type 2 diabetes mellitus    Hypertension associated with type 2 diabetes mellitus         Follow up in about 1 year (around 1/23/2026) for CAD and PAD w usual labs.

## 2025-02-04 ENCOUNTER — HOSPITAL ENCOUNTER (OUTPATIENT)
Dept: RADIOLOGY | Facility: HOSPITAL | Age: 80
Discharge: HOME | End: 2025-02-04
Attending: INTERNAL MEDICINE
Payer: MEDICARE

## 2025-02-04 DIAGNOSIS — I73.9 PERIPHERAL VASCULAR DISEASE, UNSPECIFIED: ICD-10-CM

## 2025-02-04 PROCEDURE — 25500020 PHARM REV CODE 255: Performed by: INTERNAL MEDICINE

## 2025-02-04 PROCEDURE — 75635 CT ANGIO ABDOMINAL ARTERIES: CPT | Mod: 26,,, | Performed by: RADIOLOGY

## 2025-02-04 PROCEDURE — 75635 CT ANGIO ABDOMINAL ARTERIES: CPT | Mod: TC

## 2025-02-04 RX ADMIN — IOHEXOL 120 ML: 350 INJECTION, SOLUTION INTRAVENOUS at 08:02

## 2025-02-22 DIAGNOSIS — Z00.00 ENCOUNTER FOR MEDICARE ANNUAL WELLNESS EXAM: ICD-10-CM

## 2025-03-14 ENCOUNTER — PATIENT MESSAGE (OUTPATIENT)
Dept: DIABETES | Facility: CLINIC | Age: 80
End: 2025-03-14
Payer: MEDICARE

## 2025-03-17 ENCOUNTER — PATIENT MESSAGE (OUTPATIENT)
Dept: UROLOGY | Facility: CLINIC | Age: 80
End: 2025-03-17
Payer: MEDICARE

## 2025-03-24 ENCOUNTER — OFFICE VISIT (OUTPATIENT)
Dept: FAMILY MEDICINE | Facility: CLINIC | Age: 80
End: 2025-03-24
Payer: MEDICARE

## 2025-03-24 VITALS
DIASTOLIC BLOOD PRESSURE: 60 MMHG | SYSTOLIC BLOOD PRESSURE: 122 MMHG | OXYGEN SATURATION: 95 % | HEIGHT: 75 IN | BODY MASS INDEX: 26.03 KG/M2 | WEIGHT: 209.38 LBS | HEART RATE: 62 BPM

## 2025-03-24 DIAGNOSIS — F33.42 RECURRENT MAJOR DEPRESSIVE DISORDER, IN FULL REMISSION: ICD-10-CM

## 2025-03-24 DIAGNOSIS — E11.59 HYPERTENSION ASSOCIATED WITH TYPE 2 DIABETES MELLITUS: ICD-10-CM

## 2025-03-24 DIAGNOSIS — I70.0 ABDOMINAL AORTIC ATHEROSCLEROSIS: ICD-10-CM

## 2025-03-24 DIAGNOSIS — I77.9 RIGHT-SIDED CAROTID ARTERY DISEASE, UNSPECIFIED TYPE: ICD-10-CM

## 2025-03-24 DIAGNOSIS — G47.09 OTHER INSOMNIA: ICD-10-CM

## 2025-03-24 DIAGNOSIS — I73.9 PAD (PERIPHERAL ARTERY DISEASE): ICD-10-CM

## 2025-03-24 DIAGNOSIS — I25.10 CORONARY ARTERY DISEASE INVOLVING NATIVE CORONARY ARTERY OF NATIVE HEART WITHOUT ANGINA PECTORIS: Chronic | ICD-10-CM

## 2025-03-24 DIAGNOSIS — I15.2 HYPERTENSION ASSOCIATED WITH TYPE 2 DIABETES MELLITUS: ICD-10-CM

## 2025-03-24 DIAGNOSIS — Z74.09 OTHER REDUCED MOBILITY: ICD-10-CM

## 2025-03-24 DIAGNOSIS — E66.3 OVERWEIGHT WITH BODY MASS INDEX (BMI) OF 26 TO 26.9 IN ADULT: ICD-10-CM

## 2025-03-24 DIAGNOSIS — E11.65 TYPE 2 DIABETES MELLITUS WITH HYPERGLYCEMIA, WITHOUT LONG-TERM CURRENT USE OF INSULIN: ICD-10-CM

## 2025-03-24 DIAGNOSIS — Z00.00 ENCOUNTER FOR MEDICARE ANNUAL WELLNESS EXAM: Primary | ICD-10-CM

## 2025-03-24 DIAGNOSIS — E11.69 HYPERLIPIDEMIA ASSOCIATED WITH TYPE 2 DIABETES MELLITUS: ICD-10-CM

## 2025-03-24 DIAGNOSIS — E78.5 HYPERLIPIDEMIA ASSOCIATED WITH TYPE 2 DIABETES MELLITUS: ICD-10-CM

## 2025-03-24 PROBLEM — B35.1 ONYCHOMYCOSIS OF MULTIPLE TOENAILS WITH TYPE 2 DIABETES MELLITUS AND PERIPHERAL NEUROPATHY: Status: RESOLVED | Noted: 2019-04-25 | Resolved: 2025-03-24

## 2025-03-24 PROBLEM — E11.42 ONYCHOMYCOSIS OF MULTIPLE TOENAILS WITH TYPE 2 DIABETES MELLITUS AND PERIPHERAL NEUROPATHY: Status: RESOLVED | Noted: 2019-04-25 | Resolved: 2025-03-24

## 2025-03-24 PROCEDURE — 1126F AMNT PAIN NOTED NONE PRSNT: CPT | Mod: HCNC,CPTII,S$GLB, | Performed by: NURSE PRACTITIONER

## 2025-03-24 PROCEDURE — 99999 PR PBB SHADOW E&M-EST. PATIENT-LVL V: CPT | Mod: PBBFAC,HCNC,, | Performed by: NURSE PRACTITIONER

## 2025-03-24 PROCEDURE — 3072F LOW RISK FOR RETINOPATHY: CPT | Mod: HCNC,CPTII,S$GLB, | Performed by: NURSE PRACTITIONER

## 2025-03-24 PROCEDURE — 1159F MED LIST DOCD IN RCRD: CPT | Mod: HCNC,CPTII,S$GLB, | Performed by: NURSE PRACTITIONER

## 2025-03-24 PROCEDURE — 1160F RVW MEDS BY RX/DR IN RCRD: CPT | Mod: HCNC,CPTII,S$GLB, | Performed by: NURSE PRACTITIONER

## 2025-03-24 PROCEDURE — 3074F SYST BP LT 130 MM HG: CPT | Mod: HCNC,CPTII,S$GLB, | Performed by: NURSE PRACTITIONER

## 2025-03-24 PROCEDURE — 3078F DIAST BP <80 MM HG: CPT | Mod: HCNC,CPTII,S$GLB, | Performed by: NURSE PRACTITIONER

## 2025-03-24 PROCEDURE — 1124F ACP DISCUSS-NO DSCNMKR DOCD: CPT | Mod: HCNC,CPTII,S$GLB, | Performed by: NURSE PRACTITIONER

## 2025-03-24 PROCEDURE — G0439 PPPS, SUBSEQ VISIT: HCPCS | Mod: HCNC,S$GLB,, | Performed by: NURSE PRACTITIONER

## 2025-03-24 PROCEDURE — 1170F FXNL STATUS ASSESSED: CPT | Mod: HCNC,CPTII,S$GLB, | Performed by: NURSE PRACTITIONER

## 2025-03-24 PROCEDURE — 3288F FALL RISK ASSESSMENT DOCD: CPT | Mod: HCNC,CPTII,S$GLB, | Performed by: NURSE PRACTITIONER

## 2025-03-24 PROCEDURE — 1101F PT FALLS ASSESS-DOCD LE1/YR: CPT | Mod: HCNC,CPTII,S$GLB, | Performed by: NURSE PRACTITIONER

## 2025-03-24 NOTE — PROGRESS NOTES
"  Cecil Pringle presented for a  Medicare AWV and comprehensive Health Risk Assessment today. The following components were reviewed and updated:    Medical history  Family History  Social history  Allergies and Current Medications  Health Risk Assessment  Health Maintenance  Care Team         ** See Completed Assessments for Annual Wellness Visit within the encounter summary.**         The following assessments were completed:  Living Situation  CAGE  Depression Screening  Timed Get Up and Go  Whisper Test  Cognitive Function Screening      Nutrition Screening  ADL Screening  PAQ Screening      Opioid documentation:      Patient does not have a current opioid prescription.        Vitals:    03/24/25 1601   BP: 122/60   BP Location: Left arm   Patient Position: Sitting   Pulse: 62   SpO2: 95%   Weight: 95 kg (209 lb 6.4 oz)   Height: 6' 3" (1.905 m)     Body mass index is 26.17 kg/m².    Physical Exam  Vitals reviewed.   Constitutional:       General: He is awake. He is not in acute distress.     Appearance: Normal appearance. He is well-developed, well-groomed and overweight.   HENT:      Head: Normocephalic.      Right Ear: Decreased hearing noted.      Left Ear: Decreased hearing noted.   Cardiovascular:      Rate and Rhythm: Normal rate.   Pulmonary:      Effort: Pulmonary effort is normal. No respiratory distress.   Skin:     Coloration: Skin is not pale.   Neurological:      Mental Status: He is alert and oriented to person, place, and time.      Coordination: Coordination normal.      Gait: Gait abnormal.   Psychiatric:         Attention and Perception: Attention normal.         Mood and Affect: Mood and affect normal.         Speech: Speech normal.         Behavior: Behavior normal. Behavior is cooperative.         Thought Content: Thought content normal.             Diagnoses and health risks identified today and associated recommendations/orders:    1. Encounter for Medicare annual wellness exam  - " Referral to Enhanced Annual Wellness Visit (eAWV) W+1    2. Recurrent major depressive disorder, in full remission  Chronic; stable on fluoxetine medication. Follow up with PCP.    3. Hyperlipidemia associated with type 2 diabetes mellitus  Chronic; stable on rosuvastatin, fenofibrate, omega-3 capsulesmedication. Follow up with PCP.    4. Hypertension associated with type 2 diabetes mellitus  Chronic; stable on hydrochlorothiazide, losartan, metoprolol, nifedipine medication. Followed by Cardiology.    5. Abdominal aortic atherosclerosis - seen on CTA 2/4/2025  Chronic; stable on rosuvastatin and fenofibrate medication. Followed by Cardiology.    6. Right-sided carotid artery disease, unspecified type  Chronic; stable on rosuvastatin and fenofibrate medication. Followed by Cardiology.    7. PAD (peripheral artery disease)  Chronic; stable on Plavix and ASA medication. Follow up with PCP.    8. Type 2 diabetes mellitus with hyperglycemia, without long-term current use of insulin  Chronic; stable on Farxiga, Trulicity weekly, glipizide, metformin medication. Follow up with PCP.    9. Coronary artery disease involving native coronary artery of native heart without angina pectoris  Chronic; stable on ASA, Plavix/Pletal medication. Followed by Cardiology.    10. Other insomnia  Chronic; stable on PRN Restoril medication. Follow up with PCP.    11. Other reduced mobility  Ambulates independently; slowed but stable gait. Follow up with PCP.    12. Overweight with body mass index (BMI) of 26 to 26.9 in adult  Eat a low salt/low fat ADA diet and discussed importance of engaging in physical activity at least 5x/week for a minimum of 30 min/day.      Provided Cecil with a 5-10 year written screening schedule and personal prevention plan. Recommendations were developed using the USPSTF age appropriate recommendations. Education, counseling, and referrals were provided as needed. After Visit Summary given to patient which  includes a list of additional screenings/tests needed.    Follow up for your next annual wellness visit.    Nina Rodriguez NP      Advance Care Planning     I offered to discuss advanced care planning, including how to pick a person who would make decisions for you if you were unable to make them for yourself, called a health care power of , and what kind of decisions you might make such as use of life sustaining treatments such as ventilators and tube feeding when faced with a life limiting illness recorded on a living will that they will need to know. (How you want to be cared for as you near the end of your natural life)     X  Patient is unwilling to engage in a discussion regarding advance directives at this time. Says family knows what to do.

## 2025-03-24 NOTE — PATIENT INSTRUCTIONS
Counseling and Referral of Other Preventative  (Italic type indicates deductible and co-insurance are waived)    Patient Name: Cecil Pringle  Today's Date: 3/24/2025    Health Maintenance       Date Due Completion Date    Hepatitis C Screening Never done ---    TETANUS VACCINE Never done ---    Shingles Vaccine (1 of 2) Never done ---    RSV Vaccine (Age 60+ and Pregnant patients) (1 - 1-dose 75+ series) Never done ---    Influenza Vaccine (1) 06/30/2025 (Originally 9/1/2024) 12/14/2020    Override on 9/4/2019: Done    COVID-19 Vaccine (1 - 2024-25 season) 03/24/2026 (Originally 9/1/2024) ---    Hemoglobin A1c 05/01/2025 11/1/2024    Diabetes Urine Screening 06/13/2025 6/13/2024    Diabetic Eye Exam 07/10/2025 7/10/2024    Lipid Panel 01/16/2026 1/16/2025    Aspirin/Antiplatelet Therapy 03/24/2026 3/24/2025        No orders of the defined types were placed in this encounter.      The following information is provided to all patients.  This information is to help you find resources for any of the problems found today that may be affecting your health:                  Living healthy guide: www.[facility].[place].gov      Understanding Diabetes: www.diabetes.org      Eating healthy: www.cdc.gov/healthyweight      CDC home safety checklist: www.cdc.gov/steadi/patient.html      Agency on Aging: www.goea.[place].[facility]      Alcoholics anonymous (AA): www.aa.org      Physical Activity: www.vidya.nih.gov/mh2wkya      Tobacco use: www.quitwithusla.org

## 2025-04-26 ENCOUNTER — RESULTS FOLLOW-UP (OUTPATIENT)
Dept: CARDIOLOGY | Facility: CLINIC | Age: 80
End: 2025-04-26

## 2025-04-26 NOTE — PROGRESS NOTES
Please let patient know that I'd like him to see Dr. Munoz for an enlarged artery in the leg. Also I think he should take aspirin in addition to Plavix and Pletal

## 2025-04-28 ENCOUNTER — TELEPHONE (OUTPATIENT)
Dept: CARDIOLOGY | Facility: CLINIC | Age: 80
End: 2025-04-28
Payer: MEDICARE

## 2025-04-28 NOTE — TELEPHONE ENCOUNTER
----- Message from Nory May MD sent at 4/26/2025 10:58 AM CDT -----  Please let patient know that I'd like him to see Dr. Munoz for an enlarged artery in the leg. Also I think he should take aspirin in addition to Plavix and Pletal  ----- Message -----  From: Marty Rad Results In  Sent: 2/4/2025   3:41 PM CDT  To: Nory May MD

## 2025-05-05 ENCOUNTER — OFFICE VISIT (OUTPATIENT)
Dept: CARDIOLOGY | Facility: CLINIC | Age: 80
End: 2025-05-05
Payer: MEDICARE

## 2025-05-05 VITALS
BODY MASS INDEX: 26.73 KG/M2 | WEIGHT: 214.94 LBS | DIASTOLIC BLOOD PRESSURE: 62 MMHG | SYSTOLIC BLOOD PRESSURE: 134 MMHG | HEIGHT: 75 IN | HEART RATE: 64 BPM

## 2025-05-05 DIAGNOSIS — I65.21 STENOSIS OF RIGHT CAROTID ARTERY: ICD-10-CM

## 2025-05-05 DIAGNOSIS — I35.0 NONRHEUMATIC AORTIC VALVE STENOSIS: ICD-10-CM

## 2025-05-05 DIAGNOSIS — I70.0 ABDOMINAL AORTIC ATHEROSCLEROSIS: ICD-10-CM

## 2025-05-05 DIAGNOSIS — I73.9 PAD (PERIPHERAL ARTERY DISEASE): ICD-10-CM

## 2025-05-05 DIAGNOSIS — E78.5 HYPERLIPIDEMIA ASSOCIATED WITH TYPE 2 DIABETES MELLITUS: ICD-10-CM

## 2025-05-05 DIAGNOSIS — I77.9 RIGHT-SIDED CAROTID ARTERY DISEASE, UNSPECIFIED TYPE: ICD-10-CM

## 2025-05-05 DIAGNOSIS — E11.69 HYPERLIPIDEMIA ASSOCIATED WITH TYPE 2 DIABETES MELLITUS: ICD-10-CM

## 2025-05-05 DIAGNOSIS — Z95.1 S/P CABG X 5: ICD-10-CM

## 2025-05-05 DIAGNOSIS — E11.65 TYPE 2 DIABETES MELLITUS WITH HYPERGLYCEMIA, WITHOUT LONG-TERM CURRENT USE OF INSULIN: ICD-10-CM

## 2025-05-05 DIAGNOSIS — I25.10 CORONARY ARTERY DISEASE INVOLVING NATIVE CORONARY ARTERY OF NATIVE HEART WITHOUT ANGINA PECTORIS: Chronic | ICD-10-CM

## 2025-05-05 DIAGNOSIS — R79.9 ABNORMAL FINDING OF BLOOD CHEMISTRY, UNSPECIFIED: ICD-10-CM

## 2025-05-05 DIAGNOSIS — E11.59 HYPERTENSION ASSOCIATED WITH TYPE 2 DIABETES MELLITUS: ICD-10-CM

## 2025-05-05 DIAGNOSIS — I72.3 ANEURYSM OF RIGHT ILIAC ARTERY: Primary | ICD-10-CM

## 2025-05-05 DIAGNOSIS — I15.2 HYPERTENSION ASSOCIATED WITH TYPE 2 DIABETES MELLITUS: ICD-10-CM

## 2025-05-05 PROCEDURE — 99999 PR PBB SHADOW E&M-EST. PATIENT-LVL V: CPT | Mod: PBBFAC,HCNC,, | Performed by: INTERNAL MEDICINE

## 2025-05-05 PROCEDURE — 3078F DIAST BP <80 MM HG: CPT | Mod: CPTII,HCNC,S$GLB, | Performed by: INTERNAL MEDICINE

## 2025-05-05 PROCEDURE — 1126F AMNT PAIN NOTED NONE PRSNT: CPT | Mod: CPTII,HCNC,S$GLB, | Performed by: INTERNAL MEDICINE

## 2025-05-05 PROCEDURE — 3075F SYST BP GE 130 - 139MM HG: CPT | Mod: CPTII,HCNC,S$GLB, | Performed by: INTERNAL MEDICINE

## 2025-05-05 PROCEDURE — 3072F LOW RISK FOR RETINOPATHY: CPT | Mod: CPTII,HCNC,S$GLB, | Performed by: INTERNAL MEDICINE

## 2025-05-05 PROCEDURE — 99204 OFFICE O/P NEW MOD 45 MIN: CPT | Mod: HCNC,S$GLB,, | Performed by: INTERNAL MEDICINE

## 2025-05-05 PROCEDURE — 1101F PT FALLS ASSESS-DOCD LE1/YR: CPT | Mod: CPTII,HCNC,S$GLB, | Performed by: INTERNAL MEDICINE

## 2025-05-05 PROCEDURE — 1159F MED LIST DOCD IN RCRD: CPT | Mod: CPTII,HCNC,S$GLB, | Performed by: INTERNAL MEDICINE

## 2025-05-05 PROCEDURE — 3288F FALL RISK ASSESSMENT DOCD: CPT | Mod: CPTII,HCNC,S$GLB, | Performed by: INTERNAL MEDICINE

## 2025-05-05 RX ORDER — CILOSTAZOL 100 MG/1
100 TABLET ORAL 2 TIMES DAILY
Qty: 180 TABLET | Refills: 3 | Status: SHIPPED | OUTPATIENT
Start: 2025-05-05

## 2025-05-05 NOTE — PATIENT INSTRUCTIONS
Assessment/Plan:  Cecil Pringle is a 79 y.o. male with CAD, s/p in 1990's and 2014, HTN, HLD, PAD, mild aortic stenosis, former smoker, who presents for an initial appointment.    PAD- Mr. Pringle has [place] stage 1 claudication symptoms. CTA abd/pelvis with iliofemoral runoff on 2/4/2025 revealed multifocal stenosis involving bilateral lower extremity arterial system, consistent with severe peripheral artery disease.  Most severe stenosis involving the bilateral posterior and anterior tibial arteries. 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 Right SFA is patent with severe atherosclerotic plaque with a short segment of likely greater than 50% stenosis of the distal superficial femoral artery which may be overestimated secondary to partial obscuration of the underlying lumen by calcified plaque.  He also has lumbar ddd with chronic back pain, which is contributing to his symptoms. Check exercise BEHZAD study. Increase cilostazol to 100 mg bid. Continue ASA 81 mg daily, plavix 75 mg daily, and rosuvastatin 20 mg daily. Pt to start walking program with goal of at least 30 minutes a day, 5 days a week.    2. 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 Given size is less than 3 cm and pt is asymptomatic, will monitor at this time. Check repeat CTA abd/pelvis with iliofemoral runoff prior to next visit.     3. HTN- Stable. Continue current medications,    4. HLD- Continue statin.    5. CAD s/p CABD- Pt with no angina. Continue GDMT for CAD on current medication regimen.     Follow up in 3 months with CTA abd/pelvis with iliofemoral runoff prior

## 2025-05-05 NOTE — PROGRESS NOTES
[facility]      Chief Complaint   Patient presents with    Peripheral Vascular Disease       Patient ID: Cecil Pringle is a 79 y.o. male with CAD, s/p in 1990's and 2014, HTN, HLD, PAD, mild aortic stenosis, former smoker, who presents for an initial appointment. Pertinent history/events are as follows:     -Pt kindly referred by Dr. May for evaluation of right iliac artery aneurysm.     HPI:  Mr. Ambrosia reports pain in left calf after walking 2 miles, which is relieved with rest. He has no rest pain or LE wound/ulcer. He also has chronic back pain. CTA abd/pelvis with iliofemoral runoff on 2/4/2025 revealed multifocal stenosis involving bilateral lower extremity arterial system, consistent with severe peripheral artery disease.  Most severe stenosis involving the bilateral posterior and anterior tibial arteries. 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 Right SFA is patent with severe atherosclerotic plaque with a short segment of likely greater than 50% stenosis of the distal superficial femoral artery which may be overestimated secondary to partial obscuration of the underlying lumen by calcified plaque.     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    Hyperlipidemia     Hypertension     Insomnia     Overactive bladder     PAD (peripheral artery disease)     Sleep apnea      Past Surgical History:   Procedure Laterality Date    COLONOSCOPY N/A 12/12/2018    Procedure: COLONOSCOPY;  Surgeon: Jabari Pollock MD;  Location: [facility];  Service: Endoscopy;  Laterality: N/A;  ok to hold Plavix x 5 days, pt requesting Jaden-MS    COLONOSCOPY N/A 06/14/2019     Procedure: COLONOSCOPY/Golytely;  Surgeon: Joon Devi MD;  Location: [facility] ENDO;  Service: Endoscopy;  Laterality: N/A;    COLONOSCOPY N/A 2023    Procedure: COLONOSCOPY;  Surgeon: Yoav Dsouza MD;  Location: [facility] ENDO;  Service: Endoscopy;  Laterality: N/A;    CORONARY ARTERY BYPASS GRAFT      CORONARY ARTERY BYPASS GRAFT      coronary stents  2013    x5    left foot surgery      Right hand surgery      SINUS SURGERY      bleeding blood vessel in his nose    TONSILLECTOMY       Social History[1]  Family History   Problem Relation Name Age of Onset    Cancer Mother Jessenia         mesothelioma,  at the age of 78    Diabetes Mother Jessenia     Heart disease Father Sergio,          at the age of 56    Heart disease Sister      No Known Problems Sister      Diabetes Brother Goyo     Heart disease Brother Goyo     No Known Problems Brother      No Known Problems Daughter x2     No Known Problems Son      Heart disease Maternal Aunt      Heart disease Paternal Aunt      Heart disease Paternal Uncle      Glaucoma Neg Hx      Macular degeneration Neg Hx      Retinal detachment Neg Hx      Strabismus Neg Hx      Amblyopia Neg Hx      Blindness Neg Hx      Cataracts Neg Hx         Review of patient's allergies indicates:  No Known Allergies    Medication List with Changes/Refills   Current Medications    ASPIRIN (ECOTRIN) 81 MG EC TABLET    Take 81 mg by mouth once daily.    BLOOD-GLUCOSE METER KIT    To check BG 1 times daily, to use with insurance preferred meter    CHOLECALCIFEROL, VITAMIN D3, (VITAMIN D3) 25 MCG (1,000 UNIT) CAPSULE    Take 2 capsules (2,000 Units total) by mouth once daily.    CILOSTAZOL (PLETAL) 50 MG TAB    Take 1 tablet (50 mg total) by mouth 2 (two) times daily.    CLOPIDOGREL (PLAVIX) 75 MG TABLET    Take 1 tablet (75 mg total) by mouth once daily.    CO-ENZYME Q-10 30 MG CAPSULE    Take 30 mg by mouth once daily.     DAPAGLIFLOZIN PROPANEDIOL  (FARXIGA) 10 MG TABLET    Take 1 tablet (10 mg total) by mouth once daily.    DULAGLUTIDE (TRULICITY) 3 MG/0.5 ML PEN INJECTOR    Inject 3 mg into the skin every 7 days.    FENOFIBRATE 160 MG TAB    Take 1 tablet (160 mg total) by mouth once daily.    FISH OIL-OMEGA-3 FATTY ACIDS 300-1,000 MG CAPSULE    Take 2 capsules by mouth once daily.    FLUOXETINE 20 MG CAPSULE    Take 1 capsule (20 mg total) by mouth once daily.    GLIPIZIDE (GLUCOTROL) 5 MG TABLET    Take 2 tablets (10 mg total) by mouth 2 (two) times daily with meals.    HYDROCHLOROTHIAZIDE (HYDRODIURIL) 12.5 MG TAB    Take 1 tablet (12.5 mg total) by mouth once daily.    HYDROXYZINE PAMOATE (VISTARIL) 25 MG CAP    TAKE 1 CAPSULE BY MOUTH EVERY EVENING FOR ALLERGIES    LANCETS MISC    To check BG 1 times daily, to use with insurance preferred meter    LOSARTAN (COZAAR) 100 MG TABLET    Take 1 tablet (100 mg total) by mouth once daily.    METFORMIN (GLUCOPHAGE) 1000 MG TABLET    Take 1 tablet (1,000 mg total) by mouth 2 (two) times daily.    METOPROLOL TARTRATE (LOPRESSOR) 100 MG TABLET    Take 0.5 tablets (50 mg total) by mouth 2 (two) times daily.    MULTIVIT-IRON-MIN-FOLIC ACID 3,500-18-0.4 UNIT-MG-MG ORAL CHEW    Take 1 tablet by mouth once daily.    NIFEDIPINE (PROCARDIA-XL) 60 MG (OSM) 24 HR TABLET    Take 1 tablet (60 mg total) by mouth once daily.    OMEGA-3 ACID ETHYL ESTERS (LOVAZA) 1 GRAM CAPSULE    TAKE 2 CAPSULES BY MOUTH 2 TIMES DAILY.    OXYBUTYNIN (DITROPAN-XL) 10 MG 24 HR TABLET    Take 1 tablet (10 mg total) by mouth once daily.    ROSUVASTATIN (CRESTOR) 20 MG TABLET    Take 1 tablet (20 mg total) by mouth every evening.    TEMAZEPAM (RESTORIL) 30 MG CAPSULE    Take 1 capsule (30 mg total) by mouth nightly as needed for Insomnia.    TRUE METRIX GLUCOSE TEST STRIP STRP    USE 1 STRIP TO CHECK GLUCOSE TWO TIMES DAILY   Discontinued Medications    TAMSULOSIN (FLOMAX) 0.4 MG CAP    Take 1 capsule (0.4 mg total) by mouth every evening.  "      Review of Systems  Constitution: Denies chills, fever, and sweats.  HENT: Denies headaches or blurry vision.  Cardiovascular: Denies chest pain or irregular heart beat.  Respiratory: Denies cough or shortness of breath.  Gastrointestinal: Denies abdominal pain, nausea, or vomiting.  Musculoskeletal: Denies muscle cramps.  Neurological: Denies dizziness or focal weakness.  Psychiatric/Behavioral: Normal mental status.  Hematologic/Lymphatic: Denies bleeding problem or easy bruising/bleeding.  Skin: Denies rash or suspicious lesions    Physical Examination  /62   Pulse 64   Ht 6' 3" (1.905 m)   Wt 97.5 kg (214 lb 15.2 oz)   BMI 26.87 kg/m²     Constitutional: No acute distress, conversant  HEENT: Sclera anicteric, Pupils equal, round and reactive to light, extraocular motions intact, Oropharynx clear  Neck: No JVD, no carotid bruits  Cardiovascular: regular rate and rhythm, no murmur, rubs or gallops, normal S1/S2  Pulmonary: Clear to auscultation bilaterally  Abdominal: Abdomen soft, nontender, nondistended, positive bowel sounds  Extremities: No lower extremity edema,   Pulses:  Carotid pulses are 2+ on the right side, and 2+ on the left side.  Radial pulses are 2+ on the right side, and 2+ on the left side.   Femoral pulses are 2+ on the right side, and 2+ on the left side.  Popliteal pulses are 2+ on the right side, and 2+ on the left side.   Dorsalis pedis pulses are 2+ on the right side, and 2+ on the left side.   Posterior tibial pulses are 2+ on the right side, and 2+ on the left side.    Skin: No ecchymosis, erythema, or ulcers  Psych: Alert and oriented x 3, appropriate affect  Neuro: CNII-XII intact, no focal deficits    Labs:  Most Recent Data  CBC:   Lab Results   Component Value Date    WBC 6.91 06/13/2024    HGB 14.1 06/13/2024    HCT 42.6 06/13/2024     06/13/2024    MCV 85 06/13/2024    RDW 15.0 (H) 06/13/2024     BMP:   Lab Results   Component Value Date     01/16/2025    " "K 4.3 01/16/2025     01/16/2025    CO2 23 01/16/2025    BUN 20 01/16/2025    CREATININE 1.1 01/16/2025     (H) 01/16/2025    CALCIUM 9.6 01/16/2025    PHOS 2.5 (L) 11/03/2021     LFTS;   Lab Results   Component Value Date    PROT 7.3 01/16/2025    ALBUMIN 4.0 01/16/2025    BILITOT 0.4 01/16/2025    AST 20 01/16/2025    ALKPHOS 39 (L) 01/16/2025    ALT 21 01/16/2025     COAGS: No results found for: "INR", "PROTIME", "PTT"  FLP:   Lab Results   Component Value Date    CHOL 119 (L) 01/16/2025    HDL 30 (L) 01/16/2025    LDLCALC 36.0 (L) 01/16/2025    TRIG 265 (H) 01/16/2025    CHOLHDL 25.2 01/16/2025     CARDIAC: No results found for: "TROPONINI", "CKTOTAL", "CKMB", "BNP"    Imaging:    CTA abd/pelvis with iliofemoral runoff 2/4/2025:  Multifocal stenosis involving bilateral lower extremity arterial system, consistent with severe peripheral artery disease.  Most severe stenosis involving the bilateral posterior and anterior tibial arteries.   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  Recommend clinical correlation with patient's symptoms  Right SFA: Patent with severe atherosclerotic plaque with a short segment of likely greater than 50% stenosis of the distal superficial femoral artery which may be overestimated secondary to partial obscuration of the underlying lumen by calcified plaque.     Assessment/Plan:  Cecil Pringle is a 79 y.o. male with CAD, s/p in 1990's and 2014, HTN, HLD, PAD, mild aortic stenosis, former smoker, who presents for an initial appointment.    PAD- Mr. Pringle has [place] stage 1 claudication symptoms. CTA abd/pelvis with iliofemoral runoff on 2/4/2025 revealed multifocal stenosis involving bilateral lower extremity arterial system, " consistent with severe peripheral artery disease.  Most severe stenosis involving the bilateral posterior and anterior tibial arteries. 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 Right SFA is patent with severe atherosclerotic plaque with a short segment of likely greater than 50% stenosis of the distal superficial femoral artery which may be overestimated secondary to partial obscuration of the underlying lumen by calcified plaque.  He also has lumbar ddd with chronic back pain, which is contributing to his symptoms. Check exercise BEHZAD study. Increase cilostazol to 100 mg bid. Continue ASA 81 mg daily, plavix 75 mg daily, and rosuvastatin 20 mg daily. Pt to start walking program with goal of at least 30 minutes a day, 5 days a week.    2. 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 Given size is less than 3 cm and pt is asymptomatic, will monitor at this time. Check repeat CTA abd/pelvis with iliofemoral runoff prior to next visit.     3. HTN- Stable. Continue current medications,    4. HLD- Continue statin.    5. CAD s/p CABD- Pt with no angina. Continue GDMT for CAD on current medication regimen.     Follow up in 3 months with CTA abd/pelvis with iliofemoral runoff prior    Total duration of face to face visit time 30 minutes.  Total time spent counseling greater than fifty percent of total visit time.  Counseling included discussion regarding imaging findings, diagnosis, possibilities, treatment options, risks and benefits.  The patient had many questions regarding the options and long-term effects.    Cameron Munoz MD, PhD  Interventional Cardiology           [1]   Social History  Socioeconomic History    Marital status:    Occupational History    Occupation: Retired    Tobacco Use    Smoking status:  Former     Current packs/day: 0.00     Average packs/day: 2.5 packs/day for 56.0 years (140.0 ttl pk-yrs)     Types: Cigarettes     Start date: 1964     Quit date: 1990     Years since quittin.3    Smokeless tobacco: Never   Substance and Sexual Activity    Alcohol use: No    Drug use: No    Sexual activity: Not Currently     Partners: Female     Birth control/protection: Post-menopausal     Social Drivers of Health     Financial Resource Strain: Low Risk  (3/24/2025)    Overall Financial Resource Strain (CARDIA)     Difficulty of Paying Living Expenses: Not hard at all   Food Insecurity: No Food Insecurity (3/24/2025)    Hunger Vital Sign     Worried About Running Out of Food in the Last Year: Never true     Ran Out of Food in the Last Year: Never true   Transportation Needs: No Transportation Needs (3/24/2025)    PRAPARE - Transportation     Lack of Transportation (Medical): No     Lack of Transportation (Non-Medical): No   Physical Activity: Inactive (3/24/2025)    Exercise Vital Sign     Days of Exercise per Week: 0 days     Minutes of Exercise per Session: 0 min   Stress: No Stress Concern Present (3/24/2025)    [employer] - Occupational Stress Questionnaire     Feeling of Stress : Not at all   Housing Stability: Low Risk  (3/24/2025)    Housing Stability Vital Sign     Unable to Pay for Housing in the Last Year: No     Homeless in the Last Year: No

## 2025-05-18 DIAGNOSIS — E11.69 HYPERLIPIDEMIA ASSOCIATED WITH TYPE 2 DIABETES MELLITUS: ICD-10-CM

## 2025-05-18 DIAGNOSIS — E78.5 HYPERLIPIDEMIA ASSOCIATED WITH TYPE 2 DIABETES MELLITUS: ICD-10-CM

## 2025-05-18 RX ORDER — ROSUVASTATIN CALCIUM 20 MG/1
20 TABLET, COATED ORAL NIGHTLY
Qty: 90 TABLET | Refills: 2 | Status: SHIPPED | OUTPATIENT
Start: 2025-05-18

## 2025-05-18 NOTE — TELEPHONE ENCOUNTER
Provider Staff:  Action required for this patient    Requires labs      Please see care gap opportunities below in Care Due Message.    Thanks!  [facility]     Appointments      Date Provider   Last Visit   1/15/2025 Layo Jett MD   Next Visit   Visit date not found Layo Jett MD     Refill Decision Note   Cecil Pringle  is requesting a refill authorization.  Brief Assessment and Rationale for Refill:  Approve     Medication Therapy Plan:  Lab(s) recommended: CBC      Comments:     Note composed:5:25 PM 05/18/2025

## 2025-05-18 NOTE — TELEPHONE ENCOUNTER
Care Due:                  Date            Visit Type   Department     Provider  --------------------------------------------------------------------------------                                MYCHART                              ANNUAL                              CHECKUP/PHY  MET INTERNAL  Last Visit: 01-      S            CHIVO Jett  Next Visit: None Scheduled  None         None Found                                                            Last  Test          Frequency    Reason                     Performed    Due Date  --------------------------------------------------------------------------------    CBC.........  12 months..  clopidogreL, fenofibrate.  06- 06-    HBA1C.......  6 months...  metFORMIN................  11- 04-    [facility] Embedded Care Due Messages. Reference number: [id number].   5/18/2025 12:50:53 AM CDT

## 2025-05-20 DIAGNOSIS — E11.69 TYPE 2 DIABETES MELLITUS WITH OTHER SPECIFIED COMPLICATION, WITHOUT LONG-TERM CURRENT USE OF INSULIN: ICD-10-CM

## 2025-05-21 RX ORDER — GLIPIZIDE 5 MG/1
10 TABLET ORAL 2 TIMES DAILY WITH MEALS
Qty: 360 TABLET | Refills: 0 | Status: SHIPPED | OUTPATIENT
Start: 2025-05-21 | End: 2026-05-21

## 2025-05-23 ENCOUNTER — OFFICE VISIT (OUTPATIENT)
Dept: ENDOCRINOLOGY | Facility: CLINIC | Age: 80
End: 2025-05-23
Payer: MEDICARE

## 2025-05-23 ENCOUNTER — PATIENT MESSAGE (OUTPATIENT)
Dept: ENDOCRINOLOGY | Facility: CLINIC | Age: 80
End: 2025-05-23

## 2025-05-23 DIAGNOSIS — E11.65 TYPE 2 DIABETES MELLITUS WITH HYPERGLYCEMIA, WITHOUT LONG-TERM CURRENT USE OF INSULIN: ICD-10-CM

## 2025-05-23 DIAGNOSIS — I15.2 HYPERTENSION ASSOCIATED WITH TYPE 2 DIABETES MELLITUS: ICD-10-CM

## 2025-05-23 DIAGNOSIS — E11.59 HYPERTENSION ASSOCIATED WITH TYPE 2 DIABETES MELLITUS: ICD-10-CM

## 2025-05-23 DIAGNOSIS — I25.10 CORONARY ARTERY DISEASE INVOLVING NATIVE CORONARY ARTERY OF NATIVE HEART WITHOUT ANGINA PECTORIS: ICD-10-CM

## 2025-05-23 DIAGNOSIS — E55.9 VITAMIN D DEFICIENCY: ICD-10-CM

## 2025-05-23 DIAGNOSIS — E11.69 HYPERLIPIDEMIA ASSOCIATED WITH TYPE 2 DIABETES MELLITUS: ICD-10-CM

## 2025-05-23 DIAGNOSIS — E11.69 TYPE 2 DIABETES MELLITUS WITH OTHER SPECIFIED COMPLICATION, WITHOUT LONG-TERM CURRENT USE OF INSULIN: Primary | ICD-10-CM

## 2025-05-23 DIAGNOSIS — R80.9 ALBUMINURIA: ICD-10-CM

## 2025-05-23 DIAGNOSIS — E78.5 HYPERLIPIDEMIA ASSOCIATED WITH TYPE 2 DIABETES MELLITUS: ICD-10-CM

## 2025-05-23 RX ORDER — DULAGLUTIDE 3 MG/.5ML
3 INJECTION, SOLUTION SUBCUTANEOUS
Start: 2025-05-23 | End: 2026-05-23

## 2025-05-23 NOTE — ASSESSMENT & PLAN NOTE
Reviewed goals of therapy are to get the best control we can without hypoglycemia.    Currently meeting glycemic target:  Yes based on blood glucose measurements.  Check A1c.      Medication changes:   Continue:  Trulicity 3.0 mg weekly (gets through [facility])  Metformin 1000 mg b.i.d.  Glipizide 10 mg BID with breakfast and supper  Farxiga (dapagliflozin) 10 mg daily      Advised frequent self blood glucose monitoring. Patient encouraged to document glucose results and bring them to every clinic visit. He was not able to get Dexcom 2/2 cost.    Hypoglycemia precautions discussed.     Close adherence to lifestyle changes recommended.      UTD on HM topics. See HPI    Lab Results   Component Value Date    HGBA1C 6.9 (H) 11/01/2024    HGBA1C 8.0 (H) 06/13/2024    HGBA1C 8.0 (H) 01/18/2024

## 2025-05-23 NOTE — PROGRESS NOTES
FOLLOW-UP VISIT    Subjective:      Chief Complaint:  Follow-up for diabetes    HPI: Cecil Pringle is a 79 y.o. male who is here for a follow-up evaluation for diabetes    The patient's last visit with me was on 8/31/2022.      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    Hyperlipidemia     Hypertension     Insomnia     Overactive bladder     PAD (peripheral artery disease)     Sleep apnea      With regards to the diabetes:    5/23/2025 (current visit):  Doing well. BG has been better controlled in the morning.      Glucose   2/17/2025 256 mg/dL (AZ)   3/16/2025 104 mg/dL     113 mg/dL    3/17/2025 256 mg/dL     110 mg/dL    3/18/2025 128 mg/dL     128 mg/dL     137 mg/dL     137 mg/dL    3/19/2025 219 mg/dL     165 mg/dL     166 mg/dL     131 mg/dL    3/20/2025 187 mg/dL     217 mg/dL     151 mg/dL    3/21/2025 146 mg/dL     153 mg/dL    3/22/2025 255 mg/dL     257 mg/dL     98 mg/dL     98 mg/dL     143 mg/dL    3/23/2025 159 mg/dL     143 mg/dL    3/24/2025 206 mg/dL     137 mg/dL     133 mg/dL    3/25/2025 130 mg/dL     133 mg/dL    3/26/2025 185 mg/dL     75 mg/dL     175 mg/dL    3/27/2025 245 mg/dL     163 mg/dL     158 mg/dL    3/28/2025 171 mg/dL     155 mg/dL    3/29/2025 194 mg/dL     166 mg/dL    3/31/2025 174 mg/dL     100 mg/dL    4/1/2025 202 mg/dL     140 mg/dL    4/2/2025 160 mg/dL     149 mg/dL     124 mg/dL    4/3/2025 155 mg/dL     153 mg/dL    4/4/2025 274 mg/dL     137 mg/dL     160 mg/dL    4/5/2025 234 mg/dL     167 mg/dL     156 mg/dL    4/6/2025 144 mg/dL    4/7/2025 221 mg/dL     155 mg/dL     152 mg/dL    4/8/2025 210 mg/dL     160 mg/dL    4/9/2025 157 mg/dL     143 mg/dL    4/10/2025 156 mg/dL     125 mg/dL    4/11/2025 179 mg/dL     163  mg/dL    4/12/2025 183 mg/dL     145 mg/dL    4/14/2025 138 mg/dL     160 mg/dL    4/15/2025 109 mg/dL     130 mg/dL    4/16/2025 187 mg/dL     170 mg/dL    4/17/2025 154 mg/dL     164 mg/dL    4/18/2025 108 mg/dL     119 mg/dL    4/19/2025 139 mg/dL     155 mg/dL    4/21/2025 127 mg/dL     111 mg/dL    4/22/2025 103 mg/dL     167 mg/dL    4/23/2025 137 mg/dL     130 mg/dL    4/24/2025 175 mg/dL     100 mg/dL    4/25/2025 133 mg/dL     81 mg/dL    4/26/2025 184 mg/dL     94 mg/dL    4/27/2025 106 mg/dL    4/28/2025 125 mg/dL     118 mg/dL    4/29/2025 136 mg/dL     111 mg/dL    4/30/2025 265 mg/dL     112 mg/dL     105 mg/dL    5/1/2025 133 mg/dL     106 mg/dL    5/2/2025 186 mg/dL     117 mg/dL     105 mg/dL    5/3/2025 190 mg/dL     121 mg/dL     85 mg/dL    5/4/2025 141 mg/dL     94 mg/dL    5/5/2025 155 mg/dL     129 mg/dL     111 mg/dL    5/6/2025 103 mg/dL     99 mg/dL    5/7/2025 102 mg/dL     139 mg/dL    5/8/2025 96 mg/dL     112 mg/dL    5/9/2025 137 mg/dL     142 mg/dL    5/10/2025 123 mg/dL     170 mg/dL    5/11/2025 113 mg/dL    5/12/2025 78 mg/dL     131 mg/dL    5/13/2025 127 mg/dL     125 mg/dL    5/14/2025 135 mg/dL     127 mg/dL    5/15/2025 147 mg/dL    5/16/2025 129 mg/dL    5/17/2025 92 mg/dL     141 mg/dL    5/18/2025 130 mg/dL     180 mg/dL    5/19/2025 111 mg/dL    5/20/2025 146 mg/dL     106 mg/dL     223 mg/dL    5/21/2025 158 mg/dL     141 mg/dL    5/22/2025 140 mg/dL         Current diabetic medications include:   Metformin 1000 mg twice daily  Glipizide 10 mg BID  Farxiga (dapagliflozin) 10 mg daily  Trulicity 3.0 mg weekly       History of diabetes:  The patient was initially diagnosed with Type 2 diabetes mellitus:  Over 10 years ago.    Known diabetic complications: nephropathy (CKD Stage 3), peripheral neuropathy, cardiovascular disease and peripheral vascular disease     Cardiovascular risk factors: advanced age (older than 55 for men, 65 for women), diabetes mellitus, dyslipidemia,  "family history of premature cardiovascular disease, hypertension, male gender and microalbuminuria      Other medications tried:  Victoza (liraglutide) - did well with that in the past, but it was too expensive so he was not able to continue after the clinical trial  Januvia - stopped after initiating Trulicity  Ozempic - took for 2 years as part of a clinic trial before FDA approval. Did well with it.    Last visit with Diabetes Educator: Last Education Visit: Not Found      BP Readings from Last 3 Encounters:   05/05/25 134/62   03/24/25 122/60   01/16/25 (!) 180/70       Wt Readings from Last 10 Encounters:   05/05/25 97.5 kg (214 lb 15.2 oz)   03/24/25 95 kg (209 lb 6.4 oz)   01/16/25 99.3 kg (219 lb)   01/15/25 101.5 kg (223 lb 12.3 oz)   06/20/24 99.3 kg (219 lb)   06/10/24 101 kg (222 lb 10.6 oz)   03/06/24 102.5 kg (225 lb 15.5 oz)   02/26/24 103.2 kg (227 lb 8.2 oz)   02/15/24 104 kg (229 lb 4.5 oz)   01/31/24 103.9 kg (229 lb)       Diabetes Management Status    Statin: Taking  ACE/ARB: Taking    Screening or Prevention Patient's value Goal Complete/Controlled?   HgA1C Testing and Control   Lab Results   Component Value Date    HGBA1C 6.9 (H) 11/01/2024      Annually/Less than 8% Yes   Lipid profile : 01/16/2025 Annually Yes   LDL control Lab Results   Component Value Date    LDLCALC 36.0 (L) 01/16/2025    Annually/Less than 100 mg/dl  Yes   Nephropathy screening Lab Results   Component Value Date    LABMICR 272.0 06/13/2024     Lab Results   Component Value Date    PROTEINUA 1+ (A) 06/22/2022     No results found for: "UTPCR"   Annually Yes   Blood pressure BP Readings from Last 1 Encounters:   05/05/25 134/62    Less than 140/90 Yes   Dilated retinal exam : 07/10/2024 Annually Yes   Foot exam   Most Recent Foot Exam Date: Not Found Annually Yes            Low dose ASA?: Yes    Lab Results   Component Value Date    HGBA1C 6.9 (H) 11/01/2024    HGBA1C 8.0 (H) 06/13/2024    HGBA1C 8.0 (H) 01/18/2024    " HGBA1C 7.4 (H) 07/17/2023    HGBA1C 7.6 (H) 05/08/2023    HGBA1C 7.0 (H) 12/22/2022    HGBA1C 7.1 (H) 06/22/2022    HGBA1C 7.5 (H) 04/11/2022    HGBA1C 7.9 (H) 12/09/2021    HGBA1C 8.3 (H) 11/03/2021     Taking Vitamin D3 - 5000 IU once per week. He wanted to know if this is sufficient or not.    Reviewed past medical, family, social history and updated as appropriate.      Objective:       There were no vitals filed for this visit.      BP Readings from Last 5 Encounters:   05/05/25 134/62   03/24/25 122/60   01/16/25 (!) 180/70   01/15/25 130/62   06/20/24 128/69         Physical Exam      Wt Readings from Last 30 Encounters:   05/05/25 0854 97.5 kg (214 lb 15.2 oz)   03/24/25 1601 95 kg (209 lb 6.4 oz)   01/16/25 0954 99.3 kg (219 lb)   01/15/25 0729 101.5 kg (223 lb 12.3 oz)   06/20/24 0936 99.3 kg (219 lb)   06/10/24 0818 101 kg (222 lb 10.6 oz)   03/06/24 0811 102.5 kg (225 lb 15.5 oz)   02/26/24 0934 103.2 kg (227 lb 8.2 oz)   02/15/24 0920 104 kg (229 lb 4.5 oz)   01/31/24 1730 103.9 kg (229 lb)   11/27/23 0946 104 kg (229 lb 4.5 oz)   11/14/23 0859 103.1 kg (227 lb 4.7 oz)   11/07/23 0857 102.5 kg (226 lb)   11/01/23 1114 99.8 kg (220 lb)   09/26/23 0803 102.6 kg (226 lb 3.1 oz)   08/07/23 0854 102.6 kg (226 lb 3.1 oz)   05/15/23 0725 101.9 kg (224 lb 10.4 oz)   09/19/22 1002 100.7 kg (222 lb)   09/02/22 0751 101.1 kg (222 lb 15.9 oz)   08/31/22 1329 101.8 kg (224 lb 5.1 oz)   08/17/22 0810 101.6 kg (224 lb)   06/22/22 0713 101.7 kg (224 lb 3.3 oz)   12/16/21 0729 102.3 kg (225 lb 8.5 oz)   11/18/21 0823 103 kg (227 lb)   11/16/21 0814 103 kg (227 lb)   09/27/21 1118 103.1 kg (227 lb 4.7 oz)   06/02/21 0719 103.7 kg (228 lb 9.9 oz)   04/06/21 0802 104.5 kg (230 lb 4.3 oz)   12/14/20 0807 103.8 kg (228 lb 13.4 oz)   09/14/20 0759 105.3 kg (232 lb 3.2 oz)         Lab Results   Component Value Date    HGBA1C 6.9 (H) 11/01/2024     Lab Results   Component Value Date    CHOL 119 (L) 01/16/2025    HDL 30 (L)  01/16/2025    LDLCALC 36.0 (L) 01/16/2025    TRIG 265 (H) 01/16/2025    CHOLHDL 25.2 01/16/2025     Lab Results   Component Value Date     01/16/2025    K 4.3 01/16/2025     01/16/2025    CO2 23 01/16/2025     (H) 01/16/2025    BUN 20 01/16/2025    CREATININE 1.1 01/16/2025    CALCIUM 9.6 01/16/2025    PROT 7.3 01/16/2025    ALBUMIN 4.0 01/16/2025    BILITOT 0.4 01/16/2025    ALKPHOS 39 (L) 01/16/2025    AST 20 01/16/2025    ALT 21 01/16/2025    ANIONGAP 9 01/16/2025    ESTGFRAFRICA >60.0 06/22/2022    EGFRNONAA 53.8 (A) 06/22/2022    TSH 1.064 06/13/2024      Lab Results   Component Value Date    MICALBCREAT 418.5 (H) 06/13/2024       Assessment/Plan:       Type 2 diabetes mellitus with hyperglycemia, without long-term current use of insulin  Reviewed goals of therapy are to get the best control we can without hypoglycemia.    Currently meeting glycemic target:  Yes based on blood glucose measurements.  Check A1c.      Medication changes:   Continue:  Trulicity 3.0 mg weekly (gets through [employer])  Metformin 1000 mg b.i.d.  Glipizide 10 mg BID with breakfast and supper  Farxiga (dapagliflozin) 10 mg daily      Advised frequent self blood glucose monitoring. Patient encouraged to document glucose results and bring them to every clinic visit. He was not able to get Dexcom 2/2 cost.    Hypoglycemia precautions discussed.     Close adherence to lifestyle changes recommended.      UTD on HM topics. See HPI    Lab Results   Component Value Date    HGBA1C 6.9 (H) 11/01/2024    HGBA1C 8.0 (H) 06/13/2024    HGBA1C 8.0 (H) 01/18/2024       Coronary artery disease involving native coronary artery of native heart without angina pectoris  Trulicity (dulaglutide) has been shown to decrease major adverse cardiac events in high risk patients.   Farxiga (dapagliflozin) has been shown to decrease major adverse cardiac events in high risk patients.    Hyperlipidemia associated with type 2 diabetes mellitus  On  rosuvastatin and fenofibrate.  Check lipids.    Hypertension associated with type 2 diabetes mellitus  BP mostly controlled on the current regimen. He is monitoring his blood pressure regularly.    Vitamin D deficiency  On supplemental vitamin-D - 5000 IU once weekly.  Check vitamin-D level    Albuminuria  On Farxiga (dapagliflozin) and losartan.  Recheck UACR       The patient location is: home  The chief complaint leading to consultation is: diabetes    Visit type: audiovisual    Face to Face time with patient: 20 minutes of total time spent on the encounter, which includes face to face time and non-face to face time preparing to see the patient (eg, review of tests), Obtaining and/or reviewing separately obtained history, Documenting clinical information in the electronic or other health record, Independently interpreting results (not separately reported) and communicating results to the patient/family/caregiver, or Care coordination (not separately reported).     Each patient to whom he or she provides medical services by telemedicine is:  (1) informed of the relationship between the physician and patient and the respective role of any other health care provider with respect to management of the patient; and (2) notified that he or she may decline to receive medical services by telemedicine and may withdraw from such care at any time.    Notes:     F/u 6 months (PIVV link)

## 2025-05-30 ENCOUNTER — LAB VISIT (OUTPATIENT)
Dept: LAB | Facility: HOSPITAL | Age: 80
End: 2025-05-30
Attending: INTERNAL MEDICINE
Payer: MEDICARE

## 2025-05-30 DIAGNOSIS — E55.9 VITAMIN D DEFICIENCY: ICD-10-CM

## 2025-05-30 DIAGNOSIS — E11.69 TYPE 2 DIABETES MELLITUS WITH OTHER SPECIFIED COMPLICATION, WITHOUT LONG-TERM CURRENT USE OF INSULIN: ICD-10-CM

## 2025-05-30 LAB
25(OH)D3+25(OH)D2 SERPL-MCNC: 26 NG/ML (ref 30–96)
ABSOLUTE EOSINOPHIL (OHS): 0.14 K/UL
ABSOLUTE MONOCYTE (OHS): 0.52 K/UL (ref 0.3–1)
ABSOLUTE NEUTROPHIL COUNT (OHS): 4.97 K/UL (ref 1.8–7.7)
ALBUMIN SERPL BCP-MCNC: 4 G/DL (ref 3.5–5.2)
ALBUMIN/CREAT UR: 268 UG/MG
ALP SERPL-CCNC: 39 UNIT/L (ref 40–150)
ALT SERPL W/O P-5'-P-CCNC: 15 UNIT/L (ref 10–44)
ANION GAP (OHS): 9 MMOL/L (ref 8–16)
AST SERPL-CCNC: 14 UNIT/L (ref 11–45)
BASOPHILS # BLD AUTO: 0.05 K/UL
BASOPHILS NFR BLD AUTO: 0.6 %
BILIRUB SERPL-MCNC: 0.2 MG/DL (ref 0.1–1)
BUN SERPL-MCNC: 21 MG/DL (ref 8–23)
CALCIUM SERPL-MCNC: 9.3 MG/DL (ref 8.7–10.5)
CHLORIDE SERPL-SCNC: 105 MMOL/L (ref 95–110)
CHOLEST SERPL-MCNC: 99 MG/DL (ref 120–199)
CHOLEST/HDLC SERPL: 2.5 {RATIO} (ref 2–5)
CO2 SERPL-SCNC: 25 MMOL/L (ref 23–29)
CREAT SERPL-MCNC: 1.1 MG/DL (ref 0.5–1.4)
CREAT UR-MCNC: 128 MG/DL (ref 23–375)
EAG (OHS): 166 MG/DL (ref 68–131)
ERYTHROCYTE [DISTWIDTH] IN BLOOD BY AUTOMATED COUNT: 14.9 % (ref 11.5–14.5)
GFR SERPLBLD CREATININE-BSD FMLA CKD-EPI: >60 ML/MIN/1.73/M2
GLUCOSE SERPL-MCNC: 102 MG/DL (ref 70–110)
HBA1C MFR BLD: 7.4 % (ref 4–5.6)
HCT VFR BLD AUTO: 41 % (ref 40–54)
HDLC SERPL-MCNC: 40 MG/DL (ref 40–75)
HDLC SERPL: 40.4 % (ref 20–50)
HGB BLD-MCNC: 13.1 GM/DL (ref 14–18)
IMM GRANULOCYTES # BLD AUTO: 0.05 K/UL (ref 0–0.04)
IMM GRANULOCYTES NFR BLD AUTO: 0.6 % (ref 0–0.5)
LDLC SERPL CALC-MCNC: 29.2 MG/DL (ref 63–159)
LYMPHOCYTES # BLD AUTO: 2.12 K/UL (ref 1–4.8)
MCH RBC QN AUTO: 27.6 PG (ref 27–31)
MCHC RBC AUTO-ENTMCNC: 32 G/DL (ref 32–36)
MCV RBC AUTO: 87 FL (ref 82–98)
MICROALBUMIN UR-MCNC: 343 UG/ML (ref ?–5000)
NONHDLC SERPL-MCNC: 59 MG/DL
NUCLEATED RBC (/100WBC) (OHS): 0 /100 WBC
PLATELET # BLD AUTO: 275 K/UL (ref 150–450)
PMV BLD AUTO: 10.6 FL (ref 9.2–12.9)
POTASSIUM SERPL-SCNC: 4.2 MMOL/L (ref 3.5–5.1)
PROT SERPL-MCNC: 6.9 GM/DL (ref 6–8.4)
RBC # BLD AUTO: 4.74 M/UL (ref 4.6–6.2)
RELATIVE EOSINOPHIL (OHS): 1.8 %
RELATIVE LYMPHOCYTE (OHS): 27 % (ref 18–48)
RELATIVE MONOCYTE (OHS): 6.6 % (ref 4–15)
RELATIVE NEUTROPHIL (OHS): 63.4 % (ref 38–73)
SODIUM SERPL-SCNC: 139 MMOL/L (ref 136–145)
TRIGL SERPL-MCNC: 149 MG/DL (ref 30–150)
WBC # BLD AUTO: 7.85 K/UL (ref 3.9–12.7)

## 2025-05-30 PROCEDURE — 82465 ASSAY BLD/SERUM CHOLESTEROL: CPT

## 2025-05-30 PROCEDURE — 36415 COLL VENOUS BLD VENIPUNCTURE: CPT | Mod: PO

## 2025-05-30 PROCEDURE — 83036 HEMOGLOBIN GLYCOSYLATED A1C: CPT

## 2025-05-30 PROCEDURE — 85025 COMPLETE CBC W/AUTO DIFF WBC: CPT

## 2025-05-30 PROCEDURE — 82043 UR ALBUMIN QUANTITATIVE: CPT

## 2025-05-30 PROCEDURE — 82306 VITAMIN D 25 HYDROXY: CPT

## 2025-05-30 PROCEDURE — 84460 ALANINE AMINO (ALT) (SGPT): CPT

## 2025-06-02 DIAGNOSIS — E11.69 ONYCHOMYCOSIS OF MULTIPLE TOENAILS WITH TYPE 2 DIABETES MELLITUS: ICD-10-CM

## 2025-06-02 DIAGNOSIS — B35.1 ONYCHOMYCOSIS OF MULTIPLE TOENAILS WITH TYPE 2 DIABETES MELLITUS: ICD-10-CM

## 2025-06-02 RX ORDER — CALCIUM CITRATE/VITAMIN D3 200MG-6.25
TABLET ORAL
Qty: 200 STRIP | Refills: 3 | Status: SHIPPED | OUTPATIENT
Start: 2025-06-02

## 2025-06-10 ENCOUNTER — RESULTS FOLLOW-UP (OUTPATIENT)
Dept: ENDOCRINOLOGY | Facility: CLINIC | Age: 80
End: 2025-06-10

## 2025-06-13 DIAGNOSIS — J30.89 CHRONIC NONSEASONAL ALLERGIC RHINITIS DUE TO POLLEN: ICD-10-CM

## 2025-06-13 RX ORDER — HYDROXYZINE PAMOATE 25 MG/1
CAPSULE ORAL
Qty: 90 CAPSULE | Refills: 2 | Status: SHIPPED | OUTPATIENT
Start: 2025-06-13

## 2025-06-13 RX ORDER — METOPROLOL TARTRATE 100 MG/1
TABLET ORAL
Qty: 90 TABLET | Refills: 2 | Status: SHIPPED | OUTPATIENT
Start: 2025-06-13

## 2025-06-13 RX ORDER — FLUOXETINE 20 MG/1
20 CAPSULE ORAL
Qty: 90 CAPSULE | Refills: 2 | Status: SHIPPED | OUTPATIENT
Start: 2025-06-13

## 2025-06-13 NOTE — TELEPHONE ENCOUNTER
No care due was identified.  [facility] Embedded Care Due Messages. Reference number: [id number].   6/13/2025 12:14:35 AM CDT

## 2025-06-13 NOTE — TELEPHONE ENCOUNTER
Refill Routing Note   Medication(s) are not appropriate for processing by [facility] for the following reason(s):        Outside of protocol  Drug-drug interaction    [facility] action(s):  Route  Approve  Defer      Medication Therapy Plan: Drug-Disease: metoprolol tartrate and PAD (peripheral artery disease); Hypertension associated with type 2 diabetes mellitus; Drug-Drug: cilostazoL and FLUoxetine;    Pharmacist review requested: Yes   Extended chart review required: Yes     Appointments  past 12m or future 3m with PCP    Date Provider   Last Visit   1/15/2025 Layo Jett MD   Next Visit   Visit date not found Layo Jett MD   ED visits in past 90 days: 0        Note composed:9:32 AM 06/13/2025

## 2025-06-13 NOTE — TELEPHONE ENCOUNTER
Refill Routing Note   Medication(s) are not appropriate for processing by [facility] for the following reason(s):        Drug-disease interaction  Drug-drug interaction  Outside of protocol    [facility] action(s):  Defer  Route        Medication Therapy Plan: Drug-Disease: metoprolol tartrate and PAD (peripheral artery disease); Hypertension associated with type 2 diabetes mellitus; Drug-Drug: cilostazoL and FLUoxetine;    Pharmacist review requested: Yes     Appointments  past 12m or future 3m with PCP    Date Provider   Last Visit   1/15/2025 Layo Jett MD   Next Visit   Visit date not found Layo Jett MD   ED visits in past 90 days: 0        Note composed:8:36 AM 06/13/2025

## 2025-07-16 ENCOUNTER — TELEPHONE (OUTPATIENT)
Dept: ENDOCRINOLOGY | Facility: CLINIC | Age: 80
End: 2025-07-16
Payer: MEDICARE

## 2025-07-16 DIAGNOSIS — E11.65 TYPE 2 DIABETES MELLITUS WITH HYPERGLYCEMIA, WITHOUT LONG-TERM CURRENT USE OF INSULIN: ICD-10-CM

## 2025-07-16 RX ORDER — DAPAGLIFLOZIN 10 MG/1
10 TABLET, FILM COATED ORAL DAILY
Qty: 120 TABLET | Refills: 3 | Status: SHIPPED | OUTPATIENT
Start: 2025-07-16

## 2025-07-18 ENCOUNTER — LAB VISIT (OUTPATIENT)
Dept: LAB | Facility: HOSPITAL | Age: 80
End: 2025-07-18
Attending: INTERNAL MEDICINE
Payer: MEDICARE

## 2025-07-18 DIAGNOSIS — R79.9 ABNORMAL FINDING OF BLOOD CHEMISTRY, UNSPECIFIED: ICD-10-CM

## 2025-07-18 DIAGNOSIS — I25.10 CORONARY ARTERY DISEASE INVOLVING NATIVE CORONARY ARTERY OF NATIVE HEART WITHOUT ANGINA PECTORIS: Chronic | ICD-10-CM

## 2025-07-18 LAB
EAG (OHS): 154 MG/DL (ref 68–131)
HBA1C MFR BLD: 7 % (ref 4–5.6)

## 2025-07-18 PROCEDURE — 83036 HEMOGLOBIN GLYCOSYLATED A1C: CPT | Mod: HCNC

## 2025-07-18 PROCEDURE — 36415 COLL VENOUS BLD VENIPUNCTURE: CPT | Mod: HCNC,PO

## 2025-07-23 ENCOUNTER — HOSPITAL ENCOUNTER (OUTPATIENT)
Dept: CARDIOLOGY | Facility: HOSPITAL | Age: 80
Discharge: HOME | End: 2025-07-23
Attending: INTERNAL MEDICINE
Payer: MEDICARE

## 2025-07-23 ENCOUNTER — HOSPITAL ENCOUNTER (OUTPATIENT)
Dept: RADIOLOGY | Facility: HOSPITAL | Age: 80
Discharge: HOME | End: 2025-07-23
Attending: INTERNAL MEDICINE
Payer: MEDICARE

## 2025-07-23 DIAGNOSIS — I72.3 ANEURYSM OF RIGHT ILIAC ARTERY: ICD-10-CM

## 2025-07-23 DIAGNOSIS — I73.9 PAD (PERIPHERAL ARTERY DISEASE): ICD-10-CM

## 2025-07-23 LAB
CREAT SERPL-MCNC: 1.4 MG/DL (ref 0.5–1.4)
IMMEDIATE ARM BP: 153 MMHG
IMMEDIATE LEFT ABI: 0.8
IMMEDIATE LEFT TIBIAL: 122 MMHG
IMMEDIATE RIGHT ABI: 0.69
IMMEDIATE RIGHT TIBIAL: 106 MMHG
LEFT ABI: 1.05
LEFT ARM BP: 123 MMHG
LEFT DORSALIS PEDIS: 102 MMHG
LEFT POSTERIOR TIBIAL: 137 MMHG
LEFT TBI: 0.35
LEFT TOE PRESSURE: 46 MMHG
RIGHT ABI: 0.93
RIGHT ARM BP: 131 MMHG
RIGHT DORSALIS PEDIS: 122 MMHG
RIGHT POSTERIOR TIBIAL: 114 MMHG
RIGHT TBI: 0.27
RIGHT TOE PRESSURE: 36 MMHG
SAMPLE: NORMAL
TOE RAISES: 100

## 2025-07-23 PROCEDURE — 75635 CT ANGIO ABDOMINAL ARTERIES: CPT | Mod: 26,,, | Performed by: RADIOLOGY

## 2025-07-23 PROCEDURE — 25500020 PHARM REV CODE 255: Performed by: INTERNAL MEDICINE

## 2025-07-23 PROCEDURE — 93924 LWR XTR VASC STDY BILAT: CPT | Mod: HCNC,PO

## 2025-07-23 PROCEDURE — 75635 CT ANGIO ABDOMINAL ARTERIES: CPT | Mod: TC

## 2025-07-23 PROCEDURE — 93924 LWR XTR VASC STDY BILAT: CPT | Mod: 26,HCNC,, | Performed by: INTERNAL MEDICINE

## 2025-07-23 RX ADMIN — IOHEXOL 120 ML: 350 INJECTION, SOLUTION INTRAVENOUS at 08:07

## 2025-08-08 PROBLEM — M54.10 BACK PAIN WITH RADICULOPATHY: Status: ACTIVE | Noted: 2025-08-08

## 2025-08-08 PROBLEM — I73.9 CLAUDICATION OF BOTH LOWER EXTREMITIES: Status: ACTIVE | Noted: 2025-08-08

## 2025-08-17 RX ORDER — FENOFIBRATE 160 MG/1
160 TABLET ORAL DAILY
Qty: 90 TABLET | Refills: 1 | Status: SHIPPED | OUTPATIENT
Start: 2025-08-17

## 2025-08-17 RX ORDER — NIFEDIPINE 60 MG/1
60 TABLET, EXTENDED RELEASE ORAL DAILY
Qty: 90 TABLET | Refills: 1 | Status: SHIPPED | OUTPATIENT
Start: 2025-08-17

## 2025-08-17 RX ORDER — LOSARTAN POTASSIUM 100 MG/1
100 TABLET ORAL DAILY
Qty: 90 TABLET | Refills: 1 | Status: SHIPPED | OUTPATIENT
Start: 2025-08-17

## 2025-08-17 RX ORDER — HYDROCHLOROTHIAZIDE 12.5 MG/1
12.5 TABLET ORAL DAILY
Qty: 90 TABLET | Refills: 1 | Status: SHIPPED | OUTPATIENT
Start: 2025-08-17

## 2025-08-18 RX ORDER — CLOPIDOGREL BISULFATE 75 MG/1
75 TABLET ORAL
Qty: 90 TABLET | Refills: 1 | Status: SHIPPED | OUTPATIENT
Start: 2025-08-18

## 2025-08-19 DIAGNOSIS — E11.69 TYPE 2 DIABETES MELLITUS WITH OTHER SPECIFIED COMPLICATION, WITHOUT LONG-TERM CURRENT USE OF INSULIN: ICD-10-CM

## 2025-08-19 RX ORDER — GLIPIZIDE 5 MG/1
10 TABLET ORAL 2 TIMES DAILY WITH MEALS
Qty: 360 TABLET | Refills: 3 | Status: SHIPPED | OUTPATIENT
Start: 2025-08-19 | End: 2026-08-19